# Patient Record
Sex: FEMALE | Race: WHITE | NOT HISPANIC OR LATINO | Employment: OTHER | URBAN - METROPOLITAN AREA
[De-identification: names, ages, dates, MRNs, and addresses within clinical notes are randomized per-mention and may not be internally consistent; named-entity substitution may affect disease eponyms.]

---

## 2017-03-24 DIAGNOSIS — E78.5 HYPERLIPIDEMIA LDL GOAL <100: ICD-10-CM

## 2017-03-24 RX ORDER — SIMVASTATIN 40 MG
40 TABLET ORAL AT BEDTIME
Qty: 90 TABLET | Refills: 3 | Status: CANCELLED | OUTPATIENT
Start: 2017-03-24

## 2017-03-24 NOTE — TELEPHONE ENCOUNTER
Simvastatin     Last Written Prescription Date: 04/07/2016  Last Fill Quantity: 90, # refills: 3  Last Office Visit with G, [facility] or [facility] prescribing provider: 04/07/2016  Next 5 appointments (look out 90 days)     Apr 10, 2017  8:00 AM CDT   PHYSICAL with Carla Carlos MD   [facility]    [address] [address] [facility] [address]   [phone]                   Lab Results   Component Value Date    CHOL 120 04/07/2016     Lab Results   Component Value Date    HDL 44 04/07/2016     Lab Results   Component Value Date    LDL 37 04/07/2016     Lab Results   Component Value Date    TRIG 194 04/07/2016     Lab Results   Component Value Date    CHOLHDLRATIO 3.3 04/06/2015       Niels GRECO (R)

## 2017-03-28 NOTE — TELEPHONE ENCOUNTER
Has appointment scheduled for 4/10/2017. Pharmacy notified should have enough to last until then.    Anabel Dietz RN

## 2017-04-10 ENCOUNTER — OFFICE VISIT (OUTPATIENT)
Dept: FAMILY MEDICINE | Facility: CLINIC | Age: 82
End: 2017-04-10
Payer: COMMERCIAL

## 2017-04-10 VITALS
HEIGHT: 64 IN | HEART RATE: 61 BPM | BODY MASS INDEX: 36.26 KG/M2 | DIASTOLIC BLOOD PRESSURE: 80 MMHG | WEIGHT: 212.4 LBS | SYSTOLIC BLOOD PRESSURE: 147 MMHG

## 2017-04-10 DIAGNOSIS — E78.5 HYPERLIPIDEMIA LDL GOAL <100: ICD-10-CM

## 2017-04-10 DIAGNOSIS — Z00.00 MEDICARE ANNUAL WELLNESS VISIT, SUBSEQUENT: Primary | ICD-10-CM

## 2017-04-10 DIAGNOSIS — E78.5 HYPERLIPIDEMIA, UNSPECIFIED HYPERLIPIDEMIA TYPE: ICD-10-CM

## 2017-04-10 DIAGNOSIS — R73.01 IMPAIRED FASTING GLUCOSE: ICD-10-CM

## 2017-04-10 DIAGNOSIS — I10 BENIGN ESSENTIAL HYPERTENSION: ICD-10-CM

## 2017-04-10 DIAGNOSIS — I10 ESSENTIAL HYPERTENSION: ICD-10-CM

## 2017-04-10 LAB
ANION GAP SERPL CALCULATED.3IONS-SCNC: 10 MMOL/L (ref 3–14)
BUN SERPL-MCNC: 19 MG/DL (ref 7–30)
CALCIUM SERPL-MCNC: 9 MG/DL (ref 8.5–10.1)
CHLORIDE SERPL-SCNC: 105 MMOL/L (ref 94–109)
CHOLEST SERPL-MCNC: 143 MG/DL
CO2 SERPL-SCNC: 25 MMOL/L (ref 20–32)
CREAT SERPL-MCNC: 0.74 MG/DL (ref 0.52–1.04)
GFR SERPL CREATININE-BSD FRML MDRD: 75 ML/MIN/1.7M2
GLUCOSE SERPL-MCNC: 106 MG/DL (ref 70–99)
HBA1C MFR BLD: 5.7 % (ref 4.3–6)
HDLC SERPL-MCNC: 43 MG/DL
LDLC SERPL CALC-MCNC: 66 MG/DL
NONHDLC SERPL-MCNC: 100 MG/DL
POTASSIUM SERPL-SCNC: 3.9 MMOL/L (ref 3.4–5.3)
SODIUM SERPL-SCNC: 140 MMOL/L (ref 133–144)
TRIGL SERPL-MCNC: 172 MG/DL

## 2017-04-10 PROCEDURE — 80048 BASIC METABOLIC PNL TOTAL CA: CPT | Performed by: FAMILY MEDICINE

## 2017-04-10 PROCEDURE — 80061 LIPID PANEL: CPT | Performed by: FAMILY MEDICINE

## 2017-04-10 PROCEDURE — 36415 COLL VENOUS BLD VENIPUNCTURE: CPT | Performed by: FAMILY MEDICINE

## 2017-04-10 PROCEDURE — 83036 HEMOGLOBIN GLYCOSYLATED A1C: CPT | Performed by: FAMILY MEDICINE

## 2017-04-10 PROCEDURE — 99397 PER PM REEVAL EST PAT 65+ YR: CPT | Performed by: FAMILY MEDICINE

## 2017-04-10 RX ORDER — AMLODIPINE BESYLATE 2.5 MG/1
2.5 TABLET ORAL DAILY
Qty: 90 TABLET | Refills: 3 | Status: SHIPPED | OUTPATIENT
Start: 2017-04-10 | End: 2018-01-25

## 2017-04-10 RX ORDER — SIMVASTATIN 40 MG
40 TABLET ORAL AT BEDTIME
Qty: 90 TABLET | Refills: 3 | Status: SHIPPED | OUTPATIENT
Start: 2017-04-10 | End: 2018-01-25

## 2017-04-10 RX ORDER — LISINOPRIL 20 MG/1
20 TABLET ORAL DAILY
Qty: 90 TABLET | Refills: 3 | Status: SHIPPED | OUTPATIENT
Start: 2017-04-10 | End: 2018-01-25

## 2017-04-10 NOTE — LETTER
[facility]  [address]  [facility] [address]  Phone: [phone]    April 13, 2017    Jen Layne  [address] [address]          Dear Jen,    The results of your recent lab tests were your cholesterol levels are fine on her simvastatin.   Your kidney functions and electrolytes are excellent. The fasting blood sugar is still a bit elevated, in the prediabetic range, but the A1C or 3 month average has improved since last year, so she may just recheck this in a year and keep eating a good diet with plenty of fruits and vegetables and whole grains, limited sweets and starches. Enclosed is a copy of these results.  If you have any further questions or problems, please contact our office.  Results for orders placed or performed in visit on 04/10/17   Lipid Profile with reflex to direct LDL   Result Value Ref Range    Cholesterol 143 <200 mg/dL    Triglycerides 172 (H) <150 mg/dL    HDL Cholesterol 43 (L) >49 mg/dL    LDL Cholesterol Calculated 66 <100 mg/dL    Non HDL Cholesterol 100 <130 mg/dL   Basic metabolic panel   Result Value Ref Range    Sodium 140 133 - 144 mmol/L    Potassium 3.9 3.4 - 5.3 mmol/L    Chloride 105 94 - 109 mmol/L    Carbon Dioxide 25 20 - 32 mmol/L    Anion Gap 10 3 - 14 mmol/L    Glucose 106 (H) 70 - 99 mg/dL    Urea Nitrogen 19 7 - 30 mg/dL    Creatinine 0.74 0.52 - 1.04 mg/dL    GFR Estimate 75 >60 mL/min/1.7m2    GFR Estimate If Black >90   GFR Calc   >60 mL/min/1.7m2    Calcium 9.0 8.5 - 10.1 mg/dL   Hemoglobin A1c   Result Value Ref Range    Hemoglobin A1C 5.7 4.3 - 6.0 %     Sincerely,      Carla Carlos MD/ llc

## 2017-04-10 NOTE — NURSING NOTE
"Chief Complaint   Patient presents with     Physical     refill medications and she would like her right foot looked at. Pain on the top of foot after walking.        Initial /80 (BP Location: Right arm, Patient Position: Chair, Cuff Size: Adult Large)  Pulse 61  Ht 5' 4.25\" (1.632 m)  Wt 212 lb 6.4 oz (96.3 kg)  BMI 36.18 kg/m2 Estimated body mass index is 36.18 kg/(m^2) as calculated from the following:    Height as of this encounter: 5' 4.25\" (1.632 m).    Weight as of this encounter: 212 lb 6.4 oz (96.3 kg).  Medication Reconciliation: complete   Danae Roque CMA    "

## 2017-04-10 NOTE — MR AVS SNAPSHOT
After Visit Summary   4/10/2017    Jen Layne    MRN: [id number]           Patient Information     Date Of Birth          1935        Visit Information        Provider Department      4/10/2017 8:00 AM Carla Carlos MD [facility]        Today's Diagnoses     Medicare annual wellness visit, subsequent    -  1    Benign essential hypertension        Impaired fasting glucose        Hyperlipidemia, unspecified hyperlipidemia type        Hyperlipidemia LDL goal <100        Essential hypertension          Care Instructions      Preventive Health Recommendations    Female Ages 65 +  Yearly exam:     See your health care provider every year in order to  o Review health changes.   o Discuss preventive care.    o Review your medicines if your doctor has prescribed any.    You no longer need a yearly Pap test unless you've had an abnormal Pap test in the past 10 years. If you have vaginal symptoms, such as bleeding or discharge, be sure to talk with your provider about a Pap test.    Every 1 to 2 years, have a mammogram.  If you are over 69, talk with your health care provider about whether or not you want to continue having screening mammograms.    Every 10 years, have a colonoscopy. Or, have a yearly FIT test (stool test). These exams will check for colon cancer.     Have a cholesterol test every 5 years, or more often if your doctor advises it.     Have a diabetes test (fasting glucose) every three years. If you are at risk for diabetes, you should have this test more often.     At age 65, have a bone density scan (DEXA) to check for osteoporosis (brittle bone disease).    Shots:    Get a flu shot each year.    Get a tetanus shot every 10 years.    Talk to your doctor about your pneumonia vaccines. There are now two you should receive - Pneumovax (PPSV 23) and Prevnar (PCV 13).    Talk to your doctor about the shingles vaccine.    Talk to your doctor about the hepatitis B  vaccine.  Nutrition:     Eat at least 5 servings of fruits and vegetables each day.    Eat whole-grain bread, whole-wheat pasta and brown rice instead of white grains and rice.    Talk to your provider about Calcium and Vitamin D.     Lifestyle    Exercise at least 150 minutes a week (30 minutes a day, 5 days a week). This will help you control your weight and prevent disease.    Limit alcohol to one drink per day.    No smoking.     Wear sunscreen to prevent skin cancer.     See your dentist twice a year for an exam and cleaning.    See your eye doctor every 1 to 2 years to screen for conditions such as glaucoma, macular degeneration and cataracts.  Thank you for choosing [facility].  You may be receiving a survey in the mail from [employer] regarding your visit today.  Please take a few minutes to complete and return the survey to let us know how we are doing.  Our Clinic hours are:  Mondays    7:20 am - 7 pm  Tues -  Fri  7:20 am - 5 pm  Clinic Phone: [phone]  The clinic lab opens at 7:30 am Mon - Fri and appointments are required.  [facility] [place]  Ph. [phone]  Monday-Thursday 8 am - 7pm  Tues/Wed/Fri 8 am - 5:30 pm               Follow-ups after your visit        Who to contact     If you have questions or need follow up information about today's clinic visit or your schedule please contact [facility]note 3] directly at [phone].  Normal or non-critical lab and imaging results will be communicated to you by MyChart, letter or phone within 4 business days after the clinic has received the results. If you do not hear from us within 7 days, please contact the clinic through [employer] or phone. If you have a critical or abnormal lab result, we will notify you by phone as soon as possible.  Submit refill requests through [employer] or call your pharmacy and they will forward the refill request to us. Please allow 3 business days for your refill to be completed.           "Additional Information About Your Visit        MyChart Information     [employer] lets you send messages to your doctor, view your test results, renew your prescriptions, schedule appointments and more. To sign up, go to [email or web address] . Click on \"Log in\" on the left side of the screen, which will take you to the Welcome page. Then click on \"Sign up Now\" on the right side of the page.     You will be asked to enter the access code listed below, as well as some personal information. Please follow the directions to create your username and password.     Your access code is: [email or web address]  Expires: 2017  8:36 AM     Your access code will  in 90 days. If you need help or a new code, please call your [place] clinic or [phone].        Care EveryWhere ID     This is your [facility] EveryWhere ID. This could be used by other organizations to access your [place] medical records  [id number]        Your Vitals Were     Pulse Height BMI (Body Mass Index)             61 5' 4.25\" (1.632 m) 36.18 kg/m2          Blood Pressure from Last 3 Encounters:   04/10/17 147/80   16 138/76   05/11/15 140/78    Weight from Last 3 Encounters:   04/10/17 212 lb 6.4 oz (96.3 kg)   16 214 lb (97.1 kg)   05/11/15 217 lb 9.6 oz (98.7 kg)              We Performed the Following     Basic metabolic panel     Hemoglobin A1c     Lipid Profile with reflex to direct LDL          Where to get your medicines      These medications were sent to [address] [address]     Phone:  [phone]     amLODIPine 2.5 MG tablet    lisinopril 20 MG tablet    simvastatin 40 MG tablet          Primary Care Provider Office Phone # Fax #    Carla Carlos [phone]       [facility] [address] [address] [address]        Thank you!     Thank you for choosing [facility]  for your care. Our goal is always to provide you " with excellent care. Hearing back from our patients is one way we can continue to improve our services. Please take a few minutes to complete the written survey that you may receive in the mail after your visit with us. Thank you!             Your Updated Medication List - Protect others around you: Learn how to safely use, store and throw away your medicines at www.disposemymeds.org.          This list is accurate as of: 4/10/17  8:36 AM.  Always use your most recent med list.                   Brand Name Dispense Instructions for use    amLODIPine 2.5 MG tablet    NORVASC    90 tablet    Take 1 tablet (2.5 mg) by mouth daily       aspirin 81 MG tablet      1 TABLET DAILY       lisinopril 20 MG tablet    PRINIVIL/ZESTRIL    90 tablet    Take 1 tablet (20 mg) by mouth daily       simvastatin 40 MG tablet    ZOCOR    90 tablet    Take 1 tablet (40 mg) by mouth At Bedtime       vitamin D 1000 UNITS capsule      1 CAPSULE DAILY

## 2017-04-10 NOTE — PROGRESS NOTES
SUBJECTIVE:                                                            Jen Layne is a 81 year old female who presents for Preventive Visit  Are you in the first 12 months of your Medicare Part B coverage?  No    Healthy Habits:    Do you get at least three servings of calcium containing foods daily (dairy, green leafy vegetables, etc.)? yes    Amount of exercise or daily activities, outside of work: 4 day(s) per week    Problems taking medications regularly No    Medication side effects: No    Have you had an eye exam in the past two years? yes    Do you see a dentist twice per year? yes    Do you have sleep apnea, excessive snoring or daytime drowsiness?yes, waking up to go to the bathroom     COGNITIVE SCREEN  1) Repeat 3 items (Banana, Sunrise, Chair)    2) Clock draw: NORMAL  3) 3 item recall: Recalls 3 objects  Results: 3 items recalled: COGNITIVE IMPAIRMENT LESS LIKELY  Mini-CogTM Copyright S Adrianna. Licensed by the author for use in [facility]; reprinted with permission (brittani@[facility]). All rights reserved.          Reviewed and updated as needed this visit by clinical staff  Tobacco  Allergies  Meds  Med Hx  Surg Hx  Fam Hx  Soc Hx        Reviewed and updated as needed this visit by Provider       Social History   Substance Use Topics     Smoking status: Never Smoker     Smokeless tobacco: Never Used     Alcohol use No       The patient does not drink >3 drinks per day nor >7 drinks per week.    Today's PHQ-2 Score:   PHQ-2 ( 1999 Pfizer) 4/10/2017 5/11/2015   Q1: Little interest or pleasure in doing things 0 0   Q2: Feeling down, depressed or hopeless 0 0   PHQ-2 Score 0 0     Do you feel safe in your environment - Yes    Do you have a Health Care Directive?: Yes: Advance Directive has been received and scanned. Up to date within the last year, per patient.    Current providers sharing in care for this patient include:   Patient Care Team:  Crala Carlos MD as PCP -  "General    Hearing impairment: No    Ability to successfully perform activities of daily living: Yes, no assistance needed     Fall risk:  Fallen 2 or more times in the past year?: No  Any fall with injury in the past year?: No    Home safety:  none identified      The following health maintenance items are reviewed in Epic and correct as of today:  Health Maintenance   Topic Date Due     FALL RISK ASSESSMENT  04/07/2017     INFLUENZA VACCINE (SYSTEM ASSIGNED)  09/01/2017     LIPID SCREEN Q5 YR FEMALE (SYSTEM ASSIGNED)  04/07/2021     ADVANCE DIRECTIVE PLANNING Q5 YRS (NO INBASKET)  12/06/2021     TETANUS IMMUNIZATION (SYSTEM ASSIGNED)  03/21/2023     DEXA SCAN SCREENING (SYSTEM ASSIGNED)  Completed     PNEUMOCOCCAL  Completed         Jen GRANDE Roverot is a 81 year old female here for a wellness exam. She is on lisinopril and amlodipine for hypertension, simvastatin for lipids, denies any problems with her medications.  She also takes a daily aspirin and vitamin D supplement.    She has been feeling well, denies any problems, occasionally takes an Aleve for aches and pains in her right foot.  She eats well, sleeps well, has good energy.    ROS:  Constitutional, HEENT, cardiovascular, pulmonary, gi and gu systems are negative, except as otherwise noted.      OBJECTIVE:                                                            /80 (BP Location: Right arm, Patient Position: Chair, Cuff Size: Adult Large)  Pulse 61  Ht 5' 4.25\" (1.632 m)  Wt 212 lb 6.4 oz (96.3 kg)  BMI 36.18 kg/m2 Estimated body mass index is 36.18 kg/(m^2) as calculated from the following:    Height as of this encounter: 5' 4.25\" (1.632 m).    Weight as of this encounter: 212 lb 6.4 oz (96.3 kg).  EXAM:   She is alert, oriented, overweight but weight has been stable for years.  She is in good spirits.  She has normal gait, has no trouble getting on the exam table.  The lungs are clear without wheezes, rales, or rhonchi.  Heart sounds are " "regular without murmur.  The abdomen is benign.  There is no edema.    Component      Latest Ref Rng & Units 4/7/2016 4/10/2017   Sodium      133 - 144 mmol/L 139 140   Potassium      3.4 - 5.3 mmol/L 4.2 3.9   Chloride      94 - 109 mmol/L 106 105   Carbon Dioxide      20 - 32 mmol/L 26 25   Anion Gap      3 - 14 mmol/L 7 10   Glucose      70 - 99 mg/dL 105 (H) 106 (H)   Urea Nitrogen      7 - 30 mg/dL 17 19   Creatinine      0.52 - 1.04 mg/dL 0.80 0.74   GFR Estimate      >60 mL/min/1.7m2 70 75   GFR Estimate If Black      >60 mL/min/1.7m2 84 >90 . . .   Calcium      8.5 - 10.1 mg/dL 9.2 9.0   Cholesterol      <200 mg/dL 120 143   Triglycerides      <150 mg/dL 194 (H) 172 (H)   HDL Cholesterol      >49 mg/dL 44 (L) 43 (L)   LDL Cholesterol Calculated      <100 mg/dL 37 66   Non HDL Cholesterol      <130 mg/dL 76 100   Hemoglobin A1C      4.3 - 6.0 % 6.0 5.7       ASSESSMENT / PLAN:                                                            1) wellness exam  2) hypertension, controlled on current medications -- renew meds  3) hyperlipidemia, controlled on current meds -- renew statin  4) impaired fasting glucose with improvement on A1C monitoring    End of Life Planning:  Patient currently has an advanced directive: Yes.  Practitioner is supportive of decision.    COUNSELING:  Reviewed preventive health counseling, as reflected in patient instructions       Regular exercise       Healthy diet/nutrition        Estimated body mass index is 36.18 kg/(m^2) as calculated from the following:    Height as of this encounter: 5' 4.25\" (1.632 m).    Weight as of this encounter: 212 lb 6.4 oz (96.3 kg).     reports that she has never smoked. She has never used smokeless tobacco.      Appropriate preventive services were discussed with this patient, including applicable screening as appropriate for cardiovascular disease, diabetes, osteopenia/osteoporosis, and glaucoma.  As appropriate for age/gender, discussed screening for " colorectal cancer, prostate cancer, breast cancer, and cervical cancer. Checklist reviewing preventive services available has been given to the patient.    Reviewed patients plan of care and provided an AVS. The Basic Care Plan (routine screening as documented in Health Maintenance) for Jen meets the Care Plan requirement. This Care Plan has been established and reviewed with the Patient.    Counseling Resources:  ATP IV Guidelines  Pooled Cohorts Equation Calculator  Breast Cancer Risk Calculator  FRAX Risk Assessment  ICSI Preventive Guidelines  Dietary Guidelines for Americans, 2010  USDA's MyPlate  ASA Prophylaxis  Lung CA Screening    Carla Carlos MD  [facility]

## 2017-04-10 NOTE — PATIENT INSTRUCTIONS
Preventive Health Recommendations    Female Ages 65 +  Yearly exam:     See your health care provider every year in order to  o Review health changes.   o Discuss preventive care.    o Review your medicines if your doctor has prescribed any.    You no longer need a yearly Pap test unless you've had an abnormal Pap test in the past 10 years. If you have vaginal symptoms, such as bleeding or discharge, be sure to talk with your provider about a Pap test.    Every 1 to 2 years, have a mammogram.  If you are over 69, talk with your health care provider about whether or not you want to continue having screening mammograms.    Every 10 years, have a colonoscopy. Or, have a yearly FIT test (stool test). These exams will check for colon cancer.     Have a cholesterol test every 5 years, or more often if your doctor advises it.     Have a diabetes test (fasting glucose) every three years. If you are at risk for diabetes, you should have this test more often.     At age 65, have a bone density scan (DEXA) to check for osteoporosis (brittle bone disease).    Shots:    Get a flu shot each year.    Get a tetanus shot every 10 years.    Talk to your doctor about your pneumonia vaccines. There are now two you should receive - Pneumovax (PPSV 23) and Prevnar (PCV 13).    Talk to your doctor about the shingles vaccine.    Talk to your doctor about the hepatitis B vaccine.  Nutrition:     Eat at least 5 servings of fruits and vegetables each day.    Eat whole-grain bread, whole-wheat pasta and brown rice instead of white grains and rice.    Talk to your provider about Calcium and Vitamin D.     Lifestyle    Exercise at least 150 minutes a week (30 minutes a day, 5 days a week). This will help you control your weight and prevent disease.    Limit alcohol to one drink per day.    No smoking.     Wear sunscreen to prevent skin cancer.     See your dentist twice a year for an exam and cleaning.    See your eye doctor every 1 to 2 years  to screen for conditions such as glaucoma, macular degeneration and cataracts.  Thank you for choosing [facility].  You may be receiving a survey in the mail from [employer] regarding your visit today.  Please take a few minutes to complete and return the survey to let us know how we are doing.  Our Clinic hours are:  Mondays    7:20 am - 7 pm  Tues -  Fri  7:20 am - 5 pm  Clinic Phone: [phone]  The clinic lab opens at 7:30 am Mon - Fri and appointments are required.  [facility] [place]  Ph. [phone]  Monday-Thursday 8 am - 7pm  Tues/Wed/Fri 8 am - 5:30 pm

## 2018-01-25 ENCOUNTER — OFFICE VISIT (OUTPATIENT)
Dept: FAMILY MEDICINE | Facility: CLINIC | Age: 83
End: 2018-01-25
Payer: COMMERCIAL

## 2018-01-25 VITALS
RESPIRATION RATE: 20 BRPM | HEART RATE: 86 BPM | TEMPERATURE: 97.6 F | WEIGHT: 206.8 LBS | BODY MASS INDEX: 35.3 KG/M2 | HEIGHT: 64 IN | DIASTOLIC BLOOD PRESSURE: 81 MMHG | SYSTOLIC BLOOD PRESSURE: 139 MMHG

## 2018-01-25 DIAGNOSIS — I10 ESSENTIAL HYPERTENSION: ICD-10-CM

## 2018-01-25 DIAGNOSIS — E78.5 HYPERLIPIDEMIA LDL GOAL <100: ICD-10-CM

## 2018-01-25 PROCEDURE — 99214 OFFICE O/P EST MOD 30 MIN: CPT | Performed by: FAMILY MEDICINE

## 2018-01-25 RX ORDER — AMLODIPINE BESYLATE 2.5 MG/1
2.5 TABLET ORAL DAILY
Qty: 90 TABLET | Refills: 3 | Status: SHIPPED | OUTPATIENT
Start: 2018-01-25 | End: 2019-02-27

## 2018-01-25 RX ORDER — LISINOPRIL 20 MG/1
20 TABLET ORAL DAILY
Qty: 90 TABLET | Refills: 3 | Status: SHIPPED | OUTPATIENT
Start: 2018-01-25 | End: 2018-04-19

## 2018-01-25 RX ORDER — SIMVASTATIN 40 MG
40 TABLET ORAL AT BEDTIME
Qty: 90 TABLET | Refills: 3 | Status: SHIPPED | OUTPATIENT
Start: 2018-01-25 | End: 2018-04-19

## 2018-01-25 ASSESSMENT — PAIN SCALES - GENERAL: PAINLEVEL: NO PAIN (0)

## 2018-01-25 NOTE — MR AVS SNAPSHOT
"              After Visit Summary   2018    Jen Layne    MRN: [id number]           Patient Information     Date Of Birth          1935        Visit Information        Provider Department      2018 10:20 AM Yamile Cotter MD [facility]        Today's Diagnoses     Hyperlipidemia LDL goal <100        Essential hypertension           Follow-ups after your visit        Who to contact     If you have questions or need follow up information about today's clinic visit or your schedule please contact [facility] directly at [phone].  Normal or non-critical lab and imaging results will be communicated to you by [employer], letter or phone within 4 business days after the clinic has received the results. If you do not hear from us within 7 days, please contact the clinic through [employer] or phone. If you have a critical or abnormal lab result, we will notify you by phone as soon as possible.  Submit refill requests through [employer] or call your pharmacy and they will forward the refill request to us. Please allow 3 business days for your refill to be completed.          Additional Information About Your Visit        MyChart Information     [employer] lets you send messages to your doctor, view your test results, renew your prescriptions, schedule appointments and more. To sign up, go to [email or web address] . Click on \"Log in\" on the left side of the screen, which will take you to the Welcome page. Then click on \"Sign up Now\" on the right side of the page.     You will be asked to enter the access code listed below, as well as some personal information. Please follow the directions to create your username and password.     Your access code is: [email or web address]  Expires: 2018 11:31 AM     Your access code will  in 90 days. If you need help or a new code, please call your [facility] or [phone].        Care EveryWhere ID     This is your Care " "EveryWhere ID. This could be used by other organizations to access your [place] medical records  [id number]        Your Vitals Were     Pulse Temperature Respirations Height Breastfeeding? BMI (Body Mass Index)    86 97.6  F (36.4  C) (Tympanic) 20 5' 4\" (1.626 m) No 35.5 kg/m2       Blood Pressure from Last 3 Encounters:   01/25/18 139/81   04/10/17 147/80   04/07/16 138/76    Weight from Last 3 Encounters:   01/25/18 206 lb 12.8 oz (93.8 kg)   04/10/17 212 lb 6.4 oz (96.3 kg)   04/07/16 214 lb (97.1 kg)              Today, you had the following     No orders found for display         Where to get your medicines      These medications were sent to [place] [address] [address]     Phone:  [phone]     amLODIPine 2.5 MG tablet    lisinopril 20 MG tablet    simvastatin 40 MG tablet          Primary Care Provider    Carla Carlos MD       No address on file        Equal Access to Services     [facility]: Hadii maryam lackey hadasho Soricardo, waaxda luqadaha, qaybta kaalmada adebo, beka landaverde . So [facility] [phone].    ATENCIÓN: Si habla español, tiene a pringle disposición servicios gratuitos de asistencia lingüística. [facility] [phone].    We comply with applicable federal civil rights laws and [place] laws. We do not discriminate on the basis of race, color, national origin, age, disability, sex, sexual orientation, or gender identity.            Thank you!     Thank you for choosing [facility]  for your care. Our goal is always to provide you with excellent care. Hearing back from our patients is one way we can continue to improve our services. Please take a few minutes to complete the written survey that you may receive in the mail after your visit with us. Thank you!             Your Updated Medication List - Protect others around you: Learn how to safely use, store and throw away your medicines at " www.disposemymeds.org.          This list is accurate as of 1/25/18 11:31 AM.  Always use your most recent med list.                   Brand Name Dispense Instructions for use Diagnosis    amLODIPine 2.5 MG tablet    NORVASC    90 tablet    Take 1 tablet (2.5 mg) by mouth daily    Essential hypertension       aspirin 81 MG tablet      1 TABLET DAILY        lisinopril 20 MG tablet    PRINIVIL/ZESTRIL    90 tablet    Take 1 tablet (20 mg) by mouth daily    Essential hypertension       simvastatin 40 MG tablet    ZOCOR    90 tablet    Take 1 tablet (40 mg) by mouth At Bedtime    Hyperlipidemia LDL goal <100       vitamin D 1000 UNITS capsule      1 CAPSULE DAILY

## 2018-04-19 ENCOUNTER — OFFICE VISIT (OUTPATIENT)
Dept: FAMILY MEDICINE | Facility: CLINIC | Age: 83
End: 2018-04-19
Payer: COMMERCIAL

## 2018-04-19 VITALS
TEMPERATURE: 97.6 F | HEART RATE: 63 BPM | DIASTOLIC BLOOD PRESSURE: 80 MMHG | SYSTOLIC BLOOD PRESSURE: 135 MMHG | BODY MASS INDEX: 35.89 KG/M2 | HEIGHT: 64 IN | OXYGEN SATURATION: 96 % | WEIGHT: 210.2 LBS | RESPIRATION RATE: 18 BRPM

## 2018-04-19 DIAGNOSIS — E66.9 OBESITY (BMI 30-39.9): ICD-10-CM

## 2018-04-19 DIAGNOSIS — Z78.0 POST-MENOPAUSAL: ICD-10-CM

## 2018-04-19 DIAGNOSIS — E78.5 HYPERLIPIDEMIA LDL GOAL <100: ICD-10-CM

## 2018-04-19 DIAGNOSIS — Z71.89 ADVANCED DIRECTIVES, COUNSELING/DISCUSSION: Chronic | ICD-10-CM

## 2018-04-19 DIAGNOSIS — I10 ESSENTIAL HYPERTENSION: ICD-10-CM

## 2018-04-19 DIAGNOSIS — R73.01 IMPAIRED FASTING GLUCOSE: ICD-10-CM

## 2018-04-19 DIAGNOSIS — Z00.00 WELL ADULT EXAM: Primary | ICD-10-CM

## 2018-04-19 LAB
ANION GAP SERPL CALCULATED.3IONS-SCNC: 7 MMOL/L (ref 3–14)
BUN SERPL-MCNC: 18 MG/DL (ref 7–30)
CALCIUM SERPL-MCNC: 8.9 MG/DL (ref 8.5–10.1)
CHLORIDE SERPL-SCNC: 107 MMOL/L (ref 94–109)
CHOLEST SERPL-MCNC: 107 MG/DL
CO2 SERPL-SCNC: 25 MMOL/L (ref 20–32)
CREAT SERPL-MCNC: 0.73 MG/DL (ref 0.52–1.04)
GFR SERPL CREATININE-BSD FRML MDRD: 77 ML/MIN/1.7M2
GLUCOSE SERPL-MCNC: 92 MG/DL (ref 70–99)
HBA1C MFR BLD: 5.7 % (ref 0–5.6)
HDLC SERPL-MCNC: 38 MG/DL
LDLC SERPL CALC-MCNC: 36 MG/DL
NONHDLC SERPL-MCNC: 69 MG/DL
POTASSIUM SERPL-SCNC: 4 MMOL/L (ref 3.4–5.3)
SODIUM SERPL-SCNC: 139 MMOL/L (ref 133–144)
TRIGL SERPL-MCNC: 163 MG/DL

## 2018-04-19 PROCEDURE — 83036 HEMOGLOBIN GLYCOSYLATED A1C: CPT | Performed by: FAMILY MEDICINE

## 2018-04-19 PROCEDURE — 80048 BASIC METABOLIC PNL TOTAL CA: CPT | Performed by: FAMILY MEDICINE

## 2018-04-19 PROCEDURE — 80061 LIPID PANEL: CPT | Performed by: FAMILY MEDICINE

## 2018-04-19 PROCEDURE — 99397 PER PM REEVAL EST PAT 65+ YR: CPT | Performed by: FAMILY MEDICINE

## 2018-04-19 PROCEDURE — 36415 COLL VENOUS BLD VENIPUNCTURE: CPT | Performed by: FAMILY MEDICINE

## 2018-04-19 RX ORDER — LISINOPRIL 20 MG/1
20 TABLET ORAL DAILY
Qty: 90 TABLET | Refills: 3 | Status: SHIPPED | OUTPATIENT
Start: 2018-04-19 | End: 2019-04-04

## 2018-04-19 RX ORDER — SIMVASTATIN 40 MG
40 TABLET ORAL AT BEDTIME
Qty: 90 TABLET | Refills: 3 | Status: SHIPPED | OUTPATIENT
Start: 2018-04-19 | End: 2019-04-04

## 2018-04-19 ASSESSMENT — PAIN SCALES - GENERAL: PAINLEVEL: NO PAIN (0)

## 2018-04-19 NOTE — LETTER
[facility]  [address]  [facility] [address]  Phone: [phone]      5/11/2018     Jen Layne  [address] [facility] [address] [facility] [address]      Dear Jen:    Thank you for allowing me to participate in your care. Your recent test results were reviewed and listed below.  This looks good.     Your results are provided below for your review  Results for orders placed or performed in visit on 04/19/18   Basic metabolic panel   Result Value Ref Range    Sodium 139 133 - 144 mmol/L    Potassium 4.0 3.4 - 5.3 mmol/L    Chloride 107 94 - 109 mmol/L    Carbon Dioxide 25 20 - 32 mmol/L    Anion Gap 7 3 - 14 mmol/L    Glucose 92 70 - 99 mg/dL    Urea Nitrogen 18 7 - 30 mg/dL    Creatinine 0.73 0.52 - 1.04 mg/dL    GFR Estimate 77 >60 mL/min/1.7m2    GFR Estimate If Black >90 >60 mL/min/1.7m2    Calcium 8.9 8.5 - 10.1 mg/dL   Lipid Profile (Chol, Trig, HDL, LDL calc)   Result Value Ref Range    Cholesterol 107 <200 mg/dL    Triglycerides 163 (H) <150 mg/dL    HDL Cholesterol 38 (L) >49 mg/dL    LDL Cholesterol Calculated 36 <100 mg/dL    Non HDL Cholesterol 69 <130 mg/dL   Hemoglobin A1c   Result Value Ref Range    Hemoglobin A1C 5.7 (H) 0 - 5.6 %                 Thank you for choosing [facility] [place]. As a result, please continue with the treatment plan discussed in the office. Return as discussed or sooner if symptoms worsen or fail to improve. If you have any further questions or concerns, please do not hesitate to contact us.      Sincerely,        Dr. Yamile Cotter

## 2018-04-19 NOTE — MR AVS SNAPSHOT
After Visit Summary   4/19/2018    Jen Layne    MRN: [id number]           Patient Information     Date Of Birth          1935        Visit Information        Provider Department      4/19/2018 8:40 AM Yamile Cotter MD [facility]        Today's Diagnoses     Impaired fasting glucose    -  1    Advance Care Planning        Obesity (BMI 30-39.9)        Essential hypertension        Hyperlipidemia LDL goal <100        Post-menopausal          Care Instructions      Preventive Health Recommendations    Female Ages 65 +    Yearly exam:     See your health care provider every year in order to  o Review health changes.   o Discuss preventive care.    o Review your medicines if your doctor has prescribed any.      You no longer need a yearly Pap test unless you've had an abnormal Pap test in the past 10 years. If you have vaginal symptoms, such as bleeding or discharge, be sure to talk with your provider about a Pap test.      Every 1 to 2 years, have a mammogram.  If you are over 69, talk with your health care provider about whether or not you want to continue having screening mammograms.      Every 10 years, have a colonoscopy. Or, have a yearly FIT test (stool test). These exams will check for colon cancer.       Have a cholesterol test every 5 years, or more often if your doctor advises it.       Have a diabetes test (fasting glucose) every three years. If you are at risk for diabetes, you should have this test more often.       At age 65, have a bone density scan (DEXA) to check for osteoporosis (brittle bone disease).    Shots:    Get a flu shot each year.    Get a tetanus shot every 10 years.    Talk to your doctor about your pneumonia vaccines. There are now two you should receive - Pneumovax (PPSV 23) and Prevnar (PCV 13).    Talk to your doctor about the shingles vaccine.    Talk to your doctor about the hepatitis B vaccine.    Nutrition:     Eat at least 5  "servings of fruits and vegetables each day.      Eat whole-grain bread, whole-wheat pasta and brown rice instead of white grains and rice.      Talk to your provider about Calcium and Vitamin D.     Lifestyle    Exercise at least 150 minutes a week (30 minutes a day, 5 days a week). This will help you control your weight and prevent disease.      Limit alcohol to one drink per day.      No smoking.       Wear sunscreen to prevent skin cancer.       See your dentist twice a year for an exam and cleaning.      See your eye doctor every 1 to 2 years to screen for conditions such as glaucoma, macular degeneration and cataracts.          Follow-ups after your visit        Future tests that were ordered for you today     Open Future Orders        Priority Expected Expires Ordered    DX Hip/Pelvis/Spine Routine  4/19/2019 4/19/2018            Who to contact     If you have questions or need follow up information about today's clinic visit or your schedule please contact [facility] directly at [phone].  Normal or non-critical lab and imaging results will be communicated to you by [employer], letter or phone within 4 business days after the clinic has received the results. If you do not hear from us within 7 days, please contact the clinic through G[employer] Messagingt or phone. If you have a critical or abnormal lab result, we will notify you by phone as soon as possible.  Submit refill requests through foodjunky or call your pharmacy and they will forward the refill request to us. Please allow 3 business days for your refill to be completed.          Additional Information About Your Visit        foodjunky Information     foodjunky lets you send messages to your doctor, view your test results, renew your prescriptions, schedule appointments and more. To sign up, go to [email or web address] . Click on \"Log in\" on the left side of the screen, which will take you to the Welcome page. Then click on \"Sign up Now\" on the " "right side of the page.     You will be asked to enter the access code listed below, as well as some personal information. Please follow the directions to create your username and password.     Your access code is: [email or web address]  Expires: 2018 12:31 PM     Your access code will  in 90 days. If you need help or a new code, please call your [facility] clinic or [phone].        Care EveryWhere ID     This is your Care EveryWhere ID. This could be used by other organizations to access your [facility] medical records  [id number]        Your Vitals Were     Pulse Temperature Respirations Height Pulse Oximetry Breastfeeding?    63 97.6  F (36.4  C) (Tympanic) 18 5' 4\" (1.626 m) 96% No    BMI (Body Mass Index)                   36.08 kg/m2            Blood Pressure from Last 3 Encounters:   18 135/80   18 139/81   04/10/17 147/80    Weight from Last 3 Encounters:   18 210 lb 3.2 oz (95.3 kg)   18 206 lb 12.8 oz (93.8 kg)   04/10/17 212 lb 6.4 oz (96.3 kg)              We Performed the Following     Basic metabolic panel     Hemoglobin A1c     Lipid Profile (Chol, Trig, HDL, LDL calc)          Where to get your medicines      These medications were sent to [place] [address] [facility] [address]     Phone:  [phone]     lisinopril 20 MG tablet    simvastatin 40 MG tablet          Primary Care Provider Office Phone # Fax #    Yamile Cotter [phone] [address] [facility] [address]        Equal Access to Services     [facility]: Hadii maryam Orozco, waaxda luqadaha, qaybta kaalmada adeedyada, beka bocanegra. So [facility] [phone].    ATENCIÓN: Si habla español, tiene a pringle disposición servicios gratuitos de asistencia lingüística. Llame al [phone].    We comply with applicable federal civil rights laws and [place] laws. We do not discriminate on the basis " of race, color, national origin, age, disability, sex, sexual orientation, or gender identity.            Thank you!     Thank you for choosing [facility]  for your care. Our goal is always to provide you with excellent care. Hearing back from our patients is one way we can continue to improve our services. Please take a few minutes to complete the written survey that you may receive in the mail after your visit with us. Thank you!             Your Updated Medication List - Protect others around you: Learn how to safely use, store and throw away your medicines at www.disposemymeds.org.          This list is accurate as of 4/19/18  9:30 AM.  Always use your most recent med list.                   Brand Name Dispense Instructions for use Diagnosis    amLODIPine 2.5 MG tablet    NORVASC    90 tablet    Take 1 tablet (2.5 mg) by mouth daily    Essential hypertension       aspirin 81 MG tablet      1 TABLET DAILY        lisinopril 20 MG tablet    PRINIVIL/ZESTRIL    90 tablet    Take 1 tablet (20 mg) by mouth daily    Essential hypertension       simvastatin 40 MG tablet    ZOCOR    90 tablet    Take 1 tablet (40 mg) by mouth At Bedtime    Hyperlipidemia LDL goal <100       vitamin D 1000 units capsule      1 CAPSULE DAILY

## 2018-04-19 NOTE — PROGRESS NOTES
SUBJECTIVE:   Jen Layne is a 82 year old female who presents for Preventive Visit.     Discussed that additional charges may be applied for addressing additional concerns at today's visit.  Patient verbalized understanding and would like additional concerns addressed today.     Are you in the first 12 months of your Medicare Part B coverage?  No    Healthy Habits:    Do you get at least three servings of calcium containing foods daily (dairy, green leafy vegetables, etc.)? yes    Amount of exercise or daily activities, outside of work: 1-2 day(s) per week    Problems taking medications regularly No    Medication side effects: No    Have you had an eye exam in the past two years? yes    Do you see a dentist twice per year?  yes    Do you have sleep apnea, excessive snoring or daytime drowsiness?no      Ability to successfully perform activities of daily living: Yes, no assistance needed    Home safety:  none identified     Hearing impairment: No    Fall risk:         COGNITIVE SCREEN  1) Repeat 3 items (Banana, Sunrise, Chair)    2) Clock draw: NORMAL  3) 3 item recall: Recalls 3 objects  Results: 3 items recalled: COGNITIVE IMPAIRMENT LESS LIKELY    Mini-CogTM Copyright S Adrianna. Licensed by the author for use in [facility]; reprinted with permission ([email or web address]). All rights reserved.      Chief Complaint   Patient presents with     Wellness Visit     dizziness when lying down at night x 1 week. No dizziness during the day. No CVA symptoms - weakness, speech etc. No URI symptoms.  Has gotten significantly better - almost gone now.        Reviewed and updated as needed this visit by clinical staff         Reviewed and updated as needed this visit by Provider        Social History   Substance Use Topics     Smoking status: Never Smoker     Smokeless tobacco: Never Used     Alcohol use No       If you drink alcohol do you typically have >3 drinks per day or >7 drinks per week? No                         Today's PHQ-2 Score:   PHQ-2 (  [employer]) 2018 4/10/2017   Q1: Little interest or pleasure in doing things 0 0   Q2: Feeling down, depressed or hopeless 0 0   PHQ-2 Score 0 0       Do you feel safe in your environment - Yes    Do you have a Health Care Directive?: Yes: Advance Directive has been received and scanned.    Current providers sharing in care for this patient include:   Patient Care Team:  Carla Carlos MD as PCP - General    The following health maintenance items are reviewed in Epic and correct as of today:  Health Maintenance   Topic Date Due     INFLUENZA VACCINE (1) 2017     FALL RISK ASSESSMENT  2019     ADVANCE DIRECTIVE PLANNING Q5 YRS  2021     TETANUS IMMUNIZATION (SYSTEM ASSIGNED)  2023     DEXA SCAN SCREENING (SYSTEM ASSIGNED)  Completed     PNEUMOCOCCAL  Completed     Labs reviewed in EPIC  Patient Active Problem List   Diagnosis     Hypertension     Hyperlipidemia LDL goal <100     Obesity (BMI 30-39.9)     CKD (chronic kidney disease) stage 2, GFR 60-89 ml/min     Advance Care Planning     Impaired fasting glucose     Metabolic syndrome     Past Surgical History:   Procedure Laterality Date     COLONOSCOPY  05    normal, recheck 10 years -- done at [facility] EYE  2018    Capsulotomy Left eye.   Dr. Skip Villela     HC REMOVAL GALLBLADDER       HYSTEROSCOPY,DIAGNOSTIC  around        Social History   Substance Use Topics     Smoking status: Never Smoker     Smokeless tobacco: Never Used     Alcohol use No     Family History   Problem Relation Age of Onset     CANCER Sister      uterine and ovarian-in remission      HEART DISEASE Brother      valve replacement     Lipids Brother      Eye Disorder Mother      glaucoma     HEART DISEASE Mother       of CHF     Eye Disorder Brother      detached retina     CANCER Son      SKIN     CANCER Paternal Grandfather      carcinoma of the lip, pipe-smoker     Thyroid Disease  "Daughter      goiter or nodule?     DIABETES No family hx of      C.A.D. No family hx of      Breast Cancer No family hx of      Cancer - colorectal No family hx of          Current Outpatient Prescriptions   Medication Sig Dispense Refill     amLODIPine (NORVASC) 2.5 MG tablet Take 1 tablet (2.5 mg) by mouth daily 90 tablet 3     ASPIRIN 81 MG OR TABS 1 TABLET DAILY       lisinopril (PRINIVIL/ZESTRIL) 20 MG tablet Take 1 tablet (20 mg) by mouth daily 90 tablet 3     simvastatin (ZOCOR) 40 MG tablet Take 1 tablet (40 mg) by mouth At Bedtime 90 tablet 3     VITAMIN D 1000 UNIT OR CAPS 1 CAPSULE DAILY       Allergies   Allergen Reactions     Hydrochlorothiazide      Triggered two gout attacks, no further problmes since discontinuing drug       Mammogram Screening: Patient over age 75, has elected to stop mammography screening.    ROS:  Constitutional, neuro, ENT, endocrine, pulmonary, cardiac, gastrointestinal, genitourinary, musculoskeletal, integument and psychiatric systems are negative, except as otherwise noted.     OBJECTIVE:   There were no vitals taken for this visit. Estimated body mass index is 35.5 kg/(m^2) as calculated from the following:    Height as of 1/25/18: 5' 4\" (1.626 m).    Weight as of 1/25/18: 206 lb 12.8 oz (93.8 kg).  EXAM:   GENERAL APPEARANCE: healthy, alert and no distress  EYES: Eyes grossly normal to inspection, PERRL and conjunctivae and sclerae normal  HENT: ear canals and TM's normal, nose and mouth without ulcers or lesions, oropharynx clear and oral mucous membranes moist  NECK: no adenopathy, no asymmetry, masses, or scars and thyroid normal to palpation  RESP: lungs clear to auscultation - no rales, rhonchi or wheezes  BREAST: normal without masses, tenderness or nipple discharge and no palpable axillary masses or adenopathy  CV: regular rate and rhythm, normal S1 S2, no S3 or S4, no murmur, click or rub, no peripheral edema and peripheral pulses strong  ABDOMEN: soft, nontender, " "no hepatosplenomegaly, no masses and bowel sounds normal  MS: no musculoskeletal defects are noted and gait is age appropriate without ataxia  SKIN: no suspicious lesions or rashes  NEURO: Normal strength and tone, sensory exam grossly normal, mentation intact and speech normal  PSYCH: mentation appears normal and affect normal/bright    ASSESSMENT / PLAN:   1. Well adult exam    2. Impaired fasting glucose  Check labs.  - Hemoglobin A1c    3. Obesity (BMI 30-39.9)  See below.    4. Essential hypertension  Well controlled. Refilled medication. Check labs.    - Basic metabolic panel  - lisinopril (PRINIVIL/ZESTRIL) 20 MG tablet; Take 1 tablet (20 mg) by mouth daily  Dispense: 90 tablet; Refill: 3    5. Hyperlipidemia LDL goal <100  Well controlled. Refilled medication. Check labs.    - Lipid Profile (Chol, Trig, HDL, LDL calc)  - simvastatin (ZOCOR) 40 MG tablet; Take 1 tablet (40 mg) by mouth At Bedtime  Dispense: 90 tablet; Refill: 3    6. Post-menopausal  - DX Hip/Pelvis/Spine; Future    7. Advance Care Planning      End of Life Planning:  Patient currently has an advanced directive: No.  I have verified the patient's ablity to prepare an advanced directive/make health care decisions.  Literature was provided to assist patient in preparing an advanced directive.    COUNSELING:  Reviewed preventive health counseling, as reflected in patient instructions        Estimated body mass index is 35.5 kg/(m^2) as calculated from the following:    Height as of 1/25/18: 5' 4\" (1.626 m).    Weight as of 1/25/18: 206 lb 12.8 oz (93.8 kg).  Weight management plan: Discussed healthy diet and exercise guidelines and patient will follow up in 12 months in clinic to re-evaluate.     reports that she has never smoked. She has never used smokeless tobacco.      Appropriate preventive services were discussed with this patient, including applicable screening as appropriate for cardiovascular disease, diabetes, osteopenia/osteoporosis, " and glaucoma.  As appropriate for age/gender, discussed screening for colorectal cancer, prostate cancer, breast cancer, and cervical cancer. Checklist reviewing preventive services available has been given to the patient.    Reviewed patients plan of care and provided an AVS. The Intermediate Care Plan ( asthma action plan, low back pain action plan, and migraine action plan) for Jen meets the Care Plan requirement. This Care Plan has been established and reviewed with the Patient.    Counseling Resources:  ATP IV Guidelines  Pooled Cohorts Equation Calculator  Breast Cancer Risk Calculator  FRAX Risk Assessment  ICSI Preventive Guidelines  Dietary Guidelines for Americans, 2010  USDA's MyPlate  ASA Prophylaxis  Lung CA Screening    Yamile Cotter MD  [facility]

## 2018-06-15 ENCOUNTER — OFFICE VISIT (OUTPATIENT)
Dept: FAMILY MEDICINE | Facility: CLINIC | Age: 83
End: 2018-06-15
Payer: COMMERCIAL

## 2018-06-15 VITALS
BODY MASS INDEX: 36.19 KG/M2 | TEMPERATURE: 97.9 F | SYSTOLIC BLOOD PRESSURE: 134 MMHG | RESPIRATION RATE: 12 BRPM | HEIGHT: 64 IN | HEART RATE: 72 BPM | WEIGHT: 212 LBS | OXYGEN SATURATION: 98 % | DIASTOLIC BLOOD PRESSURE: 81 MMHG

## 2018-06-15 DIAGNOSIS — R42 DIZZINESS: ICD-10-CM

## 2018-06-15 DIAGNOSIS — R53.83 FATIGUE, UNSPECIFIED TYPE: Primary | ICD-10-CM

## 2018-06-15 LAB
ALBUMIN SERPL-MCNC: 3.6 G/DL (ref 3.4–5)
ALP SERPL-CCNC: 58 U/L (ref 40–150)
ALT SERPL W P-5'-P-CCNC: 28 U/L (ref 0–50)
ANION GAP SERPL CALCULATED.3IONS-SCNC: 8 MMOL/L (ref 3–14)
AST SERPL W P-5'-P-CCNC: 21 U/L (ref 0–45)
BILIRUB SERPL-MCNC: 0.5 MG/DL (ref 0.2–1.3)
BUN SERPL-MCNC: 19 MG/DL (ref 7–30)
CALCIUM SERPL-MCNC: 8.8 MG/DL (ref 8.5–10.1)
CHLORIDE SERPL-SCNC: 109 MMOL/L (ref 94–109)
CO2 SERPL-SCNC: 27 MMOL/L (ref 20–32)
CREAT SERPL-MCNC: 1.03 MG/DL (ref 0.52–1.04)
GFR SERPL CREATININE-BSD FRML MDRD: 51 ML/MIN/1.7M2
GLUCOSE SERPL-MCNC: 99 MG/DL (ref 70–99)
POTASSIUM SERPL-SCNC: 4.2 MMOL/L (ref 3.4–5.3)
PROT SERPL-MCNC: 7.1 G/DL (ref 6.8–8.8)
SODIUM SERPL-SCNC: 144 MMOL/L (ref 133–144)
TSH SERPL DL<=0.005 MIU/L-ACNC: 0.48 MU/L (ref 0.4–4)

## 2018-06-15 PROCEDURE — 36415 COLL VENOUS BLD VENIPUNCTURE: CPT | Performed by: NURSE PRACTITIONER

## 2018-06-15 PROCEDURE — 99213 OFFICE O/P EST LOW 20 MIN: CPT | Performed by: NURSE PRACTITIONER

## 2018-06-15 PROCEDURE — 80053 COMPREHEN METABOLIC PANEL: CPT | Performed by: NURSE PRACTITIONER

## 2018-06-15 PROCEDURE — 84443 ASSAY THYROID STIM HORMONE: CPT | Performed by: NURSE PRACTITIONER

## 2018-06-15 ASSESSMENT — PAIN SCALES - GENERAL: PAINLEVEL: NO PAIN (0)

## 2018-06-15 NOTE — LETTER
[facility]  [address]  [facility] [address]  Phone: [phone]      6/19/2018     Jen Layne  [address] [address]      Dear Jen:    Thank you for allowing me to participate in your care. Your recent test results were reviewed and listed below.  Your thyroid level is normal, however your renal function looks a little off.  I would like you to come in so I can recheck this in 6 weeks.      Results for orders placed or performed in visit on 06/15/18   Comprehensive metabolic panel (BMP + Alb, Alk Phos, ALT, AST, Total. Bili, TP)   Result Value Ref Range    Sodium 144 133 - 144 mmol/L    Potassium 4.2 3.4 - 5.3 mmol/L    Chloride 109 94 - 109 mmol/L    Carbon Dioxide 27 20 - 32 mmol/L    Anion Gap 8 3 - 14 mmol/L    Glucose 99 70 - 99 mg/dL    Urea Nitrogen 19 7 - 30 mg/dL    Creatinine 1.03 0.52 - 1.04 mg/dL    GFR Estimate 51 (L) >60 mL/min/1.7m2    GFR Estimate If Black 62 >60 mL/min/1.7m2    Calcium 8.8 8.5 - 10.1 mg/dL    Bilirubin Total 0.5 0.2 - 1.3 mg/dL    Albumin 3.6 3.4 - 5.0 g/dL    Protein Total 7.1 6.8 - 8.8 g/dL    Alkaline Phosphatase 58 40 - 150 U/L    ALT 28 0 - 50 U/L    AST 21 0 - 45 U/L   TSH with free T4 reflex   Result Value Ref Range    TSH 0.48 0.40 - 4.00 mU/L         Thank you for choosing [facility]. As a result, please continue with the treatment plan discussed in the office. Return as discussed or sooner if symptoms worsen or fail to improve. If you have any further questions or concerns, please do not hesitate to contact us.      Sincerely,        Diane CANTRELL, NARCISO Ceja MA

## 2018-06-15 NOTE — PATIENT INSTRUCTIONS
We will call you with the results of your labs     Increase your fluids    Start taking the Lisinopril at bedtime    If dizziness does not resolve in a week then return to clinic.

## 2018-06-15 NOTE — MR AVS SNAPSHOT
After Visit Summary   6/15/2018    Jen Layne    MRN: [id number]           Patient Information     Date Of Birth          1935        Visit Information        Provider Department      6/15/2018 1:20 PM Diane Chua APRN CNP [facility]        Today's Diagnoses     Fatigue, unspecified type    -  1    Dizziness          Care Instructions    We will call you with the results of your labs     Increase your fluids    Start taking the Lisinopril at bedtime    If dizziness does not resolve in a week then return to clinic.          Follow-ups after your visit        Your next 10 appointments already scheduled     Jun 19, 2018 10:30 AM CDT   DX HIP/PELVIS/SPINE with [id number]   [facility] ([facility])    [address] [facility] [address] [phone]           Please do not take any of the following 24 hours prior to the day of your exam: vitamins, calcium tablets, antacids.  If possible, please wear clothes without metal (snaps, zippers). A sweatsuit works well.              Who to contact     If you have questions or need follow up information about today's clinic visit or your schedule please contact [facility] directly at [phone].  Normal or non-critical lab and imaging results will be communicated to you by MyChart, letter or phone within 4 business days after the clinic has received the results. If you do not hear from us within 7 days, please contact the clinic through MyChart or phone. If you have a critical or abnormal lab result, we will notify you by phone as soon as possible.  Submit refill requests through Ky[employer] or call your pharmacy and they will forward the refill request to us. Please allow 3 business days for your refill to be completed.          Additional Information About Your Visit        Care EveryWhere ID     This is your Care EveryWhere ID. This could be used by other organizations to  "access your [facility] medical records  [id number]        Your Vitals Were     Pulse Temperature Respirations Height Pulse Oximetry Breastfeeding?    72 97.9  F (36.6  C) (Tympanic) 12 5' 4\" (1.626 m) 98% No    BMI (Body Mass Index)                   36.39 kg/m2            Blood Pressure from Last 3 Encounters:   06/15/18 134/81   04/19/18 135/80   01/25/18 139/81    Weight from Last 3 Encounters:   06/15/18 212 lb (96.2 kg)   04/19/18 210 lb 3.2 oz (95.3 kg)   01/25/18 206 lb 12.8 oz (93.8 kg)              We Performed the Following     Comprehensive metabolic panel (BMP + Alb, Alk Phos, ALT, AST, Total. Bili, TP)     TSH with free T4 reflex        Primary Care Provider Office Phone # Fax #    Yamile Sandra Cotter [phone] [address] [facility] [address]        Equal Access to Services     [facility]: Hadii aad ku hadasho Soomaali, waaxda luqadaha, qaybta kaalmada adeegyada, waxay idiin hayracheln kye landaverde . So [facility] [phone].    ATENCIÓN: Si habla español, tiene a pringle disposición servicios gratuitos de asistencia lingüística. Llame al [phone].    We comply with applicable federal civil rights laws and [place] laws. We do not discriminate on the basis of race, color, national origin, age, disability, sex, sexual orientation, or gender identity.            Thank you!     Thank you for choosing [facility]  for your care. Our goal is always to provide you with excellent care. Hearing back from our patients is one way we can continue to improve our services. Please take a few minutes to complete the written survey that you may receive in the mail after your visit with us. Thank you!             Your Updated Medication List - Protect others around you: Learn how to safely use, store and throw away your medicines at www.disposemymeds.org.          This list is accurate as of 6/15/18  2:17 PM.  Always use your most recent med list.             "       Brand Name Dispense Instructions for use Diagnosis    amLODIPine 2.5 MG tablet    NORVASC    90 tablet    Take 1 tablet (2.5 mg) by mouth daily    Essential hypertension       aspirin 81 MG tablet      1 TABLET DAILY        lisinopril 20 MG tablet    PRINIVIL/ZESTRIL    90 tablet    Take 1 tablet (20 mg) by mouth daily    Essential hypertension       simvastatin 40 MG tablet    ZOCOR    90 tablet    Take 1 tablet (40 mg) by mouth At Bedtime    Hyperlipidemia LDL goal <100       vitamin D 1000 units capsule      1 CAPSULE DAILY

## 2018-06-15 NOTE — Clinical Note
When lab results return, patient would like to be notified by letter.  If abnormals, please call her as well.

## 2018-06-15 NOTE — PROGRESS NOTES
SUBJECTIVE:   Jen Layne is a 82 year old female who presents to clinic today for the following health issues:      Dizziness  Onset: Started 4/19/18 lasted about 1 week and quit,  this bout has been going on for 1 week     Description:   Do you feel faint:  no   Does it feel like the surroundings (bed, room) are moving: Sometimes  Unsteady/off balance: YES  Have you passed out or fallen: no     Intensity: moderate, severe    Progression of Symptoms:  worsening and intermittent    Accompanying Signs & Symptoms:  Heart palpitations: no   Nausea, vomiting: no   Weakness in arms or legs: no   Fatigue: YES  Vision or speech changes: no   Ringing in ears (Tinnitus): No  Hearing Loss: no     History:   Head trauma/concussion hx: no   Previous similar symptoms: YES 4/19/18  Recent bleeding history: no     Precipitating factors:   Worse with activity or head movement: YES- when dizziness happens  Any new medications (BP?): no   Alcohol/drug abuse/withdrawal: no     Alleviating factors:   Does staying in a fixed position give relief:  no     Therapies Tried and outcome: none    Dizziness at times when getting up, resolves quickly.   No vertigo symptoms  No vision changes  Legs can feel different, no speech changes  No CVA symptoms or neuro compromise    Not drinking enough fluids.  Some dizziness when lying down when she turns from side to side.      Problem list and histories reviewed & adjusted, as indicated.  Additional history: as documented    Patient Active Problem List   Diagnosis     Hypertension     Hyperlipidemia LDL goal <100     Obesity (BMI 30-39.9)     CKD (chronic kidney disease) stage 2, GFR 60-89 ml/min     Advance Care Planning     Impaired fasting glucose     Metabolic syndrome     Past Surgical History:   Procedure Laterality Date     COLONOSCOPY  12/05/05    normal, recheck 10 years -- done at [facility] EYE  03/30/2018    Capsulotomy Left eye.   Dr. Skip Villela     HC REMOVAL GALLBLADDER  "      HYSTEROSCOPY,DIAGNOSTIC  around        Social History   Substance Use Topics     Smoking status: Never Smoker     Smokeless tobacco: Never Used     Alcohol use No     Family History   Problem Relation Age of Onset     CANCER Sister      uterine and ovarian-in remission      HEART DISEASE Brother      valve replacement     Lipids Brother      Eye Disorder Mother      glaucoma     HEART DISEASE Mother       of CHF     Eye Disorder Brother      detached retina     CANCER Son      SKIN     CANCER Paternal Grandfather      carcinoma of the lip, pipe-smoker     Thyroid Disease Daughter      goiter or nodule?     DIABETES No family hx of      C.A.D. No family hx of      Breast Cancer No family hx of      Cancer - colorectal No family hx of            Reviewed and updated as needed this visit by clinical staff  Tobacco  Allergies  Meds  Problems  Med Hx  Surg Hx  Fam Hx  Soc Hx        Reviewed and updated as needed this visit by Provider  Allergies  Meds  Problems         ROS:  Constitutional, HEENT, cardiovascular, pulmonary, gi and gu systems are negative, except as otherwise noted.    OBJECTIVE:     /81 (BP Location: Right arm, Patient Position: Chair, Cuff Size: Adult Large)  Pulse 72  Temp 97.9  F (36.6  C) (Tympanic)  Resp 12  Ht 5' 4\" (1.626 m)  Wt 212 lb (96.2 kg)  SpO2 98%  Breastfeeding? No  BMI 36.39 kg/m2  Body mass index is 36.39 kg/(m^2).  GENERAL: healthy, alert and no distress  EYES: Eyes grossly normal to inspection, PERRL, EOMI and visual fields normal  HENT: ear canals and TM's normal, nose and mouth without ulcers or lesions  NECK: no adenopathy, no asymmetry, masses, or scars and thyroid normal to palpation  RESP: lungs clear to auscultation - no rales, rhonchi or wheezes  CV: regular rate and rhythm, normal S1 S2, no S3 or S4, no murmur, click or rub, no peripheral edema and peripheral pulses strong  MS: no gross musculoskeletal defects noted, no edema  NEURO: " Normal strength and tone, sensory exam grossly normal, mentation intact, speech normal, cranial nerves 2-12 intact and gait normal   PSYCH: mentation appears normal, affect normal/bright    Diagnostic Test Results:  No results found for this or any previous visit (from the past 24 hour(s)).    ASSESSMENT/PLAN:     ASSESSMENT:  1. Dizziness    2. Fatigue, unspecified type        PLAN:  Orders Placed This Encounter     Comprehensive metabolic panel (BMP + Alb, Alk Phos, ALT, AST, Total. Bili, TP)     TSH with free T4 reflex       Patient Instructions   We will call you with the results of your labs     Increase your fluids    Start taking the Lisinopril at bedtime    If dizziness does not resolve in a week then return to clinic.  Patient agrees with plan of care as outlined. Call or return to the clinic with any worsening of symptoms or no resolution. Medication side effects reviewed.  Chart documentation with Dragon Voice recognition Software. Although reviewed after completion, some words and grammatical errors may remain.        Diane Chua, NP, APRN [facility]

## 2018-06-18 ENCOUNTER — TELEPHONE (OUTPATIENT)
Dept: FAMILY MEDICINE | Facility: CLINIC | Age: 83
End: 2018-06-18

## 2018-06-18 DIAGNOSIS — R42 DIZZINESS: Primary | ICD-10-CM

## 2018-06-18 NOTE — TELEPHONE ENCOUNTER
Message  Received: 3 days ago       Diane Chua, APRN CNP  P Cl Provider [facility]                   When lab results return, patient would like to be notified by letter.  If abnormals, please call her as well.

## 2018-06-19 ENCOUNTER — HOSPITAL ENCOUNTER (OUTPATIENT)
Dept: BONE DENSITY | Facility: CLINIC | Age: 83
Discharge: HOME | End: 2018-06-19
Attending: FAMILY MEDICINE | Admitting: FAMILY MEDICINE
Payer: MEDICARE

## 2018-06-19 DIAGNOSIS — Z78.0 POST-MENOPAUSAL: ICD-10-CM

## 2018-06-19 PROCEDURE — 77080 DXA BONE DENSITY AXIAL: CPT

## 2018-06-19 NOTE — LETTER
[facility]  [address]  [facility] [address]  Phone: [phone]      6/25/2018     Jen Layne  [address] [address]      Dear Jen:    Thank you for allowing me to participate in your care. Your recent test results were reviewed and listed below.  Bone density scan shows osteopenia (thin bones) but not osteoporosis. I would recommend optimizing calcium and vitamin D. I would recommend 1500 mg of calcium daily along with 1000 international units of vitamin D daily. No indication for additional treatment this time.     Your results are provided below for your review  Results for orders placed or performed during the hospital encounter of 06/19/18   DX Hip/Pelvis/Spine    Narrative    DX HIP/PELVIS/SPINE 6/19/2018 10:19 AM    HISTORY: Postmenopausal.    TECHNIQUE: The lumbar spine and bilateral hips  are scanned with DXA  technique performed using a iSoftStone scanner.  DXA results are  reported according to T-score.  The T-score is the standard deviation  from the peak bone mass in a normal young adult patient.  A T-score of  -1.0 to -2.5 correlates with osteopenia.  A T-score of less than -2.5  correlates with osteoporosis.    In accordance with the ISCD (International Society of Clinical  Densitometry) the lowest BMD between the total hip and femoral neck is  reported.       Impression    IMPRESSION:  1. The T-score of the lumbar spine in the region of L1-L4 is 0.2. This  correlates with normal bone mineral density. If one looks at the L4  vertebral body alone the T-score is -1.0 which correlates with  borderline mild osteopenia.    2. The T-score of the right femoral neck is -1.0. This correlates with  borderline mild osteopenia.    3. The T-score of the left femoral neck is -0.9. This correlates with  normal bone mineral density.    4.  The bone mineral density of the worst hip is 0.902 gm/cm2.    LAWANDA ROJAS MD                 Thank you  for choosing [facility]. As a result, please continue with the treatment plan discussed in the office. Return as discussed or sooner if symptoms worsen or fail to improve. If you have any further questions or concerns, please do not hesitate to contact us.      Sincerely,        Dr. Yamile Cotter

## 2018-06-22 NOTE — PROGRESS NOTES
Please mail letter: Bone density scan shows osteopenia (thin bones) but not osteoporosis. I would recommend optimizing calcium and vitamin D. I would recommend 1500 mg of calcium daily along with 1000 international units of vitamin D daily. No indication for additional treatment this time.

## 2018-08-14 ENCOUNTER — OFFICE VISIT (OUTPATIENT)
Dept: FAMILY MEDICINE | Facility: CLINIC | Age: 83
End: 2018-08-14
Payer: COMMERCIAL

## 2018-08-14 VITALS
RESPIRATION RATE: 12 BRPM | TEMPERATURE: 96.9 F | SYSTOLIC BLOOD PRESSURE: 135 MMHG | OXYGEN SATURATION: 96 % | DIASTOLIC BLOOD PRESSURE: 75 MMHG | HEIGHT: 64 IN | BODY MASS INDEX: 35.68 KG/M2 | WEIGHT: 209 LBS | HEART RATE: 80 BPM

## 2018-08-14 DIAGNOSIS — N18.2 CKD (CHRONIC KIDNEY DISEASE) STAGE 2, GFR 60-89 ML/MIN: Primary | ICD-10-CM

## 2018-08-14 DIAGNOSIS — R42 DIZZINESS: ICD-10-CM

## 2018-08-14 PROBLEM — E66.01 MORBID OBESITY (H): Status: ACTIVE | Noted: 2018-08-14

## 2018-08-14 LAB
ALBUMIN SERPL-MCNC: 3.6 G/DL (ref 3.4–5)
ANION GAP SERPL CALCULATED.3IONS-SCNC: 6 MMOL/L (ref 3–14)
BASOPHILS # BLD AUTO: 0 10E9/L (ref 0–0.2)
BASOPHILS NFR BLD AUTO: 0.4 %
BUN SERPL-MCNC: 19 MG/DL (ref 7–30)
CALCIUM SERPL-MCNC: 8.9 MG/DL (ref 8.5–10.1)
CHLORIDE SERPL-SCNC: 108 MMOL/L (ref 94–109)
CO2 SERPL-SCNC: 27 MMOL/L (ref 20–32)
CREAT SERPL-MCNC: 0.81 MG/DL (ref 0.52–1.04)
DIFFERENTIAL METHOD BLD: NORMAL
EOSINOPHIL # BLD AUTO: 0.3 10E9/L (ref 0–0.7)
EOSINOPHIL NFR BLD AUTO: 3 %
ERYTHROCYTE [DISTWIDTH] IN BLOOD BY AUTOMATED COUNT: 13.6 % (ref 10–15)
GFR SERPL CREATININE-BSD FRML MDRD: 68 ML/MIN/1.7M2
GLUCOSE SERPL-MCNC: 98 MG/DL (ref 70–99)
HBA1C MFR BLD: 5.9 % (ref 0–5.6)
HCT VFR BLD AUTO: 44.4 % (ref 35–47)
HGB BLD-MCNC: 14.9 G/DL (ref 11.7–15.7)
LYMPHOCYTES # BLD AUTO: 2.1 10E9/L (ref 0.8–5.3)
LYMPHOCYTES NFR BLD AUTO: 23.6 %
MCH RBC QN AUTO: 30.5 PG (ref 26.5–33)
MCHC RBC AUTO-ENTMCNC: 33.6 G/DL (ref 31.5–36.5)
MCV RBC AUTO: 91 FL (ref 78–100)
MONOCYTES # BLD AUTO: 0.9 10E9/L (ref 0–1.3)
MONOCYTES NFR BLD AUTO: 9.6 %
NEUTROPHILS # BLD AUTO: 5.7 10E9/L (ref 1.6–8.3)
NEUTROPHILS NFR BLD AUTO: 63.4 %
PHOSPHATE SERPL-MCNC: 3.2 MG/DL (ref 2.5–4.5)
PLATELET # BLD AUTO: 277 10E9/L (ref 150–450)
POTASSIUM SERPL-SCNC: 4 MMOL/L (ref 3.4–5.3)
RBC # BLD AUTO: 4.88 10E12/L (ref 3.8–5.2)
SODIUM SERPL-SCNC: 141 MMOL/L (ref 133–144)
WBC # BLD AUTO: 8.9 10E9/L (ref 4–11)

## 2018-08-14 PROCEDURE — 80069 RENAL FUNCTION PANEL: CPT | Performed by: NURSE PRACTITIONER

## 2018-08-14 PROCEDURE — 99213 OFFICE O/P EST LOW 20 MIN: CPT | Performed by: NURSE PRACTITIONER

## 2018-08-14 PROCEDURE — 83036 HEMOGLOBIN GLYCOSYLATED A1C: CPT | Performed by: NURSE PRACTITIONER

## 2018-08-14 PROCEDURE — 36415 COLL VENOUS BLD VENIPUNCTURE: CPT | Performed by: NURSE PRACTITIONER

## 2018-08-14 PROCEDURE — 85025 COMPLETE CBC W/AUTO DIFF WBC: CPT | Performed by: NURSE PRACTITIONER

## 2018-08-14 ASSESSMENT — PAIN SCALES - GENERAL: PAINLEVEL: NO PAIN (0)

## 2018-08-14 NOTE — PROGRESS NOTES
SUBJECTIVE:   Cristi Layne is a 82 year old female who presents to clinic today for the following health issues:    RECHECK-  Pt is here to go over labs from last visit on 6/15/2018.  She was here to be evaluated for dizziness and fatigue.  Reports that there is been no recurrence of dizziness since her last visit.  At that time, she was working to sell her home and was under increased stress.  She wonders if the stress may have been related to her symptoms.  She is also increase her fluids as directed.  I asked her to also change the dosing time of her lisinopril to bedtime which she has done.  Metabolic panel and TSH were checked.  GFR noted to be 51, BUN 19, creatinine 1.03.      GFR in 50s 2008, 2009 when she was diagnosed with CKD stage 3.  It was felt at that time that the reduction was likely due to obesity, hypertension and increasing age. Labs improved, eGFR >60 March 2011 and December 2011, 75 in March 2013.    Metabolic panel checked today and GFR is now 68.       Results for CRISTI LAYNE (MRN [id number]) as of 8/14/2018 10:29   Ref. Range 4/6/2015 08:50 4/7/2016 08:46 4/10/2017 08:38 4/19/2018 09:36 6/15/2018 14:18   GFR Estimate Latest Ref Range: >60 mL/min/1.7m2 75 70 75 77 51 (L)     Results for CRISTI LAYNE (MRN [id number]) as of 8/18/2018 13:21   Ref. Range 8/14/2018 10:45   Sodium Latest Ref Range: 133 - 144 mmol/L 141   Potassium Latest Ref Range: 3.4 - 5.3 mmol/L 4.0   Chloride Latest Ref Range: 94 - 109 mmol/L 108   Carbon Dioxide Latest Ref Range: 20 - 32 mmol/L 27   Urea Nitrogen Latest Ref Range: 7 - 30 mg/dL 19   Creatinine Latest Ref Range: 0.52 - 1.04 mg/dL 0.81   GFR Estimate Latest Ref Range: >60 mL/min/1.7m2 68   GFR Estimate If Black Latest Ref Range: >60 mL/min/1.7m2 83   Calcium Latest Ref Range: 8.5 - 10.1 mg/dL 8.9   Anion Gap Latest Ref Range: 3 - 14 mmol/L 6   Phosphorus Latest Ref Range: 2.5 - 4.5 mg/dL 3.2   Albumin Latest Ref Range: 3.4 - 5.0 g/dL 3.6     Very  use of Aleve, not every day. Only twice a month when foot is bothering      Problem list and histories reviewed & adjusted, as indicated.  Additional history: as documented    Moved into apartment in [place] in early August.    Patient Active Problem List   Diagnosis     Hypertension     Hyperlipidemia LDL goal <100     Obesity (BMI 30-39.9)     CKD (chronic kidney disease) stage 2, GFR 60-89 ml/min     Advance Care Planning     Impaired fasting glucose     Metabolic syndrome     Morbid obesity (H)     Past Surgical History:   Procedure Laterality Date     COLONOSCOPY  05    normal, recheck 10 years -- done at [facility] EYE  2018    Capsulotomy Left eye.   Dr. Skip Villela     HC REMOVAL GALLBLADDER       HYSTEROSCOPY,DIAGNOSTIC  around        Social History   Substance Use Topics     Smoking status: Never Smoker     Smokeless tobacco: Never Used     Alcohol use No     Family History   Problem Relation Age of Onset     Cancer Sister      uterine and ovarian-in remission      HEART DISEASE Brother      valve replacement     Lipids Brother      Eye Disorder Mother      glaucoma     HEART DISEASE Mother       of CHF     Eye Disorder Brother      detached retina     Cancer Son      SKIN     Cancer Paternal Grandfather      carcinoma of the lip, pipe-smoker     Thyroid Disease Daughter      goiter or nodule?     Diabetes No family hx of      C.A.D. No family hx of      Breast Cancer No family hx of      Cancer - colorectal No family hx of            Reviewed and updated as needed this visit by clinical staff  Tobacco  Allergies  Meds  Problems  Med Hx  Surg Hx  Fam Hx  Soc Hx        Reviewed and updated as needed this visit by Provider  Allergies  Meds  Problems         ROS:  Constitutional, HEENT, cardiovascular, pulmonary, gi and gu systems are negative, except as otherwise noted.    OBJECTIVE:     /75 (BP Location: Right arm, Patient Position: Chair, Cuff Size: Adult Large)   "Pulse 80  Temp 96.9  F (36.1  C) (Tympanic)  Resp 12  Ht 5' 4\" (1.626 m)  Wt 209 lb (94.8 kg)  SpO2 96%  Breastfeeding? No  BMI 35.87 kg/m2  Body mass index is 35.87 kg/(m^2).  GENERAL: healthy, alert and no distress  NECK: no adenopathy, no asymmetry, masses, or scars and thyroid normal to palpation  RESP: lungs clear to auscultation - no rales, rhonchi or wheezes  CV: regular rate and rhythm, normal S1 S2, no S3 or S4, no murmur, click or rub, no peripheral edema and peripheral pulses strong  MS: no gross musculoskeletal defects noted, no edema  PSYCH: mentation appears normal, affect normal/bright    Diagnostic Test Results:  Results for orders placed or performed in visit on 08/14/18   Renal panel (Alb, BUN, Ca, Cl, CO2, Creat, Gluc, Phos, K, Na)   Result Value Ref Range    Sodium 141 133 - 144 mmol/L    Potassium 4.0 3.4 - 5.3 mmol/L    Chloride 108 94 - 109 mmol/L    Carbon Dioxide 27 20 - 32 mmol/L    Anion Gap 6 3 - 14 mmol/L    Glucose 98 70 - 99 mg/dL    Urea Nitrogen 19 7 - 30 mg/dL    Creatinine 0.81 0.52 - 1.04 mg/dL    GFR Estimate 68 >60 mL/min/1.7m2    GFR Estimate If Black 83 >60 mL/min/1.7m2    Calcium 8.9 8.5 - 10.1 mg/dL    Phosphorus 3.2 2.5 - 4.5 mg/dL    Albumin 3.6 3.4 - 5.0 g/dL   Hemoglobin A1c   Result Value Ref Range    Hemoglobin A1C 5.9 (H) 0 - 5.6 %   CBC with platelets and differential   Result Value Ref Range    WBC 8.9 4.0 - 11.0 10e9/L    RBC Count 4.88 3.8 - 5.2 10e12/L    Hemoglobin 14.9 11.7 - 15.7 g/dL    Hematocrit 44.4 35.0 - 47.0 %    MCV 91 78 - 100 fl    MCH 30.5 26.5 - 33.0 pg    MCHC 33.6 31.5 - 36.5 g/dL    RDW 13.6 10.0 - 15.0 %    Platelet Count 277 150 - 450 10e9/L    Diff Method Automated Method     % Neutrophils 63.4 %    % Lymphocytes 23.6 %    % Monocytes 9.6 %    % Eosinophils 3.0 %    % Basophils 0.4 %    Absolute Neutrophil 5.7 1.6 - 8.3 10e9/L    Absolute Lymphocytes 2.1 0.8 - 5.3 10e9/L    Absolute Monocytes 0.9 0.0 - 1.3 10e9/L    Absolute " Eosinophils 0.3 0.0 - 0.7 10e9/L    Absolute Basophils 0.0 0.0 - 0.2 10e9/L       ASSESSMENT/PLAN:     ASSESSMENT:  1. CKD (chronic kidney disease) stage 2, GFR 60-89 ml/min.  Stable no change in plan of care   2. Dizziness.  Resolved       PLAN:  Orders Placed This Encounter     Hemoglobin A1c     CBC with platelets and differential       Patient Instructions   We will call you with the results of your labs or need for further testing   Patient agrees with plan of care as outlined. Call or return to the clinic with any worsening of symptoms or no resolution. Medication side effects reviewed.  Chart documentation with Dragon Voice recognition Software. Although reviewed after completion, some words and grammatical errors may remain.        Diane Chua, JAYLAN, APRN [facility]

## 2018-08-14 NOTE — LETTER
[facility]  [address]  [facility] [address]  Phone: [phone]      8/15/2018     Jen Layne  [address]    [place] [place] [address]      Dear Jen:    Thank you for allowing me to participate in your care. Your recent test results were reviewed and listed below.  Kidney function now in normal range.  No concerns with this.  Complete blood count is normal.  Hemoglobin is is good.  The lab test for the diabetes is increased just a bit so we would say that you are pre-diabetic.  There are a few things that you can do-watch your calorie intake, reduce sweets, increase fruits and vegetables, increased exercise as examples..  Your results are provided below for your review  Results for orders placed or performed in visit on 08/14/18   Renal panel (Alb, BUN, Ca, Cl, CO2, Creat, Gluc, Phos, K, Na)   Result Value Ref Range    Sodium 141 133 - 144 mmol/L    Potassium 4.0 3.4 - 5.3 mmol/L    Chloride 108 94 - 109 mmol/L    Carbon Dioxide 27 20 - 32 mmol/L    Anion Gap 6 3 - 14 mmol/L    Glucose 98 70 - 99 mg/dL    Urea Nitrogen 19 7 - 30 mg/dL    Creatinine 0.81 0.52 - 1.04 mg/dL    GFR Estimate 68 >60 mL/min/1.7m2    GFR Estimate If Black 83 >60 mL/min/1.7m2    Calcium 8.9 8.5 - 10.1 mg/dL    Phosphorus 3.2 2.5 - 4.5 mg/dL    Albumin 3.6 3.4 - 5.0 g/dL   Hemoglobin A1c   Result Value Ref Range    Hemoglobin A1C 5.9 (H) 0 - 5.6 %   CBC with platelets and differential   Result Value Ref Range    WBC 8.9 4.0 - 11.0 10e9/L    RBC Count 4.88 3.8 - 5.2 10e12/L    Hemoglobin 14.9 11.7 - 15.7 g/dL    Hematocrit 44.4 35.0 - 47.0 %    MCV 91 78 - 100 fl    MCH 30.5 26.5 - 33.0 pg    MCHC 33.6 31.5 - 36.5 g/dL    RDW 13.6 10.0 - 15.0 %    Platelet Count 277 150 - 450 10e9/L    Diff Method Automated Method     % Neutrophils 63.4 %    % Lymphocytes 23.6 %    % Monocytes 9.6 %    % Eosinophils 3.0 %    % Basophils 0.4 %    Absolute Neutrophil 5.7 1.6 - 8.3 10e9/L    Absolute  Lymphocytes 2.1 0.8 - 5.3 10e9/L    Absolute Monocytes 0.9 0.0 - 1.3 10e9/L    Absolute Eosinophils 0.3 0.0 - 0.7 10e9/L    Absolute Basophils 0.0 0.0 - 0.2 10e9/L                 Thank you for choosing [facility]. As a result, please continue with the treatment plan discussed in the office. Return as discussed or sooner if symptoms worsen or fail to improve.     If you have any further questions or concerns, please do not hesitate to contact us.      Sincerely,        ÓSCAR Stanley CNP

## 2018-08-14 NOTE — MR AVS SNAPSHOT
"              After Visit Summary   8/14/2018    Jen Layne    MRN: [id number]           Patient Information     Date Of Birth          1935        Visit Information        Provider Department      8/14/2018 10:00 AM Diane Chua APRN CNP [facility]        Today's Diagnoses     CKD (chronic kidney disease) stage 2, GFR 60-89 ml/min    -  1      Care Instructions    We will call you with the results of your labs or need for further testing           Follow-ups after your visit        Who to contact     If you have questions or need follow up information about today's clinic visit or your schedule please contact [facility] directly at [phone].  Normal or non-critical lab and imaging results will be communicated to you by MyChart, letter or phone within 4 business days after the clinic has received the results. If you do not hear from us within 7 days, please contact the clinic through MyChart or phone. If you have a critical or abnormal lab result, we will notify you by phone as soon as possible.  Submit refill requests through [employer] or call your pharmacy and they will forward the refill request to us. Please allow 3 business days for your refill to be completed.          Additional Information About Your Visit        Care EveryWhere ID     This is your Care EveryWhere ID. This could be used by other organizations to access your [facility] medical records  [id number]        Your Vitals Were     Pulse Temperature Respirations Height Pulse Oximetry Breastfeeding?    80 96.9  F (36.1  C) (Tympanic) 12 5' 4\" (1.626 m) 96% No    BMI (Body Mass Index)                   35.87 kg/m2            Blood Pressure from Last 3 Encounters:   08/14/18 135/75   06/15/18 134/81   04/19/18 135/80    Weight from Last 3 Encounters:   08/14/18 209 lb (94.8 kg)   06/15/18 212 lb (96.2 kg)   04/19/18 210 lb 3.2 oz (95.3 kg)              We Performed the Following     Renal panel " (Alb, BUN, Ca, Cl, CO2, Creat, Gluc, Phos, K, Na)        Primary Care Provider Office Phone # Fax #    Eileenerik Sandra Cotter [phone] [address] [address]        Equal Access to Services     MANSOOR GALVEZ : Hadii aad ku hadasho Soomaali, waaxda luqadaha, qaybta kaalmada adeegyada, waxay idiin hayaan adeeg khradhikash la'aan ah. So [facility] [phone].    ATENCIÓN: Si habla español, tiene a pringle disposición servicios gratuitos de asistencia lingüística. Llame al [phone].    We comply with applicable federal civil rights laws and [place] laws. We do not discriminate on the basis of race, color, national origin, age, disability, sex, sexual orientation, or gender identity.            Thank you!     Thank you for choosing [facility]  for your care. Our goal is always to provide you with excellent care. Hearing back from our patients is one way we can continue to improve our services. Please take a few minutes to complete the written survey that you may receive in the mail after your visit with us. Thank you!             Your Updated Medication List - Protect others around you: Learn how to safely use, store and throw away your medicines at www.disposemymeds.org.          This list is accurate as of 8/14/18 10:37 AM.  Always use your most recent med list.                   Brand Name Dispense Instructions for use Diagnosis    amLODIPine 2.5 MG tablet    NORVASC    90 tablet    Take 1 tablet (2.5 mg) by mouth daily    Essential hypertension       aspirin 81 MG tablet      1 TABLET DAILY        lisinopril 20 MG tablet    PRINIVIL/ZESTRIL    90 tablet    Take 1 tablet (20 mg) by mouth daily    Essential hypertension       simvastatin 40 MG tablet    ZOCOR    90 tablet    Take 1 tablet (40 mg) by mouth At Bedtime    Hyperlipidemia LDL goal <100       vitamin D 1000 units capsule      1 CAPSULE DAILY

## 2019-01-19 ENCOUNTER — HOSPITAL ENCOUNTER (EMERGENCY)
Facility: CLINIC | Age: 84
Discharge: HOME | End: 2019-01-19
Attending: NURSE PRACTITIONER | Admitting: NURSE PRACTITIONER
Payer: MEDICARE

## 2019-01-19 VITALS
OXYGEN SATURATION: 100 % | SYSTOLIC BLOOD PRESSURE: 174 MMHG | DIASTOLIC BLOOD PRESSURE: 75 MMHG | TEMPERATURE: 97.4 F | RESPIRATION RATE: 18 BRPM

## 2019-01-19 DIAGNOSIS — R30.0 DYSURIA: ICD-10-CM

## 2019-01-19 DIAGNOSIS — N39.0 UTI (URINARY TRACT INFECTION): ICD-10-CM

## 2019-01-19 LAB
ALBUMIN UR-MCNC: NEGATIVE MG/DL
APPEARANCE UR: CLEAR
BACTERIA #/AREA URNS HPF: ABNORMAL /HPF
BILIRUB UR QL STRIP: NEGATIVE
COLOR UR AUTO: YELLOW
GLUCOSE UR STRIP-MCNC: NEGATIVE MG/DL
HGB UR QL STRIP: ABNORMAL
KETONES UR STRIP-MCNC: NEGATIVE MG/DL
LEUKOCYTE ESTERASE UR QL STRIP: ABNORMAL
MUCOUS THREADS #/AREA URNS LPF: PRESENT /LPF
NITRATE UR QL: NEGATIVE
PH UR STRIP: 6 PH (ref 5–7)
RBC #/AREA URNS AUTO: 2 /HPF (ref 0–2)
SOURCE: ABNORMAL
SP GR UR STRIP: 1.03 (ref 1–1.03)
SQUAMOUS #/AREA URNS AUTO: <1 /HPF (ref 0–1)
URNS CMNT MICRO: ABNORMAL
UROBILINOGEN UR STRIP-MCNC: NORMAL MG/DL (ref 0–2)
WBC #/AREA URNS AUTO: 25 /HPF (ref 0–5)
WBC CASTS #/AREA URNS LPF: 25 /LPF

## 2019-01-19 PROCEDURE — G0463 HOSPITAL OUTPT CLINIC VISIT: HCPCS | Performed by: NURSE PRACTITIONER

## 2019-01-19 PROCEDURE — 87086 URINE CULTURE/COLONY COUNT: CPT | Performed by: NURSE PRACTITIONER

## 2019-01-19 PROCEDURE — 87088 URINE BACTERIA CULTURE: CPT | Performed by: NURSE PRACTITIONER

## 2019-01-19 PROCEDURE — 87186 SC STD MICRODIL/AGAR DIL: CPT | Performed by: NURSE PRACTITIONER

## 2019-01-19 PROCEDURE — 99214 OFFICE O/P EST MOD 30 MIN: CPT | Mod: Z6 | Performed by: NURSE PRACTITIONER

## 2019-01-19 PROCEDURE — 81003 URINALYSIS AUTO W/O SCOPE: CPT | Performed by: NURSE PRACTITIONER

## 2019-01-19 RX ORDER — CEPHALEXIN 500 MG/1
500 CAPSULE ORAL 2 TIMES DAILY
Qty: 14 CAPSULE | Refills: 0 | Status: SHIPPED | OUTPATIENT
Start: 2019-01-19 | End: 2019-04-04

## 2019-01-19 NOTE — ED AVS SNAPSHOT
[facility] Emergency Department  [address] [address]  Phone:  [phone]  Fax:  [phone]                                    Jen Layne   MRN: [id number]    Department:  [facility] Emergency Department   Date of Visit:  1/19/2019           After Visit Summary Signature Page    I have received my discharge instructions, and my questions have been answered. I have discussed any challenges I see with this plan with the nurse or doctor.    ..........................................................................................................................................  Patient/Patient Representative Signature      ..........................................................................................................................................  Patient Representative Print Name and Relationship to Patient    ..................................................               ................................................  Date                                   Time    ..........................................................................................................................................  Reviewed by Signature/Title    ...................................................              ..............................................  Date                                               Time          22EPIC Rev 08/18

## 2019-01-20 NOTE — ED PROVIDER NOTES
History     Chief Complaint   Patient presents with     Dysuria     frequency, urgency     HPI    HPI:   Jen Layne is a 83 year old female who presents to the ED/UC with dysuria, frequency, hematuria, hesitancy and urgency.  Symptoms began 1 day(s) ago.  She denies long duration, rigors, flank pain, temperature > 101 degrees F., Vomiting, significant nausea or diarrhea and vaginal symptoms, chills, sweats.  Patient has had one UTI previously.  She has tried Cranberry juice prn (contraindicated in Coumadin patients) and Increase fluid intake.  Recent illnesses:  Diarrhea after eating out and only on Thursday afternoon.       Allergies:  Allergies   Allergen Reactions     Hydrochlorothiazide      Triggered two gout attacks, no further problmes since discontinuing drug       Problem List:    Patient Active Problem List    Diagnosis Date Noted     Morbid obesity (H) 08/14/2018     Priority: Medium     Impaired fasting glucose 12/16/2011     Priority: Medium     Metabolic syndrome 12/16/2011     Priority: Medium     Advance Care Planning 12/15/2011     Priority: Medium     Advance Care Planning 12/6/2016: Receipt of ACP document:  Received: Health Care Directive which was witnessed or notarized on 8-16-16.  Document previously scanned on 10-4-16.  Validation form completed and sent to be scanned.  Code Status needs to be updated to reflect choices in most recent ACP document. Confirmed/documented designated decision maker(s).  Added by Hansa Mcguire RN Advance Care Planning Liaison with [employer]  Patient does not have an Advance/Health Care Directive (HCD), given packet. Tamara Alcazar December 15, 2011    Patient has completed an Advance/Health Care Directive (HCD), scanned into Epic Media tab, entry date of 12/6/16.    Denisha Purvis  April 19, 2018         CKD (chronic kidney disease) stage 2, GFR 60-89 ml/min 11/16/2009     Priority: Medium     GFR in 50s 2008, 2009 -- diagnosed with CKD stage  3  Reduction likely due to obesity, hypertension, increasing age  Labs improved, eGFR >, 2011 -- diagnosis of stage 3 changed  Component      Latest Ref Rng 2010 3/13/2011 12/15/2011 3/21/2013   GFR Estimate      >60 mL/min/1.7m2 53 (L) 61 63 75     Component      Latest Ref Rng 3/31/2014   GFR Estimate      >60 mL/min/1.7m2 69     Problem list name updated by automated process. Provider to review       Obesity (BMI 30-39.9) 10/30/2008     Priority: Medium     (Problem list name updated by automated process. Provider to review and confirm.)       Hypertension 07/10/2008     Priority: Medium     Hyperlipidemia LDL goal <100 07/10/2008     Priority: Medium        Past Medical History:    Past Medical History:   Diagnosis Date     Gout 3/14/2011       Past Surgical History:    Past Surgical History:   Procedure Laterality Date     COLONOSCOPY  05    normal, recheck 10 years -- done at [facility] EYE  2018    Capsulotomy Left eye.   Dr. Skip Villela     HC REMOVAL GALLBLADDER       HYSTEROSCOPY,DIAGNOSTIC  around        Family History:    Family History   Problem Relation Age of Onset     Cancer Sister         uterine and ovarian-in remission      Heart Disease Brother         valve replacement     Lipids Brother      Eye Disorder Mother         glaucoma     Heart Disease Mother          of CHF     Eye Disorder Brother         detached retina     Cancer Son         SKIN     Cancer Paternal Grandfather         carcinoma of the lip, pipe-smoker     Thyroid Disease Daughter         goiter or nodule?     Diabetes No family hx of      C.A.D. No family hx of      Breast Cancer No family hx of      Cancer - colorectal No family hx of        Social History:  Marital Status:   [2]  Social History     Tobacco Use     Smoking status: Never Smoker     Smokeless tobacco: Never Used   Substance Use Topics     Alcohol use: No     Drug use: No        Medications:       cephALEXin (KEFLEX) 500 MG capsule   amLODIPine (NORVASC) 2.5 MG tablet   ASPIRIN 81 MG OR TABS   lisinopril (PRINIVIL/ZESTRIL) 20 MG tablet   simvastatin (ZOCOR) 40 MG tablet   VITAMIN D 1000 UNIT OR CAPS     Review of Systems  As mentioned above in the history present illness. All other systems were reviewed and are negative.    Physical Exam   BP: 174/75  Heart Rate: 76  Temp: 97.4  F (36.3  C)  Resp: 18  SpO2: 100 %      Physical Exam  General: healthy, alert, no distress, cooperative and over weight  Head: Normocephalic. No masses, lesions, tenderness or abnormalities  Respiratory: Normal - Clear to auscultation without rales, rhonchi, or wheezing.  Cardiovascular: Regular rate and rhythm, Normal (S1/S2), No murmurs, rubs or gallops  Abdomen: Abdomen soft, non-tender. BS normal. No masses or organomegaly  : not performed  Back/Spine: Back symmetric, no curvature. ROM normal. No CVA tenderness.      ED Course        Procedures    Results for orders placed or performed during the hospital encounter of 01/19/19 (from the past 24 hour(s))   UA reflex to Microscopic and Culture   Result Value Ref Range    Color Urine Yellow     Appearance Urine Clear     Glucose Urine Negative NEG^Negative mg/dL    Bilirubin Urine Negative NEG^Negative    Ketones Urine Negative NEG^Negative mg/dL    Specific Gravity Urine 1.030 1.003 - 1.035    Blood Urine Trace (A) NEG^Negative    pH Urine 6.0 5.0 - 7.0 pH    Protein Albumin Urine Negative NEG^Negative mg/dL    Urobilinogen mg/dL Normal 0.0 - 2.0 mg/dL    Nitrite Urine Negative NEG^Negative    Leukocyte Esterase Urine Trace (A) NEG^Negative    Source Midstream Urine     WBC Urine 25 0 - 5 /HPF    RBC Urine 2 0 - 2 /HPF    Bacteria Urine Moderate (A) NEG^Negative /HPF    Squamous Epithelial /HPF Urine <1 0 - 1 /HPF    Mucous Urine Present (A) NEG^Negative /LPF    Wbc Cast 25 NEG^Negative /LPF    Comment Urine       Urine was tested unconcentrated because <10 ml was received.        Medications - No data to display    Assessments & Plan (with Medical Decision Making)     I have reviewed the nursing notes.    I have reviewed the findings, diagnosis, plan and need for follow up with the patient.  Jen is an 83-year-old female presenting to urgent care with a 1 day history of dysuria, urinary frequency, urgency, hematuria without fever, aches, chills, sweats, flank pain with preceding 1 day history of diarrhea this past Thursday following ingestion of out to eat lunch with a friend.  Patient feeling well otherwise.  Patient has a history of stage II renal disease per problem list but most recent renal function is normal..  Patient has attempted increase fluids for treatment of her symptoms.  Patient is without vaginal symptoms presently.  Exam as noted above.  Urinalysis obtained to evaluate for possible urinary tract infection.  Urine reveals trace blood, moderate bacteria, moderate mucus and 25 white blood cells.  Will culture urine and treat for suspected urinary tract infection.  will prescribe Keflex  Recommend follow-up in 3-5 days if no improvement of symptoms.  Will encouraged increase fluids.       Medication List      Started    cephALEXin 500 MG capsule  Commonly known as:  KEFLEX  500 mg, Oral, 2 TIMES DAILY            Final diagnoses:   Dysuria   UTI (urinary tract infection)       1/19/2019   [facility] EMERGENCY DEPARTMENT     Demetra Alarcon, ÓSCAR CNP  01/19/19 1932

## 2019-01-21 LAB
BACTERIA SPEC CULT: ABNORMAL
SPECIMEN SOURCE: ABNORMAL

## 2019-02-27 ENCOUNTER — TELEPHONE (OUTPATIENT)
Dept: FAMILY MEDICINE | Facility: CLINIC | Age: 84
End: 2019-02-27

## 2019-02-27 DIAGNOSIS — I10 ESSENTIAL HYPERTENSION: ICD-10-CM

## 2019-02-27 RX ORDER — AMLODIPINE BESYLATE 2.5 MG/1
2.5 TABLET ORAL DAILY
Qty: 90 TABLET | Refills: 0 | Status: SHIPPED | OUTPATIENT
Start: 2019-02-27 | End: 2019-03-05

## 2019-02-27 NOTE — TELEPHONE ENCOUNTER
Prescription faxed to pharmacy, please notify patient. Follow-up for RN blood pressure re-check given last elevated reading.

## 2019-02-27 NOTE — TELEPHONE ENCOUNTER
Routing refill request to provider for review/approval because:  Last BP was over goal. Patient was in ED at the time.    BP Readings from Last 3 Encounters:   01/19/19 174/75   08/14/18 135/75   06/15/18 134/81     LOV 08/14/18 Mary Chua. LOV with PCP 04/19/18. Pended 90 day supply      CHANEL LeaN, RN

## 2019-02-27 NOTE — TELEPHONE ENCOUNTER
"Requested Prescriptions   Pending Prescriptions Disp Refills     amLODIPine (NORVASC) 2.5 MG tablet  Last Written Prescription Date:  01/25/2018  Last Fill Quantity: 90,  # refills: 3   Last office visit: 8/14/2018 with prescribing provider:  frank   Future Office Visit:     90 tablet 3     Sig: Take 1 tablet (2.5 mg) by mouth daily    Calcium Channel Blockers Protocol  Failed - 2/27/2019  1:26 PM       Failed - Blood pressure under 140/90 in past 12 months    BP Readings from Last 3 Encounters:   01/19/19 174/75   08/14/18 135/75   06/15/18 134/81                Passed - Recent (12 mo) or future (30 days) visit within the authorizing provider's specialty    Patient had office visit in the last 12 months or has a visit in the next 30 days with authorizing provider or within the authorizing provider's specialty.  See \"Patient Info\" tab in inbasket, or \"Choose Columns\" in Meds & Orders section of the refill encounter.             Passed - Medication is active on med list       Passed - Patient is age 18 or older       Passed - No active pregnancy on record       Passed - Normal serum creatinine on file in past 12 months    Recent Labs   Lab Test 08/14/18  1045   CR 0.81            Passed - No positive pregnancy test in past 12 months        Niels Flor RT (R)    "

## 2019-03-05 ENCOUNTER — ALLIED HEALTH/NURSE VISIT (OUTPATIENT)
Dept: FAMILY MEDICINE | Facility: CLINIC | Age: 84
End: 2019-03-05
Payer: MEDICARE

## 2019-03-05 ENCOUNTER — TELEPHONE (OUTPATIENT)
Dept: FAMILY MEDICINE | Facility: CLINIC | Age: 84
End: 2019-03-05

## 2019-03-05 VITALS — SYSTOLIC BLOOD PRESSURE: 122 MMHG | HEART RATE: 70 BPM | DIASTOLIC BLOOD PRESSURE: 72 MMHG

## 2019-03-05 DIAGNOSIS — I10 ESSENTIAL HYPERTENSION: ICD-10-CM

## 2019-03-05 PROCEDURE — 99207 ZZC NO CHARGE NURSE ONLY: CPT

## 2019-03-05 RX ORDER — AMLODIPINE BESYLATE 2.5 MG/1
2.5 TABLET ORAL DAILY
Qty: 30 TABLET | Refills: 0 | Status: SHIPPED | OUTPATIENT
Start: 2019-03-05 | End: 2019-04-04

## 2019-03-05 NOTE — TELEPHONE ENCOUNTER
S-(situation): RN BP Check    B-(background): Taking amlodipine 2.5 mg and lisinopril 20 mg daily for blood pressure    A-(assessment):   Patient is taking medication as prescribed.  Patient is monitoring Blood Pressure at home.  Average readings are 120/75. She brings in the following readings: 129/78,117/70,127/71,117/69,120/80,118/71,121/82,118/74,126/85,131/77,128/77.  Current complaints: dizziness. Patient complains of dizziness upon standing and also notices it when she is rolling over in bed. She states she does not know if it is from the BP or lack of water. Patient states she has increased fluid intake the passed few weeks, but still notices it.    Vital Signs 3/5/2019   Systolic 122   Diastolic 72   Pulse 70   MANUAL    BP Readings from Last 6 Encounters:   03/05/19 122/72   01/19/19 174/75   08/14/18 135/75   06/15/18 134/81   04/19/18 135/80   01/25/18 139/81     R-(recommendations): Routing to Provider. Patient does have upcoming OV on 3/27/2019.  Rebecca GARCÍA RN

## 2019-03-05 NOTE — NURSING NOTE
S-(situation): RN BP Check    B-(background): Taking amlodipine 2.5 mg and lisinopril 20 mg daily for blood pressure    A-(assessment):   Patient is taking medication as prescribed.  Patient is monitoring Blood Pressure at home.  Average readings are 120/75. She brings in the following readings: 129/78,117/70,127/71,117/69,120/80,118/71,121/82,118/74,126/85,131/77,128/77.  Current complaints: dizziness. Patient complains of dizziness upon standing and also notices it when she is rolling over in bed. She states she does not know if it is from the BP or lack of water.     Vital Signs 3/5/2019   Systolic 122   Diastolic 72   Pulse 70   MANUAL    BP Readings from Last 6 Encounters:   03/05/19 122/72   01/19/19 174/75   08/14/18 135/75   06/15/18 134/81   04/19/18 135/80   01/25/18 139/81     R-(recommendations): Routing to Provider. Patient does have upcoming OV on 3/27/2019.  Rebecca GARCÍA RN

## 2019-04-04 ENCOUNTER — OFFICE VISIT (OUTPATIENT)
Dept: FAMILY MEDICINE | Facility: CLINIC | Age: 84
End: 2019-04-04
Payer: MEDICARE

## 2019-04-04 VITALS
RESPIRATION RATE: 16 BRPM | TEMPERATURE: 96.9 F | OXYGEN SATURATION: 98 % | BODY MASS INDEX: 36.29 KG/M2 | SYSTOLIC BLOOD PRESSURE: 120 MMHG | WEIGHT: 212.6 LBS | HEART RATE: 80 BPM | HEIGHT: 64 IN | DIASTOLIC BLOOD PRESSURE: 74 MMHG

## 2019-04-04 DIAGNOSIS — E78.5 HYPERLIPIDEMIA LDL GOAL <100: ICD-10-CM

## 2019-04-04 DIAGNOSIS — I10 ESSENTIAL HYPERTENSION: ICD-10-CM

## 2019-04-04 DIAGNOSIS — E66.01 MORBID OBESITY (H): ICD-10-CM

## 2019-04-04 PROCEDURE — 99214 OFFICE O/P EST MOD 30 MIN: CPT | Performed by: FAMILY MEDICINE

## 2019-04-04 RX ORDER — SIMVASTATIN 40 MG
40 TABLET ORAL AT BEDTIME
Qty: 90 TABLET | Refills: 3 | Status: SHIPPED | OUTPATIENT
Start: 2019-04-04 | End: 2020-05-27

## 2019-04-04 RX ORDER — AMLODIPINE BESYLATE 2.5 MG/1
2.5 TABLET ORAL DAILY
Qty: 90 TABLET | Refills: 3 | Status: CANCELLED | OUTPATIENT
Start: 2019-04-04

## 2019-04-04 RX ORDER — LISINOPRIL 20 MG/1
20 TABLET ORAL DAILY
Qty: 90 TABLET | Refills: 3 | Status: SHIPPED | OUTPATIENT
Start: 2019-04-04 | End: 2020-05-29

## 2019-04-04 ASSESSMENT — MIFFLIN-ST. JEOR: SCORE: 1418.32

## 2019-04-04 ASSESSMENT — PAIN SCALES - GENERAL: PAINLEVEL: NO PAIN (0)

## 2019-04-04 NOTE — PATIENT INSTRUCTIONS
Our Clinic hours are:  Mondays    7:20 am - 7 pm  Tues -  Fri  7:20 am - 5 pm    Clinic Phone: [phone]    The clinic lab opens at 7:30 am Mon - Fri and appointments are required.    [facility]. [phone]  Monday  8 am - 7pm  Tues - Fri 8 am - 5:30 pm

## 2019-04-04 NOTE — PROGRESS NOTES
SUBJECTIVE:   Jen Layne is a 83 year old female who presents to clinic today for the following health issues:    Chief Complaint   Patient presents with     Hypertension     rx refill     Dizziness     when moving head from side to side, worse when lying down. off and on throughout the last year. She has a history of BPPV.        Hyperlipidemia Follow-Up      Rate your low fat/cholesterol diet?: good    Taking statin?  Yes, possible muscle aches from statin    Other lipid medications/supplements?:  none    Hypertension Follow-up      Outpatient blood pressures are being checked at home.  Results are 128/76,119/73,120/74,128/83.    Low Salt Diet: no added salt      Amount of exercise or physical activity: 2-3 days/week for an average of 45-60 minutes    Problems taking medications regularly: No    Medication side effects: No    Diet: low salt    ADDITIONAL HPI: 83 year old female here for above issue.      ROS: 10 point review of systems negative except as per HPI.    PAST MEDICAL HISTORY:  Past Medical History:   Diagnosis Date     Gout 3/14/2011    Triggered by hydrochlorothiazide          ACTIVE MEDICAL PROBLEMS:  Patient Active Problem List   Diagnosis     Hypertension     Hyperlipidemia LDL goal <100     Obesity (BMI 30-39.9)     CKD (chronic kidney disease) stage 2, GFR 60-89 ml/min     Advance Care Planning     Impaired fasting glucose     Metabolic syndrome     Morbid obesity (H)        FAMILY HISTORY:  Family History   Problem Relation Age of Onset     Cancer Sister         uterine and ovarian-in remission      Heart Disease Brother         valve replacement     Lipids Brother      Eye Disorder Mother         glaucoma     Heart Disease Mother          of CHF     Eye Disorder Brother         detached retina     Cancer Son         SKIN     Cancer Paternal Grandfather         carcinoma of the lip, pipe-smoker     Thyroid Disease Daughter         goiter or nodule?     Diabetes No family hx of       C.A.D. No family hx of      Breast Cancer No family hx of      Cancer - colorectal No family hx of        SOCIAL HISTORY:  Social History     Socioeconomic History     Marital status:      Spouse name: Not on file     Number of children: Not on file     Years of education: Not on file     Highest education level: Not on file   Occupational History     Not on file   Social Needs     Financial resource strain: Not on file     Food insecurity:     Worry: Not on file     Inability: Not on file     Transportation needs:     Medical: Not on file     Non-medical: Not on file   Tobacco Use     Smoking status: Never Smoker     Smokeless tobacco: Never Used   Substance and Sexual Activity     Alcohol use: No     Drug use: No     Sexual activity: Never   Lifestyle     Physical activity:     Days per week: Not on file     Minutes per session: Not on file     Stress: Not on file   Relationships     Social connections:     Talks on phone: Not on file     Gets together: Not on file     Attends [employer] service: Not on file     Active member of club or organization: Not on file     Attends meetings of clubs or organizations: Not on file     Relationship status: Not on file     Intimate partner violence:     Fear of current or ex partner: Not on file     Emotionally abused: Not on file     Physically abused: Not on file     Forced sexual activity: Not on file   Other Topics Concern     Parent/sibling w/ CABG, MI or angioplasty before 65F 55M? No   Social History Narrative    2008 --- 4 children, 11 grandchildren, 3 great-grandchildren       MEDICATIONS:  Current Outpatient Medications   Medication Sig Dispense Refill     ASPIRIN 81 MG OR TABS 1 TABLET DAILY       lisinopril (PRINIVIL/ZESTRIL) 20 MG tablet Take 1 tablet (20 mg) by mouth daily 90 tablet 3     simvastatin (ZOCOR) 40 MG tablet Take 1 tablet (40 mg) by mouth At Bedtime 90 tablet 3     VITAMIN D 1000 UNIT OR CAPS 1 CAPSULE DAILY         ALLERGIES:    "  Allergies   Allergen Reactions     Hydrochlorothiazide      Triggered two gout attacks, no further problmes since discontinuing drug       Problem list, Medication list, Allergies, and Medical/Social/Surgical histories reviewed in [facility] and updated as appropriate.    OBJECTIVE:                                                    VITALS: /74 (BP Location: Right arm, Cuff Size: Adult Large)   Pulse 80   Temp 96.9  F (36.1  C) (Tympanic)   Resp 16   Ht 1.632 m (5' 4.25\")   Wt 96.4 kg (212 lb 9.6 oz)   SpO2 98%   Breastfeeding? No   BMI 36.21 kg/m   Body mass index is 36.21 kg/m .  GENERAL: Pleasant, well appearing female.  HEENT: PERRL, EOMI, oropharynx clear.  NECK: supple, no thyromegaly or thyroid masses, no lymphadenopathy.  CV: RRR, no murmurs, rubs or gallops. No carotid bruits.   LUNGS: Clear to auscultation bilaterally, normal effort.  ABDOMEN: Soft, non-distended, non-tender.  No hepatosplenomegaly or palpable masses.    SKIN: warm and dry without obvious rashes.   EXTREMITIES: No edema, normal pulses.   NEURO: Cranial nerves II through XII grossly intact. No focal deficits.     ASSESSMENT/PLAN:                                                    1. Essential hypertension  - lisinopril (PRINIVIL/ZESTRIL) 20 MG tablet; Take 1 tablet (20 mg) by mouth daily  Dispense: 90 tablet; Refill: 3    2. Hyperlipidemia LDL goal <100  - simvastatin (ZOCOR) 40 MG tablet; Take 1 tablet (40 mg) by mouth At Bedtime  Dispense: 90 tablet; Refill: 3    3. Morbid obesity (H)  Working on lifestyle changes.    4. Vertigo  Discussed vestibular rehab. She will think about this as symptoms not too bothersome. Ok to order physical therapy if she decides to pursue this.       "

## 2019-08-30 ENCOUNTER — OFFICE VISIT (OUTPATIENT)
Dept: FAMILY MEDICINE | Facility: CLINIC | Age: 84
End: 2019-08-30
Payer: MEDICARE

## 2019-08-30 VITALS
HEART RATE: 80 BPM | DIASTOLIC BLOOD PRESSURE: 80 MMHG | SYSTOLIC BLOOD PRESSURE: 118 MMHG | HEIGHT: 64 IN | WEIGHT: 210 LBS | OXYGEN SATURATION: 96 % | BODY MASS INDEX: 35.85 KG/M2 | RESPIRATION RATE: 18 BRPM | TEMPERATURE: 97.8 F

## 2019-08-30 DIAGNOSIS — Z71.1 CONCERN ABOUT URINARY TRACT DISEASE WITHOUT DIAGNOSIS: Primary | ICD-10-CM

## 2019-08-30 PROCEDURE — 99213 OFFICE O/P EST LOW 20 MIN: CPT | Performed by: FAMILY MEDICINE

## 2019-08-30 ASSESSMENT — PAIN SCALES - GENERAL: PAINLEVEL: NO PAIN (0)

## 2019-08-30 ASSESSMENT — MIFFLIN-ST. JEOR: SCORE: 1406.52

## 2019-08-30 NOTE — PATIENT INSTRUCTIONS
Our Clinic hours are:  Mondays    7:20 am - 7 pm  Tues -  Fri  7:20 am - 5 pm    Clinic Phone: [phone]    The clinic lab opens at 7:30 am Mon - Fri and appointments are required.    [facility]. [phone]  Monday  8 am - 7pm  Tues - Fri 8 am - 5:30 pm

## 2019-08-30 NOTE — PROGRESS NOTES
"Subjective     Jen Layne is a 83 year old female who presents to clinic today for the following health issues:    HPI   URINARY TRACT SYMPTOMS    Chief Complaint   Patient presents with     Urinary Problem     Patient is here to dicuss why her urine was clear a couple nights ago. Patient now has her urine color to a pale yellow. Patient has no pain or discharge or burning. Patient is concerned about the amount of clear urine she had.         Onset: Monday    Description:   Painful urination (Dysuria): no            Frequency: YES   Blood in urine (Hematuria): no   Delay in urine (Hesitency): no     Intensity: 0/10    Progression of Symptoms:  same    Accompanying Signs & Symptoms:  Fever/chills: no   Flank pain no   Nausea and vomiting: no   Any vaginal symptoms: none  Abdominal/Pelvic Pain: no     History:   History of frequent UTI's: YES- last jan  History of kidney stones: no   Sexually Active: no   Possibility of pregnancy: No    Precipitating factors:       Therapies Tried and outcome: increased water intake    Bothered by one night of nocturia with larger volumes of clear urine.  No dysuria.   No incontinence.  The pattern of frequent urination has NOT continued this week (now five days later).    She just wanted to make sure that nothing was wrong.      Denies having been more edematous or having orthopnea before this began.   Reviewed and updated as needed this visit by Provider         Review of Systems   ROS COMP: Constitutional, HEENT, cardiovascular, pulmonary, gi and gu systems are negative, except as otherwise noted.      Objective    /80   Pulse 80   Temp 97.8  F (36.6  C) (Tympanic)   Resp 18   Ht 1.632 m (5' 4.25\")   Wt 95.3 kg (210 lb)   SpO2 96%   Breastfeeding? No   BMI 35.77 kg/m    Body mass index is 35.77 kg/m .  Physical Exam   GENERAL: healthy, alert and no distress  MS: no gross musculoskeletal defects noted, no edema           Assessment & Plan     1. Concern about " "urinary tract disease without diagnosis    Unlikely to be anything significant.  Sounds like an \"auto diuresis\" for unknown reasons.  No signs/symptoms of UTI.   Will not get labs or urine as all symptoms have resolved.  Patient and her daughter are comfortable with this after discussion.           No follow-ups on file.    Ele Purvis MD  [facility]      "

## 2020-02-08 ENCOUNTER — HOSPITAL ENCOUNTER (EMERGENCY)
Facility: CLINIC | Age: 85
Discharge: HOME | End: 2020-02-08
Attending: FAMILY MEDICINE | Admitting: FAMILY MEDICINE
Payer: MEDICARE

## 2020-02-08 VITALS
BODY MASS INDEX: 34.15 KG/M2 | HEART RATE: 104 BPM | OXYGEN SATURATION: 97 % | TEMPERATURE: 97.5 F | RESPIRATION RATE: 20 BRPM | WEIGHT: 200 LBS | DIASTOLIC BLOOD PRESSURE: 84 MMHG | SYSTOLIC BLOOD PRESSURE: 149 MMHG | HEIGHT: 64 IN

## 2020-02-08 DIAGNOSIS — J06.9 UPPER RESPIRATORY TRACT INFECTION, UNSPECIFIED TYPE: ICD-10-CM

## 2020-02-08 PROCEDURE — 99284 EMERGENCY DEPT VISIT MOD MDM: CPT | Mod: Z6 | Performed by: FAMILY MEDICINE

## 2020-02-08 PROCEDURE — 99283 EMERGENCY DEPT VISIT LOW MDM: CPT

## 2020-02-08 RX ORDER — AMOXICILLIN 400 MG/5ML
800 POWDER, FOR SUSPENSION ORAL 2 TIMES DAILY
Qty: 200 ML | Refills: 0 | Status: SHIPPED | OUTPATIENT
Start: 2020-02-08 | End: 2020-02-18

## 2020-02-08 RX ORDER — CODEINE PHOSPHATE AND GUAIFENESIN 10; 100 MG/5ML; MG/5ML
2 SOLUTION ORAL EVERY 4 HOURS PRN
Qty: 240 ML | Refills: 1 | Status: SHIPPED | OUTPATIENT
Start: 2020-02-08 | End: 2021-03-05

## 2020-02-08 ASSESSMENT — ENCOUNTER SYMPTOMS
CHILLS: 0
NAUSEA: 0
MUSCULOSKELETAL NEGATIVE: 1
SHORTNESS OF BREATH: 0
HEMATOLOGIC/LYMPHATIC NEGATIVE: 1
WHEEZING: 0
SORE THROAT: 1
EYE REDNESS: 0
FEVER: 0
PSYCHIATRIC NEGATIVE: 1
NEUROLOGICAL NEGATIVE: 1

## 2020-02-08 ASSESSMENT — MIFFLIN-ST. JEOR: SCORE: 1352.19

## 2020-02-08 NOTE — ED PROVIDER NOTES
History     Chief Complaint   Patient presents with     Pharyngitis     HPI  Jen Layne is a 84 year old female who presents with upper respiratory congestion and sore throat.      Jen is an 84-year-old woman who has a 5-day history of symptoms.  She has had congestion and postnasal drainage.  She has mild sinus pressure.  She has sore throat.  She is not having significant cough or shortness of breath.  She has not noticed elevated temperature.    She does not have a history of asthma or COPD.    She is a non-smoker.    Allergies:  Allergies   Allergen Reactions     Hydrochlorothiazide      Triggered two gout attacks, no further problmes since discontinuing drug       Problem List:    Patient Active Problem List    Diagnosis Date Noted     Morbid obesity (H) 08/14/2018     Priority: Medium     Impaired fasting glucose 12/16/2011     Priority: Medium     Metabolic syndrome 12/16/2011     Priority: Medium     Advance Care Planning 12/15/2011     Priority: Medium     Advance Care Planning 12/6/2016: Receipt of ACP document:  Received: Health Care Directive which was witnessed or notarized on 8-16-16.  Document previously scanned on 10-4-16.  Validation form completed and sent to be scanned.  Code Status needs to be updated to reflect choices in most recent ACP document. Confirmed/documented designated decision maker(s).  Added by Hansa Mcguire RN Advance Care Planning Liaison with [employer]  Patient does not have an Advance/Health Care Directive (HCD), given packet. Tamara Alcazar December 15, 2011    Patient has completed an Advance/Health Care Directive (HCD), scanned into Epic Media tab, entry date of 12/6/16.    Denisha Purvis  April 19, 2018         CKD (chronic kidney disease) stage 2, GFR 60-89 ml/min 11/16/2009     Priority: Medium     GFR in 50s 2008, 2009 -- diagnosed with CKD stage 3  Reduction likely due to obesity, hypertension, increasing age  Labs improved, eGFR >60 March 2011,  2011 -- diagnosis of stage 3 changed  Component      Latest Ref Rng 2010 3/13/2011 12/15/2011 3/21/2013   GFR Estimate      >60 mL/min/1.7m2 53 (L) 61 63 75     Component      Latest Ref Rng 3/31/2014   GFR Estimate      >60 mL/min/1.7m2 69     Problem list name updated by automated process. Provider to review       Obesity (BMI 30-39.9) 10/30/2008     Priority: Medium     (Problem list name updated by automated process. Provider to review and confirm.)       Hypertension 07/10/2008     Priority: Medium     Hyperlipidemia LDL goal <100 07/10/2008     Priority: Medium        Past Medical History:    Past Medical History:   Diagnosis Date     Gout 3/14/2011       Past Surgical History:    Past Surgical History:   Procedure Laterality Date     COLONOSCOPY  05    normal, recheck 10 years -- done at [facility] EYE  2018    Capsulotomy Left eye.   Dr. Skip Villela     [facility] REMOVAL GALLBLADDER       HYSTEROSCOPY,DIAGNOSTIC  around        Family History:    Family History   Problem Relation Age of Onset     Cancer Sister         uterine and ovarian-in remission      Heart Disease Brother         valve replacement     Lipids Brother      Eye Disorder Mother         glaucoma     Heart Disease Mother          of CHF     Eye Disorder Brother         detached retina     Cancer Son         SKIN     Cancer Paternal Grandfather         carcinoma of the lip, pipe-smoker     Thyroid Disease Daughter         goiter or nodule?     Diabetes No family hx of      C.A.D. No family hx of      Breast Cancer No family hx of      Cancer - colorectal No family hx of        Social History:  Marital Status:   [5]  Social History     Tobacco Use     Smoking status: Never Smoker     Smokeless tobacco: Never Used   Substance Use Topics     Alcohol use: No     Drug use: No        Medications:    amoxicillin (AMOXIL) 400 MG/5ML suspension  amoxicillin (AMOXIL) 400 MG/5ML suspension  guaiFENesin-codeine  "(ROBITUSSIN AC) 100-10 MG/5ML solution  ASPIRIN 81 MG OR TABS  lisinopril (PRINIVIL/ZESTRIL) 20 MG tablet  simvastatin (ZOCOR) 40 MG tablet  VITAMIN D 1000 UNIT OR CAPS          Review of Systems   Constitutional: Negative for chills and fever.   HENT: Positive for congestion and sore throat. Negative for ear pain.    Eyes: Negative for redness.   Respiratory: Negative for shortness of breath and wheezing.    Cardiovascular: Negative for chest pain.   Gastrointestinal: Negative for nausea.   Genitourinary: Negative.    Musculoskeletal: Negative.    Neurological: Negative.    Hematological: Negative.    Psychiatric/Behavioral: Negative.        Physical Exam   BP: (!) 149/84  Pulse: 104  Temp: 97.5  F (36.4  C)  Resp: 20  Height: 162.6 cm (5' 4\")  Weight: 90.7 kg (200 lb)  SpO2: 97 %      Physical Exam  Constitutional:       General: She is not in acute distress.  HENT:      Head: Normocephalic.      Right Ear: Tympanic membrane normal.      Left Ear: Tympanic membrane normal.      Nose: Congestion present.      Mouth/Throat:      Mouth: Mucous membranes are moist.      Pharynx: No posterior oropharyngeal erythema.      Tonsils: 0 on the right. 0 on the left.   Eyes:      Conjunctiva/sclera: Conjunctivae normal.   Cardiovascular:      Rate and Rhythm: Normal rate and regular rhythm.   Pulmonary:      Breath sounds: Normal breath sounds.   Abdominal:      Tenderness: There is no abdominal tenderness.   Skin:     Findings: No rash.   Neurological:      General: No focal deficit present.   Psychiatric:         Mood and Affect: Mood normal.         ED Course        Procedures               Critical Care time:  none               No results found for this or any previous visit (from the past 24 hour(s)).    Medications - No data to display    Assessments & Plan (with Medical Decision Making)     This patient has upper respiratory congestion consistent with URI or sinusitis.  I suspect her pharyngitis is related more to " this than a simple acute pharyngitis.  Her breathing is okay.  She was treated with antibiotics and cough medication and advised.  She voices understanding of recommendations.        I have reviewed the nursing notes.    I have reviewed the findings, diagnosis, plan and need for follow up with the patient.       Discharge Medication List as of 2/8/2020 10:29 AM      START taking these medications    Details   !! amoxicillin (AMOXIL) 400 MG/5ML suspension Take 10 mLs (800 mg) by mouth 2 times daily for 10 days, Disp-200 mL, R-0, E-PrescribeOnce daily dosing per AAP Red Book guidelines      !! amoxicillin (AMOXIL) 400 MG/5ML suspension Take 10 mLs (800 mg) by mouth 2 times daily for 10 days, Disp-200 mL, R-0, E-PrescribeOnce daily dosing per AAP Red Book guidelines      guaiFENesin-codeine (ROBITUSSIN AC) 100-10 MG/5ML solution Take 10 mLs by mouth every 4 hours as needed for cough, Disp-240 mL, R-1, Local Print       !! - Potential duplicate medications found. Please discuss with provider.          Final diagnoses:   Upper respiratory tract infection, unspecified type       2/8/2020   [facility] EMERGENCY DEPARTMENT     Best Alfaro MD  02/08/20 5920

## 2020-02-08 NOTE — ED NOTES
Pt here with sinus congestion/drainage, cough and sore throat for the past 5 days. No fevers. Pt states that it is difficult to lay down because of the drainage.

## 2020-02-08 NOTE — ED AVS SNAPSHOT
[facility] Emergency Department  [address] [address]  Phone:  [phone]  Fax:  [phone]                                    Jen Layne   MRN: [id number]    Department:  [facility] Emergency Department   Date of Visit:  2/8/2020           After Visit Summary Signature Page    I have received my discharge instructions, and my questions have been answered. I have discussed any challenges I see with this plan with the nurse or doctor.    ..........................................................................................................................................  Patient/Patient Representative Signature      ..........................................................................................................................................  Patient Representative Print Name and Relationship to Patient    ..................................................               ................................................  Date                                   Time    ..........................................................................................................................................  Reviewed by Signature/Title    ...................................................              ..............................................  Date                                               Time          22EPIC Rev 08/18

## 2020-05-24 DIAGNOSIS — E78.5 HYPERLIPIDEMIA LDL GOAL <100: ICD-10-CM

## 2020-05-27 RX ORDER — SIMVASTATIN 40 MG
40 TABLET ORAL AT BEDTIME
Qty: 90 TABLET | Refills: 0 | Status: SHIPPED | OUTPATIENT
Start: 2020-05-27 | End: 2020-06-11

## 2020-05-27 NOTE — TELEPHONE ENCOUNTER
"Requested Prescriptions   Signed Prescriptions Disp Refills    simvastatin (ZOCOR) 40 MG tablet 90 tablet 0     Sig: Take 1 tablet (40 mg) by mouth At Bedtime Labs DUE June 2020       Statins Protocol Failed - 5/24/2020  8:00 AM        Failed - LDL on file in past 12 months     Recent Labs   Lab Test 04/19/18  0936   LDL 36             Passed - No abnormal creatine kinase in past 12 months     No lab results found.             Passed - Recent (12 mo) or future (30 days) visit within the authorizing provider's specialty     Patient has had an office visit with the authorizing provider or a provider within the authorizing providers department within the previous 12 mos or has a future within next 30 days. See \"Patient Info\" tab in inbasket, or \"Choose Columns\" in Meds & Orders section of the refill encounter.              Passed - Medication is active on med list        Passed - Patient is age 18 or older        Passed - No active pregnancy on record        Passed - No positive pregnancy test in past 12 months             "

## 2020-05-28 DIAGNOSIS — I10 ESSENTIAL HYPERTENSION: ICD-10-CM

## 2020-05-29 RX ORDER — LISINOPRIL 20 MG/1
20 TABLET ORAL DAILY
Qty: 90 TABLET | Refills: 0 | Status: SHIPPED | OUTPATIENT
Start: 2020-05-29 | End: 2020-06-11

## 2020-05-29 NOTE — TELEPHONE ENCOUNTER
"Requested Prescriptions   Pending Prescriptions Disp Refills     lisinopril (ZESTRIL) 20 MG tablet [Pharmacy Med Name: lisinopril 20 mg tablet] 90 tablet 3     Sig: Take 1 tablet (20 mg) by mouth daily  Last Written Prescription Date:  4/4/19  Last Fill Quantity: 90,  # refills: 3   Last Office Visit with G, P or [facility] prescribing provider:  8/30/19   Future Office Visit:    Next 5 appointments (look out 90 days)    Jun 11, 2020  9:00 AM CDT  Telephone Visit with Yamile Cotter MD  [facility] ([facility]) [address] [facility] [address] [phone]                ACE Inhibitors (Including Combos) Protocol Failed - 5/28/2020  8:00 AM        Failed - Blood pressure under 140/90 in past 12 months     BP Readings from Last 3 Encounters:   02/08/20 (!) 149/84   08/30/19 118/80   04/04/19 120/74                 Failed - Normal serum creatinine on file in past 12 months     Recent Labs   Lab Test 08/14/18  1045   CR 0.81       Ok to refill medication if creatinine is low          Failed - Normal serum potassium on file in past 12 months     Recent Labs   Lab Test 08/14/18  1045   POTASSIUM 4.0             Passed - Recent (12 mo) or future (30 days) visit within the authorizing provider's specialty     Patient has had an office visit with the authorizing provider or a provider within the authorizing providers department within the previous 12 mos or has a future within next 30 days. See \"Patient Info\" tab in inbasket, or \"Choose Columns\" in Meds & Orders section of the refill encounter.              Passed - Medication is active on med list        Passed - Patient is age 18 or older        Passed - No active pregnancy on record        Passed - No positive pregnancy test within past 12 months             "

## 2020-06-11 ENCOUNTER — VIRTUAL VISIT (OUTPATIENT)
Dept: FAMILY MEDICINE | Facility: CLINIC | Age: 85
End: 2020-06-11
Payer: MEDICARE

## 2020-06-11 VITALS — DIASTOLIC BLOOD PRESSURE: 75 MMHG | SYSTOLIC BLOOD PRESSURE: 125 MMHG | HEART RATE: 70 BPM

## 2020-06-11 DIAGNOSIS — E78.5 HYPERLIPIDEMIA LDL GOAL <100: ICD-10-CM

## 2020-06-11 DIAGNOSIS — R25.2 LEG CRAMPS: Primary | ICD-10-CM

## 2020-06-11 DIAGNOSIS — I10 ESSENTIAL HYPERTENSION: ICD-10-CM

## 2020-06-11 PROCEDURE — 99442 ZZC PHYSICIAN TELEPHONE EVALUATION 11-20 MIN: CPT | Performed by: FAMILY MEDICINE

## 2020-06-11 RX ORDER — SIMVASTATIN 40 MG
40 TABLET ORAL AT BEDTIME
Qty: 90 TABLET | Refills: 3 | Status: SHIPPED | OUTPATIENT
Start: 2020-06-11 | End: 2021-03-05

## 2020-06-11 RX ORDER — LISINOPRIL 20 MG/1
20 TABLET ORAL DAILY
Qty: 90 TABLET | Refills: 3 | Status: SHIPPED | OUTPATIENT
Start: 2020-06-11 | End: 2021-03-05

## 2020-06-11 NOTE — PATIENT INSTRUCTIONS
Our Clinic hours are:  Mondays    7:20 am - 7 pm  Tues -  Fri  7:20 am - 5 pm    Clinic Phone: [phone]    The clinic lab opens at 7:30 am Mon - Fri and appointments are required.    [facility]. [phone]  Monday  8 am - 7pm  Tues - Fri 8 am - 5:30 pm

## 2020-06-11 NOTE — PROGRESS NOTES
"Jen Layne is a 84 year old female who is being evaluated via a billable telephone visit.      The patient has been notified of following:     \"This telephone visit will be conducted via a call between you and your physician/provider. We have found that certain health care needs can be provided without the need for a physical exam.  This service lets us provide the care you need with a short phone conversation.  If a prescription is necessary we can send it directly to your pharmacy.  If lab work is needed we can place an order for that and you can then stop by our lab to have the test done at a later time.    Telephone visits are billed at different rates depending on your insurance coverage. During this emergency period, for some insurers they may be billed the same as an in-person visit.  Please reach out to your insurance provider with any questions.    If during the course of the call the physician/provider feels a telephone visit is not appropriate, you will not be charged for this service.\"    Patient has given verbal consent for Telephone visit?  Yes    What phone number would you like to be contacted at? [phone]    How would you like to obtain your AVS? Mail a copy    Subjective     Jen Layne is a 84 year old female who presents via phone visit today for the following health issues:    HPI  Hyperlipidemia Follow-Up      Are you regularly taking any medication or supplement to lower your cholesterol?   Yes- .    Are you having muscle aches or other side effects that you think could be caused by your cholesterol lowering medication?  No    Hypertension Follow-up      Do you check your blood pressure regularly outside of the clinic? Yes 125/75    Are you following a low salt diet? Yes    Are your blood pressures ever more than 140 on the top number (systolic) OR more   than 90 on the bottom number (diastolic), for example 140/90? No      How many servings of fruits and vegetables do you eat " daily?  0-1    On average, how many sweetened beverages do you drink each day (Examples: soda, juice, sweet tea, etc.  Do NOT count diet or artificially sweetened beverages)?   0    How many days per week do you exercise enough to make your heart beat faster? 3 or less    How many minutes a day do you exercise enough to make your heart beat faster? 10 - 19    How many days per week do you miss taking your medication? 0             Patient Active Problem List   Diagnosis     Hypertension     Hyperlipidemia LDL goal <100     Obesity (BMI 30-39.9)     CKD (chronic kidney disease) stage 2, GFR 60-89 ml/min     Advance Care Planning     Impaired fasting glucose     Metabolic syndrome     Morbid obesity (H)     Past Surgical History:   Procedure Laterality Date     COLONOSCOPY  05    normal, recheck 10 years -- done at [facility] EYE  2018    Capsulotomy Left eye.   Dr. Skip Villela     [facility] REMOVAL GALLBLADDER       HYSTEROSCOPY,DIAGNOSTIC  around        Social History     Tobacco Use     Smoking status: Never Smoker     Smokeless tobacco: Never Used   Substance Use Topics     Alcohol use: No     Family History   Problem Relation Age of Onset     Cancer Sister         uterine and ovarian-in remission      Heart Disease Brother         valve replacement     Lipids Brother      Eye Disorder Mother         glaucoma     Heart Disease Mother          of CHF     Eye Disorder Brother         detached retina     Cancer Son         SKIN     Cancer Paternal Grandfather         carcinoma of the lip, pipe-smoker     Thyroid Disease Daughter         goiter or nodule?     Diabetes No family hx of      C.A.D. No family hx of      Breast Cancer No family hx of      Cancer - colorectal No family hx of          Current Outpatient Medications   Medication Sig Dispense Refill     ASPIRIN 81 MG OR TABS 1 TABLET DAILY       lisinopril (ZESTRIL) 20 MG tablet Take 1 tablet (20 mg) by mouth daily 90 tablet 3      simvastatin (ZOCOR) 40 MG tablet Take 1 tablet (40 mg) by mouth At Bedtime 90 tablet 3     VITAMIN D 1000 UNIT OR CAPS 1 CAPSULE DAILY       guaiFENesin-codeine (ROBITUSSIN AC) 100-10 MG/5ML solution Take 10 mLs by mouth every 4 hours as needed for cough (Patient not taking: Reported on 6/11/2020) 240 mL 1     Allergies   Allergen Reactions     Hydrochlorothiazide      Triggered two gout attacks, no further problmes since discontinuing drug       Reviewed and updated as needed this visit by Provider  Tobacco  Allergies  Meds  Problems  Med Hx  Surg Hx  Fam Hx         Review of Systems   Constitutional, neuro, ENT, endocrine, pulmonary, cardiac, gastrointestinal, genitourinary, musculoskeletal, integument and psychiatric systems are negative, except as otherwise noted.        Objective   Reported vitals:  /75   Pulse 70    healthy, alert and no distress  PSYCH: Alert and oriented times 3; coherent speech, normal   rate and volume, able to articulate logical thoughts, able   to abstract reason, no tangential thoughts, no hallucinations   or delusions  Her affect is normal and pleasant  RESP: No cough, no audible wheezing, able to talk in full sentences  Remainder of exam unable to be completed due to telephone visits    Diagnostic Test Results:  Labs reviewed in Epic        Assessment/Plan:  1. Essential hypertension  Well controlled. Refilled medication. Check labs.    - lisinopril (ZESTRIL) 20 MG tablet; Take 1 tablet (20 mg) by mouth daily  Dispense: 90 tablet; Refill: 3  - Basic metabolic panel; Future    2. Hyperlipidemia LDL goal <100  Well controlled. Refilled medication.  Check labs.   - simvastatin (ZOCOR) 40 MG tablet; Take 1 tablet (40 mg) by mouth At Bedtime  Dispense: 90 tablet; Refill: 3  - Lipid panel reflex to direct LDL Fasting; Future    3. Leg cramps  Check labs. Advised increasing physical activity - she reports she's been much more sedentary - and also making sure she's adequately  hydrated. Denies any swelling.  Follow-up if not improving or if worsening.   - Magnesium; Future  - TSH with free T4 reflex; Future    Return in about 1 day (around 6/12/2020) for lab visit.      Phone call duration:  11 minutes    Yamile Cotter MD

## 2020-06-12 DIAGNOSIS — I10 ESSENTIAL HYPERTENSION: ICD-10-CM

## 2020-06-12 DIAGNOSIS — E78.5 HYPERLIPIDEMIA LDL GOAL <100: ICD-10-CM

## 2020-06-12 DIAGNOSIS — R25.2 LEG CRAMPS: ICD-10-CM

## 2020-06-12 LAB
ANION GAP SERPL CALCULATED.3IONS-SCNC: 4 MMOL/L (ref 3–14)
BUN SERPL-MCNC: 18 MG/DL (ref 7–30)
CALCIUM SERPL-MCNC: 8.9 MG/DL (ref 8.5–10.1)
CHLORIDE SERPL-SCNC: 109 MMOL/L (ref 94–109)
CO2 SERPL-SCNC: 29 MMOL/L (ref 20–32)
CREAT SERPL-MCNC: 0.71 MG/DL (ref 0.52–1.04)
GFR SERPL CREATININE-BSD FRML MDRD: 79 ML/MIN/{1.73_M2}
GLUCOSE SERPL-MCNC: 96 MG/DL (ref 70–99)
MAGNESIUM SERPL-MCNC: 2.1 MG/DL (ref 1.6–2.3)
POTASSIUM SERPL-SCNC: 4.2 MMOL/L (ref 3.4–5.3)
SODIUM SERPL-SCNC: 142 MMOL/L (ref 133–144)
TSH SERPL DL<=0.005 MIU/L-ACNC: 0.76 MU/L (ref 0.4–4)

## 2020-06-12 PROCEDURE — 84443 ASSAY THYROID STIM HORMONE: CPT | Performed by: FAMILY MEDICINE

## 2020-06-12 PROCEDURE — 83735 ASSAY OF MAGNESIUM: CPT | Performed by: FAMILY MEDICINE

## 2020-06-12 PROCEDURE — 80048 BASIC METABOLIC PNL TOTAL CA: CPT | Performed by: FAMILY MEDICINE

## 2020-06-12 PROCEDURE — 36415 COLL VENOUS BLD VENIPUNCTURE: CPT | Performed by: FAMILY MEDICINE

## 2021-02-03 ENCOUNTER — IMMUNIZATION (OUTPATIENT)
Dept: FAMILY MEDICINE | Facility: OTHER | Age: 86
End: 2021-02-03
Attending: FAMILY MEDICINE
Payer: MEDICARE

## 2021-02-03 PROCEDURE — 91300 PR COVID VAC PFIZER DIL RECON 30 MCG/0.3 ML IM: CPT

## 2021-02-07 ENCOUNTER — HEALTH MAINTENANCE LETTER (OUTPATIENT)
Age: 86
End: 2021-02-07

## 2021-02-16 NOTE — PROGRESS NOTES
SUBJECTIVE:   Jen Layne is a 82 year old female who presents to clinic today for the following health issues:    Chief Complaint   Patient presents with     Establish Care     med recheck and refill     Hypertension     med recheck       Hypertension Follow-up      Outpatient blood pressures are being checked at home.  Results are 118/70.    Low Salt Diet: low salt      Amount of exercise or physical activity: 1 day/week for an average of less than 15 minutes    Problems taking medications regularly: No    Medication side effects: lightheadedness wondering if due to amlodipine    Diet: regular (no restrictions)      ADDITIONAL HPI: 82 year old female here for above issue.     ROS: 10 point review of systems negative except as per HPI.    PAST MEDICAL HISTORY:  Past Medical History:   Diagnosis Date     Gout 3/14/2011    Triggered by hydrochlorothiazide          ACTIVE MEDICAL PROBLEMS:  Patient Active Problem List   Diagnosis     Hypertension     Hyperlipidemia LDL goal <100     Obesity (BMI 30-39.9)     CKD (chronic kidney disease) stage 2, GFR 60-89 ml/min     Advance Care Planning     Impaired fasting glucose     Metabolic syndrome        FAMILY HISTORY:  Family History   Problem Relation Age of Onset     CANCER Sister      uterine and ovarian-in remission      HEART DISEASE Brother      valve replacement     Lipids Brother      Eye Disorder Mother      glaucoma     HEART DISEASE Mother       of CHF     Eye Disorder Brother      detached retina     CANCER Son      SKIN     CANCER Paternal Grandfather      carcinoma of the lip, pipe-smoker     Thyroid Disease Daughter      goiter or nodule?     DIABETES No family hx of      C.A.D. No family hx of      Breast Cancer No family hx of      Cancer - colorectal No family hx of        SOCIAL HISTORY:  Social History     Social History     Marital status:      Spouse name: N/A     Number of children: N/A     Years of education: N/A     Occupational  "History     Not on file.     Social History Main Topics     Smoking status: Never Smoker     Smokeless tobacco: Never Used     Alcohol use No     Drug use: No     Sexual activity: No     Other Topics Concern     Parent/Sibling W/ Cabg, Mi Or Angioplasty Before 65f 55m? No     Social History Narrative    2008 --- 4 children, 11 grandchildren, 3 great-grandchildren       MEDICATIONS:  Current Outpatient Prescriptions   Medication Sig Dispense Refill     simvastatin (ZOCOR) 40 MG tablet Take 1 tablet (40 mg) by mouth At Bedtime 90 tablet 3     lisinopril (PRINIVIL/ZESTRIL) 20 MG tablet Take 1 tablet (20 mg) by mouth daily 90 tablet 3     amLODIPine (NORVASC) 2.5 MG tablet Take 1 tablet (2.5 mg) by mouth daily 90 tablet 3     VITAMIN D 1000 UNIT OR CAPS 1 CAPSULE DAILY       ASPIRIN 81 MG OR TABS 1 TABLET DAILY       [DISCONTINUED] simvastatin (ZOCOR) 40 MG tablet Take 1 tablet (40 mg) by mouth At Bedtime 90 tablet 3     [DISCONTINUED] lisinopril (PRINIVIL/ZESTRIL) 20 MG tablet Take 1 tablet (20 mg) by mouth daily 90 tablet 3     [DISCONTINUED] amLODIPine (NORVASC) 2.5 MG tablet Take 1 tablet (2.5 mg) by mouth daily 90 tablet 3       ALLERGIES:     Allergies   Allergen Reactions     Hydrochlorothiazide      Triggered two gout attacks, no further problmes since discontinuing drug       Problem list, Medication list, Allergies, and Medical/Social/Surgical histories reviewed in [facility] and updated as appropriate.    OBJECTIVE:                                                    VITALS: /81 (BP Location: Right arm, Cuff Size: Adult Large)  Pulse 86  Temp 97.6  F (36.4  C) (Tympanic)  Resp 20  Ht 5' 4\" (1.626 m)  Wt 206 lb 12.8 oz (93.8 kg)  Breastfeeding? No  BMI 35.5 kg/m2 Body mass index is 35.5 kg/(m^2).  GENERAL: Pleasant, well appearing female.  HEENT: PERRL, EOMI, oropharynx clear.  NECK: supple, no thyromegaly or thyroid masses, no lymphadenopathy.  CV: RRR, no murmurs, rubs or gallops.  LUNGS: Clear to " auscultation bilaterally, normal effort.  SKIN: warm and dry without obvious rashes.   EXTREMITIES: No edema, normal pulses.     ASSESSMENT/PLAN:                                                    1. Hyperlipidemia LDL goal <100  Stable. Refilled medication.    - simvastatin (ZOCOR) 40 MG tablet; Take 1 tablet (40 mg) by mouth At Bedtime  Dispense: 90 tablet; Refill: 3    2. Essential hypertension  Stable. Refilled medication.    - lisinopril (PRINIVIL/ZESTRIL) 20 MG tablet; Take 1 tablet (20 mg) by mouth daily  Dispense: 90 tablet; Refill: 3  - amLODIPine (NORVASC) 2.5 MG tablet; Take 1 tablet (2.5 mg) by mouth daily  Dispense: 90 tablet; Refill: 3        29M. from home, self ambulating, chronic ETOH  abuse,  with no significant PMHx presents to the ED c/o abdominal pain and vomiting. Admitted for alcohol induced gastritis with hematemesis, followed by GI Dr. Long, for EGD 2/17, found to have - xxxx    CT A/P showed no acute finding of GI bleeding,  advised to take PPI as prescribed and as pt is stable in terms of  CIWA. pt is d/c home on vitamin bundle.  Please refer to medical record for entire hospital course as this is only brief summary.    29 year old male from home, self ambulating, chronic ETOH  abuse,  with no significant PMHx presents to the ED c/o abdominal pain and vomiting. Admitted for alcohol induced gastritis with hematemesis, on CIWA protocol. He was followed by GI Dr. Long, CT A/P showed no acute finding of GI bleeding. He was scheduled for EGD with Dr. Meehan on 2/17. However, pt decided to sign out AMA. Provider explained that leaving AMA may result in the patient's harm, injury, or death. Members of the healthcare team involved in the patient's care has offered to answer any questions. Patient is still adamantly requesting to leave the hospital. Attending was made aware.     Please refer to medical record for entire hospital course as this is only brief summary.    Patient is 29 year old male from home, self ambulating, chronic ETOH  abuse,  with no significant PMHx presents to the ED c/o abdominal pain and vomiting. Admitted for alcohol induced gastritis with hematemesis, on CIWA protocol. He was followed by GI Dr. Long, CT A/P showed no acute finding of GI bleeding. He was scheduled for EGD with Dr. Meehan on 2/17. Provider explained that leaving AMA may result in the patient's harm, injury, or death. Members of the healthcare team involved in the patient's care has offered to answer any questions. Patient is still adamantly requesting to leave the hospital. Attending was made aware. SW is following for placement in rehab facility as per patient request.    Please refer to medical record for entire hospital course as this is only brief summary.    Patient is 29 year old male from home, self ambulating, chronic ETOH  abuse,  with no significant PMHx presents to the ED c/o abdominal pain and vomiting. Admitted for alcohol induced gastritis with hematemesis, on CIWA protocol. He was followed by GI Dr. Long, CT A/P showed no acute finding of GI bleeding. He was scheduled for EGD with Dr. Meehan on 2/17.  S/P EGD :Impression:  Mild esophagitis - Plan: 1-  Resume diet 2- ETOH cessation 3- Follow up pathology   Refusing inpatient alchohol treatment. Verbalized understanding.    Please refer to medical record for entire hospital course as this is only brief summary.

## 2021-02-24 ENCOUNTER — IMMUNIZATION (OUTPATIENT)
Dept: FAMILY MEDICINE | Facility: OTHER | Age: 86
End: 2021-02-24
Attending: FAMILY MEDICINE
Payer: MEDICARE

## 2021-02-24 PROCEDURE — 91300 PR COVID VAC PFIZER DIL RECON 30 MCG/0.3 ML IM: CPT

## 2021-03-05 ENCOUNTER — OFFICE VISIT (OUTPATIENT)
Dept: FAMILY MEDICINE | Facility: CLINIC | Age: 86
End: 2021-03-05
Payer: MEDICARE

## 2021-03-05 VITALS
SYSTOLIC BLOOD PRESSURE: 138 MMHG | RESPIRATION RATE: 16 BRPM | DIASTOLIC BLOOD PRESSURE: 76 MMHG | BODY MASS INDEX: 36.54 KG/M2 | WEIGHT: 214 LBS | TEMPERATURE: 97.1 F | OXYGEN SATURATION: 96 % | HEIGHT: 64 IN | HEART RATE: 77 BPM

## 2021-03-05 DIAGNOSIS — Z00.00 ENCOUNTER FOR MEDICARE ANNUAL WELLNESS EXAM: Primary | ICD-10-CM

## 2021-03-05 DIAGNOSIS — E78.5 HYPERLIPIDEMIA LDL GOAL <100: ICD-10-CM

## 2021-03-05 DIAGNOSIS — Z13.220 LIPID SCREENING: ICD-10-CM

## 2021-03-05 DIAGNOSIS — I10 ESSENTIAL HYPERTENSION: ICD-10-CM

## 2021-03-05 LAB
CHOLEST SERPL-MCNC: 126 MG/DL
HDLC SERPL-MCNC: 46 MG/DL
LDLC SERPL CALC-MCNC: 45 MG/DL
NONHDLC SERPL-MCNC: 80 MG/DL
TRIGL SERPL-MCNC: 175 MG/DL

## 2021-03-05 PROCEDURE — G0439 PPPS, SUBSEQ VISIT: HCPCS | Performed by: FAMILY MEDICINE

## 2021-03-05 PROCEDURE — 36415 COLL VENOUS BLD VENIPUNCTURE: CPT | Performed by: FAMILY MEDICINE

## 2021-03-05 PROCEDURE — 80061 LIPID PANEL: CPT | Performed by: FAMILY MEDICINE

## 2021-03-05 RX ORDER — LISINOPRIL 20 MG/1
20 TABLET ORAL DAILY
Qty: 90 TABLET | Refills: 4 | Status: SHIPPED | OUTPATIENT
Start: 2021-03-05 | End: 2022-05-13

## 2021-03-05 RX ORDER — SIMVASTATIN 40 MG
40 TABLET ORAL AT BEDTIME
Qty: 90 TABLET | Refills: 4 | Status: SHIPPED | OUTPATIENT
Start: 2021-03-05 | End: 2022-05-13

## 2021-03-05 ASSESSMENT — MIFFLIN-ST. JEOR: SCORE: 1410.7

## 2021-03-05 NOTE — PATIENT INSTRUCTIONS
Patient Education   Personalized Prevention Plan  You are due for the preventive services outlined below.  Your care team is available to assist you in scheduling these services.  If you have already completed any of these items, please share that information with your care team to update in your medical record.  Health Maintenance Due   Topic Date Due     Zoster (Shingles) Vaccine (2 of 3) 12/17/2007     Annual Wellness Visit  04/19/2019     FALL RISK ASSESSMENT  04/04/2020     PHQ-2  01/01/2021

## 2021-03-05 NOTE — PROGRESS NOTES
"  SUBJECTIVE:   Jen Layne is a 85 year old female who presents for Preventive Visit.      Patient has been advised of split billing requirements and indicates understanding: Yes  Are you in the first 12 months of your Medicare Part B coverage?  No    Physical Health:    In general, how would you rate your overall physical health? good    Outside of work, how many days during the week do you exercise? none    Outside of work, approximately how many minutes a day do you exercise?less than 15 minutes    If you drink alcohol do you typically have >3 drinks per day or >7 drinks per week? No    Do you usually eat at least 4 servings of fruit and vegetables a day, include whole grains & fiber and avoid regularly eating high fat or \"junk\" foods? Yes    Do you have any problems taking medications regularly?  No    Do you have any side effects from medications? none    Needs assistance for the following daily activities: no assistance needed    Which of the following safety concerns are present in your home?  lack of grab bars in the bathroom and lack of handrails on stairs     Hearing impairment: No    In the past 6 months, have you been bothered by leaking of urine? no    Mental Health:    In general, how would you rate your overall mental or emotional health? excellent  PHQ-2 Score:      Do you feel safe in your environment? Yes    Have you ever done Advance Care Planning? (For example, a Health Directive, POLST, or a discussion with a medical provider or your loved ones about your wishes): Yes, advance care planning is on file.    Additional concerns to address?  YES    Fall risk:  Fallen 2 or more times in the past year?: No  Any fall with injury in the past year?: No    Cognitive Screenin) Repeat 3 items (Leader, Season, Table)    2) Clock draw: NORMAL  3) 3 item recall: Recalls 3 objects  Results: 3 items recalled: COGNITIVE IMPAIRMENT LESS LIKELY    Mini-CogTM Copyright S Adrianna. Licensed by the author " for use in [facility]; reprinted with permission (brittani@.[email or web address]). All rights reserved.              Right Foot - has a painful bump on the top of right foot    Reviewed and updated as needed this visit by clinical staff  Tobacco  Allergies  Meds   Med Hx  Surg Hx  Fam Hx  Soc Hx        Reviewed and updated as needed this visit by Provider                Social History     Tobacco Use     Smoking status: Never Smoker     Smokeless tobacco: Never Used   Substance Use Topics     Alcohol use: No                           Current providers sharing in care for this patient include:   Patient Care Team:  Yamile Cotter MD as PCP - General (Family Practice)  Yamile Cotter MD as Assigned PCP    The following health maintenance items are reviewed in Epic and correct as of today:  Health Maintenance   Topic Date Due     ZOSTER IMMUNIZATION (2 of 3) 12/17/2007     FALL RISK ASSESSMENT  04/04/2020     PHQ-2  01/01/2021     MEDICARE ANNUAL WELLNESS VISIT  03/05/2022     DTAP/TDAP/TD IMMUNIZATION (3 - Td) 03/21/2023     ADVANCE CARE PLANNING  03/05/2026     DEXA  06/19/2033     INFLUENZA VACCINE  Completed     Pneumococcal Vaccine: 65+ Years  Completed     COVID-19 Vaccine  Completed     Pneumococcal Vaccine: Pediatrics (0 to 5 Years) and At-Risk Patients (6 to 64 Years)  Aged Out     IPV IMMUNIZATION  Aged Out     MENINGITIS IMMUNIZATION  Aged Out     HEPATITIS B IMMUNIZATION  Aged Out     Labs reviewed in EPIC  Patient Active Problem List   Diagnosis     Hypertension     Hyperlipidemia LDL goal <100     Obesity (BMI 30-39.9)     CKD (chronic kidney disease) stage 2, GFR 60-89 ml/min     Advance Care Planning     Impaired fasting glucose     Metabolic syndrome     Morbid obesity (H)     Past Surgical History:   Procedure Laterality Date     COLONOSCOPY  12/05/05    normal, recheck 10 years -- done at [facility] EYE  03/30/2018    Capsulotomy Left eye.   Dr. Skip Villela     HC  "REMOVAL GALLBLADDER       HYSTEROSCOPY,DIAGNOSTIC  around        Social History     Tobacco Use     Smoking status: Never Smoker     Smokeless tobacco: Never Used   Substance Use Topics     Alcohol use: No     Family History   Problem Relation Age of Onset     Cancer Sister         uterine and ovarian-in remission      Heart Disease Brother         valve replacement     Lipids Brother      Eye Disorder Mother         glaucoma     Heart Disease Mother          of CHF     Eye Disorder Brother         detached retina     Cancer Son         SKIN     Cancer Paternal Grandfather         carcinoma of the lip, pipe-smoker     Thyroid Disease Daughter         goiter or nodule?     Diabetes No family hx of      C.A.D. No family hx of      Breast Cancer No family hx of      Cancer - colorectal No family hx of          Current Outpatient Medications   Medication Sig Dispense Refill     ASPIRIN 81 MG OR TABS 1 TABLET DAILY       lisinopril (ZESTRIL) 20 MG tablet Take 1 tablet (20 mg) by mouth daily 90 tablet 4     simvastatin (ZOCOR) 40 MG tablet Take 1 tablet (40 mg) by mouth At Bedtime 90 tablet 4     VITAMIN D 1000 UNIT OR CAPS 1 CAPSULE DAILY       Allergies   Allergen Reactions     Hydrochlorothiazide      Triggered two gout attacks, no further problmes since discontinuing drug     Mammogram Screening: Mammogram Screening - Patient over age 75, has elected to discontinue screenings.    ROS:  Constitutional, neuro, ENT, endocrine, pulmonary, cardiac, gastrointestinal, genitourinary, musculoskeletal, integument and psychiatric systems are negative, except as otherwise noted.     OBJECTIVE:   /76   Pulse 77   Temp 97.1  F (36.2  C) (Tympanic)   Resp 16   Ht 1.626 m (5' 4\")   Wt 97.1 kg (214 lb)   SpO2 96%   BMI 36.73 kg/m   Estimated body mass index is 36.73 kg/m  as calculated from the following:    Height as of this encounter: 1.626 m (5' 4\").    Weight as of this encounter: 97.1 kg (214 lb).  EXAM: " "  GENERAL APPEARANCE: healthy, alert and no distress  EYES: Eyes grossly normal to inspection, PERRL and conjunctivae and sclerae normal  HENT: ear canals and TM's normal, nose and mouth without ulcers or lesions, oropharynx clear and oral mucous membranes moist  NECK: no adenopathy, no asymmetry, masses, or scars and thyroid normal to palpation  RESP: lungs clear to auscultation - no rales, rhonchi or wheezes  CV: regular rate and rhythm, normal S1 S2, no S3 or S4, no murmur, click or rub, no peripheral edema and peripheral pulses strong  ABDOMEN: soft, nontender, no hepatosplenomegaly, no masses and bowel sounds normal  MS: no musculoskeletal defects are noted and gait is age appropriate without ataxia  SKIN: no suspicious lesions or rashes  NEURO: Normal strength and tone, sensory exam grossly normal, mentation intact and speech normal  PSYCH: mentation appears normal and affect normal/bright    Diagnostic Test Results:  Labs reviewed in Epic    ASSESSMENT / PLAN:   1. Encounter for Medicare annual wellness exam    2. Lipid screening  - Lipid panel reflex to direct LDL Fasting    3. Essential hypertension  Well controlled. Refilled medication.   Labs up to date.  - lisinopril (ZESTRIL) 20 MG tablet; Take 1 tablet (20 mg) by mouth daily  Dispense: 90 tablet; Refill: 4    4. Hyperlipidemia LDL goal <100  Well controlled. Refilled medication. Check labs.    - simvastatin (ZOCOR) 40 MG tablet; Take 1 tablet (40 mg) by mouth At Bedtime  Dispense: 90 tablet; Refill: 4    Patient has been advised of split billing requirements and indicates understanding: Yes    COUNSELING:  Reviewed preventive health counseling, as reflected in patient instructions    Estimated body mass index is 36.73 kg/m  as calculated from the following:    Height as of this encounter: 1.626 m (5' 4\").    Weight as of this encounter: 97.1 kg (214 lb).    Weight management plan: See AVS    She reports that she has never smoked. She has never used " smokeless tobacco.    Appropriate preventive services were discussed with this patient, including applicable screening as appropriate for cardiovascular disease, diabetes, osteopenia/osteoporosis, and glaucoma.  As appropriate for age/gender, discussed screening for colorectal cancer, prostate cancer, breast cancer, and cervical cancer. Checklist reviewing preventive services available has been given to the patient.    Reviewed patients plan of care and provided an AVS. The Basic Care Plan (routine screening as documented in Health Maintenance) for Jen meets the Care Plan requirement. This Care Plan has been established and reviewed with the Patient.    Counseling Resources:  ATP IV Guidelines  Pooled Cohorts Equation Calculator  Breast Cancer Risk Calculator  BRCA-Related Cancer Risk Assessment: FHS-7 Tool  FRAX Risk Assessment  ICSI Preventive Guidelines  Dietary Guidelines for Americans, 2010  USDA's MyPlate  ASA Prophylaxis  Lung CA Screening    Yamile Cotter MD  [facility]

## 2021-05-25 ENCOUNTER — RECORDS - HEALTHEAST (OUTPATIENT)
Dept: ADMINISTRATIVE | Facility: CLINIC | Age: 86
End: 2021-05-25

## 2021-05-26 ENCOUNTER — RECORDS - HEALTHEAST (OUTPATIENT)
Dept: ADMINISTRATIVE | Facility: CLINIC | Age: 86
End: 2021-05-26

## 2021-05-28 ENCOUNTER — RECORDS - HEALTHEAST (OUTPATIENT)
Dept: ADMINISTRATIVE | Facility: CLINIC | Age: 86
End: 2021-05-28

## 2021-05-30 ENCOUNTER — RECORDS - HEALTHEAST (OUTPATIENT)
Dept: ADMINISTRATIVE | Facility: CLINIC | Age: 86
End: 2021-05-30

## 2021-07-13 ENCOUNTER — RECORDS - HEALTHEAST (OUTPATIENT)
Dept: ADMINISTRATIVE | Facility: CLINIC | Age: 86
End: 2021-07-13

## 2021-07-21 ENCOUNTER — RECORDS - HEALTHEAST (OUTPATIENT)
Dept: ADMINISTRATIVE | Facility: CLINIC | Age: 86
End: 2021-07-21

## 2021-09-18 ENCOUNTER — HEALTH MAINTENANCE LETTER (OUTPATIENT)
Age: 86
End: 2021-09-18

## 2021-10-25 ENCOUNTER — VIRTUAL VISIT (OUTPATIENT)
Dept: FAMILY MEDICINE | Facility: CLINIC | Age: 86
End: 2021-10-25
Payer: MEDICARE

## 2021-10-25 DIAGNOSIS — J01.90 ACUTE NON-RECURRENT SINUSITIS, UNSPECIFIED LOCATION: Primary | ICD-10-CM

## 2021-10-25 PROCEDURE — 99441 PR PHYSICIAN TELEPHONE EVALUATION 5-10 MIN: CPT | Mod: 95 | Performed by: NURSE PRACTITIONER

## 2021-10-25 RX ORDER — AMOXICILLIN 875 MG
875 TABLET ORAL 2 TIMES DAILY
Qty: 14 TABLET | Refills: 0 | Status: SHIPPED | OUTPATIENT
Start: 2021-10-25 | End: 2022-04-14

## 2021-10-25 NOTE — PROGRESS NOTES
Jen is a 86 year old who is being evaluated via a billable telephone visit.      What phone number would you like to be contacted at? [phone]  How would you like to obtain your AVS? MyChart    Assessment & Plan     Acute non-recurrent sinusitis, unspecified location  Will treat patient based on duration of symptoms   - amoxicillin (AMOXIL) 875 MG tablet; Take 1 tablet (875 mg) by mouth 2 times daily        No follow-ups on file.    ÓSCAR Rossi [facility]    Racheal Li is a 86 year old who presents for the following health issues    Chief Complaint   Patient presents with     URI     Pt being seen for cold symptoms.       HPI     Acute Illness  Acute illness concerns: cold symptoms- sinus pressure/ sinus headaches, nasal congestion, slight sore throat. Has received her covid vaccines  No known exposures to covid. Patient pretty much stays home.   No history of allergies or asthma.   Onset/Duration: 1 wk  Symptoms:  Fever: no  Chills/Sweats: no  Headache (location?): YES  Sinus Pressure: YES- behind eyes, forehead  Conjunctivitis:  YES- little  Ear Pain: no  Rhinorrhea: YES- lots  Congestion: YES- nasal and throat  Sore Throat: YES  Cough: YES  Wheeze: no  Decreased Appetite: YES- somewhat  Nausea: no  Vomiting: no  Diarrhea: no  Dysuria/Freq.: no  Dysuria or Hematuria: no  Fatigue/Achiness: YES- fatigue  Sick/Strep Exposure: no  Therapies tried and outcome: delsym for cough, aleve      Review of Systems   Constitutional, HEENT, cardiovascular, pulmonary, GI, , musculoskeletal, neuro, skin, endocrine and psych systems are negative, except as otherwise noted.      Objective           Vitals:  No vitals were obtained today due to virtual visit.    Physical Exam   healthy, alert and no distress  PSYCH: Alert and oriented times 3; coherent speech, normal   rate and volume, able to articulate logical thoughts, able   to abstract reason, no tangential thoughts, no  hallucinations   or delusions  Her affect is normal  RESP: No cough, no audible wheezing, able to talk in full sentences  Remainder of exam unable to be completed due to telephone visits              Phone call duration: 10 minutes

## 2021-10-31 ENCOUNTER — ANCILLARY PROCEDURE (OUTPATIENT)
Dept: GENERAL RADIOLOGY | Facility: CLINIC | Age: 86
End: 2021-10-31
Attending: EMERGENCY MEDICINE
Payer: MEDICARE

## 2021-10-31 ENCOUNTER — OFFICE VISIT (OUTPATIENT)
Dept: URGENT CARE | Facility: URGENT CARE | Age: 86
End: 2021-10-31
Payer: MEDICARE

## 2021-10-31 VITALS
BODY MASS INDEX: 34.84 KG/M2 | RESPIRATION RATE: 20 BRPM | HEART RATE: 72 BPM | DIASTOLIC BLOOD PRESSURE: 82 MMHG | TEMPERATURE: 97.8 F | OXYGEN SATURATION: 94 % | WEIGHT: 203 LBS | SYSTOLIC BLOOD PRESSURE: 136 MMHG

## 2021-10-31 DIAGNOSIS — J18.9 PNEUMONIA OF RIGHT LOWER LOBE DUE TO INFECTIOUS ORGANISM: ICD-10-CM

## 2021-10-31 DIAGNOSIS — R05.9 COUGH: Primary | ICD-10-CM

## 2021-10-31 PROCEDURE — 71046 X-RAY EXAM CHEST 2 VIEWS: CPT | Performed by: RADIOLOGY

## 2021-10-31 PROCEDURE — 99214 OFFICE O/P EST MOD 30 MIN: CPT | Performed by: EMERGENCY MEDICINE

## 2021-10-31 RX ORDER — BENZONATATE 200 MG/1
200 CAPSULE ORAL 3 TIMES DAILY PRN
Qty: 20 CAPSULE | Refills: 0 | Status: SHIPPED | OUTPATIENT
Start: 2021-10-31 | End: 2022-04-14

## 2021-10-31 RX ORDER — AZITHROMYCIN 100 MG/5ML
POWDER, FOR SUSPENSION ORAL
Qty: 75 ML | Refills: 0 | Status: SHIPPED | OUTPATIENT
Start: 2021-10-31 | End: 2022-04-14

## 2021-10-31 NOTE — PROGRESS NOTES
CHIEF COMPLAINT: Persistent cough      HPI: Patient is an 86-year-old female who presents with several week history of persistent cough.  She was treated with amoxicillin for sinus infection by virtual appointment and she is on day 6 of the amoxicillin.  He does continue to have some nasal congestion.  She does feel occasionally dyspneic.  Recently Covid negative.  No chronic respiratory disease      ROS: See HPI otherwise normal.    Allergies   Allergen Reactions     Hydrochlorothiazide      Triggered two gout attacks, no further problmes since discontinuing drug      Current Outpatient Medications   Medication Sig Dispense Refill     amoxicillin (AMOXIL) 875 MG tablet Take 1 tablet (875 mg) by mouth 2 times daily 14 tablet 0     ASPIRIN 81 MG OR TABS 1 TABLET DAILY       azithromycin (ZITHROMAX) 100 MG/5ML suspension Tale 25 ml now, then 12.5 ml for 4 more days. 75 mL 0     benzonatate (TESSALON) 200 MG capsule Take 1 capsule (200 mg) by mouth 3 times daily as needed for cough 20 capsule 0     lisinopril (ZESTRIL) 20 MG tablet Take 1 tablet (20 mg) by mouth daily 90 tablet 4     simvastatin (ZOCOR) 40 MG tablet Take 1 tablet (40 mg) by mouth At Bedtime 90 tablet 4     VITAMIN D 1000 UNIT OR CAPS 1 CAPSULE DAILY           PE: Physical exam reveals an 86-year-old female who does not appear in acute distress vital signs show she is afebrile.  Pulse oximetry borderline at 94%.  Examination of lungs revealed no power wheezes rales or rhonchi.  Heart is regular.  Skin warm and dry.        TREATMENT: Chest x-ray reveals a question of patchy infiltrate in the right mid lung field.      ASSESSMENT: Pneumonia.  Pulse ox 94%.  No acute distress clinically.  Outpatient management reasonable.  Should be noted the patient cannot swallow pills and only can tolerate liquid medicine.      DIAGNOSIS: Pneumonia.      PLAN: Zithromax as instructed.  Tessalon for cough suppression (patient can tolerate small gel capsules).  Recheck if  feeling more ill.  Follow-up 1 week if no improvement.

## 2021-10-31 NOTE — PATIENT INSTRUCTIONS
Stop current antibiotics and start new antibiotics    Capsules for cough    Arrange follow-up in 1 week if still ill, sooner if worse

## 2021-11-02 ENCOUNTER — TELEPHONE (OUTPATIENT)
Dept: URGENT CARE | Facility: URGENT CARE | Age: 86
End: 2021-11-02

## 2021-11-02 NOTE — TELEPHONE ENCOUNTER
Pt had her ABX dose today.  Informed she would get a call tomorrow about if she needs ABXs or not.  KPavelRN    See initial note.  KPavelRN

## 2021-11-02 NOTE — TELEPHONE ENCOUNTER
See note below.  Spoke with Pharmacist and pt was given new instructions with new med dose changes. Pharmacist states pt chose to take the amts on her AVS instead of what she was directed to do.    Azithromycin given was 200 mg/5 ml since the pharmacy did not have the 100 mg/5 ml available.    Pt has had Azithromycin 1000 mg x 1 day and then 500 mg x 2 days and is now out of med.  Is pt ok to stop or does she need another prescription?  Advise.  Suyapa

## 2021-11-04 NOTE — TELEPHONE ENCOUNTER
Ok to stop antibiotic now. Zithromax can be given as a 3 day regimen too.  It's extended release so as long as she had the full quantity - the fact that it was over 3 and not 5 days it totally ok.

## 2022-04-14 ENCOUNTER — OFFICE VISIT (OUTPATIENT)
Dept: URGENT CARE | Facility: URGENT CARE | Age: 87
End: 2022-04-14
Payer: MEDICARE

## 2022-04-14 VITALS
SYSTOLIC BLOOD PRESSURE: 128 MMHG | WEIGHT: 199 LBS | OXYGEN SATURATION: 97 % | HEART RATE: 84 BPM | BODY MASS INDEX: 34.16 KG/M2 | DIASTOLIC BLOOD PRESSURE: 66 MMHG | RESPIRATION RATE: 18 BRPM | TEMPERATURE: 97.3 F

## 2022-04-14 DIAGNOSIS — J06.9 VIRAL URI WITH COUGH: Primary | ICD-10-CM

## 2022-04-14 PROCEDURE — 99213 OFFICE O/P EST LOW 20 MIN: CPT | Performed by: STUDENT IN AN ORGANIZED HEALTH CARE EDUCATION/TRAINING PROGRAM

## 2022-04-14 RX ORDER — BENZONATATE 200 MG/1
200 CAPSULE ORAL 3 TIMES DAILY PRN
Qty: 30 CAPSULE | Refills: 0 | Status: SHIPPED | OUTPATIENT
Start: 2022-04-14 | End: 2022-05-13

## 2022-04-14 RX ORDER — GUAIFENESIN 600 MG/1
1200 TABLET, EXTENDED RELEASE ORAL 2 TIMES DAILY
Qty: 40 TABLET | Refills: 0 | Status: SHIPPED | OUTPATIENT
Start: 2022-04-14 | End: 2022-05-13

## 2022-04-14 NOTE — PROGRESS NOTES
Assessment & Plan     Viral URI with cough  Symptoms consistent with viral URI. Afebrile. Discussed medications that can help to treat symptoms along with getting lots of rest and fluids. Follow up if fever or chills develop, or symptoms are worsening, advised to follow up.   - benzonatate (TESSALON) 200 MG capsule  Dispense: 30 capsule; Refill: 0  - guaiFENesin (MUCINEX) 600 MG 12 hr tablet  Dispense: 40 tablet; Refill: 0      No follow-ups on file.    Lori Kerr, ÓSCAR [facility]    Racheal Li is a 86 year old female who presents to clinic today for the following health issues:  Chief Complaint   Patient presents with     Cough     Started Tuesday with a runny nose, caused a cough and sore throat. Having trouble sleeping.  Has been using cough drops.     HPI        Review of Systems  Constitutional, HEENT, cardiovascular, pulmonary, gi and gu systems are negative, except as otherwise noted.      Objective    /66 (BP Location: Right arm, Patient Position: Sitting, Cuff Size: Adult Regular)   Pulse 84   Temp 97.3  F (36.3  C) (Tympanic)   Resp 18   Wt 90.3 kg (199 lb)   SpO2 97%   BMI 34.16 kg/m    Physical Exam   GENERAL: alert and no distress  EYES: Eyes grossly normal to inspection, PERRL and conjunctivae and sclerae normal  HENT: ear canals and TM's normal, nose and mouth without ulcers or lesions  NECK: no adenopathy, no asymmetry, masses, or scars   RESP: lungs clear to auscultation - no rales, rhonchi or wheezes  CV: regular rate and rhythm, normal S1 S2, no S3 or S4, no murmur, click or rub  MS: no gross musculoskeletal defects noted, no edema  SKIN: no suspicious lesions or rashes  NEURO: Normal strength and tone, mentation intact and speech normal    No results found for this or any previous visit (from the past 24 hour(s)).  No results found for this visit on 04/14/22.  No results found.

## 2022-04-24 ENCOUNTER — HEALTH MAINTENANCE LETTER (OUTPATIENT)
Age: 87
End: 2022-04-24

## 2022-05-12 NOTE — PROGRESS NOTES
"  Assessment & Plan     Essential hypertension  Well controlled. Refilled medication. Check labs.    - BASIC METABOLIC PANEL; Future  - lisinopril (ZESTRIL) 20 MG tablet; Take 1 tablet (20 mg) by mouth daily  - BASIC METABOLIC PANEL  - Albumin Random Urine Quantitative with Creat Ratio; Future  - Albumin Random Urine Quantitative with Creat Ratio    Hyperlipidemia LDL goal <100  Well controlled. Refilled medication. Check labs.    - simvastatin (ZOCOR) 40 MG tablet; Take 1 tablet (40 mg) by mouth At Bedtime  - Lipid panel reflex to direct LDL Fasting; Future  - Lipid panel reflex to direct LDL Fasting    Morbid obesity (H)  See AVS    Nocturnal leg cramps  Check labs. Discussed management strategies.  - Magnesium; Future  - CBC with Platelets & Differential; Future  - Magnesium  - CBC with Platelets & Differential    Primary osteoarthritis of right foot  Can try over the counter voltaren gel. Follow-up if not improving or if worsening.              BMI:   Estimated body mass index is 34.33 kg/m  as calculated from the following:    Height as of this encounter: 1.626 m (5' 4\").    Weight as of this encounter: 90.7 kg (200 lb).           Return for Physical.    Yamile Cotter MD  [facility]    Racheal Li is a 86 year old who presents for the following health issues     History of Present Illness       Reason for visit:  Medication refill    She eats 2-3 servings of fruits and vegetables daily.She consumes 2 sweetened beverage(s) daily.She exercises with enough effort to increase her heart rate 9 or less minutes per day.  She exercises with enough effort to increase her heart rate 3 or less days per week.   She is taking medications regularly.       Hyperlipidemia Follow-Up      Are you regularly taking any medication or supplement to lower your cholesterol?   Yes- simvatatin    Are you having muscle aches or other side effects that you think could be caused by your " "cholesterol lowering medication?  No    Hypertension Follow-up      Do you check your blood pressure regularly outside of the clinic? Yes     Are you following a low salt diet? No    Are your blood pressures ever more than 140 on the top number (systolic) OR more   than 90 on the bottom number (diastolic), for example 140/90? No        Review of Systems   Constitutional, neuro, ENT, endocrine, pulmonary, cardiac, gastrointestinal, genitourinary, musculoskeletal, integument and psychiatric systems are negative, except as otherwise noted.      Objective    /82   Pulse 85   Temp 97.3  F (36.3  C) (Tympanic)   Resp 16   Ht 1.626 m (5' 4\")   Wt 90.7 kg (200 lb)   SpO2 95%   BMI 34.33 kg/m    Body mass index is 34.33 kg/m .  Physical Exam   GENERAL: Pleasant, well appearing female.  HEENT: PERRL, EOMI.  NECK: supple, no thyromegaly or thyroid masses, no lymphadenopathy.  CV: RRR, no murmurs, rubs or gallops.  LUNGS: Clear to auscultation bilaterally, normal effort.  ABDOMEN: Soft, non-distended, non-tender.  No hepatosplenomegaly or palpable masses.    SKIN: warm and dry without obvious rashes.   EXTREMITIES: No edema, normal pulses.             "

## 2022-05-13 ENCOUNTER — OFFICE VISIT (OUTPATIENT)
Dept: FAMILY MEDICINE | Facility: CLINIC | Age: 87
End: 2022-05-13
Payer: MEDICARE

## 2022-05-13 VITALS
OXYGEN SATURATION: 95 % | TEMPERATURE: 97.3 F | WEIGHT: 200 LBS | SYSTOLIC BLOOD PRESSURE: 120 MMHG | RESPIRATION RATE: 16 BRPM | HEART RATE: 85 BPM | BODY MASS INDEX: 34.15 KG/M2 | HEIGHT: 64 IN | DIASTOLIC BLOOD PRESSURE: 82 MMHG

## 2022-05-13 DIAGNOSIS — Z13.220 SCREENING FOR HYPERLIPIDEMIA: ICD-10-CM

## 2022-05-13 DIAGNOSIS — E66.01 MORBID OBESITY (H): ICD-10-CM

## 2022-05-13 DIAGNOSIS — E78.5 HYPERLIPIDEMIA LDL GOAL <100: ICD-10-CM

## 2022-05-13 DIAGNOSIS — R73.01 IMPAIRED FASTING GLUCOSE: ICD-10-CM

## 2022-05-13 DIAGNOSIS — M19.071 PRIMARY OSTEOARTHRITIS OF RIGHT FOOT: ICD-10-CM

## 2022-05-13 DIAGNOSIS — G47.62 NOCTURNAL LEG CRAMPS: ICD-10-CM

## 2022-05-13 DIAGNOSIS — I10 ESSENTIAL HYPERTENSION: Primary | ICD-10-CM

## 2022-05-13 LAB
ANION GAP SERPL CALCULATED.3IONS-SCNC: 6 MMOL/L (ref 3–14)
BASOPHILS # BLD AUTO: 0.1 10E3/UL (ref 0–0.2)
BASOPHILS NFR BLD AUTO: 1 %
BUN SERPL-MCNC: 19 MG/DL (ref 7–30)
CALCIUM SERPL-MCNC: 9.3 MG/DL (ref 8.5–10.1)
CHLORIDE BLD-SCNC: 108 MMOL/L (ref 94–109)
CHOLEST SERPL-MCNC: 123 MG/DL
CO2 SERPL-SCNC: 25 MMOL/L (ref 20–32)
CREAT SERPL-MCNC: 0.79 MG/DL (ref 0.52–1.04)
CREAT UR-MCNC: 173 MG/DL
EOSINOPHIL # BLD AUTO: 0.3 10E3/UL (ref 0–0.7)
EOSINOPHIL NFR BLD AUTO: 4 %
ERYTHROCYTE [DISTWIDTH] IN BLOOD BY AUTOMATED COUNT: 13.1 % (ref 10–15)
FASTING STATUS PATIENT QL REPORTED: YES
GFR SERPL CREATININE-BSD FRML MDRD: 73 ML/MIN/1.73M2
GLUCOSE BLD-MCNC: 120 MG/DL (ref 70–99)
HCT VFR BLD AUTO: 48.3 % (ref 35–47)
HDLC SERPL-MCNC: 38 MG/DL
HGB BLD-MCNC: 15.6 G/DL (ref 11.7–15.7)
IMM GRANULOCYTES # BLD: 0 10E3/UL
IMM GRANULOCYTES NFR BLD: 0 %
LDLC SERPL CALC-MCNC: 49 MG/DL
LYMPHOCYTES # BLD AUTO: 2.3 10E3/UL (ref 0.8–5.3)
LYMPHOCYTES NFR BLD AUTO: 26 %
MAGNESIUM SERPL-MCNC: 1.8 MG/DL (ref 1.6–2.3)
MCH RBC QN AUTO: 29.8 PG (ref 26.5–33)
MCHC RBC AUTO-ENTMCNC: 32.3 G/DL (ref 31.5–36.5)
MCV RBC AUTO: 92 FL (ref 78–100)
MICROALBUMIN UR-MCNC: 20 MG/L
MICROALBUMIN/CREAT UR: 11.56 MG/G CR (ref 0–25)
MONOCYTES # BLD AUTO: 0.7 10E3/UL (ref 0–1.3)
MONOCYTES NFR BLD AUTO: 8 %
NEUTROPHILS # BLD AUTO: 5.4 10E3/UL (ref 1.6–8.3)
NEUTROPHILS NFR BLD AUTO: 62 %
NONHDLC SERPL-MCNC: 85 MG/DL
PLATELET # BLD AUTO: 321 10E3/UL (ref 150–450)
POTASSIUM BLD-SCNC: 4.2 MMOL/L (ref 3.4–5.3)
RBC # BLD AUTO: 5.24 10E6/UL (ref 3.8–5.2)
SODIUM SERPL-SCNC: 139 MMOL/L (ref 133–144)
TRIGL SERPL-MCNC: 178 MG/DL
WBC # BLD AUTO: 8.7 10E3/UL (ref 4–11)

## 2022-05-13 PROCEDURE — 85025 COMPLETE CBC W/AUTO DIFF WBC: CPT | Performed by: FAMILY MEDICINE

## 2022-05-13 PROCEDURE — 80048 BASIC METABOLIC PNL TOTAL CA: CPT | Performed by: FAMILY MEDICINE

## 2022-05-13 PROCEDURE — 83735 ASSAY OF MAGNESIUM: CPT | Performed by: FAMILY MEDICINE

## 2022-05-13 PROCEDURE — 99214 OFFICE O/P EST MOD 30 MIN: CPT | Performed by: FAMILY MEDICINE

## 2022-05-13 PROCEDURE — 82043 UR ALBUMIN QUANTITATIVE: CPT | Performed by: FAMILY MEDICINE

## 2022-05-13 PROCEDURE — 36415 COLL VENOUS BLD VENIPUNCTURE: CPT | Performed by: FAMILY MEDICINE

## 2022-05-13 PROCEDURE — 80061 LIPID PANEL: CPT | Performed by: FAMILY MEDICINE

## 2022-05-13 RX ORDER — SIMVASTATIN 40 MG
40 TABLET ORAL AT BEDTIME
Qty: 90 TABLET | Refills: 4 | Status: SHIPPED | OUTPATIENT
Start: 2022-05-13 | End: 2023-07-17

## 2022-05-13 RX ORDER — LISINOPRIL 20 MG/1
20 TABLET ORAL DAILY
Qty: 90 TABLET | Refills: 4 | Status: SHIPPED | OUTPATIENT
Start: 2022-05-13 | End: 2023-06-14

## 2022-05-13 ASSESSMENT — PAIN SCALES - GENERAL: PAINLEVEL: NO PAIN (0)

## 2022-05-13 NOTE — PATIENT INSTRUCTIONS
Shingrix is the new shingles vaccine. It is typically a pharmacy benefit under most insurances. The [facility] pharmacy can check your insurance coverage.    Voltraren gel topically for foot pain.    Your BMI is Body mass index is Body mass index is 34.33 kg/m .     A BMI of 18.5 to 24.9 is in the healthy range. A person with a BMI of 25 to 29.9 is considered overweight, and someone with a BMI of 30 or greater is considered obese. More than two-thirds of American adults are considered overweight or obese.  Weight management is a personal decision.  If you are interested in exploring weight loss strategies, the following discussion covers the approaches that may be successful. Body mass index (BMI) is one way to tell whether you are at a healthy weight, overweight, or obese. It measures your weight in relation to your height.  Being overweight or obese increases the risk for further weight gain. Excess weight may lead to heart disease and diabetes.  Creating and following plans for healthy eating and physical activity may help you improve your health.  Weight control is part of healthy lifestyle and includes exercise, emotional health, and healthy eating habits. Careful eating habits lifelong are the mainstay of weight control. Though there are significant health benefits from weight loss, long-term weight loss with diet alone may be very difficult to achieve- studies show long-term success with dietary management alone in less than 10% of people. Attaining a healthy weight may be especially difficult to achieve in those with severe obesity. In some cases, medications, devices and surgical management might be considered.  What can you do?  Keep a food journal to help with mindful eating and finding ways to modify your diet.    Reduce the amount of processed food in your diet. Focus on adding vegetables, and lean proteins.  Reduce dietary carbohydrates. Limiting to  gm of carbohydrates per day has been  shows to help boost weight loss.  If you have diabetes or are on diabetic medications do not do this without talking to your physician or healthcare provider.    Diet combined with exercise helps maintain muscle while optimizing fat loss. Strength training is particularly important for building and maintaining muscle mass. Exercise helps reduce stress, increase energy, and improves fitness. Increasing exercise without diet control, however, may not burn enough calories to loose weight.       Start walking three days a week 10-20 minutes at a time  Work towards walking thirty minutes five days a week    Eventually, increase the speed of your walking for 1-2 minutes at time    In addition, we recommend that you review healthy lifestyles and methods for weight loss available through the National Institutes of Health patient information sites:  http://win.niddk.nih.gov/publications/index.htm    Also look into health and wellness programs that may be available through your health insurance provider, employer, local community center, or [employer].

## 2022-05-13 NOTE — NURSING NOTE
"Initial /82   Pulse 85   Temp 97.3  F (36.3  C) (Tympanic)   Resp 16   Ht 1.626 m (5' 4\")   Wt 90.7 kg (200 lb)   SpO2 95%   BMI 34.33 kg/m   Estimated body mass index is 34.33 kg/m  as calculated from the following:    Height as of this encounter: 1.626 m (5' 4\").    Weight as of this encounter: 90.7 kg (200 lb). .      "

## 2022-06-06 ENCOUNTER — TELEPHONE (OUTPATIENT)
Dept: UROLOGY | Facility: CLINIC | Age: 87
End: 2022-06-06

## 2022-06-06 ENCOUNTER — OFFICE VISIT (OUTPATIENT)
Dept: FAMILY MEDICINE | Facility: CLINIC | Age: 87
End: 2022-06-06
Payer: MEDICARE

## 2022-06-06 VITALS
SYSTOLIC BLOOD PRESSURE: 134 MMHG | DIASTOLIC BLOOD PRESSURE: 74 MMHG | BODY MASS INDEX: 34.16 KG/M2 | OXYGEN SATURATION: 96 % | WEIGHT: 199 LBS | RESPIRATION RATE: 16 BRPM | TEMPERATURE: 97.9 F | HEART RATE: 77 BPM

## 2022-06-06 DIAGNOSIS — R31.0 GROSS HEMATURIA: Primary | ICD-10-CM

## 2022-06-06 DIAGNOSIS — N30.01 ACUTE CYSTITIS WITH HEMATURIA: ICD-10-CM

## 2022-06-06 LAB
ALBUMIN UR-MCNC: 100 MG/DL
APPEARANCE UR: ABNORMAL
BACTERIA #/AREA URNS HPF: ABNORMAL /HPF
BILIRUB UR QL STRIP: ABNORMAL
COLOR UR AUTO: ABNORMAL
GLUCOSE UR STRIP-MCNC: 100 MG/DL
HGB UR QL STRIP: ABNORMAL
KETONES UR STRIP-MCNC: NEGATIVE MG/DL
LEUKOCYTE ESTERASE UR QL STRIP: NEGATIVE
NITRATE UR QL: NEGATIVE
PH UR STRIP: 5.5 [PH] (ref 5–7)
RBC #/AREA URNS AUTO: ABNORMAL /HPF
SP GR UR STRIP: 1.02 (ref 1–1.03)
UROBILINOGEN UR STRIP-ACNC: 1 E.U./DL
WBC #/AREA URNS AUTO: ABNORMAL /HPF

## 2022-06-06 PROCEDURE — 87086 URINE CULTURE/COLONY COUNT: CPT | Performed by: NURSE PRACTITIONER

## 2022-06-06 PROCEDURE — 87186 SC STD MICRODIL/AGAR DIL: CPT | Performed by: NURSE PRACTITIONER

## 2022-06-06 PROCEDURE — 87088 URINE BACTERIA CULTURE: CPT | Performed by: NURSE PRACTITIONER

## 2022-06-06 PROCEDURE — 99214 OFFICE O/P EST MOD 30 MIN: CPT | Performed by: NURSE PRACTITIONER

## 2022-06-06 PROCEDURE — 81001 URINALYSIS AUTO W/SCOPE: CPT | Performed by: NURSE PRACTITIONER

## 2022-06-06 ASSESSMENT — PAIN SCALES - GENERAL: PAINLEVEL: NO PAIN (0)

## 2022-06-06 NOTE — TELEPHONE ENCOUNTER
[facility]    Phone Message    May a detailed message be left on voicemail: yes     Reason for Call: Other: Patient is being referred to Urology for Gross hematuria, per prtotocols send TE.Please triage and call patient to discuss further      Action Taken: Message routed to:  [facility] ([facility] Urology     Travel Screening: Not Applicable

## 2022-06-06 NOTE — PROGRESS NOTES
"  Assessment & Plan     Gross hematuria  Source of blood unclear however suspect likely urethral.  Further evaluation warranted. Uro/Gyn referral placed today for further evaluation. Imaging needs discussed. Will wait for evaluation   - UA macro with reflex to Microscopic and Culture - Clinc Collect  - Urine Microscopic  - Urine Culture Aerobic Bacterial - lab collect; Future  - Adult Urology Referral; Future             BMI:   Estimated body mass index is 34.16 kg/m  as calculated from the following:    Height as of 5/13/22: 1.626 m (5' 4\").    Weight as of this encounter: 90.3 kg (199 lb).           Return in about 2 weeks (around 6/20/2022) for ongoing symptoms if not improving.    Denisha Stern, ÓSCAR CNP  [facility]   Jen is a 86 year old who presents for the following health issues:    History of Present Illness       Reason for visit:  Urinary tract infection  Symptom onset:  1-3 days ago    She eats 2-3 servings of fruits and vegetables daily.She consumes 1 sweetened beverage(s) daily.She exercises with enough effort to increase her heart rate 9 or less minutes per day.  She exercises with enough effort to increase her heart rate 4 days per week.   She is taking medications regularly.     Unclear of source of bleeding patient is not sure if it is urine versus vaginal.  She denies any pain, cramping, previous similar symptoms.  Symptoms started 3 days ago and have been persistent.  No recent changes in medications.  No lifestyle changes.  She is a non-smoker.        Review of Systems   Constitutional, HEENT, cardiovascular, pulmonary, gi and gu systems are negative, except as otherwise noted.      Objective    /74   Pulse 77   Temp 97.9  F (36.6  C) (Tympanic)   Resp 16   Wt 90.3 kg (199 lb)   LMP  (LMP Unknown)   SpO2 96%   BMI 34.16 kg/m    Body mass index is 34.16 kg/m .  Physical Exam   GENERAL: healthy, alert and no distress  RESP: lungs clear " to auscultation - no rales, rhonchi or wheezes  CV: regular rate and rhythm, normal S1 S2, no S3 or S4, no murmur, click or rub, no peripheral edema and peripheral pulses strong   (female): normal female external genitalia, normal urethral meatus  and vaginal mucosa pink, moist, well rugated.  Bloody discharge present external genitalia.  Some blood present in vaginal cavity does not appear to be coming from cervix.  Urethral opening with blood present      Results for orders placed or performed in visit on 06/06/22 (from the past 24 hour(s))   UA macro with reflex to Microscopic and Culture - Clinc Collect    Specimen: Urine, Clean Catch   Result Value Ref Range    Color Urine Red (A) Colorless, Straw, Light Yellow, Yellow    Appearance Urine Cloudy (A) Clear    Glucose Urine 100  (A) Negative mg/dL    Bilirubin Urine Small (A) Negative    Ketones Urine Negative Negative mg/dL    Specific Gravity Urine 1.025 1.003 - 1.035    Blood Urine Large (A) Negative    pH Urine 5.5 5.0 - 7.0    Protein Albumin Urine 100  (A) Negative mg/dL    Urobilinogen Urine 1.0 0.2, 1.0 E.U./dL    Nitrite Urine Negative Negative    Leukocyte Esterase Urine Negative Negative   Urine Microscopic   Result Value Ref Range    Bacteria Urine Moderate (A) None Seen /HPF    RBC Urine  (A) 0-2 /HPF /HPF    WBC Urine 5-10 (A) 0-5 /HPF /HPF    Narrative    Microscopic exam performed on unspun urine.    Urine Culture not indicated

## 2022-06-08 ENCOUNTER — HOSPITAL ENCOUNTER (OUTPATIENT)
Dept: CT IMAGING | Facility: CLINIC | Age: 87
Discharge: HOME | End: 2022-06-08
Attending: NURSE PRACTITIONER | Admitting: NURSE PRACTITIONER
Payer: MEDICARE

## 2022-06-08 DIAGNOSIS — R31.0 GROSS HEMATURIA: ICD-10-CM

## 2022-06-08 LAB — BACTERIA UR CULT: ABNORMAL

## 2022-06-08 PROCEDURE — G1004 CDSM NDSC: HCPCS

## 2022-06-08 PROCEDURE — 250N000011 HC RX IP 250 OP 636: Performed by: RADIOLOGY

## 2022-06-08 PROCEDURE — 250N000009 HC RX 250: Performed by: RADIOLOGY

## 2022-06-08 RX ORDER — IOPAMIDOL 755 MG/ML
97 INJECTION, SOLUTION INTRAVASCULAR ONCE
Status: COMPLETED | OUTPATIENT
Start: 2022-06-08 | End: 2022-06-08

## 2022-06-08 RX ORDER — SULFAMETHOXAZOLE/TRIMETHOPRIM 800-160 MG
1 TABLET ORAL 2 TIMES DAILY
Qty: 14 TABLET | Refills: 0 | Status: SHIPPED | OUTPATIENT
Start: 2022-06-08 | End: 2022-06-15

## 2022-06-08 RX ADMIN — IOPAMIDOL 97 ML: 755 INJECTION, SOLUTION INTRAVENOUS at 08:37

## 2022-06-08 RX ADMIN — SODIUM CHLORIDE 90 ML: 9 INJECTION, SOLUTION INTRAVENOUS at 08:37

## 2022-06-10 ENCOUNTER — TELEPHONE (OUTPATIENT)
Dept: FAMILY MEDICINE | Facility: CLINIC | Age: 87
End: 2022-06-10
Payer: MEDICARE

## 2022-06-10 NOTE — TELEPHONE ENCOUNTER
Discussed with results with patient.  She has an appointment coming up with urogynecology further work-up will occur with this appointment.  See CT results as below.    ÓSCAR Valdez CNP      CT Urogram wo & w Contrast    Result Date: 6/8/2022  CT UROGRAM WITHOUT AND WITH CONTRAST  6/8/2022 8:54 AM CLINICAL HISTORY: Hematuria, unknown cause. Gross hematuria, unclear if bladder vs. uterine source. TECHNIQUE: CT scan of the abdomen and pelvis was performed with and without the use of IV contrast utilizing CT urogram protocol. Multiplanar reformats were obtained. Dose reduction techniques were used. CONTRAST: 97 mL Isovue 370 COMPARISON: None. FINDINGS: LOWER CHEST: Normal. HEPATOBILIARY: Gallbladder is absent. Liver parenchyma is normal appearing. No bile duct dilation. PANCREAS: Normal. SPLEEN: Spleen is normal in size. Several scattered benign calcified splenic granulomas are present. A couple of splenules along the anterior inferior aspect of the spleen are noted. ADRENAL GLANDS: Normal. RIGHT KIDNEY/URETER: Normal in size, shape and position. No nephrolithiasis or hydronephrosis. 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 Numerous subcentimeter low-density renal cortical foci are present, which are too small to characterize, but statistically favor cysts and do not require follow-up. No solid enhancing renal mass. No hydronephrosis. Central renal collecting system and opacified portions of the ureter are normal appearing. LEFT KIDNEY/URETER: Normal in size, shape and position. A couple 2 to 3 mm nonobstructing left intrarenal calculi are present. No hydronephrosis. A partially exophytic simple-appearing unilocular cyst in the anterior aspect of the left kidney measures 2.8 cm and does not require follow-up. An additional smaller 1.2 cm cyst is also seen on the left and does not require follow-up. Numerous  subcentimeter low-density renal cortical foci are present, which are too small to characterize, but statistically favor cysts and do not require follow-up. No solid enhancing renal mass. Central renal collecting system and opacified portions of the ureter are normal appearing on delayed imaging. URINARY BLADDER: The urinary bladder is normal-appearing. No urinary bladder wall thickening. No urinary bladder enhancement or intraluminal filling defect. Appendix is normal-appearing. Duodenal diverticulum. BOWEL: Diverticulosis in the colon. No acute inflammatory change. No obstruction. PELVIC ORGANS: Low-density thickening is seen along the endometrium, which measures up to 14 mm. There is also lobulated low-density masslike appearance at the uterine fundus measuring 2.7 x 3.2 x 3.2 cm. ADDITIONAL FINDINGS: None. MUSCULOSKELETAL: Normal.     IMPRESSION: 1.  No abnormality to account for reported gross hematuria. 2.  Punctate 2 to 3 mm nonobstructing left intrarenal calculi. No hydronephrosis. 3.  Multiple bilateral renal cortical cysts. No follow-up is necessary. 4.  Low-density thickening of the endometrium with a lobulated low-density masslike appearance near the fundus. Recommend correlation with history of postmenopausal bleeding, follow-up pelvic ultrasound, and possible tissue sampling to exclude endometrial carcinoma, if clinically indicated. 5.  Colonic diverticulosis without signs of diverticulitis. 6.  Prior cholecystectomy. JUAN JOSE WHATLEY MD   SYSTEM ID:  [id number]

## 2022-06-23 ENCOUNTER — OFFICE VISIT (OUTPATIENT)
Dept: UROLOGY | Facility: CLINIC | Age: 87
End: 2022-06-23
Payer: MEDICARE

## 2022-06-23 ENCOUNTER — MYC MEDICAL ADVICE (OUTPATIENT)
Dept: UROLOGY | Facility: CLINIC | Age: 87
End: 2022-06-23

## 2022-06-23 VITALS
SYSTOLIC BLOOD PRESSURE: 125 MMHG | TEMPERATURE: 97.6 F | OXYGEN SATURATION: 97 % | DIASTOLIC BLOOD PRESSURE: 80 MMHG | HEART RATE: 92 BPM

## 2022-06-23 DIAGNOSIS — N95.0 POSTMENOPAUSAL BLEEDING: Primary | ICD-10-CM

## 2022-06-23 PROCEDURE — 99203 OFFICE O/P NEW LOW 30 MIN: CPT | Performed by: STUDENT IN AN ORGANIZED HEALTH CARE EDUCATION/TRAINING PROGRAM

## 2022-06-23 NOTE — NURSING NOTE
"Initial /80 (BP Location: Right arm, Patient Position: Chair, Cuff Size: Adult Large)   Pulse 92   Temp 97.6  F (36.4  C) (Tympanic)   LMP  (LMP Unknown)   SpO2 97%  Estimated body mass index is 34.16 kg/m  as calculated from the following:    Height as of 5/13/22: 1.626 m (5' 4\").    Weight as of 6/6/22: 90.3 kg (199 lb). .    Active order to obtain bladder scan? Yes   Name of ordering provider:  June Boyle  Bladder scan preformed post void Yes.  Bladder scan reveled 16ML  Provider notified?  Yes    Tiffany Martin CMA          "

## 2022-06-23 NOTE — PROGRESS NOTES
Chief Complaint:   Gross hematuria         History of Present Illness:   Jen Layne is a 86 year old female with a history of HLD, HTN, and CKD stage 2 who presents for evaluation of gross hematuria.     Earlier this month, patient reported 3 days of gross hematuria.  She saw blood in the toilet and a small amount of blood when wiping and in her pad.  She does report some stress incontinence and wears a pad at baseline.    Patient had a UA on 2022 which was notable for  RBCs and 5-10 WBCs.  Urine culture grew ,000 CFUs of Klebsiella pneumoniae.  She was treated with a 7-day course of Bactrim.    She states the gross hematuria resolved shortly after she was started on Bactrim.  She did not have any symptoms of a UTI when the gross hematuria started.    A urine culture on 2019 also grew ,000 CFUs of Klebsiella pneumoniae.  At that time, UA was notable for only 2 RBCs.  She states she had dysuria with this UTI.    She has no smoking history, though her  did smoke.  He  5 years ago.  She denies occupational exposure to chemicals or dyes.  She denies history of kidney stones and pelvic radiation.    She denies surgical history of hysterectomy and oophorectomy.    She did have a CT urogram on 2022 which was notable for a 2 to 3 mm nonobstructing left renal stone without hydronephrosis.  Low-density thickening of the endometrium with a lobulated low-density masslike appearance near the fundus was also noted.         Past Medical History:     Past Medical History:   Diagnosis Date     Arthritis      Gout 3/14/2011    Triggered by hydrochlorothiazide       Hypertension             Past Surgical History:     Past Surgical History:   Procedure Laterality Date     COLONOSCOPY  05    normal, recheck 10 years -- done at [facility]  2018    Capsulotomy Left eye.   Dr. Skip Villela     EYE SURGERY       [facility] REMOVAL GALLBLADDER        HYSTEROSCOPY,DIAGNOSTIC  around 1998            Medications     Current Outpatient Medications   Medication     ASPIRIN 81 MG OR TABS     lisinopril (ZESTRIL) 20 MG tablet     simvastatin (ZOCOR) 40 MG tablet     VITAMIN D 1000 UNIT OR CAPS     No current facility-administered medications for this visit.            Allergies:   Hydrochlorothiazide         Review of Systems:  From intake questionnaire   Negative 14 system review except as noted on HPI, nurse's note.         Physical Exam:   Patient is a 86 year old  female   Vitals: Blood pressure 125/80, pulse 92, temperature 97.6  F (36.4  C), temperature source Tympanic, SpO2 97 %, not currently breastfeeding.  General Appearance Adult: Alert, no acute distress, oriented.  Lungs: Non-labored breathing.  Heart: No obvious jugular venous distension present.  Neuro: Alert, oriented, speech and mentation normal    PVR: 16 mL      Labs and Pathology:    I personally reviewed all applicable laboratory data and went over findings with patient  Significant for:    UA RESULTS:   Recent Labs   Lab Test 06/06/22  1534 01/19/19  1854   SG 1.025 1.030   URINEPH 5.5 6.0   NITRITE Negative Negative   RBCU * 2   WBCU 5-10* 25         Imaging:    I personally reviewed all applicable imaging and went over findings with patient.  Significant for:    Results for orders placed or performed during the hospital encounter of 06/08/22   CT Urogram wo & w Contrast    Narrative    CT UROGRAM WITHOUT AND WITH CONTRAST  6/8/2022 8:54 AM    CLINICAL HISTORY: Hematuria, unknown cause. Gross hematuria, unclear  if bladder vs. uterine source.    TECHNIQUE: CT scan of the abdomen and pelvis was performed with and  without the use of IV contrast utilizing CT urogram protocol.  Multiplanar reformats were obtained. Dose reduction techniques were  used.  CONTRAST: 97 mL Isovue 370    COMPARISON: None.    FINDINGS:   LOWER CHEST: Normal.    HEPATOBILIARY: Gallbladder is absent. Liver parenchyma  is normal  appearing. No bile duct dilation.    PANCREAS: Normal.    SPLEEN: Spleen is normal in size. Several scattered benign calcified  splenic granulomas are present. A couple of splenules along the  anterior inferior aspect of the spleen are noted.    ADRENAL GLANDS: Normal.    RIGHT KIDNEY/URETER: Normal in size, shape and position. No  nephrolithiasis or hydronephrosis. A small round 6 mm fat density  right renal cortical lesion is noted, which likely represents an  angiomyolipoma and does not require follow-up. Numerous subcentimeter  low-density renal cortical foci are present, which are too small to  characterize, but statistically favor cysts and do not require  follow-up. No solid enhancing renal mass. No hydronephrosis. Central  renal collecting system and opacified portions of the ureter are  normal appearing.    LEFT KIDNEY/URETER: Normal in size, shape and position. A couple 2 to  3 mm nonobstructing left intrarenal calculi are present. No  hydronephrosis. A partially exophytic simple-appearing unilocular cyst  in the anterior aspect of the left kidney measures 2.8 cm and does not  require follow-up. An additional smaller 1.2 cm cyst is also seen on  the left and does not require follow-up. Numerous subcentimeter  low-density renal cortical foci are present, which are too small to  characterize, but statistically favor cysts and do not require  follow-up. No solid enhancing renal mass. Central renal collecting  system and opacified portions of the ureter are normal appearing on  delayed imaging.    URINARY BLADDER: The urinary bladder is normal-appearing. No urinary  bladder wall thickening. No urinary bladder enhancement or  intraluminal filling defect. Appendix is normal-appearing. Duodenal  diverticulum.    BOWEL: Diverticulosis in the colon. No acute inflammatory change. No  obstruction.     PELVIC ORGANS: Low-density thickening is seen along the endometrium,  which measures up to 14 mm. There is  also lobulated low-density  masslike appearance at the uterine fundus measuring 2.7 x 3.2 x 3.2  cm.    ADDITIONAL FINDINGS: None.    MUSCULOSKELETAL: Normal.      Impression    IMPRESSION:   1.  No abnormality to account for reported gross hematuria.  2.  Punctate 2 to 3 mm nonobstructing left intrarenal calculi. No  hydronephrosis.  3.  Multiple bilateral renal cortical cysts. No follow-up is  necessary.  4.  Low-density thickening of the endometrium with a lobulated  low-density masslike appearance near the fundus. Recommend correlation  with history of postmenopausal bleeding, follow-up pelvic ultrasound,  and possible tissue sampling to exclude endometrial carcinoma, if  clinically indicated.  5.  Colonic diverticulosis without signs of diverticulitis.  6.  Prior cholecystectomy.    JUAN JOSE WHATLEY MD         SYSTEM ID:  [id number]            Assessment and Plan:     Assessment: Ms. Jen Layne is a pleasant 86 year old female with gross hematuria that lasted for 3 days and has since resolved.  Urine culture did grow Klebsiella pneumonia and she was treated with a course of Bactrim.  Of note, she denies any other UTI-like symptoms when she was treated.  She reports noticing blood in the toilet, when she wiped, and in her pad.  She does report some stress incontinence and wears a pad at baseline.    At this point it is unclear whether or not blood is coming from her reproductive tract or her urinary tract.  She does report some blood in the toilet and has some stress incontinence.    CT urogram was notable for a small nonobstructing left kidney stone.  It was also notable for endometrial thickening and a masslike appearance near the fundus of the uterus.    I would like her to see gynecology for work-up of postmenopausal bleeding given her symptoms and findings of the CT urogram.  I will order a pelvic ultrasound for correlation of CT findings.    Since a source of her bleeding is unclear at this time, we  will also plan to complete the work-up for gross hematuria.    Patient was unable to leave a urine sample today.  I would like to repeat UA, urine culture, and urine cytology for the work-up of gross hematuria.  We will plan to have the patient leave a urine sample at her cystoscopy appointment.  Patient was instructed to come with a comfortably full bladder to that appointment.    Plan:  1. Pelvic US with referral to gynecology for follow up.  Patient was provided with the number to schedule both of these appointments.  2. At this time, recommend proceeding with comprehensive hematuria evaluation to include:  - Urinalysis and urine culture to rule out an acute urinary tract infection.   - Urine cytology to look for cells concerning for malignancy.  - Cystoscopy with Dr. Davis to evaluate the interior of the bladder. Follow up for hematuria as recommended by urologist performing cystoscopic evaluation.    ALBINA ROD PA-C  Department of Urology

## 2022-06-24 ENCOUNTER — ALLIED HEALTH/NURSE VISIT (OUTPATIENT)
Dept: UROLOGY | Facility: CLINIC | Age: 87
End: 2022-06-24
Payer: MEDICARE

## 2022-06-24 DIAGNOSIS — R31.0 GROSS HEMATURIA: Primary | ICD-10-CM

## 2022-06-24 DIAGNOSIS — N95.0 POSTMENOPAUSAL BLEEDING: Primary | ICD-10-CM

## 2022-06-24 PROCEDURE — 88112 CYTOPATH CELL ENHANCE TECH: CPT | Mod: 26 | Performed by: PATHOLOGY

## 2022-06-24 PROCEDURE — 99207 PR NO CHARGE NURSE ONLY: CPT

## 2022-06-24 NOTE — PROGRESS NOTES
Pt left urine, but there was not enough to run all tests. Cytology ran and UA/UC cancelled per provider.  Alicia ALCALA RN BSN PHN  [facility]

## 2022-06-24 NOTE — PROGRESS NOTES
UA/UC cancelled as pt did not give enough urine to run with cytology. Cytology ordered only per provider.  Alicia ALCALA RN BSN PHN  [facility]

## 2022-06-27 ENCOUNTER — MYC MEDICAL ADVICE (OUTPATIENT)
Dept: UROLOGY | Facility: CLINIC | Age: 87
End: 2022-06-27

## 2022-06-27 NOTE — TELEPHONE ENCOUNTER
Left voicemail for patient to call clinic about upcoming appointment that was scheduled.  Tiffany Martin MA on 6/27/2022 at 10:38 AM

## 2022-06-28 LAB
PATH REPORT.COMMENTS IMP SPEC: NORMAL
PATH REPORT.FINAL DX SPEC: NORMAL
PATH REPORT.GROSS SPEC: NORMAL
PATH REPORT.MICROSCOPIC SPEC OTHER STN: NORMAL
PATH REPORT.RELEVANT HX SPEC: NORMAL

## 2022-07-08 ENCOUNTER — HOSPITAL ENCOUNTER (OUTPATIENT)
Dept: ULTRASOUND IMAGING | Facility: CLINIC | Age: 87
Discharge: HOME | End: 2022-07-08
Attending: STUDENT IN AN ORGANIZED HEALTH CARE EDUCATION/TRAINING PROGRAM | Admitting: STUDENT IN AN ORGANIZED HEALTH CARE EDUCATION/TRAINING PROGRAM
Payer: MEDICARE

## 2022-07-08 DIAGNOSIS — N95.0 POSTMENOPAUSAL BLEEDING: ICD-10-CM

## 2022-07-08 PROCEDURE — 76830 TRANSVAGINAL US NON-OB: CPT

## 2022-11-04 ENCOUNTER — E-VISIT (OUTPATIENT)
Dept: FAMILY MEDICINE | Facility: CLINIC | Age: 87
End: 2022-11-04

## 2022-11-04 DIAGNOSIS — M10.9 ACUTE GOUT INVOLVING TOE, UNSPECIFIED CAUSE, UNSPECIFIED LATERALITY: Primary | ICD-10-CM

## 2022-11-04 PROCEDURE — 99421 OL DIG E/M SVC 5-10 MIN: CPT | Performed by: FAMILY MEDICINE

## 2022-11-04 RX ORDER — PREDNISONE 20 MG/1
40 TABLET ORAL DAILY
Qty: 14 TABLET | Refills: 0 | Status: SHIPPED | OUTPATIENT
Start: 2022-11-04 | End: 2023-06-16

## 2022-11-04 NOTE — PATIENT INSTRUCTIONS
Thank you for choosing us for your care. I have placed an order for a prescription so that you can start treatment. View your full visit summary for details by clicking on the link below. Your pharmacist will able to address any questions you may have about the medication.     If you're not feeling better within 5-7 days, please schedule an appointment.  You can schedule an appointment right here in [facility], or call [phone]  If the visit is for the same symptoms as your eVisit, we'll refund the cost of your eVisit if seen within seven days.

## 2023-06-01 ENCOUNTER — HEALTH MAINTENANCE LETTER (OUTPATIENT)
Age: 88
End: 2023-06-01

## 2023-06-05 ENCOUNTER — APPOINTMENT (OUTPATIENT)
Dept: MRI IMAGING | Facility: CLINIC | Age: 88
End: 2023-06-05
Attending: NURSE PRACTITIONER
Payer: MEDICARE

## 2023-06-05 ENCOUNTER — APPOINTMENT (OUTPATIENT)
Dept: ULTRASOUND IMAGING | Facility: CLINIC | Age: 88
End: 2023-06-05
Attending: NURSE PRACTITIONER
Payer: MEDICARE

## 2023-06-05 ENCOUNTER — NURSE TRIAGE (OUTPATIENT)
Dept: FAMILY MEDICINE | Facility: CLINIC | Age: 88
End: 2023-06-05
Payer: MEDICARE

## 2023-06-05 ENCOUNTER — HOSPITAL ENCOUNTER (EMERGENCY)
Facility: CLINIC | Age: 88
Discharge: HOME | End: 2023-06-06
Attending: NURSE PRACTITIONER | Admitting: NURSE PRACTITIONER
Payer: MEDICARE

## 2023-06-05 VITALS
HEIGHT: 64 IN | BODY MASS INDEX: 35 KG/M2 | DIASTOLIC BLOOD PRESSURE: 90 MMHG | RESPIRATION RATE: 20 BRPM | OXYGEN SATURATION: 93 % | WEIGHT: 205 LBS | TEMPERATURE: 98.2 F | HEART RATE: 82 BPM | SYSTOLIC BLOOD PRESSURE: 149 MMHG

## 2023-06-05 DIAGNOSIS — N85.8 UTERINE MASS: ICD-10-CM

## 2023-06-05 DIAGNOSIS — N95.0 POST-MENOPAUSAL BLEEDING: ICD-10-CM

## 2023-06-05 DIAGNOSIS — N39.0 ACUTE LOWER UTI: ICD-10-CM

## 2023-06-05 LAB
ALBUMIN UR-MCNC: 30 MG/DL
ANION GAP SERPL CALCULATED.3IONS-SCNC: 12 MMOL/L (ref 7–15)
APPEARANCE UR: ABNORMAL
BACTERIA #/AREA URNS HPF: ABNORMAL /HPF
BASOPHILS # BLD AUTO: 0.1 10E3/UL (ref 0–0.2)
BASOPHILS NFR BLD AUTO: 1 %
BILIRUB UR QL STRIP: NEGATIVE
BUN SERPL-MCNC: 19.5 MG/DL (ref 8–23)
CALCIUM SERPL-MCNC: 9.1 MG/DL (ref 8.8–10.2)
CHLORIDE SERPL-SCNC: 103 MMOL/L (ref 98–107)
COLOR UR AUTO: YELLOW
CREAT SERPL-MCNC: 0.75 MG/DL (ref 0.51–0.95)
DEPRECATED HCO3 PLAS-SCNC: 24 MMOL/L (ref 22–29)
EOSINOPHIL # BLD AUTO: 0.3 10E3/UL (ref 0–0.7)
EOSINOPHIL NFR BLD AUTO: 3 %
ERYTHROCYTE [DISTWIDTH] IN BLOOD BY AUTOMATED COUNT: 12.8 % (ref 10–15)
GFR SERPL CREATININE-BSD FRML MDRD: 78 ML/MIN/1.73M2
GLUCOSE SERPL-MCNC: 131 MG/DL (ref 70–99)
GLUCOSE UR STRIP-MCNC: NEGATIVE MG/DL
HCT VFR BLD AUTO: 43.7 % (ref 35–47)
HGB BLD-MCNC: 14.9 G/DL (ref 11.7–15.7)
HGB UR QL STRIP: ABNORMAL
HOLD SPECIMEN: NORMAL
IMM GRANULOCYTES # BLD: 0 10E3/UL
IMM GRANULOCYTES NFR BLD: 0 %
KETONES UR STRIP-MCNC: NEGATIVE MG/DL
LEUKOCYTE ESTERASE UR QL STRIP: ABNORMAL
LYMPHOCYTES # BLD AUTO: 2.3 10E3/UL (ref 0.8–5.3)
LYMPHOCYTES NFR BLD AUTO: 22 %
MCH RBC QN AUTO: 30.6 PG (ref 26.5–33)
MCHC RBC AUTO-ENTMCNC: 34.1 G/DL (ref 31.5–36.5)
MCV RBC AUTO: 90 FL (ref 78–100)
MONOCYTES # BLD AUTO: 0.8 10E3/UL (ref 0–1.3)
MONOCYTES NFR BLD AUTO: 8 %
MUCOUS THREADS #/AREA URNS LPF: PRESENT /LPF
NEUTROPHILS # BLD AUTO: 6.9 10E3/UL (ref 1.6–8.3)
NEUTROPHILS NFR BLD AUTO: 66 %
NITRATE UR QL: POSITIVE
NRBC # BLD AUTO: 0 10E3/UL
NRBC BLD AUTO-RTO: 0 /100
PH UR STRIP: 5 [PH] (ref 5–7)
PLATELET # BLD AUTO: 314 10E3/UL (ref 150–450)
POTASSIUM SERPL-SCNC: 4 MMOL/L (ref 3.4–5.3)
RBC # BLD AUTO: 4.87 10E6/UL (ref 3.8–5.2)
RBC URINE: >182 /HPF
SODIUM SERPL-SCNC: 139 MMOL/L (ref 136–145)
SP GR UR STRIP: 1.02 (ref 1–1.03)
SQUAMOUS EPITHELIAL: 3 /HPF
UROBILINOGEN UR STRIP-MCNC: NORMAL MG/DL
WBC # BLD AUTO: 10.4 10E3/UL (ref 4–11)
WBC URINE: >182 /HPF

## 2023-06-05 PROCEDURE — A9585 GADOBUTROL INJECTION: HCPCS | Performed by: NURSE PRACTITIONER

## 2023-06-05 PROCEDURE — 85025 COMPLETE CBC W/AUTO DIFF WBC: CPT | Performed by: NURSE PRACTITIONER

## 2023-06-05 PROCEDURE — 82310 ASSAY OF CALCIUM: CPT | Performed by: NURSE PRACTITIONER

## 2023-06-05 PROCEDURE — 87086 URINE CULTURE/COLONY COUNT: CPT | Performed by: NURSE PRACTITIONER

## 2023-06-05 PROCEDURE — 258N000003 HC RX IP 258 OP 636: Performed by: NURSE PRACTITIONER

## 2023-06-05 PROCEDURE — 81001 URINALYSIS AUTO W/SCOPE: CPT | Performed by: NURSE PRACTITIONER

## 2023-06-05 PROCEDURE — 72197 MRI PELVIS W/O & W/DYE: CPT | Mod: MA

## 2023-06-05 PROCEDURE — 99284 EMERGENCY DEPT VISIT MOD MDM: CPT | Performed by: NURSE PRACTITIONER

## 2023-06-05 PROCEDURE — 99285 EMERGENCY DEPT VISIT HI MDM: CPT | Mod: 25 | Performed by: NURSE PRACTITIONER

## 2023-06-05 PROCEDURE — 255N000002 HC RX 255 OP 636: Performed by: NURSE PRACTITIONER

## 2023-06-05 PROCEDURE — 250N000013 HC RX MED GY IP 250 OP 250 PS 637: Performed by: NURSE PRACTITIONER

## 2023-06-05 PROCEDURE — 76830 TRANSVAGINAL US NON-OB: CPT

## 2023-06-05 PROCEDURE — 36415 COLL VENOUS BLD VENIPUNCTURE: CPT | Performed by: NURSE PRACTITIONER

## 2023-06-05 RX ORDER — CEFDINIR 250 MG/5ML
600 POWDER, FOR SUSPENSION ORAL ONCE
Status: COMPLETED | OUTPATIENT
Start: 2023-06-05 | End: 2023-06-05

## 2023-06-05 RX ORDER — CEFDINIR 250 MG/5ML
300 POWDER, FOR SUSPENSION ORAL 2 TIMES DAILY
Qty: 120 ML | Refills: 0 | Status: SHIPPED | OUTPATIENT
Start: 2023-06-05 | End: 2023-06-16

## 2023-06-05 RX ORDER — CEFDINIR 125 MG/5ML
600 POWDER, FOR SUSPENSION ORAL ONCE
Status: DISCONTINUED | OUTPATIENT
Start: 2023-06-05 | End: 2023-06-05

## 2023-06-05 RX ORDER — CEFDINIR 250 MG/5ML
300 POWDER, FOR SUSPENSION ORAL 2 TIMES DAILY
Qty: 120 ML | Refills: 0 | Status: SHIPPED | OUTPATIENT
Start: 2023-06-05 | End: 2023-06-05

## 2023-06-05 RX ORDER — GADOBUTROL 604.72 MG/ML
9 INJECTION INTRAVENOUS ONCE
Status: COMPLETED | OUTPATIENT
Start: 2023-06-05 | End: 2023-06-05

## 2023-06-05 RX ADMIN — CEFDINIR 600 MG: 250 POWDER, FOR SUSPENSION ORAL at 23:19

## 2023-06-05 RX ADMIN — SODIUM CHLORIDE 50 ML: 9 INJECTION, SOLUTION INTRAVENOUS at 22:54

## 2023-06-05 RX ADMIN — GADOBUTROL 9 ML: 604.72 INJECTION INTRAVENOUS at 22:53

## 2023-06-05 ASSESSMENT — ACTIVITIES OF DAILY LIVING (ADL)
ADLS_ACUITY_SCORE: 35
ADLS_ACUITY_SCORE: 33

## 2023-06-05 NOTE — TELEPHONE ENCOUNTER
Contacted daughter Marsha.  Advised UC as she has burning and blood in urine.   Denies confusion.   Patient having ongoing urinary symptoms.   Denies fever, flank pain  Daughter states she will take her to UC now  Loraine Mcguire RN on 6/5/2023 at 4:57 PM

## 2023-06-06 ENCOUNTER — PATIENT OUTREACH (OUTPATIENT)
Dept: ONCOLOGY | Facility: CLINIC | Age: 88
End: 2023-06-06
Payer: MEDICARE

## 2023-06-06 LAB
ABO/RH(D): NORMAL
ANTIBODY SCREEN: NEGATIVE
SPECIMEN EXPIRATION DATE: NORMAL

## 2023-06-06 NOTE — PROGRESS NOTES
New Patient Hematology / Oncology Nurse Navigator Note     Referral Date: 6/5/23    Referring provider:   Emergent referral,   Demetra Alarcon APRN CNP   [address] [facility] [address]   Phone: [phone]   Fax: [phone]       Referring Clinic/Organization: [facility]     Referred to: GynOnc    Requested provider (if applicable): First available - did not specify     Evaluation for : uterine mass     Clinical History (per Nurse review of records provided):      ED visit yesterday at [place] [facility] -- BOOKMARKED     Pelvic US, MRI done yesterday in ED:   IMPRESSION:  1.  Note that this study is overall moderately limited due to motion artifact.  2.  Suboptimal visualization of a 2.5 x 1.6 cm structure that has probable heterogeneous enhancement is present in the anterior right aspect of the uterus, corresponding to where there was an abnormal structure on the comparison CT scan dated 6/8/2022.   This is nonspecific, but possibilities include endometrial carcinoma and a submucosal uterine fibroid. An endometrial biopsy could be considered for further evaluation  3.  The ovaries are not visualized.  4.  A trace amount of nonspecific free fluid in the pelvis.   This result has not been signed. Information might be incomplete.   -- BOOKMARKED      Clinical Assessment / Barriers to Care (Per Nurse):  Pt lives in [facility]    Records Location: [facility]     Records Needed:  N/A    Additional testing needed prior to consult:   N/A    Referral updates and Plan:   Hold Dr. Au 6/7 noon @ [facility]  OUTGOING CALL to pt and reached her daughter Marsha who will help with transportation and scheduling:  Introduced my role as nurse navigator with "[employer] Hematology/Oncology dept and that we have recd the referral for dx of uterine mass from [place] [facility]  Pt confirms they are aware of the referral and ready to schedule  Provided contact information if future questions arise.     -Transferred pt to [facility] line  [phone] to schedule appt per scheduling instructions.    Leah Echeverria, BSN, RN, PHN, OCN  Hematology/Oncology Nurse Navigator  [facility]  [phone]

## 2023-06-06 NOTE — ED PROVIDER NOTES
History     Chief Complaint   Patient presents with     Vaginal Bleeding     Pt reports intermittent bleeding for the past 6 weeks, sometimes spotting, sometimes heavier, enough for pt to have to wear a pad. Pt denies pain at this time.      HPI  Jen Layne is a 87 year old female who presents to the emergency department with intermittent vaginal bleeding and urinary urgency and frequency.  Patient reports intermittent vaginal bleeding has been going on for the past 6 weeks.  Patient reports urinary frequency and urgency has been going on for the past week or 2.  Patient denies vaginal sores or lesions.  Patient denies abdominal pain.  Patient reports normal bowel movements.  Patient denies any chest pain or shortness of breath or difficulty breathing.  Patient denies fever, chills, sweats.  Patient denies any medication changes.  Patient states that approximately a year ago she was having vaginal bleeding and saw a provider and had an ultrasound of her kidneys and her pelvis.  Patient states that her kidneys were normal and there was nothing more to do.  Patient denies any other concerns.    Allergies:  Allergies   Allergen Reactions     Hydrochlorothiazide      Triggered two gout attacks, no further problmes since discontinuing drug       Problem List:    Patient Active Problem List    Diagnosis Date Noted     Morbid obesity (H) 08/14/2018     Priority: Medium     Impaired fasting glucose 12/16/2011     Priority: Medium     Metabolic syndrome 12/16/2011     Priority: Medium     Advance Care Planning 12/15/2011     Priority: Medium     Advance Care Planning 12/6/2016: Receipt of ACP document:  Received: Health Care Directive which was witnessed or notarized on 8-16-16.  Document previously scanned on 10-4-16.  Validation form completed and sent to be scanned.  Code Status needs to be updated to reflect choices in most recent ACP document. Confirmed/documented designated decision maker(s).  Added by Hansa  Maynor WOOD Advance Care Planning Liaison with [employer]  Patient does not have an Advance/Health Care Directive (HCD), given packet. Tamara Solorzanoon December 15, 2011    Patient has completed an Advance/Health Care Directive (HCD), scanned into Epic Media tab, entry date of 12/6/16.    Denisharadha Purvis  April 19, 2018         CKD (chronic kidney disease) stage 2, GFR 60-89 ml/min 11/16/2009     Priority: Medium     GFR in 50s 2008, 2009 -- diagnosed with CKD stage 3  Reduction likely due to obesity, hypertension, increasing age  Labs improved, eGFR >60 March 2011March 2011, December 2011 -- diagnosis of stage 3 changed  Component      Latest Ref Rng 12/9/2010 3/13/2011 12/15/2011 3/21/2013   GFR Estimate      >60 mL/min/1.7m2 53 (L) 61 63 75     Component      Latest Ref Rng 3/31/2014   GFR Estimate      >60 mL/min/1.7m2 69     Problem list name updated by automated process. Provider to review       Obesity (BMI 30-39.9) 10/30/2008     Priority: Medium     (Problem list name updated by automated process. Provider to review and confirm.)       Essential hypertension 07/10/2008     Priority: Medium     Hyperlipidemia LDL goal <100 07/10/2008     Priority: Medium        Past Medical History:    Past Medical History:   Diagnosis Date     Arthritis      Gout 3/14/2011     Hypertension        Past Surgical History:    Past Surgical History:   Procedure Laterality Date     COLONOSCOPY  12/05/05    normal, recheck 10 years -- done at [facility]  03/30/2018    Capsulotomy Left eye.   Dr. Skip Villela     EYE SURGERY  2015     HC REMOVAL GALLBLADDER       HYSTEROSCOPY,DIAGNOSTIC  around 1998       Family History:    Family History   Problem Relation Age of Onset     Cancer Sister         uterine and ovarian-in remission 2008     Other Cancer Sister      Heart Disease Brother         valve replacement     Lipids Brother      Coronary Artery Disease Brother      Eye Disorder Mother         glaucoma     Heart Disease  "Mother          of CHF     Eye Disorder Brother         detached retina     Cancer Son         SKIN     Cancer Paternal Grandfather         carcinoma of the lip, pipe-smoker     Thyroid Disease Daughter         goiter or nodule?     Other Cancer Son      Other Cancer Other      Thyroid Disease Other      Diabetes No family hx of      C.A.D. No family hx of      Breast Cancer No family hx of      Cancer - colorectal No family hx of        Social History:  Marital Status:   [5]  Social History     Tobacco Use     Smoking status: Never     Smokeless tobacco: Never   Vaping Use     Vaping status: Never Used   Substance Use Topics     Alcohol use: No     Drug use: No        Medications:    cefdinir (OMNICEF) 250 MG/5ML suspension  ASPIRIN 81 MG OR TABS  lisinopril (ZESTRIL) 20 MG tablet  predniSONE (DELTASONE) 20 MG tablet  simvastatin (ZOCOR) 40 MG tablet  VITAMIN D 1000 UNIT OR CAPS          Review of Systems  As mentioned above in the history present illness. All other systems were reviewed and are negative.    Physical Exam   BP: (!) 189/100  Pulse: 79  Temp: 98.2  F (36.8  C)  Resp: 20  Height: 162.6 cm (5' 4\")  Weight: 93 kg (205 lb)  SpO2: 96 %      Physical Exam  Vitals and nursing note reviewed.   Constitutional:       General: She is not in acute distress.     Appearance: Normal appearance. She is well-developed. She is not ill-appearing, toxic-appearing or diaphoretic.   HENT:      Head: Normocephalic and atraumatic.   Eyes:      General: No scleral icterus.        Right eye: No discharge.         Left eye: No discharge.      Conjunctiva/sclera: Conjunctivae normal.   Cardiovascular:      Rate and Rhythm: Regular rhythm.      Heart sounds: Normal heart sounds. No murmur heard.     No friction rub.   Pulmonary:      Effort: Pulmonary effort is normal. No respiratory distress.      Breath sounds: Normal breath sounds. No stridor. No wheezing or rales.   Chest:      Chest wall: No tenderness. "   Abdominal:      General: Bowel sounds are normal. There is no distension.      Palpations: Abdomen is soft.      Tenderness: There is no abdominal tenderness. There is no right CVA tenderness, left CVA tenderness or guarding.   Musculoskeletal:      Cervical back: Normal range of motion and neck supple.   Skin:     General: Skin is warm and dry.      Coloration: Skin is not pale.      Findings: No erythema or rash.   Neurological:      General: No focal deficit present.      Mental Status: She is alert and oriented to person, place, and time.   Psychiatric:         Mood and Affect: Mood normal.         ED Course              ED Course as of 06/05/23 2359 Mon Jun 05, 2023   2355 MR Pelvis (GYN) wo & w Contrast  MRI result reveals concern for mass and possible carcinoma.  Will relay findings to the patient and family and place oncology referral.   2355 MR Pelvis (GYN) wo & w Contrast     Procedures      Results for orders placed or performed during the hospital encounter of 06/05/23 (from the past 24 hour(s))   CBC with platelets differential    Narrative    The following orders were created for panel order CBC with platelets differential.  Procedure                               Abnormality         Status                     ---------                               -----------         ------                     CBC with platelets and d...[[id number]]                      Final result                 Please view results for these tests on the individual orders.   UA with Microscopic reflex to Culture    Specimen: Urine, Midstream   Result Value Ref Range    Color Urine Yellow Colorless, Straw, Light Yellow, Yellow    Appearance Urine Cloudy (A) Clear    Glucose Urine Negative Negative mg/dL    Bilirubin Urine Negative Negative    Ketones Urine Negative Negative mg/dL    Specific Gravity Urine 1.021 1.003 - 1.035    Blood Urine Large (A) Negative    pH Urine 5.0 5.0 - 7.0    Protein Albumin Urine 30 (A) Negative  mg/dL    Urobilinogen Urine Normal Normal, 2.0 mg/dL    Nitrite Urine Positive (A) Negative    Leukocyte Esterase Urine Large (A) Negative    Bacteria Urine Moderate (A) None Seen /HPF    Mucus Urine Present (A) None Seen /LPF    RBC Urine >182 (H) <=2 /HPF    WBC Urine >182 (H) <=5 /HPF    Squamous Epithelials Urine 3 (H) <=1 /HPF    Narrative    Urine Culture ordered based on laboratory criteria   CBC with platelets and differential   Result Value Ref Range    WBC Count 10.4 4.0 - 11.0 10e3/uL    RBC Count 4.87 3.80 - 5.20 10e6/uL    Hemoglobin 14.9 11.7 - 15.7 g/dL    Hematocrit 43.7 35.0 - 47.0 %    MCV 90 78 - 100 fL    MCH 30.6 26.5 - 33.0 pg    MCHC 34.1 31.5 - 36.5 g/dL    RDW 12.8 10.0 - 15.0 %    Platelet Count 314 150 - 450 10e3/uL    % Neutrophils 66 %    % Lymphocytes 22 %    % Monocytes 8 %    % Eosinophils 3 %    % Basophils 1 %    % Immature Granulocytes 0 %    NRBCs per 100 WBC 0 <1 /100    Absolute Neutrophils 6.9 1.6 - 8.3 10e3/uL    Absolute Lymphocytes 2.3 0.8 - 5.3 10e3/uL    Absolute Monocytes 0.8 0.0 - 1.3 10e3/uL    Absolute Eosinophils 0.3 0.0 - 0.7 10e3/uL    Absolute Basophils 0.1 0.0 - 0.2 10e3/uL    Absolute Immature Granulocytes 0.0 <=0.4 10e3/uL    Absolute NRBCs 0.0 10e3/uL   [place] Draw *Canceled*    Narrative    The following orders were created for panel order [place] Draw.  Procedure                               Abnormality         Status                     ---------                               -----------         ------                       Please view results for these tests on the individual orders.   [place] Draw *Canceled*    Narrative    The following orders were created for panel order [place] Draw.  Procedure                               Abnormality         Status                     ---------                               -----------         ------                     Extra Red Top Tube[[id number]]                                                            Please  view results for these tests on the individual orders.   Extra Tube    Narrative    The following orders were created for panel order Extra Tube.  Procedure                               Abnormality         Status                     ---------                               -----------         ------                     Extra Green Top (Lithium...[[id number]]                      Final result                 Please view results for these tests on the individual orders.   Extra Green Top (Lithium Heparin) Tube   Result Value Ref Range    Hold Specimen [facility]    Basic metabolic panel   Result Value Ref Range    Sodium 139 136 - 145 mmol/L    Potassium 4.0 3.4 - 5.3 mmol/L    Chloride 103 98 - 107 mmol/L    Carbon Dioxide (CO2) 24 22 - 29 mmol/L    Anion Gap 12 7 - 15 mmol/L    Urea Nitrogen 19.5 8.0 - 23.0 mg/dL    Creatinine 0.75 0.51 - 0.95 mg/dL    Calcium 9.1 8.8 - 10.2 mg/dL    Glucose 131 (H) 70 - 99 mg/dL    GFR Estimate 78 >60 mL/min/1.73m2   US Pelvic Complete with Transvaginal    Narrative    EXAM: US PELVIC TRANSABDOMINAL AND TRANSVAGINAL  LOCATION: [facility]  DATE/TIME: 6/5/2023 9:59 PM CDT    INDICATION: post menopausal bleeding  COMPARISON: 07/08/2022  TECHNIQUE: Transabdominal scans were performed. Endovaginal ultrasound was performed to better visualize the adnexa.    FINDINGS:    UTERUS: 6.9 x 4.3 x 3.5 cm. Normal in size and position. 1.3 cm submucosal fibroid in the mid uterine body.    ENDOMETRIUM: 10 mm. Diffusely thickened and heterogeneous with increased vascularity.    RIGHT OVARY: 1.8 x 1.0 x 1.1 cm. Normal.    LEFT OVARY: 1.6 x 1.3 x 1.0 cm. Normal.    No significant free fluid.      Impression    IMPRESSION:  1.  Thickened, heterogeneous, and hypervascular endometrium, which is abnormal in the setting of postmenopausal bleeding and concerning for endometrial carcinoma. Tissue sampling recommended.    2.  Small submucosal fibroid.    3.  Normal ovaries.         MR  Pelvis (GYN) wo & w Contrast    Narrative    EXAM: MR PELVIS WITHOUT AND WITH CONTRAST  LOCATION: [facility]  DATE/TIME: 6/5/2023 10:52 PM CDT    INDICATION: Postmenopausal bleeding.  COMPARISON:   6/5/2023 - pelvic ultrasound. The report from this study describes thickened, heterogeneous and hypervascular endometrium.  6/8/2022 - CT abdomen and pelvis.  TECHNIQUE: Routine MRI female pelvis protocol including T1 in/out phase, diffusion, thin section high resolution multiplane T2, and post contrast T1.  CONTRAST: 9 mL Gadavist intravenous.    FINDINGS: Note that evaluation of the uterus is overall moderately limited on this study due to motion artifact.    UTERUS AND ENDOMETRIUM: 8.1 x 4.4 x 5.4 cm in long axis, short axis and transverse dimensions, respectively. An apparent heterogeneous masslike structure is present in the anterior and right aspect of the uterus in the subendometrial or endometrial   region, measuring approximately 2.5 x 1.6 cm (series 18 image 33). This structure is not well-visualized on T1 weighted images due to artifact. It appears isointense to the uterine myometrium on T2-weighted images. Probable mildly heterogeneous   enhancement of this structure. The dual-leg endometrial stripe is not well defined due to motion artifact, measuring approximately 0.9 cm in anteroposterior thickness. A few subcentimeter low signal regions in the anterior uterus could represent   fibroids.    ADNEXA: The ovaries are not definitely visualized.    ADDITIONAL FINDINGS: Several diverticula are present in the sigmoid colon, without evidence of diverticulitis. A trace amount of free fluid in the right hemipelvis.      Impression    IMPRESSION:  1.  Note that this study is overall moderately limited due to motion artifact.  2.  Suboptimal visualization of a 2.5 x 1.6 cm structure that has probable heterogeneous enhancement is present in the anterior right aspect of the uterus,  corresponding to where there was an abnormal structure on the comparison CT scan dated 6/8/2022.   This is nonspecific, but possibilities include endometrial carcinoma and a submucosal uterine fibroid. An endometrial biopsy could be considered for further evaluation  3.  The ovaries are not visualized.  4.  A trace amount of nonspecific free fluid in the pelvis.       Medications   gadobutrol (GADAVIST) injection 9 mL (9 mLs Intravenous $Given 6/5/23 9059)   0.9% sodium chloride BOLUS (50 mLs Intravenous $New Bag 6/5/23 8723)   cefdinir (OMNICEF) suspension 600 mg (600 mg Oral $Given 6/5/23 9867)       Assessments & Plan (with Medical Decision Making)     I have reviewed the nursing notes.    I have reviewed the findings, diagnosis, plan and need for follow up with the patient.  87-year-old female presents emergency department with a 6-week intermittent history of vaginal bleeding and a 1 to 2-week history of urinary frequency and urgency without fever, chills, mental status changes.  Patient denies abdominal pain, bloating and reports normal bowel movements and denies bright red rectal bleeding and vaginal sores or lesions.  Reviewed previous records including visits from last year and ultrasound reports and cystogram reports from patient's previous vaginal bleeding episode.  Reviewed ultrasound that recommends MRI.  Consulted with radiologist and discussed that MRI had been recommended previously and given today's vaginal bleeding should we do an MRI or a CT today and radiologist recommended MRI.  Ultrasound pelvis today reports thickened heterogenous and hypervascular endometrium concerning for possible endometrial carcinoma.  Urinalysis reveals large white blood cells, red blood cells, nitrates positive consistent with a lower urinary tract infection and at this point in time no suspicion for pyelonephritis or ureteral process.  Will initiate patient on cefdinir with culture and close interval follow-up.  MRI  reveals :IMPRESSION:  1.  Note that this study is overall moderately limited due to motion artifact.  2.  Suboptimal visualization of a 2.5 x 1.6 cm structure that has probable heterogeneous enhancement is present in the anterior right aspect of the uterus, corresponding to where there was an abnormal structure on the comparison CT scan dated 6/8/2022.   This is nonspecific, but possibilities include endometrial carcinoma and a submucosal uterine fibroid. An endometrial biopsy could be considered for further evaluation  3.  The ovaries are not visualized.  4.  A trace amount of nonspecific free fluid in the pelvis.  This result has not been signed. Information might be incomplete.  We will place oncology referral and close interval follow-up.  Patient discharged with the following instructions:  I recommend  the cefdinir at the results of the drug tomorrow.  You were given your first dose this evening.  Please drink at least 4 to 6 glasses of water daily.  I recommend follow-up with your primary care for reevaluation of your urine in 1 week to 10 days.  I have placed a referral for oncology.  They should be contacting you before the end of this week for further evaluation and possible testing or where to go from here.  Please return to the emergency room if you develop confusion or unable to urinate or feel like your symptoms are worsening including uncontrolled vaginal bleeding.    New Prescriptions    CEFDINIR (OMNICEF) 250 MG/5ML SUSPENSION    Take 6 mLs (300 mg) by mouth 2 times daily for 10 days       Final diagnoses:   Acute lower UTI   Uterine mass   Post-menopausal bleeding       6/5/2023   [facility] EMERGENCY DEPT     Demetra Alarcon, ÓSCAR CNP  06/05/23 3583

## 2023-06-06 NOTE — DISCHARGE INSTRUCTIONS
I recommend  the cefdinir at the results of the drug tomorrow.  You were given your first dose this evening.  Please drink at least 4 to 6 glasses of water daily.  I recommend follow-up with your primary care for reevaluation of your urine in 1 week to 10 days.  I have placed a referral for oncology.  They should be contacting you before the end of this week for further evaluation and possible testing or where to go from here.  Please return to the emergency room if you develop confusion or unable to urinate or feel like your symptoms are worsening including uncontrolled vaginal bleeding.

## 2023-06-06 NOTE — ED TRIAGE NOTES
Pt reports intermittent bleeding for the past 6 weeks, sometimes spotting, sometimes heavier, enough for pt to have to wear a pad. Pt denies pain at this time.

## 2023-06-06 NOTE — TELEPHONE ENCOUNTER
RECORDS STATUS - ALL OTHER DIAGNOSIS      RECORDS RECEIVED FROM: Epic   DATE RECEIVED:    NOTES STATUS DETAILS   OFFICE NOTE from referring provider Epic 06/05/23: ÓSCAR Walsh CNP   OFFICE NOTE from other specialist Epic 06/23/22: June Boyle PA-C   DISCHARGE REPORT from the ER [facility] 06/05/23, 01/19/19: [facility] [place] [facility]   MEDICATION LIST [facility]    LABS     PATHOLOGY REPORTS Report in EPIC Non Gyn Cyto:  06/24/22: [id number]   ANYTHING RELATED TO DIAGNOSIS Epic Most recent 06/05/23   IMAGING (NEED IMAGES & REPORT)     CT SCANS PACS 06/08/22: CT Urogram   MRI PACS 06/05/23: MR Pelvis   ULTRASOUND PACS 06/05/23, 07/08/22: US Pelvic

## 2023-06-07 ENCOUNTER — PRE VISIT (OUTPATIENT)
Dept: ONCOLOGY | Facility: CLINIC | Age: 88
End: 2023-06-07

## 2023-06-07 ENCOUNTER — LAB (OUTPATIENT)
Dept: INFUSION THERAPY | Facility: CLINIC | Age: 88
End: 2023-06-07
Attending: OBSTETRICS & GYNECOLOGY
Payer: MEDICARE

## 2023-06-07 ENCOUNTER — ONCOLOGY VISIT (OUTPATIENT)
Dept: ONCOLOGY | Facility: CLINIC | Age: 88
End: 2023-06-07
Attending: NURSE PRACTITIONER
Payer: MEDICARE

## 2023-06-07 ENCOUNTER — PATIENT OUTREACH (OUTPATIENT)
Dept: ONCOLOGY | Facility: CLINIC | Age: 88
End: 2023-06-07

## 2023-06-07 VITALS
DIASTOLIC BLOOD PRESSURE: 77 MMHG | RESPIRATION RATE: 16 BRPM | WEIGHT: 196 LBS | HEIGHT: 64 IN | BODY MASS INDEX: 33.46 KG/M2 | SYSTOLIC BLOOD PRESSURE: 165 MMHG

## 2023-06-07 DIAGNOSIS — N85.8 UTERINE MASS: ICD-10-CM

## 2023-06-07 DIAGNOSIS — N95.0 POST-MENOPAUSAL BLEEDING: ICD-10-CM

## 2023-06-07 LAB
ALBUMIN SERPL BCG-MCNC: 4 G/DL (ref 3.5–5.2)
ALP SERPL-CCNC: 54 U/L (ref 35–104)
ALT SERPL W P-5'-P-CCNC: 21 U/L (ref 10–35)
ANION GAP SERPL CALCULATED.3IONS-SCNC: 15 MMOL/L (ref 7–15)
AST SERPL W P-5'-P-CCNC: 31 U/L (ref 10–35)
BASOPHILS # BLD AUTO: 0.1 10E3/UL (ref 0–0.2)
BASOPHILS NFR BLD AUTO: 1 %
BILIRUB SERPL-MCNC: 0.5 MG/DL
BUN SERPL-MCNC: 21 MG/DL (ref 8–23)
CALCIUM SERPL-MCNC: 9.3 MG/DL (ref 8.8–10.2)
CHLORIDE SERPL-SCNC: 105 MMOL/L (ref 98–107)
CREAT SERPL-MCNC: 0.77 MG/DL (ref 0.51–0.95)
DEPRECATED HCO3 PLAS-SCNC: 21 MMOL/L (ref 22–29)
EOSINOPHIL # BLD AUTO: 0.4 10E3/UL (ref 0–0.7)
EOSINOPHIL NFR BLD AUTO: 4 %
ERYTHROCYTE [DISTWIDTH] IN BLOOD BY AUTOMATED COUNT: 12.9 % (ref 10–15)
GFR SERPL CREATININE-BSD FRML MDRD: 75 ML/MIN/1.73M2
GLUCOSE SERPL-MCNC: 125 MG/DL (ref 70–99)
HCT VFR BLD AUTO: 44.1 % (ref 35–47)
HGB BLD-MCNC: 14.6 G/DL (ref 11.7–15.7)
IMM GRANULOCYTES # BLD: 0 10E3/UL
IMM GRANULOCYTES NFR BLD: 0 %
LYMPHOCYTES # BLD AUTO: 2.3 10E3/UL (ref 0.8–5.3)
LYMPHOCYTES NFR BLD AUTO: 25 %
MCH RBC QN AUTO: 30.8 PG (ref 26.5–33)
MCHC RBC AUTO-ENTMCNC: 33.1 G/DL (ref 31.5–36.5)
MCV RBC AUTO: 93 FL (ref 78–100)
MONOCYTES # BLD AUTO: 0.7 10E3/UL (ref 0–1.3)
MONOCYTES NFR BLD AUTO: 7 %
NEUTROPHILS # BLD AUTO: 6 10E3/UL (ref 1.6–8.3)
NEUTROPHILS NFR BLD AUTO: 63 %
NRBC # BLD AUTO: 0 10E3/UL
NRBC BLD AUTO-RTO: 0 /100
PLATELET # BLD AUTO: 317 10E3/UL (ref 150–450)
POTASSIUM SERPL-SCNC: 4.4 MMOL/L (ref 3.4–5.3)
PROT SERPL-MCNC: 6.6 G/DL (ref 6.4–8.3)
RBC # BLD AUTO: 4.74 10E6/UL (ref 3.8–5.2)
SODIUM SERPL-SCNC: 141 MMOL/L (ref 136–145)
WBC # BLD AUTO: 9.4 10E3/UL (ref 4–11)

## 2023-06-07 PROCEDURE — 85041 AUTOMATED RBC COUNT: CPT

## 2023-06-07 PROCEDURE — 85018 HEMOGLOBIN: CPT

## 2023-06-07 PROCEDURE — 99205 OFFICE O/P NEW HI 60 MIN: CPT | Performed by: OBSTETRICS & GYNECOLOGY

## 2023-06-07 PROCEDURE — 36415 COLL VENOUS BLD VENIPUNCTURE: CPT

## 2023-06-07 PROCEDURE — 86850 RBC ANTIBODY SCREEN: CPT

## 2023-06-07 PROCEDURE — G0463 HOSPITAL OUTPT CLINIC VISIT: HCPCS | Performed by: OBSTETRICS & GYNECOLOGY

## 2023-06-07 PROCEDURE — 86901 BLOOD TYPING SEROLOGIC RH(D): CPT

## 2023-06-07 ASSESSMENT — PAIN SCALES - GENERAL: PAINLEVEL: NO PAIN (0)

## 2023-06-07 NOTE — RESULT ENCOUNTER NOTE
[facility] Emergency Dept discharge antibiotic (if prescribed): Cefdinir (Omnicef) 250 MG/5ML PO suspension, Take 6 mLs (300 mg) by mouth 2 times daily for 10 days   Date of Rx (if applicable):  6/5/23  No changes in treatment per [facility] ED Lab Result Urine culture protocol.

## 2023-06-07 NOTE — PATIENT INSTRUCTIONS
Unable to perform biopsy in clinic today    Recommend proceeding to D&C.    You will be contacted with a surgical date    Labs and teaching  today    Amanda Au MD  Gynecologic Oncology  [facility]

## 2023-06-07 NOTE — PROGRESS NOTES
Met with Jen today at clinic after he surgical consult with Dr. Au, diagnosis of uterine mass.  She will proceed with D&C, Surgery date will be determined in the near future and she understands that a surgery scheduler will reach out to her to inform her of her surgery date/time and will also schedule a pre-op appointment at the PAC clinic.    Surgery teaching was completed face to face today with Jen and her daughters.    Writer provided surgical teaching packet to patient and reviewed with her and her daughters.    Reviewed:   Preparing for surgery   Showering before Surgery  Restrictions post surgery  Diet post surgery  Symptoms of concern  Activity and Driving post surgery  Bowel management   Pain management and expectations     Questions and concerns addressed. Contact information was provided for future correspondence. They stated understanding of the above and  Expressed appreciation/Nayanamary ellen Gutierrez RN

## 2023-06-07 NOTE — PROGRESS NOTES
Consult Notes on Referred Patient    Date: 2023       Dr. Demetra Alarcon, APRN CNP  [address] [place] [address]       RE: Jen Layne  : 1935  PATRICA: 2023    Dear Dr. Demetra Alarcon:    I had the pleasure of seeing your patient Jen Layne here at the Gynecologic Cancer Clinic at the [facility] on 2023.  As you know she is a very pleasant 87 year old woman with a recent diagnosis of  Postmenopausal bleeding.   Given these findings she was subsequently sent to the Gynecologic Cancer Clinic for new patient consultation.     History obtained from patient and review of EMR.  Briefly, she is a 86yo who was seen in ED for intermittent vaginal bleeding x 6 wks. Was actually seen one year ago for hematuria.  At that time, underwent CT urogram 22 which showed no gross abnormalities to explain hematuria, was noted to have low-density thickening of endometrium.  Underwent a pelvic US on 22 which showed uterus at 6.8 x 4.6cm with a 3.2cm mass along endometrial canal possibly representing polyp or fibroid.  Never had any further follow-up related to this that I can see.    Was then seen most recently in ED for bleeding. Had repeat pelvic US on 23 which showed diffusely thickened endometrium.  Had pelvic MRI  8.1 x 4.4 x 5.4 cm in long axis, short axis and transverse dimensions, respectively. An apparent heterogeneous mass-like structure is present in the anterior and right aspect of the uterus in the subendometrial or endometrial  region, measuring approximately 2.5 x 1.6 cm (series 18 image 33). This structure is not well-visualized on T1 weighted images due to artifact. It appears isointense to the uterine myometrium on T2-weighted images. Probable mildly heterogeneous  enhancement of this structure. A few subcentimeter low T2 signal regions in the anterior uterus likely represent very small fibroids.   The endometrial stripe is not  well defined due to motion artifact. It measures approximately 0.9 cm in dual-layer anteroposterior thickness. The endometrial region appears mildly irregular on the axial images (for example, series 6 image 15). It cannot be determined on the post contrast images if there is abnormal enhancement associated with the endometrium. Possible restricted diffusion within the region of endometrium.    She has been referred for further evaluation.  She has not been seen by Gyn during this time.  No pelvic exam or biopsies.    Her PMH is notable for arthritis, gout, HTN.  Her past surgical history is notable for hysteroscopy, D&C in late 1990s, pierce.     FH of sister with endometrial/ovarian cancer, free of disease for past 25 years    SH: , lives alone in apartment.    In ROS, no chest pain, no SOB, relatively active.  Continues to have some light  Spotting. No abdominal or pelvic pain.     Past Medical History:    Past Medical History:   Diagnosis Date     Arthritis      Gout 3/14/2011    Triggered by hydrochlorothiazide       Hypertension          Past Surgical History:    Past Surgical History:   Procedure Laterality Date     COLONOSCOPY  12/05/05    normal, recheck 10 years -- done at [facility] EYE  03/30/2018    Capsulotomy Left eye.   Dr. Skip Villela     EYE SURGERY  2015     HC REMOVAL GALLBLADDER       HYSTEROSCOPY,DIAGNOSTIC  around 1998         Health Maintenance:  Health Maintenance Due   Topic Date Due     MEDICARE ANNUAL WELLNESS VISIT  03/05/2022     PHQ-2 (once per calendar year)  01/01/2023     DTAP/TDAP/TD IMMUNIZATION (5 - Td or Tdap) 03/21/2023     LIPID  05/13/2023     MICROALBUMIN  05/13/2023         Current Medications:     has a current medication list which includes the following prescription(s): aspirin, cefdinir, lisinopril, prednisone, simvastatin, and vitamin d, and the following Facility-Administered Medications: silver nitrate.       Allergies:     Hydrochlorothiazide         Social History:     Social History     Tobacco Use     Smoking status: Never     Smokeless tobacco: Never   Vaping Use     Vaping status: Never Used   Substance Use Topics     Alcohol use: No       History   Drug Use No           Family History:     The patient's family history is notable for :    Family History   Problem Relation Age of Onset     Cancer Sister         uterine and ovarian-in remission      Other Cancer Sister      Heart Disease Brother         valve replacement     Lipids Brother      Coronary Artery Disease Brother      Eye Disorder Mother         glaucoma     Heart Disease Mother          of CHF     Eye Disorder Brother         detached retina     Cancer Son         SKIN     Cancer Paternal Grandfather         carcinoma of the lip, pipe-smoker     Thyroid Disease Daughter         goiter or nodule?     Other Cancer Son      Other Cancer Other      Thyroid Disease Other      Diabetes No family hx of      C.A.D. No family hx of      Breast Cancer No family hx of      Cancer - colorectal No family hx of          Physical Exam:     LMP  (LMP Unknown)   There is no height or weight on file to calculate BMI.    General Appearance: healthy and alert, no distress     Musculoskeletal: extremities non tender and without edema    Skin: no lesions or rashes     Neurological: normal gait, no gross defects     Psychiatric: appropriate mood and affect                               Hematological: normal cervical, supraclavicular and inguinal lymph nodes     Gastrointestinal:       abdomen soft, non-tender, non-distended, no organomegaly or masses    Genitourinary: External genitalia and urethral meatus appears normal.  Vagina is smooth without nodularity or masses.  Old blood in vault.  Cervix small, scarred centrally.  Attempt at endometrial biopsy. Unsuccessful due to significant stenosis.  Unable to dilate with os finder. Bimanual exam reveal no masses, nodularity or fullness.  Recto-vaginal exam  confirms these findings.      Assessment:    Jen Layne is a 87 year old woman with a new diagnosis of postmenopausal bleeding and thickened endometrial stripe     60 minutes spent on the date of the encounter doing chart review, history and exam, documentation and further activities as noted above      Plan:     1.)        Symptoms, imaging and exam findings reviewed in detail with patient.  Given age, postmenopausal bleeding and thickened endometrial stripe, discussed differential diagnosis of endometrial hyperplasia, cancer versus benign process such as endometrial polyp.  Unable to perform endometrial biopsy in office today due to stenosis.    Therefore, recommend examination under anesthesia, dilation and curettage uterus under ultrasound guidance for diagnostic purposes.  Discussed potential need for further procedures including hysterectomy pending results of D&C.    Discussed in detail. Risks, benefits and alternatives to proceed discussed in detail with the patient. Risks include but are not limited to bleeding, infection, possible injury to surrounding organs including bowel, bladder, ureter, need for second procedure/surgery related to complications from first procedure, postoperative medical complications such as cardiopulmonary events, lymphedema, lymphocyst, thromboembolic events. Also discussed specific risks related to the procedure including uterine perforation, bleeding, inclusive sampling, need for additional treatment based on final pathology. Will arrange appropriate preoperative blood work, PAC. Patient also advised on need for postoperative surveillance and/or adjuvant therapy. Questions answered, patient will be contacted with surgical date.     Amanda Au MD  Gynecologic Oncology  [facility]      CC  Patient Care Team:  Yamile Cotter MD as PCP - General (Family Practice)  Yamile Cotter MD as Assigned PCP  June Boyle PA-C as  Assigned Surgical Provider  Amanda Au MD as MD (Gynecologic Oncology)  ESTHER PINO

## 2023-06-07 NOTE — PROGRESS NOTES
"Oncology Rooming Note    June 7, 2023 12:24 PM   Jen Layne is a 87 year old female who presents for:    Chief Complaint   Patient presents with     Oncology Clinic Visit     New consult uterine mass and postmenopausal bleeding     Initial Vitals: BP (!) 165/77   Resp 16   Ht 1.626 m (5' 4\")   Wt 88.9 kg (196 lb)   LMP  (LMP Unknown)   BMI 33.64 kg/m   Estimated body mass index is 33.64 kg/m  as calculated from the following:    Height as of this encounter: 1.626 m (5' 4\").    Weight as of this encounter: 88.9 kg (196 lb). Body surface area is 2 meters squared.  No Pain (0) Comment: Data Unavailable   No LMP recorded (lmp unknown). Patient is postmenopausal.  Allergies reviewed: Yes  Medications reviewed: Yes    Medications: Medication refills not needed today.  Pharmacy name entered into EPIC:    [facility] - [place], [place] - [address] [place] [address] AND [facility]    Clinical concerns: new consult       Naya Salazar            "

## 2023-06-07 NOTE — LETTER
"    2023         RE: Jen Layne  [address]  [address]  [place] [address]        Dear Colleague,    Thank you for referring your patient, Jen Layne, to the [facility]. Please see a copy of my visit note below.    Oncology Rooming Note    2023 12:24 PM   Jen Layne is a 87 year old female who presents for:    Chief Complaint   Patient presents with     Oncology Clinic Visit     New consult uterine mass and postmenopausal bleeding     Initial Vitals: BP (!) 165/77   Resp 16   Ht 1.626 m (5' 4\")   Wt 88.9 kg (196 lb)   LMP  (LMP Unknown)   BMI 33.64 kg/m   Estimated body mass index is 33.64 kg/m  as calculated from the following:    Height as of this encounter: 1.626 m (5' 4\").    Weight as of this encounter: 88.9 kg (196 lb). Body surface area is 2 meters squared.  No Pain (0) Comment: Data Unavailable   No LMP recorded (lmp unknown). Patient is postmenopausal.  Allergies reviewed: Yes  Medications reviewed: Yes    Medications: Medication refills not needed today.  Pharmacy name entered into EPIC:    [facility] - [place], [place] - [address] [place] [address] AND [facility]    Clinical concerns: new consult       Naya Salazar                                      Consult Notes on Referred Patient    Date: 2023       Dr. Demetra Alarcon, APRN CNP  [address] [place] [address]       RE: Jen Layne  : 1935  PATRICA: 2023    Dear Dr. Demetra Alarcon:    I had the pleasure of seeing your patient Jen Layne here at the Gynecologic Cancer Clinic at the [facility] on 2023.  As you know she is a very pleasant 87 year old woman with a recent diagnosis of  Postmenopausal bleeding.   Given these findings she was subsequently sent to the Gynecologic Cancer Clinic for new patient consultation.     History obtained from patient and review of EMR.  Briefly, she is a 86yo who " was seen in ED for intermittent vaginal bleeding x 6 wks. Was actually seen one year ago for hematuria.  At that time, underwent CT urogram 6/8/22 which showed no gross abnormalities to explain hematuria, was noted to have low-density thickening of endometrium.  Underwent a pelvic US on 7/8/22 which showed uterus at 6.8 x 4.6cm with a 3.2cm mass along endometrial canal possibly representing polyp or fibroid.  Never had any further follow-up related to this that I can see.    Was then seen most recently in ED for bleeding. Had repeat pelvic US on 6/5/23 which showed diffusely thickened endometrium.  Had pelvic MRI  8.1 x 4.4 x 5.4 cm in long axis, short axis and transverse dimensions, respectively. An apparent heterogeneous mass-like structure is present in the anterior and right aspect of the uterus in the subendometrial or endometrial  region, measuring approximately 2.5 x 1.6 cm (series 18 image 33). This structure is not well-visualized on T1 weighted images due to artifact. It appears isointense to the uterine myometrium on T2-weighted images. Probable mildly heterogeneous  enhancement of this structure. A few subcentimeter low T2 signal regions in the anterior uterus likely represent very small fibroids.   The endometrial stripe is not well defined due to motion artifact. It measures approximately 0.9 cm in dual-layer anteroposterior thickness. The endometrial region appears mildly irregular on the axial images (for example, series 6 image 15). It cannot be determined on the post contrast images if there is abnormal enhancement associated with the endometrium. Possible restricted diffusion within the region of endometrium.    She has been referred for further evaluation.  She has not been seen by Gyn during this time.  No pelvic exam or biopsies.    Her PMH is notable for arthritis, gout, HTN.  Her past surgical history is notable for hysteroscopy, D&C in late 1990s, pierce.     FH of sister with  endometrial/ovarian cancer, free of disease for past 25 years    SH: , lives alone in apartment.    In ROS, no chest pain, no SOB, relatively active.  Continues to have some light  Spotting. No abdominal or pelvic pain.     Past Medical History:    Past Medical History:   Diagnosis Date     Arthritis      Gout 3/14/2011    Triggered by hydrochlorothiazide       Hypertension          Past Surgical History:    Past Surgical History:   Procedure Laterality Date     COLONOSCOPY  05    normal, recheck 10 years -- done at [facility] EYE  2018    Capsulotomy Left eye.   Dr. Skip Villela     EYE SURGERY       HC REMOVAL GALLBLADDER       HYSTEROSCOPY,DIAGNOSTIC  around          Health Maintenance:  Health Maintenance Due   Topic Date Due     MEDICARE ANNUAL WELLNESS VISIT  2022     PHQ-2 (once per calendar year)  2023     DTAP/TDAP/TD IMMUNIZATION (5 - Td or Tdap) 2023     LIPID  2023     MICROALBUMIN  2023         Current Medications:     has a current medication list which includes the following prescription(s): aspirin, cefdinir, lisinopril, prednisone, simvastatin, and vitamin d, and the following Facility-Administered Medications: silver nitrate.       Allergies:     Hydrochlorothiazide        Social History:     Social History     Tobacco Use     Smoking status: Never     Smokeless tobacco: Never   Vaping Use     Vaping status: Never Used   Substance Use Topics     Alcohol use: No       History   Drug Use No           Family History:     The patient's family history is notable for :    Family History   Problem Relation Age of Onset     Cancer Sister         uterine and ovarian-in remission      Other Cancer Sister      Heart Disease Brother         valve replacement     Lipids Brother      Coronary Artery Disease Brother      Eye Disorder Mother         glaucoma     Heart Disease Mother          of CHF     Eye Disorder Brother         detached  retina     Cancer Son         SKIN     Cancer Paternal Grandfather         carcinoma of the lip, pipe-smoker     Thyroid Disease Daughter         goiter or nodule?     Other Cancer Son      Other Cancer Other      Thyroid Disease Other      Diabetes No family hx of      C.A.D. No family hx of      Breast Cancer No family hx of      Cancer - colorectal No family hx of          Physical Exam:     LMP  (LMP Unknown)   There is no height or weight on file to calculate BMI.    General Appearance: healthy and alert, no distress     Musculoskeletal: extremities non tender and without edema    Skin: no lesions or rashes     Neurological: normal gait, no gross defects     Psychiatric: appropriate mood and affect                               Hematological: normal cervical, supraclavicular and inguinal lymph nodes     Gastrointestinal:       abdomen soft, non-tender, non-distended, no organomegaly or masses    Genitourinary: External genitalia and urethral meatus appears normal.  Vagina is smooth without nodularity or masses.  Old blood in vault.  Cervix small, scarred centrally.  Attempt at endometrial biopsy. Unsuccessful due to significant stenosis.  Unable to dilate with os finder. Bimanual exam reveal no masses, nodularity or fullness.  Recto-vaginal exam confirms these findings.      Assessment:    Jen Layne is a 87 year old woman with a new diagnosis of postmenopausal bleeding and thickened endometrial stripe     60 minutes spent on the date of the encounter doing chart review, history and exam, documentation and further activities as noted above      Plan:     1.)        Symptoms, imaging and exam findings reviewed in detail with patient.  Given age, postmenopausal bleeding and thickened endometrial stripe, discussed differential diagnosis of endometrial hyperplasia, cancer versus benign process such as endometrial polyp.  Unable to perform endometrial biopsy in office today due to stenosis.    Therefore,  recommend examination under anesthesia, dilation and curettage uterus under ultrasound guidance for diagnostic purposes.  Discussed potential need for further procedures including hysterectomy pending results of D&C.    Discussed in detail. Risks, benefits and alternatives to proceed discussed in detail with the patient. Risks include but are not limited to bleeding, infection, possible injury to surrounding organs including bowel, bladder, ureter, need for second procedure/surgery related to complications from first procedure, postoperative medical complications such as cardiopulmonary events, lymphedema, lymphocyst, thromboembolic events. Also discussed specific risks related to the procedure including uterine perforation, bleeding, inclusive sampling, need for additional treatment based on final pathology. Will arrange appropriate preoperative blood work, PAC. Patient also advised on need for postoperative surveillance and/or adjuvant therapy. Questions answered, patient will be contacted with surgical date.     Amanda Au MD  Gynecologic Oncology  [facility]      CC  Patient Care Team:  Yamile Cotter MD as PCP - General (Family Practice)  Yamile Cotter MD as Assigned PCP  June Boyle PA-C as Assigned Surgical Provider  Amanda Au MD as MD (Gynecologic Oncology)  ESTHER PINO        Again, thank you for allowing me to participate in the care of your patient.        Sincerely,        Amanda Au MD

## 2023-06-08 LAB
BACTERIA UR CULT: ABNORMAL
BACTERIA UR CULT: ABNORMAL

## 2023-06-09 NOTE — RESULT ENCOUNTER NOTE
Final Urine Culture Report on 6/8/23  Emergency Dep/Urgent Care discharge antibiotic prescribed: Cefdinir (Omnicef) 250 MG/5ML PO suspension, Take 6 mLs (300 mg) by mouth 2 times daily for 10 days  #1. Bacteria, >100,000 CFU/ML Klebsiella pneumoniae  is SUSCEPTIBLE to Antibiotic.    #2. Bacteria, >100,000 CFU/ML Klebsiella pneumoniae  is SUSCEPTIBLE to Antibiotic.    No change in treatment per [facility] ED lab result Urine Culture protocol.

## 2023-06-10 ENCOUNTER — TELEPHONE (OUTPATIENT)
Dept: EMERGENCY MEDICINE | Facility: CLINIC | Age: 88
End: 2023-06-10
Payer: MEDICARE

## 2023-06-10 NOTE — TELEPHONE ENCOUNTER
"[facility] Emergency Department/Urgent Care Lab result notification   [Positive for uti and bacteria is susceptible to antibiotic]    Reason for call:     Notify of lab results    Assess patient current symptoms    Review ED Providers recommendations/discharge instructions (if necessary)    Advise per [facility] ED lab result Urine Culture protocol    Lab Result & Date of Final Report [copied from Result Note]:    Final Urine Culture Report on 6/8/23  Emergency Dep/Urgent Care discharge antibiotic prescribed: Cefdinir (Omnicef) 250 MG/5ML PO suspension, Take 6 mLs (300 mg) by mouth 2 times daily for 10 days  #1. Bacteria, >100,000 CFU/ML Klebsiella pneumoniae  is SUSCEPTIBLE to Antibiotic.    #2. Bacteria, >100,000 CFU/ML Klebsiella pneumoniae  is SUSCEPTIBLE to Antibiotic.    No change in treatment per [facility] ED lab result Urine Culture protocol.    RN Assessment (Patient's current Symptoms):    Time of call: 5:06PM    Assessment: \"I'm doing pretty darn good.\" \"I feel fine.\" \"I have no pain.\"     The vaginal spotting has \"just about stopped.\"     Has less urgency and frequency    Is taking the antibiotic without difficulty.     Recommendations/Instructions:     [facility] ED lab result protocol used: Urine Culture    Patient notified of urine culture result    Take antibiotic as directed by the Emergency Dept/Urgent Care Provider.    RN reviewed information about UTI prevention [Yes/No]:  Yes  Patient Education on preventing future UTI's.  Practice good personal hygiene. Wipe yourself from front to back after using the toilet. This helps keep bacteria from getting into the urethra. Keep the genital area clean and dry.  Drink plenty of fluids  Follow up with your health care provider as directed. He or she may test to make sure the infection has cleared. If necessary, additional treatment may be started.    Advised to follow up with the PCP as directed by the ED provider.   The " patient is comfortable with the information given and has no further questions.     Please contact you PCP or return to the Emergency Department if your:    Symptomas do not improved after completing antibiotic.    Symptoms worsen or other concerning symptoms    Copy of full urine culture report below:  Urine Culture  Order: [id number]   Collected 6/5/2023  9:10 PM      Status: Final result      Visible to patient: Yes (not seen)     Specimen Information: Urine, Midstream    3 Result Notes  Culture >100,000 CFU/mL Klebsiella pneumoniae Abnormal       This isolate of Klebsiella pneumoniae demonstrates a Hypermucoviscous phenotype (hmKp), Hypermucoviscous Klebsiella pneumoniae (hmKp) may reflect a hypervirulent strain.   >100,000 CFU/mL Klebsiella pneumoniae Abnormal             Resulting Agency: [facility]     Susceptibility     Klebsiella pneumoniae (1) Klebsiella pneumoniae (2)     ALONSO ALONSO     Ampicillin  Resistant 1  Resistant 1     Ampicillin/ Sulbactam 4 ug/mL Susceptible <=2 ug/mL Susceptible     Cefazolin <=4 ug/mL Susceptible 2 <=4 ug/mL Susceptible 2     Cefepime <=1 ug/mL Susceptible <=1 ug/mL Susceptible     Cefoxitin <=4 ug/mL Susceptible <=4 ug/mL Susceptible     Ceftazidime <=1 ug/mL Susceptible <=1 ug/mL Susceptible     Ceftriaxone <=1 ug/mL Susceptible <=1 ug/mL Susceptible     Ciprofloxacin <=0.25 ug/mL Susceptible <=0.25 ug/mL Susceptible     Gentamicin <=1 ug/mL Susceptible <=1 ug/mL Susceptible     Levofloxacin <=0.12 ug/mL Susceptible <=0.12 ug/mL Susceptible     Nitrofurantoin 32 ug/mL Susceptible 32 ug/mL Susceptible     Piperacillin/Tazobactam <=4 ug/mL Susceptible <=4 ug/mL Susceptible     Tobramycin <=1 ug/mL Susceptible <=1 ug/mL Susceptible     Trimethoprim/Sulfamethoxazole <=1/19 ug/mL Susceptible <=1/19 ug/mL Susceptible                1 Intrinsically Resistant   2 Cefazolin ALONSO breakpoints are for the treatment of uncomplicated urinary tract infections. For the treatment of systemic  infections, please contact the laboratory for additional testing.            Specimen Collected: 06/05/23  9:10 PM Last Resulted: 06/08/23  9:06 PM                  Nava Byrne RN  [facility]  Emergency Dept Lab Result RN  Ph# [phone]

## 2023-06-15 ENCOUNTER — PREP FOR PROCEDURE (OUTPATIENT)
Dept: ONCOLOGY | Facility: CLINIC | Age: 88
End: 2023-06-15
Payer: MEDICARE

## 2023-06-15 DIAGNOSIS — R93.89 THICKENED ENDOMETRIUM: Primary | ICD-10-CM

## 2023-06-15 DIAGNOSIS — N95.0 POST-MENOPAUSAL BLEEDING: ICD-10-CM

## 2023-06-15 RX ORDER — PHENAZOPYRIDINE HYDROCHLORIDE 100 MG/1
200 TABLET, FILM COATED ORAL ONCE
Status: CANCELLED | OUTPATIENT
Start: 2023-06-15 | End: 2023-06-15

## 2023-06-15 RX ORDER — HEPARIN SODIUM 10000 [USP'U]/ML
5000 INJECTION, SOLUTION INTRAVENOUS; SUBCUTANEOUS
Status: CANCELLED | OUTPATIENT
Start: 2023-06-15

## 2023-06-15 NOTE — PROGRESS NOTES
Orders placed for OR on 7/19/23    Indication:  Thickened endometrium, postmenopausal bleeding    Procedure:  Pelvic examination under anesthesia, dilation and curettage uterus, ultrasound guidance    Preop teaching done.  Needs PAC appt.  Consent day of surgery.  Order placed for intraop US with tech.    RNCC Nayana Au MD  Gynecologic Oncology  [facility]

## 2023-06-16 ENCOUNTER — OFFICE VISIT (OUTPATIENT)
Dept: FAMILY MEDICINE | Facility: CLINIC | Age: 88
End: 2023-06-16
Payer: MEDICARE

## 2023-06-16 VITALS
OXYGEN SATURATION: 96 % | TEMPERATURE: 97 F | RESPIRATION RATE: 16 BRPM | WEIGHT: 194 LBS | HEIGHT: 65 IN | BODY MASS INDEX: 32.32 KG/M2 | SYSTOLIC BLOOD PRESSURE: 142 MMHG | DIASTOLIC BLOOD PRESSURE: 96 MMHG | HEART RATE: 85 BPM

## 2023-06-16 DIAGNOSIS — N30.01 ACUTE CYSTITIS WITH HEMATURIA: Primary | ICD-10-CM

## 2023-06-16 LAB
ALBUMIN UR-MCNC: ABNORMAL MG/DL
APPEARANCE UR: CLEAR
BACTERIA #/AREA URNS HPF: ABNORMAL /HPF
BILIRUB UR QL STRIP: NEGATIVE
COLOR UR AUTO: YELLOW
GLUCOSE UR STRIP-MCNC: NEGATIVE MG/DL
HGB UR QL STRIP: ABNORMAL
KETONES UR STRIP-MCNC: NEGATIVE MG/DL
LEUKOCYTE ESTERASE UR QL STRIP: ABNORMAL
MUCOUS THREADS #/AREA URNS LPF: PRESENT /LPF
NITRATE UR QL: NEGATIVE
PH UR STRIP: 6 [PH] (ref 5–7)
RBC #/AREA URNS AUTO: ABNORMAL /HPF
SP GR UR STRIP: >=1.03 (ref 1–1.03)
SQUAMOUS #/AREA URNS AUTO: ABNORMAL /LPF
UROBILINOGEN UR STRIP-ACNC: 0.2 E.U./DL
WBC #/AREA URNS AUTO: ABNORMAL /HPF

## 2023-06-16 PROCEDURE — 87086 URINE CULTURE/COLONY COUNT: CPT | Performed by: NURSE PRACTITIONER

## 2023-06-16 PROCEDURE — 99214 OFFICE O/P EST MOD 30 MIN: CPT | Performed by: NURSE PRACTITIONER

## 2023-06-16 PROCEDURE — 81001 URINALYSIS AUTO W/SCOPE: CPT | Performed by: NURSE PRACTITIONER

## 2023-06-16 RX ORDER — SULFAMETHOXAZOLE AND TRIMETHOPRIM 200; 40 MG/5ML; MG/5ML
160 SUSPENSION ORAL 2 TIMES DAILY
Qty: 280 ML | Refills: 0 | Status: SHIPPED | OUTPATIENT
Start: 2023-06-16 | End: 2023-06-23

## 2023-06-16 ASSESSMENT — PAIN SCALES - GENERAL: PAINLEVEL: NO PAIN (0)

## 2023-06-16 NOTE — PROGRESS NOTES
"  Assessment & Plan     Acute cystitis with hematuria  UA is positive for UTI still.  Treating with Bactrim suspension for 1 week.  Reviewed last kidney labs that are normal.  Plan to recheck urine in 1 week.  Follow-up pending that lab.  Advised to use probiotic to reduce gastric symptoms.  - UA Macroscopic with reflex to Microscopic and Culture  - UA Microscopic with Reflex to Culture  - Urine Culture  - sulfamethoxazole-trimethoprim (BACTRIM/SEPTRA) 8 mg/mL suspension; Take 20 mLs (160 mg) by mouth 2 times daily for 7 days  - UA with Microscopic reflex to Culture - lab collect; Future    See Patient Instructions    Alisa Stevenson NP  [facility] JOSH Li is a 87 year old, presenting for the following health issues:  UTI        6/16/2023    11:13 AM   Additional Questions   Roomed by rmb   Accompanied by daughter         6/16/2023    11:13 AM   Patient Reported Additional Medications   Patient reports taking the following new medications none     History of Present Illness       Reason for visit:  Bleeding & urinary issues    She eats 2-3 servings of fruits and vegetables daily.She consumes 2 sweetened beverage(s) daily.She exercises with enough effort to increase her heart rate 9 or less minutes per day.  She exercises with enough effort to increase her heart rate 3 or less days per week.   She is taking medications regularly.    Still has some confusion per daughter.  Needs to clear UTI prior to having D&C surgery.  Patient has finished cefdinir ordered by ER but had diarrhea and nausea during treatment which has resolved off of treatment.    Review of Systems   CONSTITUTIONAL: NEGATIVE for fever, chills, change in weight  : normal menstrual cycles and recent UTI      Objective    BP (!) 162/88   Pulse 85   Temp 97  F (36.1  C) (Tympanic)   Resp 16   Ht 1.638 m (5' 4.5\")   Wt 88 kg (194 lb)   LMP  (LMP Unknown)   SpO2 96%   BMI 32.79 kg/m    Body mass index is " 32.79 kg/m .  Physical Exam   GENERAL: healthy, alert and no distress   (female): recent UTI with treatment with cefdinir, here to check if this is resolved.  PSYCH: mentation appears normal, affect normal/bright    Results for orders placed or performed in visit on 06/16/23 (from the past 24 hour(s))   UA Macroscopic with reflex to Microscopic and Culture    Specimen: Urine, Midstream   Result Value Ref Range    Color Urine Yellow Colorless, Straw, Light Yellow, Yellow    Appearance Urine Clear Clear    Glucose Urine Negative Negative mg/dL    Bilirubin Urine Negative Negative    Ketones Urine Negative Negative mg/dL    Specific Gravity Urine >=1.030 1.003 - 1.035    Blood Urine Large (A) Negative    pH Urine 6.0 5.0 - 7.0    Protein Albumin Urine Trace (A) Negative mg/dL    Urobilinogen Urine 0.2 0.2, 1.0 E.U./dL    Nitrite Urine Negative Negative    Leukocyte Esterase Urine Small (A) Negative   UA Microscopic with Reflex to Culture   Result Value Ref Range    Bacteria Urine Few (A) None Seen /HPF    RBC Urine 10-25 (A) 0-2 /HPF /HPF    WBC Urine 10-25 (A) 0-5 /HPF /HPF    Squamous Epithelials Urine Few (A) None Seen /LPF    Mucus Urine Present (A) None Seen /LPF

## 2023-06-16 NOTE — PATIENT INSTRUCTIONS
Take antibiotic as directed.  Take Align or Culturelle once daily with treatment.  Follow-up in 1 week with lab appointment for recheck urine.

## 2023-06-17 LAB — BACTERIA UR CULT: NORMAL

## 2023-06-19 ENCOUNTER — TELEPHONE (OUTPATIENT)
Dept: ONCOLOGY | Facility: CLINIC | Age: 88
End: 2023-06-19
Payer: MEDICARE

## 2023-06-19 NOTE — TELEPHONE ENCOUNTER
Called patient, spoke with daughter Marsha to schedule surgery with: Dr. Au     Surgery Date: 7/19/23     Location: [facility]    H&P: to be completed by PAC clinic on 7/12/23, virtual     Post-op: tbd    Patient will receive surgery arrival and start time from PAC.    Patient aware times are subject to change up until day before surgery.     Patient questions/concerns: N/A     Surgery packet was provided.       Maria Fernanda Manning on 6/19/2023 at 2:49 PM

## 2023-06-20 NOTE — TELEPHONE ENCOUNTER
FUTURE VISIT INFORMATION      SURGERY INFORMATION:    Date: 7/19/23    Location: uu or    Surgeon:  Amanda Au MD    Anesthesia Type:  General    Procedure: Pelvic examination under anesthesia, dilation and curettage uterus, ultrasound guidance    Consult: ov 6/7/23    RECORDS REQUESTED FROM:       Primary Care Provider: [facility]    Pertinent Medical History: hypertension

## 2023-06-23 ENCOUNTER — LAB (OUTPATIENT)
Dept: LAB | Facility: CLINIC | Age: 88
End: 2023-06-23
Payer: MEDICARE

## 2023-06-23 DIAGNOSIS — N30.01 ACUTE CYSTITIS WITH HEMATURIA: ICD-10-CM

## 2023-06-23 LAB
ALBUMIN UR-MCNC: 30 MG/DL
APPEARANCE UR: ABNORMAL
BACTERIA #/AREA URNS HPF: ABNORMAL /HPF
BILIRUB UR QL STRIP: NEGATIVE
COLOR UR AUTO: YELLOW
GLUCOSE UR STRIP-MCNC: NEGATIVE MG/DL
HGB UR QL STRIP: ABNORMAL
KETONES UR STRIP-MCNC: NEGATIVE MG/DL
LEUKOCYTE ESTERASE UR QL STRIP: ABNORMAL
MUCOUS THREADS #/AREA URNS LPF: PRESENT /LPF
NITRATE UR QL: NEGATIVE
PH UR STRIP: 5.5 [PH] (ref 5–7)
RBC #/AREA URNS AUTO: ABNORMAL /HPF
SP GR UR STRIP: >=1.03 (ref 1–1.03)
SQUAMOUS #/AREA URNS AUTO: ABNORMAL /LPF
UROBILINOGEN UR STRIP-ACNC: 0.2 E.U./DL
WBC #/AREA URNS AUTO: ABNORMAL /HPF

## 2023-06-23 PROCEDURE — 87086 URINE CULTURE/COLONY COUNT: CPT

## 2023-06-23 PROCEDURE — 81001 URINALYSIS AUTO W/SCOPE: CPT

## 2023-06-26 LAB — BACTERIA UR CULT: NORMAL

## 2023-07-06 ENCOUNTER — HOSPITAL ENCOUNTER (EMERGENCY)
Facility: CLINIC | Age: 88
Discharge: HOME | End: 2023-07-06
Attending: EMERGENCY MEDICINE | Admitting: EMERGENCY MEDICINE
Payer: MEDICARE

## 2023-07-06 VITALS
WEIGHT: 190 LBS | TEMPERATURE: 97.8 F | HEIGHT: 65 IN | RESPIRATION RATE: 18 BRPM | SYSTOLIC BLOOD PRESSURE: 195 MMHG | HEART RATE: 76 BPM | DIASTOLIC BLOOD PRESSURE: 107 MMHG | OXYGEN SATURATION: 96 % | BODY MASS INDEX: 31.65 KG/M2

## 2023-07-06 DIAGNOSIS — N93.9 VAGINAL BLEEDING: ICD-10-CM

## 2023-07-06 LAB
ANION GAP SERPL CALCULATED.3IONS-SCNC: 12 MMOL/L (ref 7–15)
BASOPHILS # BLD AUTO: 0.1 10E3/UL (ref 0–0.2)
BASOPHILS NFR BLD AUTO: 1 %
BUN SERPL-MCNC: 18.2 MG/DL (ref 8–23)
CALCIUM SERPL-MCNC: 9.3 MG/DL (ref 8.8–10.2)
CHLORIDE SERPL-SCNC: 106 MMOL/L (ref 98–107)
CREAT SERPL-MCNC: 0.81 MG/DL (ref 0.51–0.95)
DEPRECATED HCO3 PLAS-SCNC: 24 MMOL/L (ref 22–29)
EOSINOPHIL # BLD AUTO: 0.3 10E3/UL (ref 0–0.7)
EOSINOPHIL NFR BLD AUTO: 3 %
ERYTHROCYTE [DISTWIDTH] IN BLOOD BY AUTOMATED COUNT: 12.6 % (ref 10–15)
GFR SERPL CREATININE-BSD FRML MDRD: 71 ML/MIN/1.73M2
GLUCOSE SERPL-MCNC: 112 MG/DL (ref 70–99)
HCT VFR BLD AUTO: 42.4 % (ref 35–47)
HGB BLD-MCNC: 14.3 G/DL (ref 11.7–15.7)
IMM GRANULOCYTES # BLD: 0 10E3/UL
IMM GRANULOCYTES NFR BLD: 0 %
LYMPHOCYTES # BLD AUTO: 2.1 10E3/UL (ref 0.8–5.3)
LYMPHOCYTES NFR BLD AUTO: 21 %
MCH RBC QN AUTO: 30.8 PG (ref 26.5–33)
MCHC RBC AUTO-ENTMCNC: 33.7 G/DL (ref 31.5–36.5)
MCV RBC AUTO: 91 FL (ref 78–100)
MONOCYTES # BLD AUTO: 0.8 10E3/UL (ref 0–1.3)
MONOCYTES NFR BLD AUTO: 8 %
NEUTROPHILS # BLD AUTO: 6.6 10E3/UL (ref 1.6–8.3)
NEUTROPHILS NFR BLD AUTO: 67 %
NRBC # BLD AUTO: 0 10E3/UL
NRBC BLD AUTO-RTO: 0 /100
PLATELET # BLD AUTO: 292 10E3/UL (ref 150–450)
POTASSIUM SERPL-SCNC: 4.5 MMOL/L (ref 3.4–5.3)
RBC # BLD AUTO: 4.65 10E6/UL (ref 3.8–5.2)
SODIUM SERPL-SCNC: 142 MMOL/L (ref 136–145)
WBC # BLD AUTO: 9.8 10E3/UL (ref 4–11)

## 2023-07-06 PROCEDURE — 99283 EMERGENCY DEPT VISIT LOW MDM: CPT | Performed by: EMERGENCY MEDICINE

## 2023-07-06 PROCEDURE — 36415 COLL VENOUS BLD VENIPUNCTURE: CPT | Performed by: EMERGENCY MEDICINE

## 2023-07-06 PROCEDURE — 85025 COMPLETE CBC W/AUTO DIFF WBC: CPT | Performed by: EMERGENCY MEDICINE

## 2023-07-06 PROCEDURE — 99284 EMERGENCY DEPT VISIT MOD MDM: CPT | Performed by: EMERGENCY MEDICINE

## 2023-07-06 PROCEDURE — 80048 BASIC METABOLIC PNL TOTAL CA: CPT | Performed by: EMERGENCY MEDICINE

## 2023-07-06 ASSESSMENT — ENCOUNTER SYMPTOMS
SHORTNESS OF BREATH: 0
NAUSEA: 0
VOMITING: 0
UNEXPECTED WEIGHT CHANGE: 1
COUGH: 0
APPETITE CHANGE: 0
DYSURIA: 0
CHEST TIGHTNESS: 0
LIGHT-HEADEDNESS: 0
ABDOMINAL PAIN: 0
FEVER: 0
FATIGUE: 0
CHILLS: 0
BLOOD IN STOOL: 0
HEMATURIA: 0

## 2023-07-07 ENCOUNTER — PATIENT OUTREACH (OUTPATIENT)
Dept: ONCOLOGY | Facility: CLINIC | Age: 88
End: 2023-07-07
Payer: MEDICARE

## 2023-07-07 NOTE — ED PROVIDER NOTES
History     Chief Complaint   Patient presents with     Vaginal Bleeding     HPI  Jen Layne is a 87 year old female with history of hypertension and hyperlipidemia presenting for evaluation of vaginal bleeding.  Has been having spotting for a few months but had more heavy bleeding today prompting evaluation.  Was seen June 5 and had an MRI and ultrasound showing evidence of a uterine mass.  She has scheduled follow-up  with GYN surgery on the 12th for preop visit and on the 19th for a planned surgical evaluation.  Patient reports she has been otherwise feeling well other than increased bleeding today.  Denies any chest pain, difficulty breathing, lightheadedness, or dizziness.  Normal appetite.  Normal bowel movements.  No dysuria, urgency, or frequency.  Patient reports her current bleeding today has been similar to her menses in her past.  Patient feels that while being in the ED, her bleeding seems to have subsided.  She is on aspirin 81 mg daily preventatively.  No diagnosed history of heart disease.      ==================================================================    CHART REVIEW:    MRI pelvis 6/5/23  IMPRESSION:  1.  Note that this study is overall moderately limited due to motion artifact.  2.  Apparent mildly irregular appearance of the endometrium, which is not well evaluated on this study. This could relate to endometrial carcinoma, blood products, or be artifactual. An endometrial biopsy could be considered for further evaluation.  3.  Suboptimal visualization of a 2.5 x 1.6 cm structure that has probable heterogeneous enhancement in the anterior right aspect of the uterus, corresponding to where there was an abnormal structure on the comparison CT scan dated 6/8/2022. This is   nonspecific, but possibilities include a submucosal fibroid and endometrial carcinoma. There are also likely a few subcentimeter fibroids scattered within the uterus.  4.  The ovaries are not visualized.  5.  A  trace amount of nonspecific free fluid in the pelvis.    US Pelvis 6/5/23:  IMPRESSION:  1.  Thickened, heterogeneous, and hypervascular endometrium, which is abnormal in the setting of postmenopausal bleeding and concerning for endometrial carcinoma. Tissue sampling recommended.     2.  Small submucosal fibroid.     3.  Normal ovaries.    END CHART REVIEW  ==================================================================      Allergies:  Allergies   Allergen Reactions     Hydrochlorothiazide      Triggered two gout attacks, no further problmes since discontinuing drug       Problem List:    Patient Active Problem List    Diagnosis Date Noted     Morbid obesity (H) 08/14/2018     Priority: Medium     Impaired fasting glucose 12/16/2011     Priority: Medium     Metabolic syndrome 12/16/2011     Priority: Medium     Advance Care Planning 12/15/2011     Priority: Medium     Advance Care Planning 12/6/2016: Receipt of ACP document:  Received: Health Care Directive which was witnessed or notarized on 8-16-16.  Document previously scanned on 10-4-16.  Validation form completed and sent to be scanned.  Code Status needs to be updated to reflect choices in most recent ACP document. Confirmed/documented designated decision maker(s).  Added by Hansa Mcguire RN Advance Care Planning Liaison with [employer]  Patient does not have an Advance/Health Care Directive (HCD), given packet. Tamara Alcazar December 15, 2011    Patient has completed an Advance/Health Care Directive (HCD), scanned into [employer] tab, entry date of 12/6/16.    Denisha Purvis  April 19, 2018         CKD (chronic kidney disease) stage 2, GFR 60-89 ml/min 11/16/2009     Priority: Medium     GFR in 50s 2008, 2009 -- diagnosed with CKD stage 3  Reduction likely due to obesity, hypertension, increasing age  Labs improved, eGFR >60 March 2011March 2011, December 2011 -- diagnosis of stage 3 changed  Component      Latest Ref Rng 12/9/2010 3/13/2011 12/15/2011  3/21/2013   GFR Estimate      >60 mL/min/1.7m2 53 (L) 61 63 75     Component      Latest Ref Rng 3/31/2014   GFR Estimate      >60 mL/min/1.7m2 69     Problem list name updated by automated process. Provider to review       Obesity (BMI 30-39.9) 10/30/2008     Priority: Medium     (Problem list name updated by automated process. Provider to review and confirm.)       Essential hypertension 07/10/2008     Priority: Medium     Hyperlipidemia LDL goal <100 07/10/2008     Priority: Medium        Past Medical History:    Past Medical History:   Diagnosis Date     Arthritis      Gout 3/14/2011     Hypertension        Past Surgical History:    Past Surgical History:   Procedure Laterality Date     COLONOSCOPY  05    normal, recheck 10 years -- done at [facility] EYE  2018    Capsulotomy Left eye.   Dr. Skip Villela     EYE SURGERY       [facility] REMOVAL GALLBLADDER       HYSTEROSCOPY,DIAGNOSTIC  around        Family History:    Family History   Problem Relation Age of Onset     Cancer Sister         uterine and ovarian-in remission      Other Cancer Sister      Heart Disease Brother         valve replacement     Lipids Brother      Coronary Artery Disease Brother      Eye Disorder Mother         glaucoma     Heart Disease Mother          of CHF     Eye Disorder Brother         detached retina     Cancer Son         SKIN     Cancer Paternal Grandfather         carcinoma of the lip, pipe-smoker     Thyroid Disease Daughter         goiter or nodule?     Other Cancer Son      Other Cancer Other      Thyroid Disease Other      Diabetes No family hx of      C.A.D. No family hx of      Breast Cancer No family hx of      Cancer - colorectal No family hx of        Social History:  Marital Status:   [5]  Social History     Tobacco Use     Smoking status: Never     Smokeless tobacco: Never   Vaping Use     Vaping Use: Never used   Substance Use Topics     Alcohol use: No     Drug use: No     "    Medications:    lisinopril (ZESTRIL) 20 MG tablet  simvastatin (ZOCOR) 40 MG tablet  VITAMIN D 1000 UNIT OR CAPS          Review of Systems   Constitutional: Positive for unexpected weight change. Negative for appetite change, chills, fatigue and fever.   HENT: Negative for congestion.    Respiratory: Negative for cough, chest tightness and shortness of breath.    Cardiovascular: Negative for chest pain.   Gastrointestinal: Negative for abdominal pain, blood in stool, nausea and vomiting.   Genitourinary: Positive for vaginal bleeding. Negative for decreased urine volume, dysuria, hematuria, pelvic pain and vaginal discharge.   Neurological: Negative for light-headedness.   All other systems reviewed and are negative.      Physical Exam   BP: (!) 175/114  Pulse: 80  Temp: 97.8  F (36.6  C)  Resp: 18  Height: 165.1 cm (5' 5\")  Weight: 86.2 kg (190 lb)  SpO2: 97 %      Physical Exam  Vitals and nursing note reviewed.   Constitutional:       Appearance: Normal appearance. She is obese. She is not ill-appearing or diaphoretic.   HENT:      Head: Atraumatic.      Mouth/Throat:      Mouth: Mucous membranes are moist.   Eyes:      Conjunctiva/sclera: Conjunctivae normal.   Cardiovascular:      Rate and Rhythm: Normal rate.      Pulses: Normal pulses.   Pulmonary:      Effort: Pulmonary effort is normal.   Abdominal:      Palpations: Abdomen is soft.      Tenderness: There is no abdominal tenderness.   Skin:     General: Skin is warm and dry.      Capillary Refill: Capillary refill takes less than 2 seconds.   Neurological:      Mental Status: She is alert and oriented to person, place, and time.   Psychiatric:         Mood and Affect: Mood normal.         ED Course                 Procedures                Results for orders placed or performed during the hospital encounter of 07/06/23 (from the past 24 hour(s))   CBC with platelets, differential    Narrative    The following orders were created for panel order CBC " with platelets, differential.  Procedure                               Abnormality         Status                     ---------                               -----------         ------                     CBC with platelets and d...[[id number]]                      Final result                 Please view results for these tests on the individual orders.   Basic metabolic panel   Result Value Ref Range    Sodium 142 136 - 145 mmol/L    Potassium 4.5 3.4 - 5.3 mmol/L    Chloride 106 98 - 107 mmol/L    Carbon Dioxide (CO2) 24 22 - 29 mmol/L    Anion Gap 12 7 - 15 mmol/L    Urea Nitrogen 18.2 8.0 - 23.0 mg/dL    Creatinine 0.81 0.51 - 0.95 mg/dL    Calcium 9.3 8.8 - 10.2 mg/dL    Glucose 112 (H) 70 - 99 mg/dL    GFR Estimate 71 >60 mL/min/1.73m2   CBC with platelets and differential   Result Value Ref Range    WBC Count 9.8 4.0 - 11.0 10e3/uL    RBC Count 4.65 3.80 - 5.20 10e6/uL    Hemoglobin 14.3 11.7 - 15.7 g/dL    Hematocrit 42.4 35.0 - 47.0 %    MCV 91 78 - 100 fL    MCH 30.8 26.5 - 33.0 pg    MCHC 33.7 31.5 - 36.5 g/dL    RDW 12.6 10.0 - 15.0 %    Platelet Count 292 150 - 450 10e3/uL    % Neutrophils 67 %    % Lymphocytes 21 %    % Monocytes 8 %    % Eosinophils 3 %    % Basophils 1 %    % Immature Granulocytes 0 %    NRBCs per 100 WBC 0 <1 /100    Absolute Neutrophils 6.6 1.6 - 8.3 10e3/uL    Absolute Lymphocytes 2.1 0.8 - 5.3 10e3/uL    Absolute Monocytes 0.8 0.0 - 1.3 10e3/uL    Absolute Eosinophils 0.3 0.0 - 0.7 10e3/uL    Absolute Basophils 0.1 0.0 - 0.2 10e3/uL    Absolute Immature Granulocytes 0.0 <=0.4 10e3/uL    Absolute NRBCs 0.0 10e3/uL       Medications - No data to display    Assessments & Plan (with Medical Decision Making)  Well-appearing 87-year-old presenting for evaluation of painless vaginal bleeding.  Has been having spotting for several months and had a work-up in early June showing a uterine mass.  She has scheduled follow-up with surgery for further evaluation of this next week.  Today her  bleeding was somewhat heavier, transitioning from spotting to more heavy bleeding similar to her previous menses.  Not have any other systemic symptoms.  Hemoglobin not significantly changed from baseline.  Patient provided reassurance and encouraged that the neck step of evaluation will be surgical follow-up as scheduled.  Encouraged to maintain hydration and good dietary intake of iron.  Also suggested holding her aspirin as this will likely help reduce her bleeding.  Discussed that they can call her surgeon tomorrow to see if an earlier follow-up may be available.     I have reviewed the nursing notes.    I have reviewed the findings, diagnosis, plan and need for follow up with the patient.               Current Discharge Medication List          Final diagnoses:   Vaginal bleeding       7/6/2023   [facility] EMERGENCY DEPT     Mena, Christopher Tran MD  07/06/23 [id number]

## 2023-07-07 NOTE — PROGRESS NOTES
"Patients daughter reached out as patient was in the ED for vaginal bleeding yesterday     Patient needed to change a  \"Thick old fashioned pad\" Q hour from 1-6 pm. Patient stated this was a heavy heavy bleeding type period    Patient called her kids and stated that she needed to go in and they brought her to the ED. ED staff instructed family to reach out to Gyn/Onc team with update and see if surgery could be potentially be moved up     Bleeding has improved and is back to spotting. No heavy bleeding    Denies any signs and symptoms of concern     Labs reviewed    RN and daughter reviewed when to return to the ED versus calling in     Contact information for weekend coverage will be sent via [employer]     Message sent to MD     Daughter very appreciative of RN time     Pily Hines RN        "

## 2023-07-07 NOTE — ED TRIAGE NOTES
Pt presents with vaginal bleeding. Pt states she has had spotting for months and scheduled for a DnC on July 19th. Pt states bleeding has increased. Per pt she has gone through 6 pads in last 6 hrs.        Triage Assessment     Row Name 07/06/23 1940       Triage Assessment (Adult)    Airway WDL WDL       Respiratory WDL    Respiratory WDL WDL       Skin Circulation/Temperature WDL    Skin Circulation/Temperature WDL WDL       Cardiac WDL    Cardiac WDL WDL       Peripheral/Neurovascular WDL    Peripheral Neurovascular WDL WDL       Cognitive/Neuro/Behavioral WDL    Cognitive/Neuro/Behavioral WDL WDL

## 2023-07-12 ENCOUNTER — ANESTHESIA EVENT (OUTPATIENT)
Dept: SURGERY | Facility: CLINIC | Age: 88
End: 2023-07-12
Payer: MEDICARE

## 2023-07-12 ENCOUNTER — PRE VISIT (OUTPATIENT)
Dept: SURGERY | Facility: CLINIC | Age: 88
End: 2023-07-12

## 2023-07-12 ENCOUNTER — VIRTUAL VISIT (OUTPATIENT)
Dept: SURGERY | Facility: CLINIC | Age: 88
End: 2023-07-12
Payer: MEDICARE

## 2023-07-12 DIAGNOSIS — Z01.818 PREOP EXAMINATION: Primary | ICD-10-CM

## 2023-07-12 PROCEDURE — 99203 OFFICE O/P NEW LOW 30 MIN: CPT | Mod: 95 | Performed by: PHYSICIAN ASSISTANT

## 2023-07-12 ASSESSMENT — ENCOUNTER SYMPTOMS: SEIZURES: 0

## 2023-07-12 ASSESSMENT — LIFESTYLE VARIABLES: TOBACCO_USE: 0

## 2023-07-12 NOTE — PATIENT INSTRUCTIONS
Preparing for Your Surgery      Name:  Jen Layne   MRN:  [id number]   :  1935   Today's Date:  2023       Arriving for surgery:  Surgery date:  23  Arrival time:  10:45 am    Please come to:     Please come to:      [facility] [address]    [address] [place]  [address]      The [facility] Patient /Visitor Ramp is located at [address]. Patients and visitors who self-park will receive the reduced hospital parking rate. If the Patient /Visitor Ramp is full, please follow the signs to the  parking located at the main hospital entrance.     parking is available ( 24 hours/ 7 days a week)    Discounted parking pass options are available for patients and visitors. They can be purchased at the [employer] desk at the [facility] hospital entrance.    -    Stop at the security desk and they will direct surgery patients to the 3rd floor Surgery Waiting Room - . [phone]     -  If you are in need of directions, wheelchair or escort please stop at the Information/security desk in the lobby.        What can I eat or drink?  -  You may eat and drink normally up to 8 hours prior to arrival time. (Until 2:45 am)  -  You may have clear liquids until 2 hours prior to arrival time. (Until 8:45 am)    Examples of clear liquids:  Water  Clear broth  Juices (apple, white grape, white cranberry  and cider) without pulp  Noncarbonated, powder based beverages  (lemonade and Daniel-Aid)  Sodas (Sprite, 7-Up, ginger ale and seltzer)  Coffee or tea (without milk or cream)  Gatorade    -  No Alcohol or cannabis products for at least 24 hours before surgery.     Which medicines can I take?    Hold Aspirin for 7 days before surgery.   Hold Multivitamins for 7 days before surgery.  Hold Supplements for 7 days before surgery.  Hold Ibuprofen (Advil, Motrin) for 1 day(s) before surgery--unless otherwise directed by surgeon.  Hold  Naproxen (Aleve) for 4 days before surgery.    -  DO NOT take these medications the day of surgery:  Lisinopril (if this is normally taken in the evening, you may take it the evening before surgery - take at least 12 hours before surgery time)  Vitamin D    -  PLEASE TAKE these medications the night before or the day of surgery per your usual routine:  Simvastatin (Zocor)    How do I prepare myself?  - Please take 2 showers (one the night prior to surgery and one the morning of surgery) using Scrubcare or Hibiclens soap.    Use this soap only from the neck to your toes.     Leave the soap on your skin for one minute--then rinse thoroughly.      You may use your own shampoo and conditioner. No other hair products.   - Please remove all jewelry and body piercings.  - No lotions, deodorants or fragrance.  - No makeup or fingernail polish.   - Bring your ID and insurance card.      ALL PATIENTS GOING HOME THE SAME DAY OF SURGERY ARE REQUIRED TO HAVE A RESPONSIBLE ADULT TO DRIVE AND BE IN ATTENDANCE WITH THEM FOR 24 HOURS FOLLOWING SURGERY.    Covid testing policy as of 12/06/2022  Your surgeon will notify and schedule you for a COVID test if one is needed before surgery--please direct any questions or COVID symptoms to your surgeon      Questions or Concerns:    - For any questions regarding the day of surgery or your hospital stay, please contact the Pre Admission Nursing Office at [phone].       - If you have health changes between today and your surgery, please call your surgeon.       - For questions after surgery, please call your surgeons office.           Current Visitor Guidelines    You may have 2 visitors in the pre op area.    Visiting hours: 8 a.m. to 8:30 p.m.    You may have four visitors during your inpatient hospital stay.    Patients confirmed or suspected to have symptoms of COVID 19 or flu:     No visitors allowed for adult patients.   Children (under age 18) can have 1 named visitor.     People  who are sick or showing symptoms of COVID 19 or flu:    Are not allowed to visit patients--we can only make exceptions in special situations.       Please follow these guidelines for your visit:          Please maintain social distance          Masking is optional--however at times you may be asked to wear a mask for the safety of yourself and others     Clean your hands with alcohol hand . Do this when you arrive at and leave the building and patient room,    And again after you touch your mask or anything in the room.     Go directly to and from the room you are visiting.     Stay in the patient s room during your visit. Limit going to other places in the hospital as much as possible     Leave bags and jackets at home or in the car.     For everyone s health, please don t come and go during your visit. That includes for smoking   during your visit.

## 2023-07-12 NOTE — H&P
Pre-Operative H & P     CC:  Preoperative exam to assess for increased cardiopulmonary risk while undergoing surgery and anesthesia.    Date of Encounter: 7/12/2023  Primary Care Physician:  Yamile Cotter Roverto is a 87 year old female who presents for pre-operative H & P in preparation for  Procedure Information     Case: [id number] Date/Time: 07/19/23 1245    Procedure: Pelvic examination under anesthesia, dilation and curettage uterus, ultrasound guidance (Abdomen)    Anesthesia type: General    Diagnosis: Thickened endometrium [R93.89]    Pre-op diagnosis: Thickened endometrium [R93.89]    Location:  OR  /  OR    Providers: Amanda Au MD          Patient is being evaluated for comorbid conditions of HTN, obesity, arthritis,     Ms. Layne has a history of postmenopausal bleeding in the setting a a thickened endometrium. She now presents for the above procedure.    History is obtained from the patient and chart review. She is accompanied by her two daughters,    Hx of abnormal bleeding or anti-platelet use: denies    Menstrual history: No LMP recorded (lmp unknown). Patient is postmenopausal.:       Past Medical History  Past Medical History:   Diagnosis Date     Arthritis      Gout 03/14/2011    Triggered by hydrochlorothiazide       Hypertension      Obesity      Thickened endometrium        Past Surgical History  Past Surgical History:   Procedure Laterality Date     COLONOSCOPY  12/05/05    normal, recheck 10 years -- done at [facility] EYE  03/30/2018    Capsulotomy Left eye.   Dr. Skip Villela     EYE SURGERY  2015     [facility] REMOVAL GALLBLADDER       HYSTEROSCOPY,DIAGNOSTIC  around 1998       Prior to Admission Medications  Current Outpatient Medications   Medication Sig Dispense Refill     lisinopril (ZESTRIL) 20 MG tablet Take 1 tablet (20 mg) by mouth daily (Patient taking differently: Take 20 mg by mouth every evening) 90 tablet 0     simvastatin (ZOCOR) 40  MG tablet Take 1 tablet (40 mg) by mouth At Bedtime 90 tablet 4     VITAMIN D 1000 UNIT OR CAPS Take 1 capsule by mouth every morning         Allergies  Allergies   Allergen Reactions     Hydrochlorothiazide      Triggered two gout attacks, no further problmes since discontinuing drug       Social History  Social History     Socioeconomic History     Marital status:      Spouse name: Not on file     Number of children: Not on file     Years of education: Not on file     Highest education level: Not on file   Occupational History     Not on file   Tobacco Use     Smoking status: Never     Passive exposure: Never     Smokeless tobacco: Never   Vaping Use     Vaping Use: Never used   Substance and Sexual Activity     Alcohol use: No     Drug use: No     Sexual activity: Never   Other Topics Concern     Parent/sibling w/ CABG, MI or angioplasty before 65F 55M? No   Social History Narrative     --- 4 children, 11 grandchildren, 3 great-grandchildren     Social Determinants of Health     Financial Resource Strain: Not on file   Food Insecurity: Not on file   Transportation Needs: Not on file   Physical Activity: Not on file   Stress: Not on file   Social Connections: Not on file   Intimate Partner Violence: Not on file   Housing Stability: Not on file       Family History  Family History   Problem Relation Age of Onset     Eye Disorder Mother         glaucoma     Heart Disease Mother          of CHF     Cancer Sister         uterine and ovarian-in remission      Other Cancer Sister      Heart Disease Brother         valve replacement     Lipids Brother      Coronary Artery Disease Brother      Eye Disorder Brother         detached retina     Cancer Paternal Grandfather         carcinoma of the lip, pipe-smoker     Thyroid Disease Daughter         goiter or nodule?     Cancer Son         SKIN     Other Cancer Son      Other Cancer Other      Thyroid Disease Other      Diabetes No family hx of      C.A.D.  No family hx of      Breast Cancer No family hx of      Cancer - colorectal No family hx of      Anesthesia Reaction No family hx of      Deep Vein Thrombosis (DVT) No family hx of        Review of Systems  The complete review of systems is negative other than noted in the HPI or here.     Anesthesia Evaluation   Pt has had prior anesthetic.     No history of anesthetic complications       ROS/MED HX  ENT/Pulmonary:  - neg pulmonary ROS  (-) tobacco use, asthma and sleep apnea   Neurologic:  - neg neurologic ROS  (-) no seizures and no CVA   Cardiovascular:     (+) hypertension-----    METS/Exercise Tolerance: 3 - Able to walk 1-2 blocks without stopping Comment: Able to ascend stairs and walk > 2 blocks. Endorses fatigue, denies CP. Does own shopping.   Hematologic:  - neg hematologic  ROS  (-) history of blood clots and history of blood transfusion   Musculoskeletal:   (+) arthritis (feet),     GI/Hepatic:  - neg GI/hepatic ROS  (-) GERD and liver disease   Renal/Genitourinary:  - neg Renal ROS  (-) renal disease   Endo:     (+) Obesity,  (-) Type II DM   Psychiatric/Substance Use:  - neg psychiatric ROS     Infectious Disease:  - neg infectious disease ROS     Malignancy:  - neg malignancy ROS     Other:  - neg other ROS          Virtual visit -  No vitals were obtained    Physical Exam  Constitutional: Awake, alert, cooperative, no apparent distress, and appears stated age.  HENT: Normocephalic  Respiratory: non labored breathing   Neurologic: Awake, alert, oriented to name, place and time.   Neuropsychiatric: Calm, cooperative. Normal affect.      Prior Labs/Diagnostic Studies   All labs and imaging personally reviewed       The patient's records and results personally reviewed by this provider.       Assessment      Jen Layne is a 87 year old female seen as a PAC referral for risk assessment and optimization for anesthesia.    Plan/Recommendations  Pt will be optimized for the proposed procedure.  See  "below for details on the assessment, risk, and preoperative recommendations    NEUROLOGY  - No history of TIA, CVA or seizure    -Post Op delirium risk factors:  Age    ENT  - No current airway concerns.  Will need to be reassessed day of surgery.  Mallampati: Unable to assess  TM: Unable to assess    CARDIAC  - HTN, will hold lisinopril DOS  - METS (Metabolic Equivalents)  Able to ascend stairs and walk > 2 blocks. Endorses fatigue, denies CP. Does own shopping.    Patient CANNOT perform 4 METS exercise without symptoms            Total Score: 1    Functional Capacity: Unable to complete 4 METS      RCRI-Very low risk: Class 1 0.4% complication rate            Total Score: 0        PULMONARY  MONAE Low Risk            Total Score: 2    MONAE: Hypertension    MONAE: Over 50 ys old      - Denies asthma or inhaler use  - Tobacco History      History   Smoking Status     Never   Smokeless Tobacco     Never       GI  - Denies GERD  PONV Medium Risk  Total Score: 2           1 AN PONV: Pt is Female    1 AN PONV: Patient is not a current smoker          ENDOCRINE    - BMI: Estimated body mass index is 31.62 kg/m  as calculated from the following:    Height as of 7/6/23: 1.651 m (5' 5\").    Weight as of 7/6/23: 86.2 kg (190 lb).  Obesity (BMI >30)  - No history of Diabetes Mellitus    HEME  VTE Low Risk 0.26%            Total Score: 1    VTE: Greater than 59 yrs old      - No history of abnormal bleeding or antiplatelet use.      The patient is aware that the final anesthesia plan will be decided by the assigned anesthesia provider on the date of service.    The patient is optimized for their procedure. AVS with information on surgery time/arrival time, meds and NPO status given by nursing staff. No further diagnostic testing indicated.    Please refer to the physical examination documented by the anesthesiologist in the anesthesia record on the day of surgery.    Video-Visit Details    Type of service:  Video Visit    Provider " received verbal consent for a Video Visit from the patient? Yes   Video Start Time: 0923  Video End Time:0934    Originating Location (pt. Location): Home    Distant Location (provider location):  Off-site  Mode of Communication:  Video Conference via NoteWagon  On the day of service:     Prep time: 14 minutes  Visit time: 11 minutes  Documentation time: 12 minutes  ------------------------------------------  Total time: 37 minutes      Izabella Ribeiro PA-C  Preoperative Assessment Center  [facility]  [facility]  Phone: [phone]  Fax: [phone]

## 2023-07-12 NOTE — PROGRESS NOTES
Jen is a 87 year old who is being evaluated via a billable video visit.      How would you like to obtain your AVS? Diana Springer   Jen is a 87 year old, presenting for the following health issues:  Pre-Op Exam (/)    HPI           Review of Systems       Physical Exam     ALBIN Velazco LPN

## 2023-07-14 ENCOUNTER — PATIENT OUTREACH (OUTPATIENT)
Dept: ONCOLOGY | Facility: CLINIC | Age: 88
End: 2023-07-14
Payer: MEDICARE

## 2023-07-14 NOTE — PROGRESS NOTES
RN reached out to patient for a check in prior to surgery     Patient had specific questions on what the D&C was and its full purpose     RN was able to review this with patient     RN and patient reviewed what to expect after surgery and when to call clinic vs seeking care at ED     Patient very appreciative of the call    Pily Hines RN

## 2023-07-16 DIAGNOSIS — E78.5 HYPERLIPIDEMIA LDL GOAL <100: ICD-10-CM

## 2023-07-17 RX ORDER — SIMVASTATIN 40 MG
40 TABLET ORAL AT BEDTIME
Qty: 90 TABLET | Refills: 0 | Status: SHIPPED | OUTPATIENT
Start: 2023-07-17 | End: 2023-09-01

## 2023-07-17 ASSESSMENT — ENCOUNTER SYMPTOMS: SEIZURES: 0

## 2023-07-17 ASSESSMENT — LIFESTYLE VARIABLES: TOBACCO_USE: 0

## 2023-07-17 NOTE — ANESTHESIA PREPROCEDURE EVALUATION
Pre-Operative H & P     CC:  Preoperative exam to assess for increased cardiopulmonary risk while undergoing surgery and anesthesia.    Date of Encounter: 7/12/2023  Primary Care Physician:  Yamile Cotter Roverto is a 87 year old female who presents for pre-operative H & P in preparation for  Procedure Information     Case: [id number] Date/Time: 07/19/23 1245    Procedure: Pelvic examination under anesthesia, dilation and curettage uterus, ultrasound guidance Latex Free (Abdomen)    Anesthesia type: General    Diagnosis: Thickened endometrium [R93.89]    Pre-op diagnosis: Thickened endometrium [R93.89]    Location:  OR  /  OR    Providers: Amanda Au MD          Patient is being evaluated for comorbid conditions of HTN, obesity, arthritis,     Ms. Layne has a history of postmenopausal bleeding in the setting a a thickened endometrium. She now presents for the above procedure.    History is obtained from the patient and chart review. She is accompanied by her two daughters,    Hx of abnormal bleeding or anti-platelet use: denies    Menstrual history: No LMP recorded (lmp unknown). Patient is postmenopausal.:       Past Medical History  Past Medical History:   Diagnosis Date     Arthritis      Gout 03/14/2011    Triggered by hydrochlorothiazide       Hypertension      Obesity      Thickened endometrium        Past Surgical History  Past Surgical History:   Procedure Laterality Date     COLONOSCOPY  12/05/05    normal, recheck 10 years -- done at [facility] EYE  03/30/2018    Capsulotomy Left eye.   Dr. Skip Villela     EYE SURGERY  2015     [facility] REMOVAL GALLBLADDER       HYSTEROSCOPY,DIAGNOSTIC  around 1998       Prior to Admission Medications  Current Outpatient Medications   Medication Sig Dispense Refill     lisinopril (ZESTRIL) 20 MG tablet Take 1 tablet (20 mg) by mouth daily (Patient taking differently: Take 20 mg by mouth every evening) 90 tablet 0     simvastatin  (ZOCOR) 40 MG tablet Take 1 tablet (40 mg) by mouth At Bedtime 90 tablet 4     VITAMIN D 1000 UNIT OR CAPS Take 1 capsule by mouth every morning         Allergies  Allergies   Allergen Reactions     Hydrochlorothiazide      Triggered two gout attacks, no further problmes since discontinuing drug       Social History  Social History     Socioeconomic History     Marital status:      Spouse name: Not on file     Number of children: Not on file     Years of education: Not on file     Highest education level: Not on file   Occupational History     Not on file   Tobacco Use     Smoking status: Never     Passive exposure: Never     Smokeless tobacco: Never   Vaping Use     Vaping Use: Never used   Substance and Sexual Activity     Alcohol use: No     Drug use: No     Sexual activity: Never   Other Topics Concern     Parent/sibling w/ CABG, MI or angioplasty before 65F 55M? No   Social History Narrative     --- 4 children, 11 grandchildren, 3 great-grandchildren     Social Determinants of Health     Financial Resource Strain: Not on file   Food Insecurity: Not on file   Transportation Needs: Not on file   Physical Activity: Not on file   Stress: Not on file   Social Connections: Not on file   Intimate Partner Violence: Not on file   Housing Stability: Not on file       Family History  Family History   Problem Relation Age of Onset     Eye Disorder Mother         glaucoma     Heart Disease Mother          of CHF     Cancer Sister         uterine and ovarian-in remission      Other Cancer Sister      Heart Disease Brother         valve replacement     Lipids Brother      Coronary Artery Disease Brother      Eye Disorder Brother         detached retina     Cancer Paternal Grandfather         carcinoma of the lip, pipe-smoker     Thyroid Disease Daughter         goiter or nodule?     Cancer Son         SKIN     Other Cancer Son      Other Cancer Other      Thyroid Disease Other      Diabetes No family hx of       C.A.D. No family hx of      Breast Cancer No family hx of      Cancer - colorectal No family hx of      Anesthesia Reaction No family hx of      Deep Vein Thrombosis (DVT) No family hx of        Review of Systems  The complete review of systems is negative other than noted in the HPI or here.     Anesthesia Evaluation   Pt has had prior anesthetic.     No history of anesthetic complications       ROS/MED HX  ENT/Pulmonary:  - neg pulmonary ROS  (-) tobacco use, asthma and sleep apnea   Neurologic:  - neg neurologic ROS  (-) no seizures and no CVA   Cardiovascular:     (+) Dyslipidemia hypertension-----    METS/Exercise Tolerance: 3 - Able to walk 1-2 blocks without stopping Comment: Able to ascend stairs and walk > 2 blocks. Endorses fatigue, denies CP. Does own shopping.   Hematologic:  - neg hematologic  ROS  (-) history of blood clots and history of blood transfusion   Musculoskeletal:   (+) arthritis (feet),     GI/Hepatic:  - neg GI/hepatic ROS  (-) GERD and liver disease   Renal/Genitourinary: Comment: Thickened endometrium/postmenopausal bleeding    (+) renal disease (CKD2),     Endo:     (+) Obesity,  (-) Type II DM   Psychiatric/Substance Use:  - neg psychiatric ROS     Infectious Disease:  - neg infectious disease ROS     Malignancy:  - neg malignancy ROS     Other:  - neg other ROS          Virtual visit -  No vitals were obtained    Physical Exam  Constitutional: Awake, alert, cooperative, no apparent distress, and appears stated age.  HENT: Normocephalic  Respiratory: non labored breathing   Neurologic: Awake, alert, oriented to name, place and time.   Neuropsychiatric: Calm, cooperative. Normal affect.      Prior Labs/Diagnostic Studies   All labs and imaging personally reviewed       The patient's records and results personally reviewed by this provider.       Assessment      Jen Layne is a 87 year old female seen as a PAC referral for risk assessment and optimization for  "anesthesia.    Plan/Recommendations  Pt will be optimized for the proposed procedure.  See below for details on the assessment, risk, and preoperative recommendations    NEUROLOGY  - No history of TIA, CVA or seizure    -Post Op delirium risk factors:  Age    ENT  - No current airway concerns.  Will need to be reassessed day of surgery.  Mallampati: Unable to assess  TM: Unable to assess    CARDIAC  - HTN, will hold lisinopril DOS  - METS (Metabolic Equivalents)  Able to ascend stairs and walk > 2 blocks. Endorses fatigue, denies CP. Does own shopping.    Patient CANNOT perform 4 METS exercise without symptoms            Total Score: 1    Functional Capacity: Unable to complete 4 METS      RCRI-Very low risk: Class 1 0.4% complication rate            Total Score: 0        PULMONARY  MONAE Low Risk            Total Score: 2    MONAE: Hypertension    MONAE: Over 50 ys old      - Denies asthma or inhaler use  - Tobacco History      History   Smoking Status     Never   Smokeless Tobacco     Never       GI  - Denies GERD  PONV Medium Risk  Total Score: 2           1 AN PONV: Pt is Female    1 AN PONV: Patient is not a current smoker          ENDOCRINE    - BMI: Estimated body mass index is 31.62 kg/m  as calculated from the following:    Height as of 7/6/23: 1.651 m (5' 5\").    Weight as of 7/6/23: 86.2 kg (190 lb).  Obesity (BMI >30)  - No history of Diabetes Mellitus    HEME  VTE Low Risk 0.26%            Total Score: 1    VTE: Greater than 59 yrs old      - No history of abnormal bleeding or antiplatelet use.           Physical Exam    Airway        Mallampati: III   TM distance: > 3 FB   Neck ROM: full   Mouth opening: > 3 cm    Respiratory Devices and Support         Dental     Comment: Patient reports no loose or chipped teeth        Cardiovascular          Rhythm and rate: regular and normal     Pulmonary           breath sounds clear to auscultation             Anesthesia Plan    ASA Status:  2   NPO Status:  NPO " Appropriate    Anesthesia Type: General.     - Airway: LMA   Induction: Intravenous.   Maintenance: TIVA.        Consents    Anesthesia Plan(s) and associated risks, benefits, and realistic alternatives discussed. Questions answered and patient/representative(s) expressed understanding.    - Discussed:     - Discussed with:  Patient         Postoperative Care    Pain management: IV analgesics, Multi-modal analgesia.   PONV prophylaxis: Ondansetron (or other 5HT-3)     Comments:

## 2023-07-19 ENCOUNTER — HOSPITAL ENCOUNTER (OUTPATIENT)
Facility: CLINIC | Age: 88
Discharge: HOME | End: 2023-07-19
Attending: OBSTETRICS & GYNECOLOGY | Admitting: OBSTETRICS & GYNECOLOGY
Payer: MEDICARE

## 2023-07-19 ENCOUNTER — HOSPITAL ENCOUNTER (OUTPATIENT)
Dept: ULTRASOUND IMAGING | Facility: CLINIC | Age: 88
Discharge: HOME | End: 2023-07-19
Attending: OBSTETRICS & GYNECOLOGY | Admitting: OBSTETRICS & GYNECOLOGY
Payer: MEDICARE

## 2023-07-19 ENCOUNTER — ANESTHESIA (OUTPATIENT)
Dept: SURGERY | Facility: CLINIC | Age: 88
End: 2023-07-19
Payer: MEDICARE

## 2023-07-19 VITALS
OXYGEN SATURATION: 96 % | HEIGHT: 64 IN | TEMPERATURE: 98.4 F | BODY MASS INDEX: 32.26 KG/M2 | DIASTOLIC BLOOD PRESSURE: 77 MMHG | HEART RATE: 66 BPM | WEIGHT: 188.93 LBS | RESPIRATION RATE: 16 BRPM | SYSTOLIC BLOOD PRESSURE: 147 MMHG

## 2023-07-19 DIAGNOSIS — R93.89 THICKENED ENDOMETRIUM: ICD-10-CM

## 2023-07-19 LAB — HGB BLD-MCNC: 15 G/DL (ref 11.7–15.7)

## 2023-07-19 PROCEDURE — 710N000010 HC RECOVERY PHASE 1, LEVEL 2, PER MIN: Performed by: OBSTETRICS & GYNECOLOGY

## 2023-07-19 PROCEDURE — 250N000009 HC RX 250

## 2023-07-19 PROCEDURE — 76857 US EXAM PELVIC LIMITED: CPT | Mod: TC

## 2023-07-19 PROCEDURE — 88341 IMHCHEM/IMCYTCHM EA ADD ANTB: CPT | Mod: 26 | Performed by: PATHOLOGY

## 2023-07-19 PROCEDURE — 258N000003 HC RX IP 258 OP 636

## 2023-07-19 PROCEDURE — 999N000054 HC STATISTIC EKG NON-CHARGEABLE

## 2023-07-19 PROCEDURE — 360N000075 HC SURGERY LEVEL 2, PER MIN: Performed by: OBSTETRICS & GYNECOLOGY

## 2023-07-19 PROCEDURE — 999N000141 HC STATISTIC PRE-PROCEDURE NURSING ASSESSMENT: Performed by: OBSTETRICS & GYNECOLOGY

## 2023-07-19 PROCEDURE — 88305 TISSUE EXAM BY PATHOLOGIST: CPT | Mod: 26 | Performed by: PATHOLOGY

## 2023-07-19 PROCEDURE — 250N000013 HC RX MED GY IP 250 OP 250 PS 637: Performed by: OBSTETRICS & GYNECOLOGY

## 2023-07-19 PROCEDURE — 250N000013 HC RX MED GY IP 250 OP 250 PS 637

## 2023-07-19 PROCEDURE — 85018 HEMOGLOBIN: CPT | Performed by: ANESTHESIOLOGY

## 2023-07-19 PROCEDURE — 250N000011 HC RX IP 250 OP 636: Mod: JZ

## 2023-07-19 PROCEDURE — 272N000001 HC OR GENERAL SUPPLY STERILE: Performed by: OBSTETRICS & GYNECOLOGY

## 2023-07-19 PROCEDURE — 88305 TISSUE EXAM BY PATHOLOGIST: CPT | Mod: TC | Performed by: OBSTETRICS & GYNECOLOGY

## 2023-07-19 PROCEDURE — 250N000025 HC SEVOFLURANE, PER MIN: Performed by: OBSTETRICS & GYNECOLOGY

## 2023-07-19 PROCEDURE — 250N000011 HC RX IP 250 OP 636: Performed by: OBSTETRICS & GYNECOLOGY

## 2023-07-19 PROCEDURE — 93005 ELECTROCARDIOGRAM TRACING: CPT

## 2023-07-19 PROCEDURE — 36415 COLL VENOUS BLD VENIPUNCTURE: CPT | Performed by: ANESTHESIOLOGY

## 2023-07-19 PROCEDURE — 370N000017 HC ANESTHESIA TECHNICAL FEE, PER MIN: Performed by: OBSTETRICS & GYNECOLOGY

## 2023-07-19 PROCEDURE — 710N000012 HC RECOVERY PHASE 2, PER MINUTE: Performed by: OBSTETRICS & GYNECOLOGY

## 2023-07-19 PROCEDURE — 88342 IMHCHEM/IMCYTCHM 1ST ANTB: CPT | Mod: 26 | Performed by: PATHOLOGY

## 2023-07-19 RX ORDER — PROPOFOL 10 MG/ML
INJECTION, EMULSION INTRAVENOUS CONTINUOUS PRN
Status: DISCONTINUED | OUTPATIENT
Start: 2023-07-19 | End: 2023-07-19

## 2023-07-19 RX ORDER — ACETAMINOPHEN 325 MG/1
975 TABLET ORAL ONCE
Status: DISCONTINUED | OUTPATIENT
Start: 2023-07-19 | End: 2023-07-19 | Stop reason: HOSPADM

## 2023-07-19 RX ORDER — SODIUM CHLORIDE, SODIUM LACTATE, POTASSIUM CHLORIDE, CALCIUM CHLORIDE 600; 310; 30; 20 MG/100ML; MG/100ML; MG/100ML; MG/100ML
INJECTION, SOLUTION INTRAVENOUS CONTINUOUS
Status: DISCONTINUED | OUTPATIENT
Start: 2023-07-19 | End: 2023-07-19 | Stop reason: HOSPADM

## 2023-07-19 RX ORDER — ACETAMINOPHEN 500 MG
1000 TABLET ORAL ONCE
Status: COMPLETED | OUTPATIENT
Start: 2023-07-19 | End: 2023-07-19

## 2023-07-19 RX ORDER — EPHEDRINE SULFATE 50 MG/ML
INJECTION, SOLUTION INTRAMUSCULAR; INTRAVENOUS; SUBCUTANEOUS PRN
Status: DISCONTINUED | OUTPATIENT
Start: 2023-07-19 | End: 2023-07-19

## 2023-07-19 RX ORDER — ACETAMINOPHEN 500 MG
1000 TABLET ORAL ONCE
Status: DISCONTINUED | OUTPATIENT
Start: 2023-07-19 | End: 2023-07-19 | Stop reason: HOSPADM

## 2023-07-19 RX ORDER — LIDOCAINE 40 MG/G
CREAM TOPICAL
Status: DISCONTINUED | OUTPATIENT
Start: 2023-07-19 | End: 2023-07-19 | Stop reason: HOSPADM

## 2023-07-19 RX ORDER — LABETALOL HYDROCHLORIDE 5 MG/ML
10 INJECTION, SOLUTION INTRAVENOUS
Status: DISCONTINUED | OUTPATIENT
Start: 2023-07-19 | End: 2023-07-19 | Stop reason: HOSPADM

## 2023-07-19 RX ORDER — FENTANYL CITRATE 50 UG/ML
25 INJECTION, SOLUTION INTRAMUSCULAR; INTRAVENOUS EVERY 5 MIN PRN
Status: DISCONTINUED | OUTPATIENT
Start: 2023-07-19 | End: 2023-07-19 | Stop reason: HOSPADM

## 2023-07-19 RX ORDER — IBUPROFEN 200 MG
600 TABLET ORAL ONCE
Status: DISCONTINUED | OUTPATIENT
Start: 2023-07-19 | End: 2023-07-19 | Stop reason: HOSPADM

## 2023-07-19 RX ORDER — FENTANYL CITRATE 50 UG/ML
50 INJECTION, SOLUTION INTRAMUSCULAR; INTRAVENOUS EVERY 5 MIN PRN
Status: DISCONTINUED | OUTPATIENT
Start: 2023-07-19 | End: 2023-07-19 | Stop reason: HOSPADM

## 2023-07-19 RX ORDER — SODIUM CHLORIDE, SODIUM LACTATE, POTASSIUM CHLORIDE, CALCIUM CHLORIDE 600; 310; 30; 20 MG/100ML; MG/100ML; MG/100ML; MG/100ML
INJECTION, SOLUTION INTRAVENOUS CONTINUOUS PRN
Status: DISCONTINUED | OUTPATIENT
Start: 2023-07-19 | End: 2023-07-19

## 2023-07-19 RX ORDER — PROPOFOL 10 MG/ML
INJECTION, EMULSION INTRAVENOUS PRN
Status: DISCONTINUED | OUTPATIENT
Start: 2023-07-19 | End: 2023-07-19

## 2023-07-19 RX ORDER — HYDRALAZINE HYDROCHLORIDE 20 MG/ML
2.5-5 INJECTION INTRAMUSCULAR; INTRAVENOUS EVERY 10 MIN PRN
Status: DISCONTINUED | OUTPATIENT
Start: 2023-07-19 | End: 2023-07-19 | Stop reason: HOSPADM

## 2023-07-19 RX ORDER — FENTANYL CITRATE 50 UG/ML
INJECTION, SOLUTION INTRAMUSCULAR; INTRAVENOUS PRN
Status: DISCONTINUED | OUTPATIENT
Start: 2023-07-19 | End: 2023-07-19

## 2023-07-19 RX ORDER — PHENAZOPYRIDINE HYDROCHLORIDE 200 MG/1
200 TABLET, FILM COATED ORAL ONCE
Status: COMPLETED | OUTPATIENT
Start: 2023-07-19 | End: 2023-07-19

## 2023-07-19 RX ORDER — HYDROMORPHONE HCL IN WATER/PF 6 MG/30 ML
0.2 PATIENT CONTROLLED ANALGESIA SYRINGE INTRAVENOUS EVERY 5 MIN PRN
Status: DISCONTINUED | OUTPATIENT
Start: 2023-07-19 | End: 2023-07-19 | Stop reason: HOSPADM

## 2023-07-19 RX ORDER — HEPARIN SODIUM 5000 [USP'U]/.5ML
5000 INJECTION, SOLUTION INTRAVENOUS; SUBCUTANEOUS
Status: COMPLETED | OUTPATIENT
Start: 2023-07-19 | End: 2023-07-19

## 2023-07-19 RX ORDER — ONDANSETRON 2 MG/ML
INJECTION INTRAMUSCULAR; INTRAVENOUS PRN
Status: DISCONTINUED | OUTPATIENT
Start: 2023-07-19 | End: 2023-07-19

## 2023-07-19 RX ORDER — DIMENHYDRINATE 50 MG/ML
25 INJECTION, SOLUTION INTRAMUSCULAR; INTRAVENOUS
Status: DISCONTINUED | OUTPATIENT
Start: 2023-07-19 | End: 2023-07-19 | Stop reason: HOSPADM

## 2023-07-19 RX ORDER — OXYCODONE HYDROCHLORIDE 10 MG/1
10 TABLET ORAL
Status: DISCONTINUED | OUTPATIENT
Start: 2023-07-19 | End: 2023-07-19 | Stop reason: HOSPADM

## 2023-07-19 RX ORDER — ONDANSETRON 4 MG/1
4 TABLET, ORALLY DISINTEGRATING ORAL EVERY 30 MIN PRN
Status: DISCONTINUED | OUTPATIENT
Start: 2023-07-19 | End: 2023-07-19 | Stop reason: HOSPADM

## 2023-07-19 RX ORDER — HYDROMORPHONE HCL IN WATER/PF 6 MG/30 ML
0.4 PATIENT CONTROLLED ANALGESIA SYRINGE INTRAVENOUS EVERY 5 MIN PRN
Status: DISCONTINUED | OUTPATIENT
Start: 2023-07-19 | End: 2023-07-19 | Stop reason: HOSPADM

## 2023-07-19 RX ORDER — ONDANSETRON 2 MG/ML
4 INJECTION INTRAMUSCULAR; INTRAVENOUS EVERY 30 MIN PRN
Status: DISCONTINUED | OUTPATIENT
Start: 2023-07-19 | End: 2023-07-19 | Stop reason: HOSPADM

## 2023-07-19 RX ORDER — LIDOCAINE HYDROCHLORIDE 20 MG/ML
INJECTION, SOLUTION INFILTRATION; PERINEURAL PRN
Status: DISCONTINUED | OUTPATIENT
Start: 2023-07-19 | End: 2023-07-19

## 2023-07-19 RX ORDER — OXYCODONE HYDROCHLORIDE 5 MG/1
5 TABLET ORAL
Status: DISCONTINUED | OUTPATIENT
Start: 2023-07-19 | End: 2023-07-19 | Stop reason: HOSPADM

## 2023-07-19 RX ADMIN — PROPOFOL 100 MCG/KG/MIN: 10 INJECTION, EMULSION INTRAVENOUS at 14:09

## 2023-07-19 RX ADMIN — PHENYLEPHRINE HYDROCHLORIDE 100 MCG: 10 INJECTION INTRAVENOUS at 14:15

## 2023-07-19 RX ADMIN — ONDANSETRON 4 MG: 2 INJECTION INTRAMUSCULAR; INTRAVENOUS at 14:33

## 2023-07-19 RX ADMIN — HEPARIN SODIUM 5000 UNITS: 5000 INJECTION, SOLUTION INTRAVENOUS; SUBCUTANEOUS at 11:44

## 2023-07-19 RX ADMIN — ACETAMINOPHEN 1000 MG: 500 TABLET ORAL at 11:48

## 2023-07-19 RX ADMIN — EPHEDRINE SULFATE 5 MG: 5 INJECTION INTRAVENOUS at 14:16

## 2023-07-19 RX ADMIN — SODIUM CHLORIDE, POTASSIUM CHLORIDE, SODIUM LACTATE AND CALCIUM CHLORIDE: 600; 310; 30; 20 INJECTION, SOLUTION INTRAVENOUS at 13:57

## 2023-07-19 RX ADMIN — FENTANYL CITRATE 100 MCG: 50 INJECTION, SOLUTION INTRAMUSCULAR; INTRAVENOUS at 14:04

## 2023-07-19 RX ADMIN — LIDOCAINE HYDROCHLORIDE 100 MG: 20 INJECTION, SOLUTION INFILTRATION; PERINEURAL at 14:04

## 2023-07-19 RX ADMIN — PROPOFOL 140 MG: 10 INJECTION, EMULSION INTRAVENOUS at 14:06

## 2023-07-19 RX ADMIN — PROPOFOL 85 MCG/KG/MIN: 10 INJECTION, EMULSION INTRAVENOUS at 14:24

## 2023-07-19 RX ADMIN — PHENAZOPYRIDINE 200 MG: 200 TABLET ORAL at 11:48

## 2023-07-19 ASSESSMENT — ACTIVITIES OF DAILY LIVING (ADL)
ADLS_ACUITY_SCORE: 35

## 2023-07-19 NOTE — OR NURSING
Small amount of bright red vaginal drainage, paged gyn-onc. Ok to send to P2, will see pt there. Confirmed pt does not need to void before going home.

## 2023-07-19 NOTE — PROGRESS NOTES
Gynecologic Oncology Postoperative Check Note  7/19/2023    S: Patient reports she is doing well postoperatively. Pain is well controlled with PO pain medications. Ambulating without pain. Voiding spontaneously. Tolerating crackers and water without nausea or vomiting. Denies chest pain, shortness of breath, dizziness, or other concerns at this time. She feels ready to go home.    O:  Vitals:    07/19/23 1500 07/19/23 1515 07/19/23 1543 07/19/23 1625   BP: 123/59  138/70 (!) 147/77   BP Location:   Left arm    Pulse: 61 62 61 66   Resp: 14 14 15 16   Temp:  97.6  F (36.4  C) 98.4  F (36.9  C)    TempSrc:  Oral Oral    SpO2: 93% 94% 97% 96%   Weight:       Height:           Gen: NAD  Cardio: Well-perfused  Resp: Breathing comfortably on room air  Abdomen: Soft, non-tender, non-distended  Extremities: Non-tender, trace edema    Assessment: 87 year old POD#0 s/p EUA, D&C. Doing well in the early post-op period. She has voided, tolerated water and crackers without n/v, ambulated, and pain is well controlled.  Given instructions to call or come to ED for pain not controlled my PO pain meds, n/v especially if unable to tolerate PO intake all day or not passing gas or had a BM, inability to urinate, fever/chills, heavy vaginal bleeding, or lightheadedness.    Plan:  Disease: PMB. TVUS 7/8/22 uterus w/ 3.2cm mass along endometrial kiana. TVUS 6/5/23 diffusely thickened endometrium. Pelvic MRI w/ heterogenous mass-like structure ~2.5 x 1.6cm.  FEN: Regular diet  Pain: Tylenol  Heme: Hgb 14.3> EBL 2  CV: HTN - Hydrochlorothiazide  Resp: No issues  GI: No issues  : Voiding spontaneously  Endocrine: No issues  Psych/Neuro/MSK: Arthritis  ID: No issues  PPx: ambulation    Dispo: to home    Hortencia Bales MD  Gynecologic Oncology, PGY-4  07/19/2023, 4:51 PM     To reach the GYNECOLOGIC ONCOLOGY resident pager: [phone]

## 2023-07-19 NOTE — ANESTHESIA CARE TRANSFER NOTE
Patient: Jen Layne    Procedure: Procedure(s):  Pelvic examination under anesthesia, dilation and curettage uterus, ultrasound guidance Latex Free       Diagnosis: Thickened endometrium [R93.89]  Diagnosis Additional Information: No value filed.    Anesthesia Type:   General     Note:    Oropharynx: oropharynx clear of all foreign objects and spontaneously breathing  Level of Consciousness: awake  Oxygen Supplementation: face mask  Level of Supplemental Oxygen (L/min / FiO2): 6  Independent Airway: airway patency satisfactory and stable  Dentition: dentition unchanged  Vital Signs Stable: post-procedure vital signs reviewed and stable  Report to RN Given: handoff report given  Patient transferred to: PACU    Handoff Report: Identifed the Patient, Identified the Reponsible Provider, Reviewed the pertinent medical history, Discussed the surgical course, Reviewed Intra-OP anesthesia mangement and issues during anesthesia, Set expectations for post-procedure period and Allowed opportunity for questions and acknowledgement of understanding      Vitals:  Vitals Value Taken Time   /79 07/19/23 1446   Temp     Pulse 66 07/19/23 1448   Resp 8 07/19/23 1448   SpO2 99 % 07/19/23 1448   Vitals shown include unvalidated device data.    Electronically Signed By: Florencio Manzo MD  July 19, 2023  2:49 PM

## 2023-07-19 NOTE — OP NOTE
[facility]   Operative Note    Date of service: 2023    Name: Jen Layne   MRN: [id number]   : 1935     Pre-operative diagnosis:  1. Thickened endometrium    Post-operative diagnosis:  Same    Surgeon: Amanda Au MD    Assistant: Jaime Can, PGY-2    Procedure: Exam under anesthesia, Dilation & curettage under ultrasound guidance    Anesthesia: MAC  Fluids: 300 mL  EBL: 2 mL  Urine output: bladder not drained    Complications: None  Specimen: Endometrial curettings    Indication: 87 year old female with postmenopausal bleeding. TVUS demonstrated diffusely thickened endometrium, pelvic MRI demonstrated a heterogenous mass-like structure measuring ~2.5 x 1.6 cm. In-office EMB was attempted and unable to be completed secondary to cervical stenosis. Exam under anesthesia and D&C was recommended. Risk, benefits, and alternatives of procedure were discussed, informed consent was obtained.     Findings:   1. EUA reveals cervix flush with vaginal tissue, anteverted uterus, dark red vaginal bleeding  2. Ultrasound demonstrated thickened endometrium    Procedure:  Patient was taken to the OR where she underwent MAC anesthesia without difficulty. Once the patient was comfortable she was positioned in the dorsal lithotomy position using yellow-fin stirrups.  She was then prepped and draped in the normal sterile fashion. A sterile speculum was placed in the vagina and the cervix was visualized. The cervix was grasped with a single tooth tenaculum. The cervix was serially dilated with Hegar dilators to 6mm. A sharp curette was placed through the cervix and used to curette to obtain endometrial curettings. Once sample was deemed sufficient, the tenaculum was removed and sites noted to be hemostatic. The speculum was then removed. The patient tolerated the procedure well and was taken to the PACU for recovery in stable condition. Instrument, needle and sponge counts correct x2.  Dr. Au was scrubbed and  present for the entire procedure.    Jaime Can MD  OB/GYN PGY-2  07/19/2023 12:08 PM    Attending Attestation:  I was present and scrubbed for the entire surgical procedure.  I have reviewed and edited above note and agree with findings as documented.    Amanda Au MD  Gynecologic Oncology  [facility]

## 2023-07-19 NOTE — DISCHARGE INSTRUCTIONS
[facility]  Same-Day Surgery   Adult Discharge Orders & Instructions     For 24 hours after surgery    Get plenty of rest.  A responsible adult must stay with you for at least 24 hours after you leave the hospital.   Do not drive or use heavy equipment.  If you have weakness or tingling, don't drive or use heavy equipment until this feeling goes away.  Do not drink alcohol.  Avoid strenuous or risky activities.  Ask for help when climbing stairs.   You may feel lightheaded.  IF so, sit for a few minutes before standing.  Have someone help you get up.   If you have nausea (feel sick to your stomach): Drink only clear liquids such as apple juice, ginger ale, broth or 7-Up.  Rest may also help.  Be sure to drink enough fluids.  Move to a regular diet as you feel able.  You may have a slight fever. Call the doctor if your fever is over 100 F (37.7 C) (taken under the tongue) or lasts longer than 24 hours.  You may have a dry mouth, a sore throat, muscle aches or trouble sleeping.  These should go away after 24 hours.  Do not make important or legal decisions.   Call your doctor for any of the followin.  Signs of infection (fever, growing tenderness at the surgery site, a large amount of drainage or bleeding, severe pain, foul-smelling drainage, redness, swelling).    2. It has been over 8 to 10 hours since surgery and you are still not able to urinate (pass water).    3.  Headache for over 24 hours.    4.  Numbness, tingling or weakness the day after surgery (if you had spinal anesthesia    '   [phone] and ask for the resident on call for   Dr Au's clinic at [phone] (answered 24 hours a day)  '   Emergency Department:    [facility]: [phone]       (TTY for hearing impaired: [phone])    [facility]: [phone]       (TTY for hearing impaired: [phone])

## 2023-07-19 NOTE — ANESTHESIA POSTPROCEDURE EVALUATION
Patient: Jen Layne    Procedure: Procedure(s):  Pelvic examination under anesthesia, dilation and curettage uterus, ultrasound guidance Latex Free       Anesthesia Type:  General    Note:  Disposition: Outpatient   Postop Pain Control: Uneventful            Sign Out: Well controlled pain   PONV: No   Neuro/Psych: Uneventful            Sign Out: Acceptable/Baseline neuro status   Airway/Respiratory: Uneventful            Sign Out: Acceptable/Baseline resp. status   CV/Hemodynamics: Uneventful            Sign Out: Acceptable CV status   Other NRE: NONE   DID A NON-ROUTINE EVENT OCCUR? No           Last vitals:  Vitals Value Taken Time   /59 07/19/23 1500   Temp     Pulse 60 07/19/23 1507   Resp 12 07/19/23 1507   SpO2 94 % 07/19/23 1507   Vitals shown include unvalidated device data.    Electronically Signed By: Rafita Wild MD  July 19, 2023  3:07 PM

## 2023-07-19 NOTE — ANESTHESIA PROCEDURE NOTES
Airway    Staff -        Anesthesiologist:  Rafita Wild MD       Resident/Fellow: Florencio Manzo MD       Performed By: resident  Consent for Airway        Urgency: elective  Indications and Patient Condition       Indications for airway management: gamaliel-procedural       Induction type:intravenous       Mask difficulty assessment: 0 - not attempted    Final Airway Details       Final airway type: supraglottic airway    Supraglottic Airway Details        Type: LMA       Brand: I-Gel       LMA size: 3    Post intubation assessment        Placement verified by: capnometry, equal breath sounds and chest rise        Number of attempts at approach: 1       Number of other approaches attempted: 0       Ease of procedure: easy       Dentition: Intact and Unchanged

## 2023-07-20 LAB
ATRIAL RATE - MUSE: 76 BPM
DIASTOLIC BLOOD PRESSURE - MUSE: NORMAL MMHG
INTERPRETATION ECG - MUSE: NORMAL
P AXIS - MUSE: 22 DEGREES
PR INTERVAL - MUSE: 202 MS
QRS DURATION - MUSE: 74 MS
QT - MUSE: 380 MS
QTC - MUSE: 427 MS
R AXIS - MUSE: -12 DEGREES
SYSTOLIC BLOOD PRESSURE - MUSE: NORMAL MMHG
T AXIS - MUSE: 3 DEGREES
VENTRICULAR RATE- MUSE: 76 BPM

## 2023-07-21 ENCOUNTER — PATIENT OUTREACH (OUTPATIENT)
Dept: ONCOLOGY | Facility: CLINIC | Age: 88
End: 2023-07-21
Payer: MEDICARE

## 2023-07-21 LAB
PATH REPORT.COMMENTS IMP SPEC: ABNORMAL
PATH REPORT.COMMENTS IMP SPEC: YES
PATH REPORT.FINAL DX SPEC: ABNORMAL
PATH REPORT.GROSS SPEC: ABNORMAL
PATH REPORT.MICROSCOPIC SPEC OTHER STN: ABNORMAL
PATH REPORT.RELEVANT HX SPEC: ABNORMAL
PATHOLOGY SYNOPTIC REPORT: ABNORMAL
PHOTO IMAGE: ABNORMAL

## 2023-07-21 NOTE — PROGRESS NOTES
Post hospital call completed    Patient doing well. Eating and drinking without concern     Pain controlled with OTC medication.     Up moving around     Vaginal bleeding within normal limits     Reviewed when to go to ED versus calling clinic/oncall MD    No questions or concerns at this time     Pily Hines RN

## 2023-07-21 NOTE — PROGRESS NOTES
Post-Discharge Phone Call         Surgery Date:  7/19/2023         Description of Surgery: Pelvic examination under anesthesia, dilation and curettage uterus, ultrasound guidance       Pain:    1) Location: denies pain    2) Rate pain on scale 1-10:  0    3) Is your pain well controlled on your pain medication?:only took one tylenol post surgery    4) How often are you taking your pain medication?: has not needed medication since 7/19      GI:     Last bowel movement: 7/21/2023     Are you having regular bowel movements?: Yes     Nausea?: no         Urinary:    9) Are you having problems or difficulty with urination?: no    Oral intake.  Reports good appetite and eating baseline portions. She is drinking 5 glasses of fluids per day and will try to increase to 6-8    Vaginal Discharge  She reports small  Or trace amount of vaginal spotting. She has not passed any blood clots    No fever or chills and she is moving about the house alternating rest and activity. She reports good help from her family.         Post-op:    18) Verify date and time of appointment: 8/4/2023, check in at 1015 and second apt is scheduled on 8/29/23, check iin at 1125    19) Pt was informed that pathology will be discussed at this appointment: yes         Any other questions or concerns at this time?: no    She has our contact information for nurse triage as well as after hours contact for GYN service. She expressed appreciation for the phone call/    Nayana Gutierrez RN

## 2023-07-24 LAB
LAB DIRECTOR COMMENTS: ABNORMAL
LAB DIRECTOR DISCLAIMER: ABNORMAL
LAB DIRECTOR INTERPRETATION: ABNORMAL
LAB DIRECTOR METHODOLOGY: ABNORMAL
LAB DIRECTOR RESULTS: ABNORMAL
SPECIMEN DESCRIPTION: ABNORMAL

## 2023-07-24 PROCEDURE — G0452 MOLECULAR PATHOLOGY INTERPR: HCPCS | Mod: 26 | Performed by: PATHOLOGY

## 2023-07-24 PROCEDURE — 81288 MLH1 GENE: CPT | Performed by: OBSTETRICS & GYNECOLOGY

## 2023-07-25 ENCOUNTER — PREP FOR PROCEDURE (OUTPATIENT)
Dept: ONCOLOGY | Facility: CLINIC | Age: 88
End: 2023-07-25
Payer: MEDICARE

## 2023-07-25 DIAGNOSIS — C54.1 ENDOMETRIAL CANCER (H): Primary | ICD-10-CM

## 2023-07-25 RX ORDER — PHENAZOPYRIDINE HYDROCHLORIDE 100 MG/1
200 TABLET, FILM COATED ORAL ONCE
Status: CANCELLED | OUTPATIENT
Start: 2023-07-25 | End: 2023-07-25

## 2023-07-25 RX ORDER — METRONIDAZOLE 500 MG/100ML
500 INJECTION, SOLUTION INTRAVENOUS
Status: CANCELLED | OUTPATIENT
Start: 2023-07-25

## 2023-07-25 RX ORDER — HEPARIN SODIUM 10000 [USP'U]/ML
5000 INJECTION, SOLUTION INTRAVENOUS; SUBCUTANEOUS
Status: CANCELLED | OUTPATIENT
Start: 2023-07-25

## 2023-07-25 NOTE — PROGRESS NOTES
Surgery orders placed for OR on 9/22    Diagnosis:  Endometrial cancer    Procedure:  Robotic assisted hysterectomy, bilateral salpingo-oophorectomy, cancer staging, sentinel lymph node mapping and sampling, possible laparotomy     Needs preop teaching, PAC, consent day of surgery      Has appt with me on 8/29 to discuss further    RNCC Nayana Gutierrez; follow-up at     Amanda Au MD  Gynecologic Oncology  [facility]

## 2023-07-26 ENCOUNTER — TELEPHONE (OUTPATIENT)
Dept: ONCOLOGY | Facility: CLINIC | Age: 88
End: 2023-07-26
Payer: MEDICARE

## 2023-07-26 ENCOUNTER — PATIENT OUTREACH (OUTPATIENT)
Dept: ONCOLOGY | Facility: CLINIC | Age: 88
End: 2023-07-26
Payer: MEDICARE

## 2023-07-26 NOTE — TELEPHONE ENCOUNTER
Called patient to schedule surgery with: Dr. Au     Surgery Date: 9/22/23     Location: [facility]    H&P: to be completed by PAC clinic on 9/13/23, virtual     Post-op:  10/4/23, in person visit, Alyssa    Patient will receive surgery arrival and start time from PAC.    Patient aware times are subject to change up until day before surgery.     Patient questions/concerns: N/A     Surgery packet was provided by the RNCC during appointment      Maria Fernanda Manning on 7/26/2023 at 10:41 AM

## 2023-07-26 NOTE — PROGRESS NOTES
Phoned  Jen to complete Surgery teaching via telephone.    Diagnosis:Endometrial cancer     Procedure: Robotic assisted hysterectomy, bilateral salpingo-oophorectomy, cancer staging, sentinel lymph node mapping and sampling, possible laparotomy       Surgery is scheduled on 9/22/2023    Pre-op  Is scheduled at  PAC on 9/13/23, virtual visit    Reviewed:   Preparing for surgery    Restrictions post surgery  Diet post surgery  Tips to Increase protein  Symptoms of concern and when to call  Driving post surgery  Bowel management   Showering post surgery  Incision care   Pain management and expectations   Surgery teaching was done on 6/7 for D&C but reviewed today and added additional information for the above listed procedure.    Questions and concerns addressed. Contact information was provided for future correspondence.   She stated understanding of the above and  Expressed appreciation.  Additional Surgery packet items were mailed to her confirmed address for [place].  Of note, writer also reviewed the above teaching with her daughter, Marsha, by phone today., in a separate phone call /Nayana Gutierrez RN

## 2023-07-27 NOTE — TELEPHONE ENCOUNTER
FUTURE VISIT INFORMATION      SURGERY INFORMATION:  Date: 23  Location: u or  Surgeon:  Amanda Au MD   Anesthesia Type:  general  Procedure: Robotic assisted hysterectomy, bilateral salpingo-oophorectomy, cancer staging, sentinel lymph node mapping and sampling  possible open Hysterectomy, Total Abdominal, With Bilateral Salpingo-Oophorectomy, With Lymphadenectomy       RECORDS REQUESTED FROM:       Primary Care Provider: [facility]    Pertinent Medical History: hypertension    Most recent EKG+ Tracin23

## 2023-08-03 NOTE — PROGRESS NOTES
Follow Up Notes on Referred Patient    Date: 2023        RE: Jen Layne  : 1935  PATRICA: 2023      Jen Layne is a 87 year old woman with a diagnosis of grade 1 endometrial endometrioid adenocarcinoma. She is here today for follow up.      History:   Ms Layne was underwent CT urogram 22 which showed no gross abnormalities to explain hematuria, was noted to have low-density thickening of endometrium.  Underwent a pelvic US on 22 which showed uterus at 6.8 x 4.6cm with a 3.2cm mass along endometrial canal possibly representing polyp or fibroid.  Never had any further follow-up related to this.     Was then seen most recently in ED 23 for bleeding. Had repeat pelvic US on 23 which showed diffusely thickened endometrium.  Had pelvic MRI  8.1 x 4.4 x 5.4 cm in long axis, short axis and transverse dimensions, respectively. An apparent heterogeneous mass-like structure is present in the anterior and right aspect of the uterus in the subendometrial or endometrial  region, measuring approximately 2.5 x 1.6 cm (series 18 image 33). This structure is not well-visualized on T1 weighted images due to artifact. It appears isointense to the uterine myometrium on T2-weighted images. Probable mildly heterogeneous  enhancement of this structure. A few subcentimeter low T2 signal regions in the anterior uterus likely represent very small fibroids.   The endometrial stripe is not well defined due to motion artifact. It measures approximately 0.9 cm in dual-layer anteroposterior thickness. The endometrial region appears mildly irregular on the axial images (for example, series 6 image 15). It cannot be determined on the post contrast images if there is abnormal enhancement associated with the endometrium. Possible restricted diffusion within the region of endometrium.    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 She states she has bleeding prior to her procedure. After her procedure she has has some spotting to a little more bleeding which is dark red to red; she denies saturating a pad and may see a little worm like darker clot. She denies any issues with her bowel or bladder. She will take Tylenol for her HA or her spinal stenosis; she denies pain from her procedure. She is eating and drinking well.       Review of Systems:    Systemic           no weight changes; no fever; no chills; no night sweats; no appetite changes  Skin           no rashes, or lesions  Eye           no irritation; no changes in vision  Rosanna-Laryngeal           no dysphagia; no hoarseness   Pulmonary    no cough; no shortness of breath  Cardiovascular    no chest pain; no palpitations  Gastrointestinal    no diarrhea; no constipation; no abdominal pain; no changes in bowel habits; no blood in stool  Genitourinary   no urinary frequency; no urinary urgency; no dysuria; no pain; no abnormal vaginal discharge; no abnormal vaginal bleeding  Breast   no breast discharge; no breast changes; no breast pain  Musculoskeletal    no myalgias; no arthralgias; no back pain  Psychiatric           no depressed mood; no anxiety    Hematologic              no tender lymph nodes; no noticeable swellings or lumps   Endocrine    no hot flashes; no heat/cold intolerance         Neurological   no tremor; no numbness and tingling; no headaches; no difficulty sleeping      Past Medical History:    Past Medical History:   Diagnosis Date    Arthritis     Gout 03/14/2011    Triggered by hydrochlorothiazide      Hypertension     Obesity     Thickened endometrium          Past Surgical History:    Past Surgical History:   Procedure Laterality Date    COLONOSCOPY  12/05/05     normal, recheck 10 years -- done at [facility]    DILATION AND CURETTAGE, WITH ULTRASOUND GUIDANCE N/A 2023    Procedure: Pelvic examination under anesthesia, dilation and curettage uterus, ultrasound guidance Latex Free;  Surgeon: Amanda Au MD;  Location: [facility] OR    EXAM EYE  2018    Capsulotomy Left eye.   Dr. Skip Villela    EYE SURGERY      HC REMOVAL GALLBLADDER      HYSTEROSCOPY,DIAGNOSTIC  around          Health Maintenance Due   Topic Date Due    MEDICARE ANNUAL WELLNESS VISIT  2022    COVID-19 Vaccine (6 - Pfizer series) 2023    DTAP/TDAP/TD IMMUNIZATION (5 - Td or Tdap) 2023    LIPID  2023    MICROALBUMIN  2023       Current Medications:     Current Outpatient Medications   Medication Sig Dispense Refill    lisinopril (ZESTRIL) 20 MG tablet Take 1 tablet (20 mg) by mouth daily (Patient taking differently: Take 20 mg by mouth every evening) 90 tablet 0    simvastatin (ZOCOR) 40 MG tablet Take 1 tablet (40 mg) by mouth At Bedtime 90 tablet 0    VITAMIN D 1000 UNIT OR CAPS Take 1 capsule by mouth every morning           Allergies:        Allergies   Allergen Reactions    Hydrochlorothiazide      Triggered two gout attacks, no further problmes since discontinuing drug        Social History:     Social History     Tobacco Use    Smoking status: Never     Passive exposure: Never    Smokeless tobacco: Never   Substance Use Topics    Alcohol use: No       History   Drug Use No         Family History:     The patient's family history is notable for:.    Family History   Problem Relation Age of Onset    Eye Disorder Mother         glaucoma    Heart Disease Mother          of CHF    Cancer Sister         uterine and ovarian-in remission     Other Cancer Sister     Heart Disease Brother         valve replacement    Lipids Brother     Coronary Artery Disease Brother     Eye Disorder Brother         detached retina    Cancer Paternal Grandfather          "carcinoma of the lip, pipe-smoker    Thyroid Disease Daughter         goiter or nodule?    Cancer Son         SKIN    Other Cancer Son     Other Cancer Other     Thyroid Disease Other     Diabetes No family hx of     C.A.D. No family hx of     Breast Cancer No family hx of     Cancer - colorectal No family hx of     Anesthesia Reaction No family hx of     Deep Vein Thrombosis (DVT) No family hx of          Physical Exam:     /83 (BP Location: Right arm, Patient Position: Sitting, Cuff Size: Adult Regular)   Pulse 79   Temp 97.7  F (36.5  C) (Oral)   Ht 1.626 m (5' 4.02\")   Wt 86.5 kg (190 lb 11.2 oz)   LMP  (LMP Unknown)   SpO2 97%   BMI 32.72 kg/m    Body mass index is 32.72 kg/m .    General Appearance: healthy and alert, no distress     HEENT: no thyromegaly, no palpable nodules or masses        Cardiovascular: regular rate and rhythm, no gallops, rubs or murmurs     Respiratory: lungs clear, no rales, rhonchi or wheezes, normal diaphragmatic excursion    Musculoskeletal: Upper extremities non tender and without edema; lower extremities with slight edema at ankles    Skin: no lesions or rashes     Neurological: normal gait, no gross defects     Psychiatric: appropriate mood and affect              Gastrointestinal:       abdomen soft, non-tender, non-distended    Genitourinary: deferred      Assessment:    Jen Layne is a 87 year old woman with a diagnosis of grade 1 endometrial endometrioid adenocarcinoma. She is here today for follow up. .     20 minutes spent on the date of the encounter doing chart review, history and exam, documentation and further activities as noted above        Plan:     1.)        She is scheduled to see Dr. Au 8/29 ahead of her scheduled surgery on 9/22. Reviewed signs and symptoms for when she should contact the clinic or seek additional care. Patient to contact the clinic with any questions or concerns in the interim.  Answered all of her questions to the best " of my ability.     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 MLH1 promoter methylation is typically associated with sporadic microsatellite unstable tumors of the colon/rectum or endometrium.    3.) Labs and/or tests ordered include:  none.     4.) Health maintenance issues addressed today include annual health maintenance and non-gynecologic issues with PCP.    ÓSCAR Morgan, WHNP-BC, ANP-BC  Women's Health Nurse Practitioner  Adult Nurse Practitioner  Division of Gynecologic Oncology  .      CC  Patient Care Team:  Yamile Cotter MD as PCP - General (Family Practice)  Yamile Cotter MD as Assigned PCP  June Boyle PA-C as Assigned Surgical Provider  Amanda Au MD as MD (Gynecologic Oncology)  Nayana uGtierrez, [place] as Specialty Care Coordinator (Gynecologic Oncology)  Amanda Au MD as Assigned Cancer Care Provider

## 2023-08-04 ENCOUNTER — ONCOLOGY VISIT (OUTPATIENT)
Dept: ONCOLOGY | Facility: CLINIC | Age: 88
End: 2023-08-04
Attending: OBSTETRICS & GYNECOLOGY
Payer: MEDICARE

## 2023-08-04 VITALS
DIASTOLIC BLOOD PRESSURE: 83 MMHG | HEIGHT: 64 IN | SYSTOLIC BLOOD PRESSURE: 136 MMHG | OXYGEN SATURATION: 97 % | TEMPERATURE: 97.7 F | WEIGHT: 190.7 LBS | BODY MASS INDEX: 32.56 KG/M2 | HEART RATE: 79 BPM

## 2023-08-04 DIAGNOSIS — C54.1 GRADE 1 MALIGNANT NEOPLASM OF ENDOMETRIUM (H): Primary | ICD-10-CM

## 2023-08-04 PROCEDURE — G0463 HOSPITAL OUTPT CLINIC VISIT: HCPCS | Performed by: NURSE PRACTITIONER

## 2023-08-04 PROCEDURE — 99213 OFFICE O/P EST LOW 20 MIN: CPT | Performed by: NURSE PRACTITIONER

## 2023-08-04 ASSESSMENT — PAIN SCALES - GENERAL: PAINLEVEL: NO PAIN (0)

## 2023-08-04 NOTE — LETTER
2023         RE: Jen Layne  [address]  [address]  [facility] [address]        Dear Colleague,    Thank you for referring your patient, Jen Layne, to the [facility]. Please see a copy of my visit note below.                Follow Up Notes on Referred Patient    Date: 2023        RE: Jen Layne  : 1935  PATRICA: 2023      Jen Layne is a 87 year old woman with a diagnosis of grade 1 endometrial endometrioid adenocarcinoma. She is here today for follow up.      History:   Ms Layne was underwent CT urogram 22 which showed no gross abnormalities to explain hematuria, was noted to have low-density thickening of endometrium.  Underwent a pelvic US on 22 which showed uterus at 6.8 x 4.6cm with a 3.2cm mass along endometrial canal possibly representing polyp or fibroid.  Never had any further follow-up related to this.     Was then seen most recently in ED 23 for bleeding. Had repeat pelvic US on 23 which showed diffusely thickened endometrium.  Had pelvic MRI  8.1 x 4.4 x 5.4 cm in long axis, short axis and transverse dimensions, respectively. An apparent heterogeneous mass-like structure is present in the anterior and right aspect of the uterus in the subendometrial or endometrial  region, measuring approximately 2.5 x 1.6 cm (series 18 image 33). This structure is not well-visualized on T1 weighted images due to artifact. It appears isointense to the uterine myometrium on T2-weighted images. Probable mildly heterogeneous  enhancement of this structure. A few subcentimeter low T2 signal regions in the anterior uterus likely represent very small fibroids.   The endometrial stripe is not well defined due to motion artifact. It measures approximately 0.9 cm in dual-layer anteroposterior thickness. The endometrial region appears mildly irregular on the axial images (for example, series 6 image 15). It cannot be determined on  the post contrast images if there is abnormal enhancement associated with the endometrium. Possible restricted diffusion within the region of endometrium.    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 She states she has bleeding prior to her procedure. After her procedure she has has some spotting to a little more bleeding which is dark red to red; she denies saturating a pad and may see a little worm like darker clot. She denies any issues with her bowel or bladder. She will take Tylenol for her HA or her spinal stenosis; she denies pain from her procedure. She is eating and drinking well.       Review of Systems:    Systemic           no weight changes; no fever; no chills; no night sweats; no appetite changes  Skin           no rashes, or lesions  Eye           no irritation; no changes in vision  Rosanna-Laryngeal           no dysphagia; no hoarseness   Pulmonary    no cough; no shortness of breath  Cardiovascular    no chest pain; no palpitations  Gastrointestinal    no diarrhea; no constipation; no abdominal pain; no changes in bowel habits; no blood in stool  Genitourinary   no urinary frequency; no urinary urgency; no dysuria; no pain; no abnormal vaginal discharge; no abnormal vaginal bleeding  Breast   no breast discharge; no breast changes; no breast pain  Musculoskeletal    no myalgias; no arthralgias; no back pain  Psychiatric           no depressed mood; no anxiety    Hematologic              no tender lymph nodes; no noticeable swellings or lumps   Endocrine    no hot flashes; no heat/cold intolerance         Neurological   no tremor; no numbness and tingling; no headaches; no difficulty sleeping      Past Medical History:    Past  Medical History:   Diagnosis Date    Arthritis     Gout 2011    Triggered by hydrochlorothiazide      Hypertension     Obesity     Thickened endometrium          Past Surgical History:    Past Surgical History:   Procedure Laterality Date    COLONOSCOPY  05    normal, recheck 10 years -- done at [facility]    DILATION AND CURETTAGE, WITH ULTRASOUND GUIDANCE N/A 2023    Procedure: Pelvic examination under anesthesia, dilation and curettage uterus, ultrasound guidance Latex Free;  Surgeon: Amanda Au MD;  Location: [facility] OR    EXAM EYE  2018    Capsulotomy Left eye.   Dr. Skip Villela    EYE SURGERY      [facility] REMOVAL GALLBLADDER      HYSTEROSCOPY,DIAGNOSTIC  around          Health Maintenance Due   Topic Date Due    MEDICARE ANNUAL WELLNESS VISIT  2022    COVID-19 Vaccine (6 - Pfizer series) 2023    DTAP/TDAP/TD IMMUNIZATION (5 - Td or Tdap) 2023    LIPID  2023    MICROALBUMIN  2023       Current Medications:     Current Outpatient Medications   Medication Sig Dispense Refill    lisinopril (ZESTRIL) 20 MG tablet Take 1 tablet (20 mg) by mouth daily (Patient taking differently: Take 20 mg by mouth every evening) 90 tablet 0    simvastatin (ZOCOR) 40 MG tablet Take 1 tablet (40 mg) by mouth At Bedtime 90 tablet 0    VITAMIN D 1000 UNIT OR CAPS Take 1 capsule by mouth every morning           Allergies:        Allergies   Allergen Reactions    Hydrochlorothiazide      Triggered two gout attacks, no further problmes since discontinuing drug        Social History:     Social History     Tobacco Use    Smoking status: Never     Passive exposure: Never    Smokeless tobacco: Never   Substance Use Topics    Alcohol use: No       History   Drug Use No         Family History:     The patient's family history is notable for:.    Family History   Problem Relation Age of Onset    Eye Disorder Mother         glaucoma    Heart Disease Mother          of CHF    Cancer  "Sister         uterine and ovarian-in remission 2008    Other Cancer Sister     Heart Disease Brother         valve replacement    Lipids Brother     Coronary Artery Disease Brother     Eye Disorder Brother         detached retina    Cancer Paternal Grandfather         carcinoma of the lip, pipe-smoker    Thyroid Disease Daughter         goiter or nodule?    Cancer Son         SKIN    Other Cancer Son     Other Cancer Other     Thyroid Disease Other     Diabetes No family hx of     C.A.D. No family hx of     Breast Cancer No family hx of     Cancer - colorectal No family hx of     Anesthesia Reaction No family hx of     Deep Vein Thrombosis (DVT) No family hx of          Physical Exam:     /83 (BP Location: Right arm, Patient Position: Sitting, Cuff Size: Adult Regular)   Pulse 79   Temp 97.7  F (36.5  C) (Oral)   Ht 1.626 m (5' 4.02\")   Wt 86.5 kg (190 lb 11.2 oz)   LMP  (LMP Unknown)   SpO2 97%   BMI 32.72 kg/m    Body mass index is 32.72 kg/m .    General Appearance: healthy and alert, no distress     HEENT: no thyromegaly, no palpable nodules or masses        Cardiovascular: regular rate and rhythm, no gallops, rubs or murmurs     Respiratory: lungs clear, no rales, rhonchi or wheezes, normal diaphragmatic excursion    Musculoskeletal: Upper extremities non tender and without edema; lower extremities with slight edema at ankles    Skin: no lesions or rashes     Neurological: normal gait, no gross defects     Psychiatric: appropriate mood and affect              Gastrointestinal:       abdomen soft, non-tender, non-distended    Genitourinary: deferred      Assessment:    Jen Layne is a 87 year old woman with a diagnosis of grade 1 endometrial endometrioid adenocarcinoma. She is here today for follow up. .     20 minutes spent on the date of the encounter doing chart review, history and exam, documentation and further activities as noted above        Plan:     1.)        She is scheduled to see " Dr. Au 8/29 ahead of her scheduled surgery on 9/22. Reviewed signs and symptoms for when she should contact the clinic or seek additional care. Patient to contact the clinic with any questions or concerns in the interim.  Answered all of her questions to the best of my ability.     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 MLH1 promoter methylation is typically associated with sporadic microsatellite unstable tumors of the colon/rectum or endometrium.    3.) Labs and/or tests ordered include:  none.     4.) Health maintenance issues addressed today include annual health maintenance and non-gynecologic issues with PCP.    ÓSCAR Morgan, WHNP-BC, ANP-BC  Women's Health Nurse Practitioner  Adult Nurse Practitioner  Division of Gynecologic Oncology      CC  Patient Care Team:  Yamile Cotter MD as PCP - General (Family Practice)  Yamile Cotter MD as Assigned PCP

## 2023-08-04 NOTE — NURSING NOTE
"Oncology Rooming Note    August 4, 2023 10:19 AM   Jen Layne is a 87 year old female who presents for:    Chief Complaint   Patient presents with    Oncology Clinic Visit     Grade 1 malignant neoplasm of endometrium (H) (Primary Dx)     Initial Vitals: /83 (BP Location: Right arm, Patient Position: Sitting, Cuff Size: Adult Regular)   Pulse 79   Temp 97.7  F (36.5  C) (Oral)   Ht 1.626 m (5' 4.02\")   Wt 86.5 kg (190 lb 11.2 oz)   LMP  (LMP Unknown)   SpO2 97%   BMI 32.72 kg/m   Estimated body mass index is 32.72 kg/m  as calculated from the following:    Height as of this encounter: 1.626 m (5' 4.02\").    Weight as of this encounter: 86.5 kg (190 lb 11.2 oz). Body surface area is 1.98 meters squared.  No Pain (0) Comment: Data Unavailable   No LMP recorded (lmp unknown). Patient is postmenopausal.  Allergies reviewed: Yes  Medications reviewed: Yes    Medications: Medication refills not needed today.  Pharmacy name entered into EPIC:    [facility] - [place], [place] - [address] [place] [address] AND [facility]    Clinical concerns:  PT just concerned about the spotting       Kendrick Maria             "

## 2023-08-14 ENCOUNTER — PATIENT OUTREACH (OUTPATIENT)
Dept: ONCOLOGY | Facility: CLINIC | Age: 88
End: 2023-08-14
Payer: MEDICARE

## 2023-08-14 NOTE — PROGRESS NOTES
Called Jen Larson's daughter to follow up on  questions that hey have prior to surgery.  1. They wonder if there any guidance of timing to receive either the seasonal flu vac or the covid vac prior to surgery or after surgery? Relayed that Jen should not get vaccine within a week from her surgery as it can cause side effects as it can give the OR team a false elevated temp. Jen plans to get her vaccines the first week in Sept.  2. She also needs to have a cavity filled and had planned to do this some time in Sept. Relayed that there is no restriction on dental work prior to surgery.  Marsha will see her mom today and will relay the above. They will keep all scheduled future apts/Nayana Gutierrez RN

## 2023-08-25 ASSESSMENT — ENCOUNTER SYMPTOMS
ARTHRALGIAS: 1
ABDOMINAL PAIN: 0
HEMATURIA: 1
DYSURIA: 0
JOINT SWELLING: 0
BREAST MASS: 0
SORE THROAT: 0
CONSTIPATION: 0
PALPITATIONS: 0
HEADACHES: 1
PARESTHESIAS: 0
WEAKNESS: 0
COUGH: 1
DIARRHEA: 0
NERVOUS/ANXIOUS: 1
NAUSEA: 0
EYE PAIN: 0
FREQUENCY: 1
HEARTBURN: 0
CHILLS: 0
SHORTNESS OF BREATH: 0
HEMATOCHEZIA: 0
FEVER: 0
MYALGIAS: 1
DIZZINESS: 1

## 2023-08-25 ASSESSMENT — ACTIVITIES OF DAILY LIVING (ADL): CURRENT_FUNCTION: NO ASSISTANCE NEEDED

## 2023-08-29 ENCOUNTER — ONCOLOGY VISIT (OUTPATIENT)
Dept: ONCOLOGY | Facility: CLINIC | Age: 88
End: 2023-08-29
Attending: OBSTETRICS & GYNECOLOGY
Payer: MEDICARE

## 2023-08-29 VITALS
BODY MASS INDEX: 32.97 KG/M2 | OXYGEN SATURATION: 97 % | SYSTOLIC BLOOD PRESSURE: 143 MMHG | HEART RATE: 77 BPM | TEMPERATURE: 97.9 F | WEIGHT: 192.2 LBS | DIASTOLIC BLOOD PRESSURE: 83 MMHG

## 2023-08-29 DIAGNOSIS — C54.1 GRADE 1 MALIGNANT NEOPLASM OF ENDOMETRIUM (H): Primary | ICD-10-CM

## 2023-08-29 PROCEDURE — G0463 HOSPITAL OUTPT CLINIC VISIT: HCPCS | Performed by: OBSTETRICS & GYNECOLOGY

## 2023-08-29 PROCEDURE — 99214 OFFICE O/P EST MOD 30 MIN: CPT | Performed by: OBSTETRICS & GYNECOLOGY

## 2023-08-29 ASSESSMENT — PAIN SCALES - GENERAL: PAINLEVEL: NO PAIN (0)

## 2023-08-29 NOTE — LETTER
8/29/2023         RE: Jen Layne  [address]  [address]  [place] [place] [address]        Dear Colleague,    Thank you for referring your patient, Jen Layne, to the [facility]. Please see a copy of my visit note below.                            Consult Notes on Referred Patient    Date: 8/29/2023     The patient returns today for follow-up and treatment planning.    Her history is as follows:    Briefly, she is a 86yo who was seen in ED for intermittent vaginal bleeding x 6 wks. Was actually seen one year ago for hematuria.  At that time, underwent CT urogram 6/8/22 which showed no gross abnormalities to explain hematuria, was noted to have low-density thickening of endometrium.  Underwent a pelvic US on 7/8/22 which showed uterus at 6.8 x 4.6cm with a 3.2cm mass along endometrial canal possibly representing polyp or fibroid.  Never had any further follow-up related to this that I can see.     Was then seen most recently in ED for bleeding. Had repeat pelvic US on 6/5/23 which showed diffusely thickened endometrium.  Had pelvic MRI  8.1 x 4.4 x 5.4 cm in long axis, short axis and transverse dimensions, respectively. An apparent heterogeneous mass-like structure is present in the anterior and right aspect of the uterus in the subendometrial or endometrial  region, measuring approximately 2.5 x 1.6 cm (series 18 image 33). This structure is not well-visualized on T1 weighted images due to artifact. It appears isointense to the uterine myometrium on T2-weighted images. Probable mildly heterogeneous  enhancement of this structure. A few subcentimeter low T2 signal regions in the anterior uterus likely represent very small fibroids.   The endometrial stripe is not well defined due to motion artifact. It measures approximately 0.9 cm in dual-layer anteroposterior thickness. The endometrial region appears mildly irregular on the axial images (for example, series 6 image  15). It cannot be determined on the post contrast images if there is abnormal enhancement associated with the endometrium. Possible restricted diffusion within the region of endometrium.    6/7/23:  Seen in Gyn Onc. Unable to perform EMB due to stenosis    7/19/23:  EUA, D&C under US guidance.  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  MLH1 hypermethylation +    She returns today for evaluation.  She has been scheduled for definitive surgical management on 9/22/23.  No complaints, no changes in symptoms.           Past Medical History:    Past Medical History:   Diagnosis Date    Arthritis     Dyslipidemia     Endometrial cancer (H)     Gout 03/14/2011    Triggered by hydrochlorothiazide      Hypertension     Obesity          Past Surgical History:    Past Surgical History:   Procedure Laterality Date    COLONOSCOPY  12/05/05    normal, recheck 10 years -- done at [facility] HYSTERECTOMY TOTAL, BILATERAL SALPINGO-OOPHORECTOMY, NODE DISSECTION, COMBINED N/A 9/22/2023    Procedure: Robotic assisted hysterectomy, bilateral salpingo-oophorectomy, cancer staging, sentinel lymph node mapping and sampling, pelvic washings;  Surgeon: Amanda Au MD;  Location: [facility] OR    DILATION AND CURETTAGE, WITH ULTRASOUND GUIDANCE N/A 7/19/2023    Procedure: Pelvic examination under anesthesia, dilation and curettage uterus, ultrasound guidance Latex Free;  Surgeon: Amanda Au MD;  Location: [facility] OR    EXAM EYE  03/30/2018    Capsulotomy Left eye.   Dr. Skip Villela    EYE SURGERY  2015    [facility] REMOVAL GALLBLADDER      HYSTEROSCOPY,DIAGNOSTIC  around 1998         Health Maintenance:  Health Maintenance Due   Topic Date Due    COVID-19 Vaccine (6 - Pfizer series) 01/08/2023         Current Medications:     has a current medication list which includes the following prescription(s): acetaminophen, lisinopril, oxycodone, senna-docusate, simvastatin, and vitamin d.       Allergies:     Hydrochlorothiazide         Social History:     Social History     Tobacco Use    Smoking status: Never     Passive exposure: Never    Smokeless tobacco: Never   Substance Use Topics    Alcohol use: No       History   Drug Use No           Family History:     The patient's family history is notable for :.    Family History   Problem Relation Age of Onset    Eye Disorder Mother         glaucoma    Heart Disease Mother          of CHF    Cancer Sister         uterine and ovarian-in remission     Other Cancer Sister     Deep Vein Thrombosis (DVT) Sister     Heart Disease Brother         valve replacement    Lipids Brother     Coronary Artery Disease Brother     Eye Disorder Brother         detached retina    Cancer Paternal Grandfather         carcinoma of the lip, pipe-smoker    Thyroid Disease Daughter         goiter or nodule?    Cancer Son         SKIN    Other Cancer Son     Other Cancer Other     Thyroid Disease Other     Diabetes No family hx of     C.A.D. No family hx of     Breast Cancer No family hx of     Cancer - colorectal No family hx of     Anesthesia Reaction No family hx of          Physical Exam:     BP (!) 143/83 (BP Location: Right arm, Patient Position: Sitting, Cuff Size: Adult Large)   Pulse 77   Temp 97.9  F (36.6  C) (Oral)   Wt 87.2 kg (192 lb 3.2 oz)   LMP  (LMP Unknown)   SpO2 97%   BMI 32.97 kg/m    Body mass index is 32.97 kg/m .    General Appearance: healthy and alert, no distress       Assessment:    Jen Layne is a 88 year old woman with a new diagnosis of FIGO Grade 1 endometrioid TOMAS.       30 minutes spent on the date of the encounter doing chart review, history and exam, documentation and further activities as noted above        Plan:     1.)    FIGO Grade 1 endometrioid TOMAS:  pathology reviewed in detail with patient and her family. Reviewed recommendations for surgery.     Diagnosis, staging, treatment options for endometrial cancer reviewed in detail including surgery, radiation and  medical management. Reviewed general surgical approach to endometrial cancer including hysterectomy, bilateral salpingo-oophorectomy, lymph node dissection to both treat the disease, determine extent of spread, and identify pathologic risk factors which may dictate need for post-operative adjuvant therapy. Discussed surgical approach via laparotomy versus minimally invasive robotic-assisted approach. Patient is an appropriate candidate for robotic approach. Therefore, would recommend robotic-assisted hysterectomy, bilateral salpingo-oophorectomy, cancer staging, sentinel lymph node mapping and sampling, possible laparotomy. Risks, benefits and alternatives to proceed discussed in detail with the patient. Risks include but are not limited to bleeding, infection, possible injury to surrounding organs including bowel, bladder, ureter, need for second procedure/surgery related to complications from first procedure, postoperative medical complications such as cardiopulmonary events, lymphedema, lymphocyst, thromboembolic events. Also discussed specific risks related to robotic procedure including potential for conversion to laparotomy, vaginal cuff dehiscence, trochar site hernia.        Questions answered, patient expressed understanding of plan of care.  Proceed with surgery as scheduled.    Amanda Au MD  Gynecologic Oncology  [facility]      CC  Patient Care Team:  Yamile Cotter MD as PCP - General (Family Medicine)

## 2023-08-29 NOTE — NURSING NOTE
"Oncology Rooming Note    August 29, 2023 11:40 AM   Jen Layne is a 87 year old female who presents for:    Chief Complaint   Patient presents with    Oncology Clinic Visit     malignant neoplasm of endometrium     Initial Vitals: BP (!) 143/83 (BP Location: Right arm, Patient Position: Sitting, Cuff Size: Adult Large)   Pulse 77   Temp 97.9  F (36.6  C) (Oral)   Wt 87.2 kg (192 lb 3.2 oz)   LMP  (LMP Unknown)   SpO2 97%   BMI 32.97 kg/m   Estimated body mass index is 32.97 kg/m  as calculated from the following:    Height as of 8/4/23: 1.626 m (5' 4.02\").    Weight as of this encounter: 87.2 kg (192 lb 3.2 oz). Body surface area is 1.98 meters squared.  No Pain (0) Comment: Data Unavailable   No LMP recorded (lmp unknown). Patient is postmenopausal.  Allergies reviewed: Yes  Medications reviewed: Yes    Medications: Medication refills not needed today.  Pharmacy name entered into EPIC:    [facility] - [place], [place] - [address] [place] [address]    Clinical concerns: none      Brenda Lake, EMT  8/29/2023              "

## 2023-09-01 ENCOUNTER — LAB (OUTPATIENT)
Dept: LAB | Facility: CLINIC | Age: 88
End: 2023-09-01
Payer: MEDICARE

## 2023-09-01 ENCOUNTER — OFFICE VISIT (OUTPATIENT)
Dept: FAMILY MEDICINE | Facility: CLINIC | Age: 88
End: 2023-09-01
Payer: MEDICARE

## 2023-09-01 VITALS
TEMPERATURE: 98.1 F | HEIGHT: 65 IN | HEART RATE: 79 BPM | SYSTOLIC BLOOD PRESSURE: 136 MMHG | WEIGHT: 190 LBS | RESPIRATION RATE: 16 BRPM | DIASTOLIC BLOOD PRESSURE: 84 MMHG | BODY MASS INDEX: 31.65 KG/M2 | OXYGEN SATURATION: 96 %

## 2023-09-01 DIAGNOSIS — N18.2 CKD (CHRONIC KIDNEY DISEASE) STAGE 2, GFR 60-89 ML/MIN: ICD-10-CM

## 2023-09-01 DIAGNOSIS — Z00.00 ENCOUNTER FOR MEDICARE ANNUAL WELLNESS EXAM: Primary | ICD-10-CM

## 2023-09-01 DIAGNOSIS — E78.5 HYPERLIPIDEMIA LDL GOAL <100: ICD-10-CM

## 2023-09-01 DIAGNOSIS — I10 ESSENTIAL HYPERTENSION: ICD-10-CM

## 2023-09-01 DIAGNOSIS — C54.1 ENDOMETRIAL CARCINOMA (H): ICD-10-CM

## 2023-09-01 LAB
ANION GAP SERPL CALCULATED.3IONS-SCNC: 9 MMOL/L (ref 7–15)
BASOPHILS # BLD AUTO: 0 10E3/UL (ref 0–0.2)
BASOPHILS NFR BLD AUTO: 0 %
BUN SERPL-MCNC: 18.9 MG/DL (ref 8–23)
CALCIUM SERPL-MCNC: 9.2 MG/DL (ref 8.8–10.2)
CHLORIDE SERPL-SCNC: 106 MMOL/L (ref 98–107)
CHOLEST SERPL-MCNC: 119 MG/DL
CREAT SERPL-MCNC: 0.78 MG/DL (ref 0.51–0.95)
CREAT UR-MCNC: 229.2 MG/DL
DEPRECATED HCO3 PLAS-SCNC: 25 MMOL/L (ref 22–29)
EOSINOPHIL # BLD AUTO: 0.1 10E3/UL (ref 0–0.7)
EOSINOPHIL NFR BLD AUTO: 2 %
ERYTHROCYTE [DISTWIDTH] IN BLOOD BY AUTOMATED COUNT: 12.9 % (ref 10–15)
GFR SERPL CREATININE-BSD FRML MDRD: 74 ML/MIN/1.73M2
GLUCOSE SERPL-MCNC: 115 MG/DL (ref 70–99)
HCT VFR BLD AUTO: 45.4 % (ref 35–47)
HDLC SERPL-MCNC: 40 MG/DL
HGB BLD-MCNC: 14.9 G/DL (ref 11.7–15.7)
IMM GRANULOCYTES # BLD: 0 10E3/UL
IMM GRANULOCYTES NFR BLD: 0 %
LDLC SERPL CALC-MCNC: 52 MG/DL
LYMPHOCYTES # BLD AUTO: 1.6 10E3/UL (ref 0.8–5.3)
LYMPHOCYTES NFR BLD AUTO: 18 %
MCH RBC QN AUTO: 30.2 PG (ref 26.5–33)
MCHC RBC AUTO-ENTMCNC: 32.8 G/DL (ref 31.5–36.5)
MCV RBC AUTO: 92 FL (ref 78–100)
MICROALBUMIN UR-MCNC: 56.5 MG/L
MICROALBUMIN/CREAT UR: 24.65 MG/G CR (ref 0–25)
MONOCYTES # BLD AUTO: 0.8 10E3/UL (ref 0–1.3)
MONOCYTES NFR BLD AUTO: 9 %
NEUTROPHILS # BLD AUTO: 6.4 10E3/UL (ref 1.6–8.3)
NEUTROPHILS NFR BLD AUTO: 71 %
NONHDLC SERPL-MCNC: 79 MG/DL
PLATELET # BLD AUTO: 296 10E3/UL (ref 150–450)
POTASSIUM SERPL-SCNC: 4.3 MMOL/L (ref 3.4–5.3)
RBC # BLD AUTO: 4.94 10E6/UL (ref 3.8–5.2)
SODIUM SERPL-SCNC: 140 MMOL/L (ref 136–145)
TRIGL SERPL-MCNC: 133 MG/DL
WBC # BLD AUTO: 9 10E3/UL (ref 4–11)

## 2023-09-01 PROCEDURE — G0439 PPPS, SUBSEQ VISIT: HCPCS | Performed by: FAMILY MEDICINE

## 2023-09-01 PROCEDURE — 36415 COLL VENOUS BLD VENIPUNCTURE: CPT

## 2023-09-01 PROCEDURE — 99214 OFFICE O/P EST MOD 30 MIN: CPT | Mod: 25 | Performed by: FAMILY MEDICINE

## 2023-09-01 PROCEDURE — 82043 UR ALBUMIN QUANTITATIVE: CPT

## 2023-09-01 PROCEDURE — 82570 ASSAY OF URINE CREATININE: CPT

## 2023-09-01 PROCEDURE — 80061 LIPID PANEL: CPT

## 2023-09-01 PROCEDURE — 85025 COMPLETE CBC W/AUTO DIFF WBC: CPT

## 2023-09-01 PROCEDURE — 80048 BASIC METABOLIC PNL TOTAL CA: CPT

## 2023-09-01 RX ORDER — LISINOPRIL 20 MG/1
20 TABLET ORAL DAILY
Qty: 90 TABLET | Refills: 4 | Status: SHIPPED | OUTPATIENT
Start: 2023-09-01 | End: 2024-09-12

## 2023-09-01 RX ORDER — SIMVASTATIN 40 MG
40 TABLET ORAL AT BEDTIME
Qty: 90 TABLET | Refills: 4 | Status: SHIPPED | OUTPATIENT
Start: 2023-09-01 | End: 2024-09-12

## 2023-09-01 ASSESSMENT — ENCOUNTER SYMPTOMS
FREQUENCY: 1
CHILLS: 0
DYSURIA: 0
CONSTIPATION: 0
COUGH: 1
SHORTNESS OF BREATH: 0
HEMATURIA: 1
HEADACHES: 1
MYALGIAS: 1
ABDOMINAL PAIN: 0
DIZZINESS: 1
ARTHRALGIAS: 1
JOINT SWELLING: 0
SORE THROAT: 0
NAUSEA: 0
BREAST MASS: 0
FEVER: 0
DIARRHEA: 0
WEAKNESS: 0
HEARTBURN: 0
NERVOUS/ANXIOUS: 1
HEMATOCHEZIA: 0
EYE PAIN: 0
PALPITATIONS: 0
PARESTHESIAS: 0

## 2023-09-01 ASSESSMENT — PAIN SCALES - GENERAL: PAINLEVEL: NO PAIN (0)

## 2023-09-01 ASSESSMENT — ACTIVITIES OF DAILY LIVING (ADL): CURRENT_FUNCTION: NO ASSISTANCE NEEDED

## 2023-09-01 NOTE — PROGRESS NOTES
"SUBJECTIVE:   Jen is a 87 yea old who presents for Preventive Visit.      9/1/2023     7:41 AM   Additional Questions   Roomed by Veronica RUBI CMA       Are you in the first 12 months of your Medicare coverage?  No    Healthy Habits:     In general, how would you rate your overall health?  Good    Frequency of exercise:  None    Do you usually eat at least 4 servings of fruit and vegetables a day, include whole grains    & fiber and avoid regularly eating high fat or \"junk\" foods?  No    Taking medications regularly:  Yes    Medication side effects:  Lightheadedness    Ability to successfully perform activities of daily living:  No assistance needed    Home Safety:  Lack of grab bars in the bathroom    Hearing Impairment:  No hearing concerns    In the past 6 months, have you been bothered by leaking of urine? Yes    In general, how would you rate your overall mental or emotional health?  Good    Additional concerns today:  No        Have you ever done Advance Care Planning? (For example, a Health Directive, POLST, or a discussion with a medical provider or your loved ones about your wishes): Yes, advance care planning is on file.       Fall risk  Fallen 2 or more times in the past year?: No  Any fall with injury in the past year?: No    Cognitive Screening   1) Repeat 3 items (Leader, Season, Table)    2) Clock draw: NORMAL  3) 3 item recall: Recalls 3 objects  Results: 3 items recalled: COGNITIVE IMPAIRMENT LESS LIKELY    Mini-CogTM Copyright S Adrianna. Licensed by the author for use in [facility]; reprinted with permission (brittani@.[email or web address]). All rights reserved.      Do you have sleep apnea, excessive snoring or daytime drowsiness? : no    Reviewed and updated as needed this visit by clinical staff                  Reviewed and updated as needed this visit by Provider                 Social History     Tobacco Use    Smoking status: Never     Passive exposure: Never    Smokeless tobacco: Never "   Substance Use Topics    Alcohol use: No             8/25/2023     9:20 AM   Alcohol Use   Prescreen: >3 drinks/day or >7 drinks/week? Not Applicable     Do you have a current opioid prescription? No  Do you use any other controlled substances or medications that are not prescribed by a provider? None            Current providers sharing in care for this patient include:   Patient Care Team:  Yamile Cotter MD as PCP - General (Family Medicine)  Yamile Cotter MD as Assigned PCP  June Boyle PA-C as Assigned Surgical Provider  Amanda Au MD as MD (Gynecologic Oncology)  Nayana Gutierrez, RN as Specialty Care Coordinator (Gynecologic Oncology)  Tori Johnson APRN CNP as Assigned Cancer Care Provider    The following health maintenance items are reviewed in Epic and correct as of today:  Health Maintenance   Topic Date Due    MEDICARE ANNUAL WELLNESS VISIT  03/05/2022    COVID-19 Vaccine (6 - Pfizer series) 01/08/2023    DTAP/TDAP/TD IMMUNIZATION (5 - Td or Tdap) 03/21/2023    LIPID  05/13/2023    MICROALBUMIN  05/13/2023    ANNUAL REVIEW OF HM ORDERS  05/13/2023    INFLUENZA VACCINE (1) 09/01/2023    BMP  07/06/2024    FALL RISK ASSESSMENT  09/01/2024    ADVANCE CARE PLANNING  03/05/2026    PHQ-2 (once per calendar year)  Completed    Pneumococcal Vaccine: 65+ Years  Completed    URINALYSIS  Completed    ZOSTER IMMUNIZATION  Completed    IPV IMMUNIZATION  Aged Out    HPV IMMUNIZATION  Aged Out    MENINGITIS IMMUNIZATION  Aged Out    MAMMO SCREENING  Discontinued     Labs reviewed in EPIC  Patient Active Problem List   Diagnosis    Essential hypertension    Hyperlipidemia LDL goal <100    Obesity (BMI 30-39.9)    CKD (chronic kidney disease) stage 2, GFR 60-89 ml/min    Advance Care Planning    Impaired fasting glucose    Metabolic syndrome     Past Surgical History:   Procedure Laterality Date    COLONOSCOPY  12/05/05    normal, recheck 10 years -- done at [facility]  AND CURETTAGE, WITH ULTRASOUND GUIDANCE N/A 2023    Procedure: Pelvic examination under anesthesia, dilation and curettage uterus, ultrasound guidance Latex Free;  Surgeon: Amanda Au MD;  Location: [facility] OR    EXAM EYE  2018    Capsulotomy Left eye.   Dr. Skip Villela    EYE SURGERY      HC REMOVAL GALLBLADDER      HYSTEROSCOPY,DIAGNOSTIC  around        Social History     Tobacco Use    Smoking status: Never     Passive exposure: Never    Smokeless tobacco: Never   Substance Use Topics    Alcohol use: No     Family History   Problem Relation Age of Onset    Eye Disorder Mother         glaucoma    Heart Disease Mother          of CHF    Cancer Sister         uterine and ovarian-in remission     Other Cancer Sister     Deep Vein Thrombosis (DVT) Sister     Heart Disease Brother         valve replacement    Lipids Brother     Coronary Artery Disease Brother     Eye Disorder Brother         detached retina    Cancer Paternal Grandfather         carcinoma of the lip, pipe-smoker    Thyroid Disease Daughter         goiter or nodule?    Cancer Son         SKIN    Other Cancer Son     Other Cancer Other     Thyroid Disease Other     Diabetes No family hx of     C.A.D. No family hx of     Breast Cancer No family hx of     Cancer - colorectal No family hx of     Anesthesia Reaction No family hx of          Current Outpatient Medications   Medication Sig Dispense Refill    lisinopril (ZESTRIL) 20 MG tablet Take 1 tablet (20 mg) by mouth daily (Patient taking differently: Take 20 mg by mouth every evening) 90 tablet 4    simvastatin (ZOCOR) 40 MG tablet Take 1 tablet (40 mg) by mouth At Bedtime 90 tablet 4    VITAMIN D 1000 UNIT OR CAPS Take 1 capsule by mouth every morning       Allergies   Allergen Reactions    Hydrochlorothiazide      Triggered two gout attacks, no further problmes since discontinuing drug         Mammogram Screening - Patient over age 75, has elected to discontinue  "screenings.  Pertinent mammograms are reviewed under the imaging tab.    Review of Systems   Constitutional:  Negative for chills and fever.   HENT:  Negative for congestion, ear pain, hearing loss and sore throat.    Eyes:  Negative for pain and visual disturbance.   Respiratory:  Positive for cough. Negative for shortness of breath.    Cardiovascular:  Positive for peripheral edema. Negative for chest pain and palpitations.   Gastrointestinal:  Negative for abdominal pain, constipation, diarrhea, heartburn, hematochezia and nausea.   Breasts:  Negative for tenderness, breast mass and discharge.   Genitourinary:  Positive for frequency, hematuria, urgency and vaginal bleeding. Negative for dysuria, genital sores, pelvic pain and vaginal discharge.   Musculoskeletal:  Positive for arthralgias and myalgias. Negative for joint swelling.   Skin:  Negative for rash.   Neurological:  Positive for dizziness and headaches. Negative for weakness and paresthesias.   Psychiatric/Behavioral:  Negative for mood changes. The patient is nervous/anxious.          OBJECTIVE:   /84   Pulse 79   Temp 98.1  F (36.7  C) (Tympanic)   Resp 16   Ht 1.638 m (5' 4.5\")   Wt 86.2 kg (190 lb)   LMP  (LMP Unknown)   SpO2 96%   BMI 32.11 kg/m   Estimated body mass index is 32.11 kg/m  as calculated from the following:    Height as of this encounter: 1.638 m (5' 4.5\").    Weight as of this encounter: 86.2 kg (190 lb).  Physical Exam  GENERAL APPEARANCE: healthy, alert and no distress  EYES: Eyes grossly normal to inspection, PERRL and conjunctivae and sclerae normal  HENT: ear canals and TM's normal  NECK: no adenopathy, no asymmetry, masses, or scars and thyroid normal to palpation  RESP: lungs clear to auscultation - no rales, rhonchi or wheezes  BREAST: normal without masses, tenderness or nipple discharge and no palpable axillary masses or adenopathy  CV: regular rate and rhythm, normal S1 S2, no S3 or S4, no murmur, click or " "rub, no peripheral edema and peripheral pulses strong  ABDOMEN: soft, nontender, no hepatosplenomegaly, no masses and bowel sounds normal  MS: no musculoskeletal defects are noted and gait is age appropriate without ataxia  SKIN: no suspicious lesions or rashes  NEURO: Normal strength and tone, sensory exam grossly normal, mentation intact and speech normal  PSYCH: mentation appears normal and affect normal/bright    Diagnostic Test Results:  Labs reviewed in Epic    ASSESSMENT / PLAN:   Encounter for Medicare annual wellness exam    Endometrial carcinoma (H)   Having upcoming surgery for endometrial cancer. She is already scheduled for a pre-op.    Hyperlipidemia LDL goal <100  Well controlled. Refilled medication. Check labs.    - Lipid panel reflex to direct LDL Non-fasting; Future  - simvastatin (ZOCOR) 40 MG tablet; Take 1 tablet (40 mg) by mouth At Bedtime    CKD (chronic kidney disease) stage 2, GFR 60-89 ml/min  Stable check labs..  - Albumin Random Urine Quantitative with Creat Ratio; Future  - CBC with Platelets & Differential; Future  - Basic metabolic panel; Future    Essential hypertension  Well controlled. Refilled medication. Check labs.    - lisinopril (ZESTRIL) 20 MG tablet; Take 1 tablet (20 mg) by mouth daily (Patient taking differently: Take 20 mg by mouth every evening)  - Basic metabolic panel; Future        Patient has been advised of split billing requirements and indicates understanding: Yes      COUNSELING:  Reviewed preventive health counseling, as reflected in patient instructions      BMI:   Estimated body mass index is 32.11 kg/m  as calculated from the following:    Height as of this encounter: 1.638 m (5' 4.5\").    Weight as of this encounter: 86.2 kg (190 lb).         She reports that she has never smoked. She has never been exposed to tobacco smoke. She has never used smokeless tobacco.      Appropriate preventive services were discussed with this patient, including applicable " screening as appropriate for cardiovascular disease, diabetes, osteopenia/osteoporosis, and glaucoma.  As appropriate for age/gender, discussed screening for colorectal cancer, prostate cancer, breast cancer, and cervical cancer. Checklist reviewing preventive services available has been given to the patient.    Reviewed patients plan of care and provided an AVS. The Basic Care Plan (routine screening as documented in Health Maintenance) for Jen meets the Care Plan requirement. This Care Plan has been established and reviewed with the Patient.          Yamile Cotter MD  [facility]    Identified Health Risks:

## 2023-09-01 NOTE — NURSING NOTE
"Initial /84   Pulse 79   Temp 98.1  F (36.7  C) (Tympanic)   Resp 16   Ht 1.638 m (5' 4.5\")   Wt 86.2 kg (190 lb)   LMP  (LMP Unknown)   SpO2 96%   BMI 32.11 kg/m   Estimated body mass index is 32.11 kg/m  as calculated from the following:    Height as of this encounter: 1.638 m (5' 4.5\").    Weight as of this encounter: 86.2 kg (190 lb). .    "

## 2023-09-13 ENCOUNTER — PRE VISIT (OUTPATIENT)
Dept: SURGERY | Facility: CLINIC | Age: 88
End: 2023-09-13

## 2023-09-13 ENCOUNTER — VIRTUAL VISIT (OUTPATIENT)
Dept: SURGERY | Facility: CLINIC | Age: 88
End: 2023-09-13
Payer: MEDICARE

## 2023-09-13 ENCOUNTER — ANESTHESIA EVENT (OUTPATIENT)
Dept: SURGERY | Facility: CLINIC | Age: 88
End: 2023-09-13
Payer: MEDICARE

## 2023-09-13 DIAGNOSIS — C54.1 ENDOMETRIAL CANCER (H): ICD-10-CM

## 2023-09-13 DIAGNOSIS — Z01.818 PRE-OP EVALUATION: Primary | ICD-10-CM

## 2023-09-13 PROBLEM — E66.01 MORBID OBESITY (H): Status: RESOLVED | Noted: 2018-08-14 | Resolved: 2023-09-13

## 2023-09-13 PROCEDURE — 99213 OFFICE O/P EST LOW 20 MIN: CPT | Mod: 95 | Performed by: PHYSICIAN ASSISTANT

## 2023-09-13 ASSESSMENT — PAIN SCALES - GENERAL: PAINLEVEL: NO PAIN (0)

## 2023-09-13 ASSESSMENT — ENCOUNTER SYMPTOMS: SEIZURES: 0

## 2023-09-13 ASSESSMENT — LIFESTYLE VARIABLES: TOBACCO_USE: 0

## 2023-09-13 NOTE — PROGRESS NOTES
Jen is a 87 year old who is being evaluated via a billable video visit.      How would you like to obtain your AVS? MyChart  If the video visit is dropped, the invitation should be resent by: Text to cell phone: [phone]        HPI               Review of Systems             Physical Exam

## 2023-09-13 NOTE — PATIENT INSTRUCTIONS
Preparing for Your Surgery      Name:  Jen Layne   MRN:  [id number]   :  1935   Today's Date:  2023       Arriving for surgery:  Surgery date:  23  Arrival time:  09:30 am      Please come to:        [facility] [address]  [address] [place]  [address]      The [facility] Patient /Visitor Ramp is located at [address]. Patients and visitors who self-park will receive the reduced hospital parking rate. If the Patient /Visitor Ramp is full, please follow the signs to the [facility] parking located at the main hospital entrance.     parking is available ( 24 hours/ 7 days a week)    Discounted parking pass options are available for patients and visitors. They can be purchased at the [employer] desk at the [facility] hospital entrance.    -    Stop at the security desk and they will direct surgery patients to the 3rd floor Surgery Waiting Room. [phone]     -  If you are in need of directions, wheelchair or escort please stop at the Information/security desk in the lobby.       What can I eat or drink?  -  You may eat and drink normally up to 8 hours prior to arrival time.  -  You may have clear liquids until 2 hours prior to arrival time.    Examples of clear liquids:  Water  Clear broth  Juices (apple, white grape, white cranberry  and cider) without pulp  Noncarbonated, powder based beverages  (lemonade and Daniel-Aid)  Sodas (Sprite, 7-Up, ginger ale and seltzer)  Coffee or tea (without milk or cream)  Gatorade    -  No Alcohol or cannabis products for at least 24 hours before surgery.     Which medicines can I take?    Hold Aspirin for 7 days before surgery.   Hold Multivitamins for 7 days before surgery.  Hold Supplements for 7 days before surgery.  Hold Ibuprofen (Advil, Motrin) for 1 day(s) before surgery--unless otherwise directed by surgeon.  Hold Naproxen (Aleve) for 4 days before surgery.    -  DO  NOT take these medications the day of surgery:  Vitamin D.    -  PLEASE TAKE these medications the day of surgery:  Tylenol if needed.    How do I prepare myself?  - Please take 2 showers (one the night prior to surgery and one the morning of surgery) using Scrubcare or Hibiclens soap.    Use this soap only from the neck to your toes.     Leave the soap on your skin for one minute--then rinse thoroughly.      You may use your own shampoo and conditioner. No other hair products.   - Please remove all jewelry and body piercings.  - No lotions, deodorants or fragrance.  - No makeup or fingernail polish.   - Bring your ID and insurance card.    -If you have a Deep Brain Stimulator, Spinal Cord Stimulator, or any Neuro Stimulator device---you must bring the remote control to the hospital.      ALL PATIENTS GOING HOME THE SAME DAY OF SURGERY ARE REQUIRED TO HAVE A RESPONSIBLE ADULT TO DRIVE AND BE IN ATTENDANCE WITH THEM FOR 24 HOURS FOLLOWING SURGERY.    Covid testing policy as of 12/06/2022  Your surgeon will notify and schedule you for a COVID test if one is needed before surgery--please direct any questions or COVID symptoms to your surgeon      Questions or Concerns:    - For any questions regarding the day of surgery or your hospital stay, please contact the Pre Admission Nursing Office at [phone].       - If you have health changes between today and your surgery, please call your surgeon.       - For questions after surgery, please call your surgeons office.           Current Visitor Guidelines    You may have 2 visitors in the pre op area.    Visiting hours: 8 a.m. to 8:30 p.m.    You may have four visitors during your inpatient hospital stay.    Patients confirmed or suspected to have symptoms of COVID 19 or flu:     No visitors allowed for adult patients.   Children (under age 18) can have 1 named visitor.     People who are sick or showing symptoms of COVID 19 or flu:    Are not allowed to visit  patients--we can only make exceptions in special situations.       Please follow these guidelines for your visit:          Please maintain social distance          Masking is optional--however at times you may be asked to wear a mask for the safety of yourself and others     Clean your hands with alcohol hand . Do this when you arrive at and leave the building and patient room,    And again after you touch your mask or anything in the room.     Go directly to and from the room you are visiting.     Stay in the patient s room during your visit. Limit going to other places in the hospital as much as possible     Leave bags and jackets at home or in the car.     For everyone s health, please don t come and go during your visit. That includes for smoking   during your visit.

## 2023-09-13 NOTE — H&P
Pre-Operative H & P     CC:  Preoperative exam to assess for increased cardiopulmonary risk while undergoing surgery and anesthesia.    Date of Encounter: 9/13/2023  Primary Care Physician:  Yamile Cotter     Reason for visit:   Encounter Diagnoses   Name Primary?    Pre-op evaluation Yes    Endometrial cancer (H)        HPI  Jen Layne is a 87 year old female who presents for pre-operative H & P in preparation for  Procedure Information       Case: [id number] Date/Time: 09/22/23 1130    Procedures:       Robotic assisted hysterectomy, bilateral salpingo-oophorectomy, cancer staging, sentinel lymph node mapping and sampling (Abdomen)      possible open Hysterectomy, Total Abdominal, With Bilateral Salpingo-Oophorectomy, With Lymphadenectomy (Abdomen)    Anesthesia type: General    Diagnosis: Endometrial cancer (H) [C54.1]    Pre-op diagnosis: Endometrial cancer (H) [C54.1]    Location:  OR  /  OR    Providers: Amanda Au MD            Patient is being evaluated for comorbid conditions of HTN, hyperlipidemia, obesity, and arthritis.    She was recently evaluated for postmenopausal bleeding and has been diagnosed with endometrial cancer after undergoing a pelvic exam under anesthesia and D&C on 7/19/2023. She continues to follow closely with gynecologic oncology and surgical intervention has been recommended for further management. She is now scheduled for surgery as above.    History is obtained from the patient and chart review. Her daughter's Jacqueline and Marsha were present during this visit.    Hx of abnormal bleeding or anti-platelet use: Denies    Menstrual history: No LMP recorded (lmp unknown). Patient is postmenopausal.     Past Medical History  Past Medical History:   Diagnosis Date    Arthritis     Dyslipidemia     Endometrial cancer (H)     Gout 03/14/2011    Triggered by hydrochlorothiazide      Hypertension     Obesity        Past Surgical History  Past Surgical History:    Procedure Laterality Date    COLONOSCOPY  12/05/05    normal, recheck 10 years -- done at [facility]    DILATION AND CURETTAGE, WITH ULTRASOUND GUIDANCE N/A 7/19/2023    Procedure: Pelvic examination under anesthesia, dilation and curettage uterus, ultrasound guidance Latex Free;  Surgeon: Amanda Au MD;  Location: [facility] OR    EXAM EYE  03/30/2018    Capsulotomy Left eye.   Dr. Skip Villela    EYE SURGERY  2015    [facility] REMOVAL GALLBLADDER      HYSTEROSCOPY,DIAGNOSTIC  around 1998       Prior to Admission Medications  Current Outpatient Medications   Medication Sig Dispense Refill    lisinopril (ZESTRIL) 20 MG tablet Take 1 tablet (20 mg) by mouth daily (Patient taking differently: Take 20 mg by mouth every evening) 90 tablet 4    simvastatin (ZOCOR) 40 MG tablet Take 1 tablet (40 mg) by mouth At Bedtime 90 tablet 4    VITAMIN D 1000 UNIT OR CAPS Take 1 capsule by mouth every morning         Allergies  Allergies   Allergen Reactions    Hydrochlorothiazide      Triggered two gout attacks, no further problmes since discontinuing drug       Social History  Social History     Socioeconomic History    Marital status:      Spouse name: Not on file    Number of children: Not on file    Years of education: Not on file    Highest education level: Not on file   Occupational History    Not on file   Tobacco Use    Smoking status: Never     Passive exposure: Never    Smokeless tobacco: Never   Vaping Use    Vaping Use: Never used   Substance and Sexual Activity    Alcohol use: No    Drug use: No    Sexual activity: Never   Other Topics Concern    Parent/sibling w/ CABG, MI or angioplasty before 65F 55M? No   Social History Narrative    2008 --- 4 children, 11 grandchildren, 3 great-grandchildren     Social Determinants of Health     Financial Resource Strain: Not on file   Food Insecurity: Not on file   Transportation Needs: Not on file   Physical Activity: Not on file   Stress: Not on file   Social Connections: Not on  file   Intimate Partner Violence: Not on file   Housing Stability: Not on file       Family History  Family History   Problem Relation Age of Onset    Eye Disorder Mother         glaucoma    Heart Disease Mother          of CHF    Cancer Sister         uterine and ovarian-in remission     Other Cancer Sister     Deep Vein Thrombosis (DVT) Sister     Heart Disease Brother         valve replacement    Lipids Brother     Coronary Artery Disease Brother     Eye Disorder Brother         detached retina    Cancer Paternal Grandfather         carcinoma of the lip, pipe-smoker    Thyroid Disease Daughter         goiter or nodule?    Cancer Son         SKIN    Other Cancer Son     Other Cancer Other     Thyroid Disease Other     Diabetes No family hx of     C.A.D. No family hx of     Breast Cancer No family hx of     Cancer - colorectal No family hx of     Anesthesia Reaction No family hx of        Review of Systems  The complete review of systems is negative other than noted in the HPI or here.   Anesthesia Evaluation   Pt has had prior anesthetic.     No history of anesthetic complications       ROS/MED HX  ENT/Pulmonary:  - neg pulmonary ROS  (-) tobacco use, asthma, sleep apnea and recent URI   Neurologic:  - neg neurologic ROS  (-) no seizures and no CVA   Cardiovascular:     (+) Dyslipidemia hypertension- -   -  - -                                      METS/Exercise Tolerance: 3 - Able to walk 1-2 blocks without stopping Comment: Feels like she is limited due to postmenopausal bleeding because it increases with activity. Able to ascend stairs and walk > 2 blocks. Does own shopping. Denies chest pain/pressure, ALLEN, orthopnea, edema.   Hematologic:  - neg hematologic  ROS  (-) history of blood clots and history of blood transfusion   Musculoskeletal:   (+)  arthritis (feet),             GI/Hepatic:  - neg GI/hepatic ROS  (-) GERD and liver disease   Renal/Genitourinary:  - neg Renal ROS  (-) renal disease   Endo:      (+)               Obesity,    (-) Type II DM and thyroid disease   Psychiatric/Substance Use:  - neg psychiatric ROS     Infectious Disease:  - neg infectious disease ROS     Malignancy:   (+) Malignancy, History of Other.Other CA Endometrial cancer Active status post.    Other:  - neg other ROS          Virtual visit -  No vitals were obtained    Physical Exam  Constitutional: Pleasant female, no apparent distress, and appears stated age.  Eyes: Pupils equal  HENT: Normocephalic and atraumatic  Respiratory: Non labored breathing on room air  Neurologic: Awake, alert, oriented to name, place and time.   Neuropsychiatric: Calm, cooperative. Normal affect.        Prior Labs/Diagnostic Studies   All labs and imaging personally reviewed     EKG/ stress test - if available please see in ROS above   No results found.    The patient's records and results personally reviewed by this provider.     Outside records reviewed from: Care Everywhere      Assessment  Jen Layne is a 87 year old female seen as a PAC referral for risk assessment and optimization for anesthesia.    Plan/Recommendations  Pt will be optimized for the proposed procedure.  See below for details on the assessment, risk, and preoperative recommendations    NEUROLOGY  - No history of TIA, CVA or seizure    -Post Op delirium risk factors:  Age    ENT  - No current airway concerns.  Will need to be reassessed day of surgery.  Mallampati: Unable to assess  TM: Unable to assess    CARDIAC  - No history of CAD and Afib  - Hypertension  Well controlled  - Hyperlipidemia  Well controlled on home regimen  - Patient reports that bleeding is limiting her activity. She is able to walk >2 blocks and ascend stairs without stopping. She is able to do her own shopping. Denies chest pain/pressure, ALLEN, orthopnea, edema, etc.    - METS (Metabolic Equivalents)  Patient CANNOT perform 4 METS exercise          Total Score: 1    Functional Capacity: Unable to complete  "4 METS      RCRI-Very low risk: Class 1 0.4% complication rate            Total Score: 0        PULMONARY    MONAE Low Risk            Total Score: 2    MONAE: Hypertension    MONAE: Over 50 ys old      - Denies asthma or inhaler use  - Tobacco History    History   Smoking Status    Never   Smokeless Tobacco    Never       GI    PONV High Risk  Total Score: 3           1 AN PONV: Pt is Female    1 AN PONV: Patient is not a current smoker    1 AN PONV: Intended Post Op Opioids        /RENAL  - Baseline Creatinine  0.78  - Endometrial Cancer, above procedure planned    ENDOCRINE    - BMI: Estimated body mass index is 32.11 kg/m  as calculated from the following:    Height as of 9/1/23: 1.638 m (5' 4.5\").    Weight as of 9/1/23: 86.2 kg (190 lb).  Obesity (BMI >30)  - No history of Diabetes Mellitus    HEME  VTE High Risk 3%            Total Score: 10    VTE: Greater than 59 yrs old    VTE: Family Hx of VTE    VTE: Current cancer      - No history of abnormal bleeding or antiplatelet use.    A type and screen has been ordered for this patient for DOS by the surgical team    MSK  - Arthritis affecting bilateral feet. Consider careful positioning to limit discomfort and fall precautions.    Different anesthesia methods/types have been discussed with the patient, but they are aware that the final plan will be decided by the assigned anesthesia provider on the date of service.    The patient is optimized for their procedure. AVS with information on surgery time/arrival time, meds and NPO status given by nursing staff. No further diagnostic testing indicated.    Please refer to the physical examination documented by the anesthesiologist in the anesthesia record on the day of surgery.    Video-Visit Details    Type of service:  Video Visit    Provider received verbal consent for a Video Visit from the patient? Yes   Video Start Time:  0903  Video End Time: 0914    Originating Location (pt. Location): Home    Distant Location " (provider location):  Off-site  Mode of Communication:  Video Conference via [employer] [facility]  On the day of service:     Prep time: 8 minutes  Visit time: 11 minutes  Documentation time: 5 minutes  ------------------------------------------  Total time: 24 minutes      Clementina Newell PA-C  Preoperative Assessment Center  [facility]  [facility]  Phone: [phone]  Fax: [phone]

## 2023-09-14 ENCOUNTER — MYC MEDICAL ADVICE (OUTPATIENT)
Dept: FAMILY MEDICINE | Facility: CLINIC | Age: 88
End: 2023-09-14
Payer: MEDICARE

## 2023-09-14 NOTE — PATIENT INSTRUCTIONS
"Patient Education   Personalized Prevention Plan  You are due for the preventive services outlined below.  Your care team is available to assist you in scheduling these services.  If you have already completed any of these items, please share that information with your care team to update in your medical record.  Health Maintenance Due   Topic Date Due     Annual Wellness Visit  03/05/2022     COVID-19 Vaccine (6 - Pfizer series) 01/08/2023     Learning About Being Physically Active  What is physical activity?     Being physically active means doing any kind of activity that gets your body moving.  The types of physical activity that can help you get fit and stay healthy include:  Aerobic or \"cardio\" activities. These make your heart beat faster and make you breathe harder, such as brisk walking, riding a bike, or running. They strengthen your heart and lungs and build up your endurance.  Strength training activities. These make your muscles work against, or \"resist,\" something. Examples include lifting weights or doing push-ups. These activities help tone and strengthen your muscles and bones.  Stretches. These let you move your joints and muscles through their full range of motion. Stretching helps you be more flexible.  Reaching a balance between these three types of physical activity is important because each one contributes to your overall fitness.  What are the benefits of being active?  Being active is one of the best things you can do for your health. It helps you to:  Feel stronger and have more energy to do all the things you like to do.  Focus better at school or work.  Feel, think, and sleep better.  Reach and stay at a healthy weight.  Lose fat and build lean muscle.  Lower your risk for serious health problems, including diabetes, heart attack, high blood pressure, and some cancers.  Keep your heart, lungs, bones, muscles, and joints strong and healthy.  How can you make being active part of your " "life?  Start slowly. Make it your long-term goal to get at least 30 minutes of exercise on most days of the week. Walking is a good choice. You also may want to do other activities, such as running, swimming, cycling, or playing tennis or team sports.  Pick activities that you like--ones that make your heart beat faster, your muscles stronger, and your muscles and joints more flexible. If you find more than one thing you like doing, do them all. You don't have to do the same thing every day.  Get your heart pumping every day. Any activity that makes your heart beat faster and keeps it at that rate for a while counts.  Here are some great ways to get your heart beating faster:  Go for a brisk walk, run, or bike ride.  Go for a hike or swim.  Go in-line skating.  Play a game of touch football, basketball, or soccer.  Ride a bike.  Play tennis or racquetball.  Climb stairs.  Even some household chores can be aerobic--just do them at a faster pace. Vacuuming, raking or mowing the lawn, sweeping the garage, and washing and waxing the car all can help get your heart rate up.  Strengthen your muscles during the week. You don't have to lift heavy weights or grow big, bulky muscles to get stronger. Doing a few simple activities that make your muscles work against, or \"resist,\" something can help you get stronger.  For example, you can:  Do push-ups or sit-ups, which use your own body weight as resistance.  Lift weights or dumbbells or use stretch bands at home or in a gym or community center.  Stretch your muscles often. Stretching will help you as you become more active. It can help you stay flexible, loosen tight muscles, and avoid injury. It can also help improve your balance and posture and can be a great way to relax.  Be sure to stretch the muscles you'll be using when you work out. It's best to warm your muscles slightly before you stretch them. Walk or do some other light aerobic activity for a few minutes, and then " "start stretching.  When you stretch your muscles:  Do it slowly. Stretching is not about going fast or making sudden movements.  Don't push or bounce during a stretch.  Hold each stretch for at least 15 to 30 seconds, if you can. You should feel a stretch in the muscle, but not pain.  Breathe out as you do the stretch. Then breathe in as you hold the stretch. Don't hold your breath.  If you're worried about how more activity might affect your health, have a checkup before you start. Follow any special advice your doctor gives you for getting a smart start.  Where can you learn more?  Go to https://www.[employer].net/patiented  Enter W332 in the search box to learn more about \"Learning About Being Physically Active.\"  Current as of: October 10, 2022               Content Version: 13.7    9603-9229 [employer]   Care instructions adapted under license by your healthcare professional. If you have questions about a medical condition or this instruction, always ask your healthcare professional. [employer] disclaims any warranty or liability for your use of this information.      Learning About Dietary Guidelines  What are the Dietary Guidelines for Americans?     Dietary Guidelines for Americans provide tips for eating well and staying healthy. This helps reduce the risk for long-term (chronic) diseases.  These guidelines recommend that you:  Eat and drink the right amount for you. The U.S. government's food guide is called MyPlate. It can help you make your own well-balanced eating plan.  Try to balance your eating with your activity. This helps you stay at a healthy weight.  Drink alcohol in moderation, if at all.  Limit foods high in salt, saturated fat, trans fat, and added sugar.  These guidelines are from the U.S. Department of Agriculture and the U.S. Department of Health and Human Services. They are updated every 5 years.  What is MyPlate?  MyPlate is the U.S. government's food guide. " "It can help you make your own well-balanced eating plan. A balanced eating plan means that you eat enough, but not too much, and that your food gives you the nutrients you need to stay healthy.  MyPlate focuses on eating plenty of whole grains, fruits, and vegetables, and on limiting fat and sugar. It is available online at www.ChooseMyPlate.gov.  How can you get started?  If you're trying to eat healthier, you can slowly change your eating habits over time. You don't have to make big changes all at once. Start by adding one or two healthy foods to your meals each day.  Grains  Choose whole-grain breads and cereals and whole-wheat pasta and whole-grain crackers.  Vegetables  Eat a variety of vegetables every day. They have lots of nutrients and are part of a heart-healthy diet.  Fruits  Eat a variety of fruits every day. Fruits contain lots of nutrients. Choose fresh fruit instead of fruit juice.  Protein foods  Choose fish and lean poultry more often. Eat red meat and fried meats less often. Dried beans, tofu, and nuts are also good sources of protein.  Dairy  Choose low-fat or fat-free products from this food group. If you have problems digesting milk, try eating cheese or yogurt instead.  Fats and oils  Limit fats and oils if you're trying to cut calories. Choose healthy fats when you cook. These include canola oil and olive oil.  Where can you learn more?  Go to https://www.[employer].net/patiented  Enter D676 in the search box to learn more about \"Learning About Dietary Guidelines.\"  Current as of: March 1, 2023               Content Version: 13.7 2006-2023 [employer].   Care instructions adapted under license by your healthcare professional. If you have questions about a medical condition or this instruction, always ask your healthcare professional. [employer] disclaims any warranty or liability for your use of this information.      Bladder Training: Care Instructions  Your Care " Instructions     Bladder training is used to treat urge incontinence and stress incontinence. Urge incontinence means that the need to urinate comes on so fast that you can't get to a toilet in time. Stress incontinence means that you leak urine because of pressure on your bladder. For example, it may happen when you laugh, cough, or lift something heavy.  Bladder training can increase how long you can wait before you have to urinate. It can also help your bladder hold more urine. And it can give you better control over the urge to urinate.  It is important to remember that bladder training takes a few weeks to a few months to make a difference. You may not see results right away, but don't give up.  Follow-up care is a key part of your treatment and safety. Be sure to make and go to all appointments, and call your doctor if you are having problems. It's also a good idea to know your test results and keep a list of the medicines you take.  How can you care for yourself at home?  Work with your doctor to come up with a bladder training program that is right for you. You may use one or more of the following methods.  Delayed urination  In the beginning, try to keep from urinating for 5 minutes after you first feel the need to go.  While you wait, take deep, slow breaths to relax. Kegel exercises can also help you delay the need to go to the bathroom.  After some practice, when you can easily wait 5 minutes to urinate, try to wait 10 minutes before you urinate.  Slowly increase the waiting period until you are able to control when you have to urinate.  Scheduled urination  Empty your bladder when you first wake up in the morning.  Schedule times throughout the day when you will urinate.  Start by going to the bathroom every hour, even if you don't need to go.  Slowly increase the time between trips to the bathroom.  When you have found a schedule that works well for you, keep doing it.  If you wake up during the night  "and have to urinate, do it. Apply your schedule to waking hours only.  Kegel exercises  These tighten and strengthen pelvic muscles, which can help you control the flow of urine. (If doing these exercises causes pain, stop doing them and talk with your doctor.) To do Kegel exercises:  Squeeze your muscles as if you were trying not to pass gas. Or squeeze your muscles as if you were stopping the flow of urine. Your belly, legs, and buttocks shouldn't move.  Hold the squeeze for 3 seconds, then relax for 5 to 10 seconds.  Start with 3 seconds, then add 1 second each week until you are able to squeeze for 10 seconds.  Repeat the exercise 10 times a session. Do 3 to 8 sessions a day.  When should you call for help?  Watch closely for changes in your health, and be sure to contact your doctor if:    Your incontinence is getting worse.     You do not get better as expected.   Where can you learn more?  Go to https://www.[employer].net/patiented  Enter V684 in the search box to learn more about \"Bladder Training: Care Instructions.\"  Current as of: March 1, 2023               Content Version: 13.7    8962-0216 [employer].   Care instructions adapted under license by your healthcare professional. If you have questions about a medical condition or this instruction, always ask your healthcare professional. [employer] disclaims any warranty or liability for your use of this information.         "

## 2023-09-14 NOTE — TELEPHONE ENCOUNTER
"Her visit on the first was coded as a Medicare annual wellness visit.  I am not sure why it is showing just \"preventive visit\" on her end.  Epic shows that it was a Medicare annual wellness exam and was billed as such.  "

## 2023-09-19 ENCOUNTER — NURSE TRIAGE (OUTPATIENT)
Dept: ONCOLOGY | Facility: CLINIC | Age: 88
End: 2023-09-19
Payer: MEDICARE

## 2023-09-19 ENCOUNTER — PATIENT OUTREACH (OUTPATIENT)
Dept: ONCOLOGY | Facility: CLINIC | Age: 88
End: 2023-09-19
Payer: MEDICARE

## 2023-09-19 NOTE — PROGRESS NOTES
RN reached out to patients daughter to follow up     Daughter unavailable. VM left    Pily Hines RN

## 2023-09-19 NOTE — PROGRESS NOTES
"Patient daughter reached out with questions/concerns    Patient has had light bleeding off for some time. Did have heavy bleeding prior to her  EUA and D&C on 7/19    This past Saturday patients vaginal bleeding increased. Patient is changing her pad every hour and half and did have one clot that was the size of a \"pam chip\". Pad is not saturated. Patient is particular and has been changing them often because they do have blood on them. Daughter does not know how much blood is actually on the pad or how continuous the bleeding is.     Patients son was at the house and stated the patient has increased in how pale she is and has been more tired     Family denies any SOB     RN and daughter reviewed without specifics of how heavy the bleeding is it is hard to advise.     Daughter is going to go to the [facility] and will report back after she sees her mom and can better evaluate the situation.     RN and patient reviewed when to seek immediate care at ED versus calling clinic back.     Daughter is appreciative of RN time and will report back once she gets to the patients [place]     Daughter appreciative of RN time     Pily Hines RN        "

## 2023-09-19 NOTE — TELEPHONE ENCOUNTER
Patient's daughter called and advised this RN that patient's vaginal bleeding has increased over the last couple of days.  She is having power red blood and is having to change her pad about every 1.5 hours.  Daughter advises that her energy level is lower, pallor is paler.  Denies SOB.  Patient's anxiety level has also increased.    Discussed with Tori Johnson and Geetha Hines.  Geetha to call patient and daughter to discuss further and provide appropriate guidance.

## 2023-09-21 ENCOUNTER — PATIENT OUTREACH (OUTPATIENT)
Dept: ONCOLOGY | Facility: CLINIC | Age: 88
End: 2023-09-21
Payer: MEDICARE

## 2023-09-21 NOTE — PROGRESS NOTES
RN reached out to check in     Patient doing better. Vaginal bleeding is much improved.    When daughter went to patients house she was able to see the bleeding was not as heavy as originally thought and was more like at the end of a period type bleeding. The day prior was much heavier but things have steadily improved since then     No concerns at this time and they feel good about moving forward with surgery tomorrow     Daughter appreciative of the call    Pily Hines RN

## 2023-09-22 ENCOUNTER — ANESTHESIA (OUTPATIENT)
Dept: SURGERY | Facility: CLINIC | Age: 88
End: 2023-09-22
Payer: MEDICARE

## 2023-09-22 ENCOUNTER — HOSPITAL ENCOUNTER (OUTPATIENT)
Facility: CLINIC | Age: 88
Discharge: HOME | End: 2023-09-22
Attending: OBSTETRICS & GYNECOLOGY | Admitting: OBSTETRICS & GYNECOLOGY
Payer: MEDICARE

## 2023-09-22 VITALS
RESPIRATION RATE: 18 BRPM | WEIGHT: 188.93 LBS | DIASTOLIC BLOOD PRESSURE: 80 MMHG | TEMPERATURE: 97.8 F | OXYGEN SATURATION: 94 % | SYSTOLIC BLOOD PRESSURE: 147 MMHG | BODY MASS INDEX: 31.48 KG/M2 | HEART RATE: 64 BPM | HEIGHT: 65 IN

## 2023-09-22 DIAGNOSIS — G89.18 POST-OP PAIN: Primary | ICD-10-CM

## 2023-09-22 PROBLEM — Z90.710 S/P HYSTERECTOMY: Status: ACTIVE | Noted: 2023-09-22

## 2023-09-22 LAB
ABO/RH(D): NORMAL
ANTIBODY SCREEN: NEGATIVE
ATRIAL RATE - MUSE: 64 BPM
DIASTOLIC BLOOD PRESSURE - MUSE: NORMAL MMHG
HOLD SPECIMEN: NORMAL
INTERPRETATION ECG - MUSE: NORMAL
P AXIS - MUSE: 33 DEGREES
PR INTERVAL - MUSE: 248 MS
QRS DURATION - MUSE: 90 MS
QT - MUSE: 418 MS
QTC - MUSE: 431 MS
R AXIS - MUSE: -14 DEGREES
SPECIMEN EXPIRATION DATE: NORMAL
SYSTOLIC BLOOD PRESSURE - MUSE: NORMAL MMHG
T AXIS - MUSE: 2 DEGREES
VENTRICULAR RATE- MUSE: 64 BPM

## 2023-09-22 PROCEDURE — 250N000025 HC SEVOFLURANE, PER MIN: Performed by: OBSTETRICS & GYNECOLOGY

## 2023-09-22 PROCEDURE — 272N000001 HC OR GENERAL SUPPLY STERILE: Performed by: OBSTETRICS & GYNECOLOGY

## 2023-09-22 PROCEDURE — 93005 ELECTROCARDIOGRAM TRACING: CPT | Mod: XU

## 2023-09-22 PROCEDURE — 250N000011 HC RX IP 250 OP 636: Performed by: STUDENT IN AN ORGANIZED HEALTH CARE EDUCATION/TRAINING PROGRAM

## 2023-09-22 PROCEDURE — 710N000012 HC RECOVERY PHASE 2, PER MINUTE: Performed by: OBSTETRICS & GYNECOLOGY

## 2023-09-22 PROCEDURE — 93010 ELECTROCARDIOGRAM REPORT: CPT | Performed by: INTERNAL MEDICINE

## 2023-09-22 PROCEDURE — 88112 CYTOPATH CELL ENHANCE TECH: CPT | Mod: TC | Performed by: OBSTETRICS & GYNECOLOGY

## 2023-09-22 PROCEDURE — 360N000080 HC SURGERY LEVEL 7, PER MIN: Performed by: OBSTETRICS & GYNECOLOGY

## 2023-09-22 PROCEDURE — 250N000009 HC RX 250: Performed by: NURSE ANESTHETIST, CERTIFIED REGISTERED

## 2023-09-22 PROCEDURE — 258N000003 HC RX IP 258 OP 636: Performed by: NURSE ANESTHETIST, CERTIFIED REGISTERED

## 2023-09-22 PROCEDURE — 250N000011 HC RX IP 250 OP 636: Performed by: ANESTHESIOLOGY

## 2023-09-22 PROCEDURE — 250N000009 HC RX 250: Performed by: OBSTETRICS & GYNECOLOGY

## 2023-09-22 PROCEDURE — 250N000011 HC RX IP 250 OP 636: Performed by: OBSTETRICS & GYNECOLOGY

## 2023-09-22 PROCEDURE — 999N000141 HC STATISTIC PRE-PROCEDURE NURSING ASSESSMENT: Performed by: OBSTETRICS & GYNECOLOGY

## 2023-09-22 PROCEDURE — 250N000011 HC RX IP 250 OP 636: Mod: JZ | Performed by: NURSE ANESTHETIST, CERTIFIED REGISTERED

## 2023-09-22 PROCEDURE — 88307 TISSUE EXAM BY PATHOLOGIST: CPT | Mod: TC | Performed by: OBSTETRICS & GYNECOLOGY

## 2023-09-22 PROCEDURE — 250N000011 HC RX IP 250 OP 636: Mod: JZ | Performed by: STUDENT IN AN ORGANIZED HEALTH CARE EDUCATION/TRAINING PROGRAM

## 2023-09-22 PROCEDURE — 250N000013 HC RX MED GY IP 250 OP 250 PS 637: Performed by: OBSTETRICS & GYNECOLOGY

## 2023-09-22 PROCEDURE — 250N000011 HC RX IP 250 OP 636: Mod: JZ | Performed by: OBSTETRICS & GYNECOLOGY

## 2023-09-22 PROCEDURE — 36415 COLL VENOUS BLD VENIPUNCTURE: CPT | Performed by: OBSTETRICS & GYNECOLOGY

## 2023-09-22 PROCEDURE — 250N000009 HC RX 250: Performed by: STUDENT IN AN ORGANIZED HEALTH CARE EDUCATION/TRAINING PROGRAM

## 2023-09-22 PROCEDURE — 710N000009 HC RECOVERY PHASE 1, LEVEL 1, PER MIN: Performed by: OBSTETRICS & GYNECOLOGY

## 2023-09-22 PROCEDURE — 86901 BLOOD TYPING SEROLOGIC RH(D): CPT | Performed by: OBSTETRICS & GYNECOLOGY

## 2023-09-22 PROCEDURE — 370N000017 HC ANESTHESIA TECHNICAL FEE, PER MIN: Performed by: OBSTETRICS & GYNECOLOGY

## 2023-09-22 RX ORDER — LIDOCAINE HYDROCHLORIDE 20 MG/ML
INJECTION, SOLUTION INFILTRATION; PERINEURAL PRN
Status: DISCONTINUED | OUTPATIENT
Start: 2023-09-22 | End: 2023-09-22

## 2023-09-22 RX ORDER — FLUMAZENIL 0.1 MG/ML
0.2 INJECTION, SOLUTION INTRAVENOUS
Status: DISCONTINUED | OUTPATIENT
Start: 2023-09-22 | End: 2023-09-22 | Stop reason: HOSPADM

## 2023-09-22 RX ORDER — HEPARIN SODIUM 5000 [USP'U]/.5ML
5000 INJECTION, SOLUTION INTRAVENOUS; SUBCUTANEOUS
Status: COMPLETED | OUTPATIENT
Start: 2023-09-22 | End: 2023-09-22

## 2023-09-22 RX ORDER — OXYCODONE HYDROCHLORIDE 5 MG/1
5 TABLET ORAL
Status: DISCONTINUED | OUTPATIENT
Start: 2023-09-22 | End: 2023-09-22 | Stop reason: HOSPADM

## 2023-09-22 RX ORDER — SODIUM CHLORIDE, SODIUM LACTATE, POTASSIUM CHLORIDE, CALCIUM CHLORIDE 600; 310; 30; 20 MG/100ML; MG/100ML; MG/100ML; MG/100ML
INJECTION, SOLUTION INTRAVENOUS CONTINUOUS
Status: DISCONTINUED | OUTPATIENT
Start: 2023-09-22 | End: 2023-09-22 | Stop reason: HOSPADM

## 2023-09-22 RX ORDER — SODIUM CHLORIDE, SODIUM LACTATE, POTASSIUM CHLORIDE, CALCIUM CHLORIDE 600; 310; 30; 20 MG/100ML; MG/100ML; MG/100ML; MG/100ML
INJECTION, SOLUTION INTRAVENOUS CONTINUOUS PRN
Status: DISCONTINUED | OUTPATIENT
Start: 2023-09-22 | End: 2023-09-22

## 2023-09-22 RX ORDER — NALOXONE HYDROCHLORIDE 0.4 MG/ML
0.2 INJECTION, SOLUTION INTRAMUSCULAR; INTRAVENOUS; SUBCUTANEOUS
Status: DISCONTINUED | OUTPATIENT
Start: 2023-09-22 | End: 2023-09-22 | Stop reason: HOSPADM

## 2023-09-22 RX ORDER — FENTANYL CITRATE 50 UG/ML
INJECTION, SOLUTION INTRAMUSCULAR; INTRAVENOUS PRN
Status: DISCONTINUED | OUTPATIENT
Start: 2023-09-22 | End: 2023-09-22

## 2023-09-22 RX ORDER — ACETAMINOPHEN 325 MG/1
650 TABLET ORAL EVERY 6 HOURS PRN
Qty: 24 TABLET | Refills: 0 | Status: SHIPPED | OUTPATIENT
Start: 2023-09-22 | End: 2023-10-26

## 2023-09-22 RX ORDER — PROPOFOL 10 MG/ML
INJECTION, EMULSION INTRAVENOUS PRN
Status: DISCONTINUED | OUTPATIENT
Start: 2023-09-22 | End: 2023-09-22

## 2023-09-22 RX ORDER — PHENAZOPYRIDINE HYDROCHLORIDE 200 MG/1
200 TABLET, FILM COATED ORAL ONCE
Status: COMPLETED | OUTPATIENT
Start: 2023-09-22 | End: 2023-09-22

## 2023-09-22 RX ORDER — OXYCODONE HYDROCHLORIDE 10 MG/1
10 TABLET ORAL
Status: DISCONTINUED | OUTPATIENT
Start: 2023-09-22 | End: 2023-09-22 | Stop reason: HOSPADM

## 2023-09-22 RX ORDER — ACETAMINOPHEN 325 MG/1
975 TABLET ORAL ONCE
Status: DISCONTINUED | OUTPATIENT
Start: 2023-09-23 | End: 2023-09-22 | Stop reason: HOSPADM

## 2023-09-22 RX ORDER — FENTANYL CITRATE 50 UG/ML
25 INJECTION, SOLUTION INTRAMUSCULAR; INTRAVENOUS EVERY 5 MIN PRN
Status: DISCONTINUED | OUTPATIENT
Start: 2023-09-22 | End: 2023-09-22 | Stop reason: HOSPADM

## 2023-09-22 RX ORDER — ONDANSETRON 2 MG/ML
INJECTION INTRAMUSCULAR; INTRAVENOUS PRN
Status: DISCONTINUED | OUTPATIENT
Start: 2023-09-22 | End: 2023-09-22

## 2023-09-22 RX ORDER — ONDANSETRON 2 MG/ML
4 INJECTION INTRAMUSCULAR; INTRAVENOUS EVERY 30 MIN PRN
Status: DISCONTINUED | OUTPATIENT
Start: 2023-09-22 | End: 2023-09-22 | Stop reason: HOSPADM

## 2023-09-22 RX ORDER — AMOXICILLIN 250 MG
1-2 CAPSULE ORAL 2 TIMES DAILY
Qty: 30 TABLET | Refills: 0 | Status: SHIPPED | OUTPATIENT
Start: 2023-09-22 | End: 2023-10-26

## 2023-09-22 RX ORDER — ONDANSETRON 4 MG/1
4 TABLET, ORALLY DISINTEGRATING ORAL EVERY 30 MIN PRN
Status: DISCONTINUED | OUTPATIENT
Start: 2023-09-22 | End: 2023-09-22 | Stop reason: HOSPADM

## 2023-09-22 RX ORDER — CEFAZOLIN SODIUM/WATER 2 G/20 ML
2 SYRINGE (ML) INTRAVENOUS SEE ADMIN INSTRUCTIONS
Status: DISCONTINUED | OUTPATIENT
Start: 2023-09-22 | End: 2023-09-22 | Stop reason: HOSPADM

## 2023-09-22 RX ORDER — HYDROMORPHONE HCL IN WATER/PF 6 MG/30 ML
0.4 PATIENT CONTROLLED ANALGESIA SYRINGE INTRAVENOUS EVERY 5 MIN PRN
Status: DISCONTINUED | OUTPATIENT
Start: 2023-09-22 | End: 2023-09-22 | Stop reason: HOSPADM

## 2023-09-22 RX ORDER — BUPIVACAINE HYDROCHLORIDE 2.5 MG/ML
INJECTION, SOLUTION EPIDURAL; INFILTRATION; INTRACAUDAL
Status: COMPLETED | OUTPATIENT
Start: 2023-09-22 | End: 2023-09-22

## 2023-09-22 RX ORDER — FENTANYL CITRATE 50 UG/ML
25-50 INJECTION, SOLUTION INTRAMUSCULAR; INTRAVENOUS
Status: DISCONTINUED | OUTPATIENT
Start: 2023-09-22 | End: 2023-09-22 | Stop reason: HOSPADM

## 2023-09-22 RX ORDER — CEFAZOLIN SODIUM/WATER 2 G/20 ML
2 SYRINGE (ML) INTRAVENOUS
Status: COMPLETED | OUTPATIENT
Start: 2023-09-22 | End: 2023-09-22

## 2023-09-22 RX ORDER — NALOXONE HYDROCHLORIDE 0.4 MG/ML
0.4 INJECTION, SOLUTION INTRAMUSCULAR; INTRAVENOUS; SUBCUTANEOUS
Status: DISCONTINUED | OUTPATIENT
Start: 2023-09-22 | End: 2023-09-22 | Stop reason: HOSPADM

## 2023-09-22 RX ORDER — OXYCODONE HYDROCHLORIDE 5 MG/1
5-10 TABLET ORAL EVERY 4 HOURS PRN
Qty: 6 TABLET | Refills: 0 | Status: SHIPPED | OUTPATIENT
Start: 2023-09-22 | End: 2023-10-26

## 2023-09-22 RX ORDER — INDOCYANINE GREEN AND WATER 25 MG
KIT INJECTION PRN
Status: DISCONTINUED | OUTPATIENT
Start: 2023-09-22 | End: 2023-09-22 | Stop reason: HOSPADM

## 2023-09-22 RX ORDER — DEXMEDETOMIDINE HYDROCHLORIDE 4 UG/ML
INJECTION, SOLUTION INTRAVENOUS
Status: COMPLETED | OUTPATIENT
Start: 2023-09-22 | End: 2023-09-22

## 2023-09-22 RX ORDER — METRONIDAZOLE 500 MG/100ML
500 INJECTION, SOLUTION INTRAVENOUS
Status: COMPLETED | OUTPATIENT
Start: 2023-09-22 | End: 2023-09-22

## 2023-09-22 RX ORDER — DEXAMETHASONE SODIUM PHOSPHATE 4 MG/ML
INJECTION, SOLUTION INTRA-ARTICULAR; INTRALESIONAL; INTRAMUSCULAR; INTRAVENOUS; SOFT TISSUE PRN
Status: DISCONTINUED | OUTPATIENT
Start: 2023-09-22 | End: 2023-09-22

## 2023-09-22 RX ORDER — HYDROMORPHONE HCL IN WATER/PF 6 MG/30 ML
0.2 PATIENT CONTROLLED ANALGESIA SYRINGE INTRAVENOUS EVERY 5 MIN PRN
Status: DISCONTINUED | OUTPATIENT
Start: 2023-09-22 | End: 2023-09-22 | Stop reason: HOSPADM

## 2023-09-22 RX ORDER — IBUPROFEN 200 MG
600 TABLET ORAL ONCE
Status: DISCONTINUED | OUTPATIENT
Start: 2023-09-23 | End: 2023-09-22 | Stop reason: HOSPADM

## 2023-09-22 RX ORDER — GLYCOPYRROLATE 0.2 MG/ML
INJECTION, SOLUTION INTRAMUSCULAR; INTRAVENOUS PRN
Status: DISCONTINUED | OUTPATIENT
Start: 2023-09-22 | End: 2023-09-22

## 2023-09-22 RX ORDER — DEXAMETHASONE SODIUM PHOSPHATE 10 MG/ML
INJECTION, SOLUTION INTRAMUSCULAR; INTRAVENOUS
Status: COMPLETED | OUTPATIENT
Start: 2023-09-22 | End: 2023-09-22

## 2023-09-22 RX ORDER — FENTANYL CITRATE 50 UG/ML
50 INJECTION, SOLUTION INTRAMUSCULAR; INTRAVENOUS EVERY 5 MIN PRN
Status: DISCONTINUED | OUTPATIENT
Start: 2023-09-22 | End: 2023-09-22 | Stop reason: HOSPADM

## 2023-09-22 RX ADMIN — DEXAMETHASONE SODIUM PHOSPHATE 4 MG: 4 INJECTION, SOLUTION INTRA-ARTICULAR; INTRALESIONAL; INTRAMUSCULAR; INTRAVENOUS; SOFT TISSUE at 13:08

## 2023-09-22 RX ADMIN — PHENAZOPYRIDINE 200 MG: 200 TABLET ORAL at 10:03

## 2023-09-22 RX ADMIN — HEPARIN SODIUM 5000 UNITS: 5000 INJECTION, SOLUTION INTRAVENOUS; SUBCUTANEOUS at 10:03

## 2023-09-22 RX ADMIN — Medication 2 G: at 12:51

## 2023-09-22 RX ADMIN — SODIUM CHLORIDE, POTASSIUM CHLORIDE, SODIUM LACTATE AND CALCIUM CHLORIDE: 600; 310; 30; 20 INJECTION, SOLUTION INTRAVENOUS at 12:54

## 2023-09-22 RX ADMIN — FENTANYL CITRATE 100 MCG: 50 INJECTION, SOLUTION INTRAMUSCULAR; INTRAVENOUS at 12:54

## 2023-09-22 RX ADMIN — SODIUM CHLORIDE, POTASSIUM CHLORIDE, SODIUM LACTATE AND CALCIUM CHLORIDE: 600; 310; 30; 20 INJECTION, SOLUTION INTRAVENOUS at 15:38

## 2023-09-22 RX ADMIN — SUGAMMADEX 200 MG: 100 INJECTION, SOLUTION INTRAVENOUS at 15:35

## 2023-09-22 RX ADMIN — DEXAMETHASONE SODIUM PHOSPHATE 2 MG: 10 INJECTION, SOLUTION INTRAMUSCULAR; INTRAVENOUS at 10:30

## 2023-09-22 RX ADMIN — FENTANYL CITRATE 25 MCG: 50 INJECTION, SOLUTION INTRAMUSCULAR; INTRAVENOUS at 16:17

## 2023-09-22 RX ADMIN — DEXMEDETOMIDINE 40 MCG: 100 INJECTION, SOLUTION, CONCENTRATE INTRAVENOUS at 10:30

## 2023-09-22 RX ADMIN — ONDANSETRON 4 MG: 2 INJECTION INTRAMUSCULAR; INTRAVENOUS at 15:28

## 2023-09-22 RX ADMIN — GLYCOPYRROLATE 0.1 MG: 0.2 INJECTION, SOLUTION INTRAMUSCULAR; INTRAVENOUS at 14:24

## 2023-09-22 RX ADMIN — Medication 20 MG: at 14:33

## 2023-09-22 RX ADMIN — LIDOCAINE HYDROCHLORIDE 100 MG: 20 INJECTION, SOLUTION INFILTRATION; PERINEURAL at 12:54

## 2023-09-22 RX ADMIN — Medication 50 MG: at 13:08

## 2023-09-22 RX ADMIN — HYDROMORPHONE HYDROCHLORIDE 0.5 MG: 1 INJECTION, SOLUTION INTRAMUSCULAR; INTRAVENOUS; SUBCUTANEOUS at 15:28

## 2023-09-22 RX ADMIN — SUCCINYLCHOLINE CHLORIDE 80 MG: 20 INJECTION, SOLUTION INTRAMUSCULAR; INTRAVENOUS; PARENTERAL at 12:54

## 2023-09-22 RX ADMIN — FENTANYL CITRATE 25 MCG: 50 INJECTION, SOLUTION INTRAMUSCULAR; INTRAVENOUS at 10:32

## 2023-09-22 RX ADMIN — PROPOFOL 120 MG: 10 INJECTION, EMULSION INTRAVENOUS at 12:54

## 2023-09-22 RX ADMIN — METRONIDAZOLE 500 MG: 500 INJECTION, SOLUTION INTRAVENOUS at 10:53

## 2023-09-22 RX ADMIN — FENTANYL CITRATE 25 MCG: 50 INJECTION, SOLUTION INTRAMUSCULAR; INTRAVENOUS at 17:01

## 2023-09-22 RX ADMIN — BUPIVACAINE HYDROCHLORIDE 60 ML: 2.5 INJECTION, SOLUTION EPIDURAL; INFILTRATION; INTRACAUDAL; PERINEURAL at 10:30

## 2023-09-22 ASSESSMENT — ACTIVITIES OF DAILY LIVING (ADL)
ADLS_ACUITY_SCORE: 35

## 2023-09-22 NOTE — BRIEF OP NOTE
[facility]    Brief Operative Note    Pre-operative diagnosis:  Endometrial cancer (H) [C54.1]  Post-operative diagnosis: Same as pre-operative diagnosis    Procedure: Procedure(s):  Robotic assisted hysterectomy, bilateral salpingo-oophorectomy, cancer staging, sentinel lymph node mapping and sampling, pelvic washings  Surgeon: Surgeon(s) and Role:     * Amanda Au MD - Primary       Trupti Chowdhury MD PGY4 - Assistant       Eloisa John MS4 - Assistant    Anesthesia: General with Block   Estimated Blood Loss: 50 ml  UOP: 120 ml  IVF: 1000 ml crystalloid    Drains: None  Specimens:   ID Type Source Tests Collected by Time Destination   1 : Pelvic Washings Washings Pelvis NON-GYNECOLOGIC CYTOLOGY Amanda Au MD 9/22/2023  1:51 PM    2 : Left external iliac sentinel lymph node Tissue Lymph Node(s), Iliac, Left SURGICAL PATHOLOGY EXAM Amanda Au MD 9/22/2023  2:11 PM    3 : Right obturator sentinnel lymph node Tissue Lymph Node(s), Obturator, Right SURGICAL PATHOLOGY EXAM Amanda Au MD 9/22/2023  2:37 PM    4 : Uterus, Cervix, Bilateral Fallopian Tubes & Ovaries Tissue Uterus, Cervix, Bilateral Fallopian Tubes & Ovaries SURGICAL PATHOLOGY EXAM Amanda Au MD 9/22/2023  3:00 PM      Findings:  EUA with normal external genitalia, atrophic vaginal mucosa, small smooth cervix, mobile mid position uterus. On laparoscopy, no evidence of injury upon entry. Physiologic adhesions between descending and sigmoid colon and left pelvic side wall. Normal appearing uterus, bilateral ovaries and fallopian tubes. Ureters visualized and noted to vermiculate bilaterally. [place] lymph nodes mapped and identified, left external iliac and right obturator nodes. Generally poor tissue integrity. Hemostatic surgical sites at the end of the case, Surgicel placed over left pelvic side wall, sigmoid colon, and vaginal cuff.    Complications: None apparent.  Implants: * No  implants in log *    Trupti Chowdhury MD  Gynecologic Oncology, PGY-4  09/22/23 3:49 PM   Team pager: [phone]

## 2023-09-22 NOTE — DISCHARGE INSTRUCTIONS
[facility]  Same-Day Surgery   Adult Discharge Orders & Instructions     For 24 hours after surgery    Get plenty of rest.  A responsible adult must stay with you for at least 24 hours after you leave the hospital.   Do not drive or use heavy equipment.  If you have weakness or tingling, don't drive or use heavy equipment until this feeling goes away.  Do not drink alcohol.  Avoid strenuous or risky activities.  Ask for help when climbing stairs.   You may feel lightheaded.  IF so, sit for a few minutes before standing.  Have someone help you get up.   If you have nausea (feel sick to your stomach): Drink only clear liquids such as apple juice, ginger ale, broth or 7-Up.  Rest may also help.  Be sure to drink enough fluids.  Move to a regular diet as you feel able.  You may have a slight fever. Call the doctor if your fever is over 100 F (37.7 C) (taken under the tongue) or lasts longer than 24 hours.  You may have a dry mouth, a sore throat, muscle aches or trouble sleeping.  These should go away after 24 hours.  Do not make important or legal decisions.   Call your doctor for any of the followin.  Signs of infection (fever, growing tenderness at the surgery site, a large amount of drainage or bleeding, severe pain, foul-smelling drainage, redness, swelling).    2. It has been over 8 to 10 hours since surgery and you are still not able to urinate (pass water).    3.  Headache for over 24 hours.    4.  Numbness, tingling or weakness the day after surgery (if you had spinal anesthesia).  To contact a doctor, call _Dr Au's clinic at [phone] or:     '   [phone] and ask for the resident on call for Gynecology Resident  (answered 24 hours a day)  '   Emergency Department:    [facility]: [phone]       (TTY for hearing impaired: [phone])    [facility]: [phone]       (TTY for hearing impaired: [phone])

## 2023-09-22 NOTE — PROGRESS NOTES
"TAP block performed by Dr. Rodriguez. Pt tolerated well. Patient denies any adverse signs or symptoms. See flow sheet/MAR for monitoring.     Procedure Medication Administration: 25mcg Fentanyl given PIV      BP (!) 155/84   Pulse 65   Temp 98  F (36.7  C) (Oral)   Resp 19   Ht 1.638 m (5' 4.5\")   Wt 85.7 kg (188 lb 15 oz)   LMP  (LMP Unknown)   SpO2 100%   BMI 31.93 kg/m      "

## 2023-09-22 NOTE — OP NOTE
[facility]  Operative Note     Pre-operative diagnosis:  Endometrial cancer (H) [C54.1]  Post-operative diagnosis: Same as pre-operative diagnosis     Procedure:         Procedure(s):  Robotic assisted hysterectomy, bilateral salpingo-oophorectomy, cancer staging, sentinel lymph node mapping and sampling, pelvic washings    Surgeon:            Surgeon(s) and Role:     * Amanda Au MD - Primary       Trupti Chowdhury MD PGY4 - Assistant       Eloisa John MS4 - Assistant     Anesthesia:        General with Block           Estimated Blood Loss: 50 ml  UOP: 120 ml  IVF: 1000 ml crystalloid     Drains:  None  Specimens:          ID Type Source Tests Collected by Time Destination   1 : Pelvic Washings Washings Pelvis NON-GYNECOLOGIC CYTOLOGY Amanda Au MD 9/22/2023  1:51 PM     2 : Left external iliac sentinel lymph node Tissue Lymph Node(s), Iliac, Left SURGICAL PATHOLOGY EXAM Amanda Au MD 9/22/2023  2:11 PM     3 : Right obturator sentinnel lymph node Tissue Lymph Node(s), Obturator, Right SURGICAL PATHOLOGY EXAM Amanda Au MD 9/22/2023  2:37 PM     4 : Uterus, Cervix, Bilateral Fallopian Tubes & Ovaries Tissue Uterus, Cervix, Bilateral Fallopian Tubes & Ovaries SURGICAL PATHOLOGY EXAM Amanda Au MD 9/22/2023  3:00 PM        Findings:          EUA with normal external genitalia, atrophic vaginal mucosa, small smooth cervix, mobile mid position uterus. On laparoscopy, no evidence of injury upon entry. Physiologic adhesions between descending and sigmoid colon and left pelvic side wall. Normal appearing uterus, bilateral ovaries and fallopian tubes. Ureters visualized and noted to vermiculate bilaterally. [place] lymph nodes mapped and identified, left external iliac and right obturator nodes. Generally poor tissue integrity. Hemostatic surgical sites at the end of the case, Surgicel placed over left pelvic side wall, sigmoid colon, and vaginal  cuff.     Complications: None apparent.  Implants:          * No implants in log *    Indication:  Jen Layne is a 87 year old who initially presented with postmenopausal bleeding and was found to have endometrial cancer on tissue biopsy. Management options were discussed and she was recommended to undergo the above listed procedure, to which she agreed. She was consented for the above procedure.    Procedure Details:   Consent was reviewed with the patient in the preoperative setting and confirmed. She received prophylactic antibiotics. The patient was transferred to the operating room where SCDs were placed. General anesthesia was obtained without noted difficulties and she was positioned in dorsal lithotomy position with both arms tucked at sides. Exam under anesthesia was performed with findings as described above. The patient was then prepped and draped in the usual sterile fashion. Surgical time out was performed. A murrell catheter was placed on the sterile field. After placement of a speculum, the cervix was grasped with a single toothed tenaculum and 0.5mg/mL ICG dye in sterile water was injected into the cervix at the 3 and 9 o'clock positions, both deep and superficial, injecting a total of 5 mL. The uterus was sounded and a small V-care uterine manipulator was placed into the uterus with the cervical cap fitted over the cervix.    Attention was then turned to the upper abdomen after gloves were changed. A vertical 3 cm supraumbilical incision was made and S retractors used to dissect subcutaneous tissue down to the fascia. The fascia was grasped with Kocher clamps x2, elevated, and cut with a scalpel. The fascia was tagged with 0 Vicryl on each side. Intraabdominal entry was confirmed and the Garcia robotic port was placed. Pneumoperitoneum was achieved to a maxiumum pressure of 15mm Hg. The Davinci camera was then inserted and the abdomen evaluated with no evidence of intraabdominal trauma from  entry noted. All additional ports were placed under direct visualization without any injuries noted. Three additional 8 mm Davinci ports were placed in the left upper, right and left lower abdomen with a minimum of 10 cm between them. An 8 mm Airseal assistant port was placed between the midline port and the right lower abdomen port. At this point, the patient was placed into steep Trendelenburg. The pelvis was inspected as well as the upper abdomen with findings as noted above. Pelvic washings were collected. The da Dee Dee robot was docked and instruments were inserted including prograsp, bipolar cautery and monopolar scissors.    Attention was turned to lymph node mapping. The left round ligament was sealed and transsected, and the retroperitoneum was opened parallel to the gonadal vessels. The external iliac artery was followed cephalad and the ureter was identified. The para-vesical space was opened to allow visualization of the obturator space. The pelvic sentinel lymph nodes were identified using the ICG dye, tracing the afferent lymphatic channels to the sentinel lymph node. The sentinel lymph node was dissected and removed. The same procedure was performed on the right side, and right sentinel lymph node identified and removed. They were sent for permanent pathology.    The ureter was again identified. The ovarian vessels were isolated, sealed and transected. The posterior broad ligament was transected toward the uterus to begin skeletonizing the uterine artery. The round ligament was transected and the anterior broad ligament was transected toward the uterus and the uterovesical peritoneum was incised. The bladder was dissected away from the lower uterus and upper vagina. The uterine artery was skeletonized and transected. The same procedure was carried out on the contralateral side.    Blanching of the uterus was noted. Serial bites along bilateral cardinal ligaments were completed using the vessel sealer  until the distal cervix was reached on each side. The monopolar scissors were then used to further dissect the cardial ligament so that the ureter and vascular pedicle fell away from the cervix bilaterally. The monopolar scissors were then used to incise the tissue to make the colpotomy circumferentially. This incision was extended around the cervix until the uterus was free of all its attachments. The uterus, cervix, bilateral fallopian tubes and ovaries were then removed through the vagina. A vaginal balloon was inserted to mantain adequate insufflation. The vaginal cuff was closed with interrupted sutures of 0-vicryl with good reapproximation noted. The vaginal cuff was hemostatic and the pelvis irrigated. Hemostasis of all surgical excision sites were noted. Surgicel was placed over the left pelvic side wall and vaginal cuff. All instruments were then removed from the abdomen and the [employer] robot was undocked with ports left in place.     Quigley catheter was removed. Vaginal balloon was removed from the vagina. Vaginal inspection revealed no lacerations or other injuries. The fascia of the supraumbilical port was closed using 0-vicryl in a running fashion. The fascia was palpated and intact. All laparoscopic instruments and ports were then removed and the pneumoperitoneum was expelled. The patient was leveled. Skin was reapproximated with 4-0 monocryl sutures and skin glue applied.     Sponge, instrument and needle counts were correct x 2. The patient tolerated the procedure well and was taken to PACU in stable condition. Dr. Au was present for the entire procedure.    Trupti Chowdhury MD  Gynecologic Oncology, PGY-4  09/22/23 4:22 PM      Attending Attestation:  I was present and scrubbed for the entire surgical procedure. I have reviewed and edited above note and agree with findings as documented.  Amanda Au MD  Gynecologic Oncology  [facility]

## 2023-09-22 NOTE — ANESTHESIA PROCEDURE NOTES
Airway       Patient location during procedure: OR       Procedure Start/Stop Times: 9/22/2023 12:56 PM  Staff -        CRNA: Karla Quintana APRN CRNA       Performed By: CRNAIndications and Patient Condition       Indications for airway management: gamaliel-procedural       Induction type:RSI       Mask difficulty assessment: 1 - vent by mask    Final Airway Details       Final airway type: endotracheal airway       Successful airway: ETT - single  Endotracheal Airway Details        Cuffed: yes       Successful intubation technique: direct laryngoscopy       DL Blade Type: MAC 3       Grade View of Cords: 1       Adjucts: stylet       Position: Right       Measured from: lips       Secured at (cm): 21       Bite block used: None    Post intubation assessment        Placement verified by: capnometry, equal breath sounds and chest rise        Number of attempts at approach: 1       Number of other approaches attempted: 0       Secured with: pink tape       Ease of procedure: easy       Dentition: Intact and Unchanged    Medication(s) Administered   Medication Administration Time: 9/22/2023 12:56 PM

## 2023-09-22 NOTE — PROGRESS NOTES
"Gynecologic Oncology Postoperative Check Note  9/22/2023    S:   Patient reports she is doing well postoperatively. Pain is controlled with PO medications. Ambulating without pain. Voiding spontaneously, noted some spotting when she went to the bathroom but denies any heavier vaginal bleeding. Tolerating water without nausea or vomiting. Denies chest pain, shortness of breath, dizziness, or other concerns at this time. Accompanied by her two daughters, who she will stay with tonight. All questions answered.    O:  Patient Vitals for the past 24 hrs:   BP Temp Temp src Pulse Resp SpO2 Height Weight   09/22/23 1816 (!) 150/90 97.8  F (36.6  C) Oral 61 16 94 % -- --   09/22/23 1730 134/65 -- -- 55 13 (!) 89 % -- --   09/22/23 1715 138/67 97  F (36.1  C) Temporal 60 14 93 % -- --   09/22/23 1700 (!) 149/66 -- -- 62 14 100 % -- --   09/22/23 1645 (!) 145/65 -- -- 61 11 100 % -- --   09/22/23 1630 137/64 -- -- 57 11 99 % -- --   09/22/23 1600 118/66 -- -- 65 15 100 % -- --   09/22/23 1555 129/87 (!) 95.4  F (35.2  C) Temporal 60 14 100 % -- --   09/22/23 1100 119/70 -- -- 64 14 99 % -- --   09/22/23 1050 135/76 -- -- 69 18 100 % -- --   09/22/23 1042 (!) 155/84 -- -- 65 19 100 % -- --   09/22/23 1040 (!) 152/79 -- -- 64 22 100 % -- --   09/22/23 1035 (!) 158/82 -- -- 66 20 100 % -- --   09/22/23 1029 (!) 157/84 -- -- -- 21 -- -- --   09/22/23 1025 (!) 158/83 -- -- 67 21 99 % -- --   09/22/23 0919 (!) 151/88 98  F (36.7  C) Oral 87 16 93 % 1.638 m (5' 4.5\") 85.7 kg (188 lb 15 oz)     Gen: alert and oriented, sitting up in chair  Cardio: rrr, nl s1 and s2, no m/r/g  Resp: lungs CTAB, no wheezes or crackles  Abdomen: soft, non-tender to palpation, non-distended  Incision: C/D/I robotic incision sites x5, covered with skin glue   Extremities: no appreciable edema        A/P:  87 year old POD#0 s/p Robotic assisted hysterectomy, bilateral salpingo-oophorectomy, cancer staging, sentinel lymph node mapping and sampling, pelvic " washings.  Doing well in the post-operative period.  She has voided, tolerated PO intake without n/v, ambulated, and pain is well controlled.  Given instructions to call or come to ED for pain not controlled my PO pain meds, n/v especially if unable to tolerate PO intake all day or not passing gas or had a BM, inability to urinate, fever/chills, heavy vaginal bleeding, or lightheadedness.    Dz: Grade 1 endometrial endometrioid adenocarcinoma   FEN: Advance as tolerated  Pain: Tylenol and Oxy PRN  GI: Bowel Regimen at home   : voiding spontaneously  Dispo: Home, has follow up appointment scheduled with Dr. Au on 10/04/23    Happy early birthday Jen!    Eloisa John- MS4  Medical Student - Gynecologic Oncology   [facility]  09/22/2023 7:15 PM    I was present with the student who participated in the service and in the documentation of the note. I have verified the history and personally performed the physical exam and medical decision-making. I agree with the assessment and plan of care as documented in the note.    Sara Ferrara MD, PGY-3  7:15 PM  [facility]

## 2023-09-22 NOTE — ANESTHESIA CARE TRANSFER NOTE
Patient: Jen Layne    Procedure: Procedure(s):  Robotic assisted hysterectomy, bilateral salpingo-oophorectomy, cancer staging, sentinel lymph node mapping and sampling, pelvic washings       Diagnosis: Endometrial cancer (H) [C54.1]  Diagnosis Additional Information: No value filed.    Anesthesia Type:   General     Note:    Oropharynx: oropharynx clear of all foreign objects and spontaneously breathing  Level of Consciousness: drowsy  Oxygen Supplementation: face mask  Level of Supplemental Oxygen (L/min / FiO2): 6  Independent Airway: airway patency satisfactory and stable  Dentition: dentition unchanged  Vital Signs Stable: post-procedure vital signs reviewed and stable  Report to RN Given: handoff report given  Patient transferred to: PACU    Handoff Report: Identifed the Patient, Identified the Reponsible Provider, Reviewed the pertinent medical history, Discussed the surgical course, Reviewed Intra-OP anesthesia mangement and issues during anesthesia, Set expectations for post-procedure period and Allowed opportunity for questions and acknowledgement of understanding      Vitals:  Vitals Value Taken Time   /87 09/22/23 1554   Temp     Pulse 58 09/22/23 1558   Resp 15 09/22/23 1558   SpO2 100 % 09/22/23 1558   Vitals shown include unvalidated device data.    Electronically Signed By: ÓSCAR Gleason CRNA  September 22, 2023  3:59 PM

## 2023-09-22 NOTE — ANESTHESIA PREPROCEDURE EVALUATION
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   Dr. Skip Villela    EYE SURGERY      [facility] REMOVAL GALLBLADDER      HYSTEROSCOPY,DIAGNOSTIC  around       Allergies   Allergen Reactions    Hydrochlorothiazide      Triggered two gout attacks, no further problmes since discontinuing drug      Social History     Tobacco Use    Smoking status: Never     Passive exposure: Never    Smokeless tobacco: Never   Substance Use Topics    Alcohol use: No      Wt Readings from Last 1 Encounters:   23 85.7 kg (188 lb 15 oz)        Anesthesia Evaluation   Pt has had prior anesthetic. Type: General and MAC.        ROS/MED HX  ENT/Pulmonary:  - neg pulmonary ROS     Neurologic:  - neg neurologic ROS     Cardiovascular:     (+)  hypertension- -   -  - -                                      METS/Exercise Tolerance:     Hematologic:  - neg hematologic  ROS     Musculoskeletal:  - neg musculoskeletal ROS     GI/Hepatic:       Renal/Genitourinary:     (+) renal disease,             Endo:     (+)               Obesity,        Psychiatric/Substance Use:  - neg psychiatric ROS     Infectious Disease:  - neg infectious disease ROS     Malignancy: Comment: Pending pathology in this surgery.      Other:            Physical Exam    Airway  airway exam normal      Mallampati: I   TM distance: > 3 FB   Neck ROM: full   Mouth opening: > 3 cm    Respiratory Devices and Support         Dental       (+) Minor Abnormalities - some fillings, tiny chips      Cardiovascular   cardiovascular exam normal       Rhythm and rate: regular and normal     Pulmonary   pulmonary exam normal        breath sounds clear to auscultation           OUTSIDE LABS:  CBC:   Lab Results   Component Value Date    WBC 9.0 09/01/2023    WBC 9.8 07/06/2023    HGB 14.9 09/01/2023    HGB 15.0 07/19/2023    HCT 45.4 09/01/2023    HCT 42.4 07/06/2023     09/01/2023     07/06/2023     BMP:   Lab Results   Component Value Date     09/01/2023     07/06/2023    POTASSIUM 4.3 09/01/2023    POTASSIUM 4.5 07/06/2023    CHLORIDE 106 09/01/2023    CHLORIDE 106 07/06/2023    CO2 25 09/01/2023    CO2 24 07/06/2023    BUN 18.9 09/01/2023    BUN 18.2 07/06/2023    CR 0.78 09/01/2023    CR 0.81 07/06/2023     (H) 09/01/2023     (H) 07/06/2023     COAGS: No results found for: PTT, INR, FIBR  POC: No results found for: BGM, HCG, HCGS  HEPATIC:   Lab Results   Component Value Date    ALBUMIN 4.0 06/05/2023    PROTTOTAL 6.6 06/05/2023    ALT 21 06/05/2023    AST 31 06/05/2023    ALKPHOS 54 06/05/2023    BILITOTAL 0.5 06/05/2023     OTHER:   Lab Results   Component Value Date    A1C 5.9 (H) 08/14/2018    JOHN 9.2 09/01/2023    PHOS 3.2 08/14/2018    MAG 1.8 05/13/2022    TSH 0.76 06/12/2020       Anesthesia Plan    ASA Status:  3       Anesthesia Type: General.     - Airway: ETT   Induction: Intravenous.   Maintenance: Inhalation.        Consents    Anesthesia Plan(s) and associated risks, benefits, and realistic alternatives discussed. Questions answered and  patient/representative(s) expressed understanding.     - Discussed: Risks, Benefits and Alternatives for BOTH SEDATION and the PROCEDURE were discussed     - Discussed with:  Patient      - Extended Intubation/Ventilatory Support Discussed: Yes.      - Patient is DNR/DNI Status: No     Use of blood products discussed: Yes.     - Discussed with: Patient.     Postoperative Care    Pain management: IV analgesics.   PONV prophylaxis: Ondansetron (or other 5HT-3), Dexamethasone or Solumedrol     Comments:    Other Comments: The material risks, benefits, and alternatives were discussed in detail.  The patient agrees to proceed.  The patient has no other complaints at this time.            Julio Donahue MD

## 2023-09-22 NOTE — ANESTHESIA POSTPROCEDURE EVALUATION
Patient: Jen Layne    Procedure: Procedure(s):  Robotic assisted hysterectomy, bilateral salpingo-oophorectomy, cancer staging, sentinel lymph node mapping and sampling, pelvic washings       Anesthesia Type:  General    Note:  Disposition: Admission   Postop Pain Control: Uneventful            Sign Out: Well controlled pain   PONV: No   Neuro/Psych: Uneventful            Sign Out: Acceptable/Baseline neuro status   Airway/Respiratory: Uneventful            Sign Out: Acceptable/Baseline resp. status   CV/Hemodynamics: Uneventful            Sign Out: Acceptable CV status; No obvious hypovolemia; No obvious fluid overload   Other NRE: NONE   DID A NON-ROUTINE EVENT OCCUR? No    Event details/Postop Comments:  No complications.           Last vitals:  Vitals Value Taken Time   /87 09/22/23 1554   Temp     Pulse 73 09/22/23 1557   Resp 23 09/22/23 1557   SpO2 96 % 09/22/23 1557   Vitals shown include unvalidated device data.    Electronically Signed By: Julio Donahue MD  September 22, 2023  3:58 PM

## 2023-09-22 NOTE — ANESTHESIA PROCEDURE NOTES
TAP Procedure Note    Pre-Procedure   Staff -        Anesthesiologist:  Alexandro Amezquita MD       Resident/Fellow: Shruti Rodriguez MD       Performed By: resident       Location: pre-op       Procedure Start/Stop Times: 9/22/2023 10:30 AM and 9/22/2023 10:42 AM       Pre-Anesthestic Checklist: patient identified, IV checked, risks and benefits discussed, informed consent, monitors and equipment checked, at physician/surgeon's request and post-op pain management  Timeout:       Correct Patient: Yes        Correct Procedure: Yes        Correct Site: Yes        Correct Position: Yes        Correct Laterality: Yes   Procedure Documentation  Procedure: TAP       Diagnosis: BILATERAL SUBCOSTAL AND BILATERAL RECTUS SHEATH       Laterality: bilateral       Patient Position: supine       Patient Prep/Sterile Barriers: sterile gloves, mask       Skin prep: Chloraprep       Needle Type: short bevel       Needle Gauge: 21.        Needle Length (millimeters): 110        Ultrasound guided       1. Ultrasound was used to identify targeted nerve, plexus, vascular marker, or fascial plane and place a needle adjacent to it in real-time.       2. Ultrasound was used to visualize the spread of anesthetic in close proximity to the above referenced structure.       3. A permanent image is entered into the patient's record.    Assessment/Narrative         The placement was negative for: blood aspirated, painful injection and site bleeding       Paresthesias: No.       Bolus given via needle..        Secured via.        Insertion/Infusion Method: Single Shot       Complications: none       Injection made incrementally with aspirations every 5 mL.       Sensory Level Left: T10 and T9.       Sensory Level Right: T10 and T9.    Medication(s) Administered   Bupivacaine 0.25% PF (Infiltration) - Infiltration   60 mL - 9/22/2023 10:30:00 AM  Dexmedetomidine 4 mcg/mL (Perineural) - Perineural   40 mcg - 9/22/2023 10:30:00  "AM  Dexamethasone 10 mg/mL PF (Perineural) - Perineural   2 mg - 9/22/2023 10:30:00 AM  Medication Administration Time: 9/22/2023 10:30 AM     Comments:  Informed consent was obtained. Discussed risks of nerve block, including nerve injury, bleeding, infection. Discussed anticipated incomplete analgesia. Discussed alternative of not performing a nerve block. Ensured understanding, invited questions and all questions were answered. Patient wishes to proceed.    Patient tolerated well. Incremental aspiration every 5 mL. No paresthesia, no heme. Needle tip visualized throughout with appropriate spread of local anesthetic in fascial planes bilaterally. Ultrasound snapshots of plane block are documented in patient's chart.  Block was placed at the surgeon's request for post operative pain control.        FOR [facility] ONLY:   Pain Team Contact information: please page the Pain Team Via [employer]. Search \"Pain\". During daytime hours, please page the attending first. At night please page the resident first.      "

## 2023-09-23 NOTE — OR NURSING
Gyn/onc Resident Sara Ferrara MD came to see the patient and put in discharge order.  Patient voided 2x  Denies nausea  C/o mild pain but decline pain medication  VSS.    Cyndi WOOD

## 2023-09-25 ENCOUNTER — PATIENT OUTREACH (OUTPATIENT)
Dept: ONCOLOGY | Facility: CLINIC | Age: 88
End: 2023-09-25
Payer: MEDICARE

## 2023-09-25 PROCEDURE — 88112 CYTOPATH CELL ENHANCE TECH: CPT | Mod: 26 | Performed by: PATHOLOGY

## 2023-09-25 NOTE — PROGRESS NOTES
Post-Discharge Phone Call     Surgery Date:  9/22/2023     Description of Surgery:    Robotic assisted hysterectomy, bilateral salpingo-oophorectomy, cancer staging, sentinel lymph node mapping and sampling, pelvic washings        Pain:    1) Location: abdominal incision    2) Rate pain on scale 1-10:  3/10    3) Is your pain well controlled on your pain medication?:yes    4) How often are you taking your pain medication?:  Tylenol every 6 hours, took last dose of oxycodone last night. Started that her pain is controled with OTC tylenol, does not need mor oxycodone.. Pain increases with movement, expectedly    5).  What pain medication are you taking? tylenol       GI:    6) Last bowel movement:  9/25/23, diarrhea  Took senna 2 tablets last night and this morning and now has diarrhea. Will not take more senna today and will start with one tablet in the morning. She understands rationale for taking senna so that she does not strain with bowel movements    7) Are you having regular bowel movements?: see above    8) Nausea?: no       Urinary:    9) Are you having problems or difficulty with urination?: no     Lower Extremities:    10) Were lymph nodes removed during surgery?: yes    11) Any pain, redness, or swelling in legs?:no    13) Any area on your legs that are warm to touch?: no    14) Chest pain or severe shortness of breath?: no       Wound:    15) Type of incision:vertical 3 cm supraumbilical incision and small additional incisions for da tai    Any of the following:  Yes/No.  If Yes, elaborate further    - Drainage (color, amount): none    - Odor: none    - Redness: none    - Chills: no      - Fever: no    16 Pt was reminded to wash incision (allow warm, soapy water to run over incision): yes, she did shower today, she let water run over her incision. Did remind her that she can use fragrance free  liquid soap on her finger tips and can then rinse with water    Activity  Up and about at home walking around  and sitting in chair    Oral Intake  Goal of 64 oz per day, she is close to this volume and will monitor more closely  She is eating small frequent meals and will start adding more protein, has tips for adding protein handout in her surgery teach folder       Post-op:    18) Verify date and time of appointment: 10/4/23, in person follow up with Dr. Au at     19) Pt was informed that pathology will be discussed at this appointment: yes     Any other questions or concerns at this time?: no      She has our contact information for future reference ig any questions or concerns arise prior to her follow up apt    Nayana Gutierrez RN

## 2023-09-26 NOTE — PROGRESS NOTES
Consult Notes on Referred Patient    Date: 8/29/2023     The patient returns today for follow-up and treatment planning.    Her history is as follows:    Briefly, she is a 86yo who was seen in ED for intermittent vaginal bleeding x 6 wks. Was actually seen one year ago for hematuria.  At that time, underwent CT urogram 6/8/22 which showed no gross abnormalities to explain hematuria, was noted to have low-density thickening of endometrium.  Underwent a pelvic US on 7/8/22 which showed uterus at 6.8 x 4.6cm with a 3.2cm mass along endometrial canal possibly representing polyp or fibroid.  Never had any further follow-up related to this that I can see.     Was then seen most recently in ED for bleeding. Had repeat pelvic US on 6/5/23 which showed diffusely thickened endometrium.  Had pelvic MRI  8.1 x 4.4 x 5.4 cm in long axis, short axis and transverse dimensions, respectively. An apparent heterogeneous mass-like structure is present in the anterior and right aspect of the uterus in the subendometrial or endometrial  region, measuring approximately 2.5 x 1.6 cm (series 18 image 33). This structure is not well-visualized on T1 weighted images due to artifact. It appears isointense to the uterine myometrium on T2-weighted images. Probable mildly heterogeneous  enhancement of this structure. A few subcentimeter low T2 signal regions in the anterior uterus likely represent very small fibroids.   The endometrial stripe is not well defined due to motion artifact. It measures approximately 0.9 cm in dual-layer anteroposterior thickness. The endometrial region appears mildly irregular on the axial images (for example, series 6 image 15). It cannot be determined on the post contrast images if there is abnormal enhancement associated with the endometrium. Possible restricted diffusion within the region of endometrium.    6/7/23:  Seen in Gyn Onc. Unable to perform EMB due to stenosis    7/19/23:  EUA,  D&C under US guidance.  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  MLH1 hypermethylation +    She returns today for evaluation.  She has been scheduled for definitive surgical management on 9/22/23.  No complaints, no changes in symptoms.           Past Medical History:    Past Medical History:   Diagnosis Date    Arthritis     Dyslipidemia     Endometrial cancer (H)     Gout 03/14/2011    Triggered by hydrochlorothiazide      Hypertension     Obesity          Past Surgical History:    Past Surgical History:   Procedure Laterality Date    COLONOSCOPY  12/05/05    normal, recheck 10 years -- done at [facility] HYSTERECTOMY TOTAL, BILATERAL SALPINGO-OOPHORECTOMY, NODE DISSECTION, COMBINED N/A 9/22/2023    Procedure: Robotic assisted hysterectomy, bilateral salpingo-oophorectomy, cancer staging, sentinel lymph node mapping and sampling, pelvic washings;  Surgeon: Amanda Au MD;  Location: [facility] OR    DILATION AND CURETTAGE, WITH ULTRASOUND GUIDANCE N/A 7/19/2023    Procedure: Pelvic examination under anesthesia, dilation and curettage uterus, ultrasound guidance Latex Free;  Surgeon: Amanda Au MD;  Location: [facility] OR    EXAM EYE  03/30/2018    Capsulotomy Left eye.   Dr. Skip Villela    EYE SURGERY  2015    [facility] REMOVAL GALLBLADDER      HYSTEROSCOPY,DIAGNOSTIC  around 1998         Health Maintenance:  Health Maintenance Due   Topic Date Due    COVID-19 Vaccine (6 - Pfizer series) 01/08/2023         Current Medications:     has a current medication list which includes the following prescription(s): acetaminophen, lisinopril, oxycodone, senna-docusate, simvastatin, and vitamin d.       Allergies:     Hydrochlorothiazide        Social History:     Social History     Tobacco Use    Smoking status: Never     Passive exposure: Never    Smokeless tobacco: Never   Substance Use Topics    Alcohol use: No       History   Drug Use No           Family History:     The patient's family history is  notable for :.    Family History   Problem Relation Age of Onset    Eye Disorder Mother         glaucoma    Heart Disease Mother          of CHF    Cancer Sister         uterine and ovarian-in remission     Other Cancer Sister     Deep Vein Thrombosis (DVT) Sister     Heart Disease Brother         valve replacement    Lipids Brother     Coronary Artery Disease Brother     Eye Disorder Brother         detached retina    Cancer Paternal Grandfather         carcinoma of the lip, pipe-smoker    Thyroid Disease Daughter         goiter or nodule?    Cancer Son         SKIN    Other Cancer Son     Other Cancer Other     Thyroid Disease Other     Diabetes No family hx of     C.A.D. No family hx of     Breast Cancer No family hx of     Cancer - colorectal No family hx of     Anesthesia Reaction No family hx of          Physical Exam:     BP (!) 143/83 (BP Location: Right arm, Patient Position: Sitting, Cuff Size: Adult Large)   Pulse 77   Temp 97.9  F (36.6  C) (Oral)   Wt 87.2 kg (192 lb 3.2 oz)   LMP  (LMP Unknown)   SpO2 97%   BMI 32.97 kg/m    Body mass index is 32.97 kg/m .    General Appearance: healthy and alert, no distress       Assessment:    Jen Layne is a 88 year old woman with a new diagnosis of FIGO Grade 1 endometrioid TOMAS.       30 minutes spent on the date of the encounter doing chart review, history and exam, documentation and further activities as noted above        Plan:     1.)    FIGO Grade 1 endometrioid TOMAS:  pathology reviewed in detail with patient and her family. Reviewed recommendations for surgery.     Diagnosis, staging, treatment options for endometrial cancer reviewed in detail including surgery, radiation and medical management. Reviewed general surgical approach to endometrial cancer including hysterectomy, bilateral salpingo-oophorectomy, lymph node dissection to both treat the disease, determine extent of spread, and identify pathologic risk factors which may dictate  need for post-operative adjuvant therapy. Discussed surgical approach via laparotomy versus minimally invasive robotic-assisted approach. Patient is an appropriate candidate for robotic approach. Therefore, would recommend robotic-assisted hysterectomy, bilateral salpingo-oophorectomy, cancer staging, sentinel lymph node mapping and sampling, possible laparotomy. Risks, benefits and alternatives to proceed discussed in detail with the patient. Risks include but are not limited to bleeding, infection, possible injury to surrounding organs including bowel, bladder, ureter, need for second procedure/surgery related to complications from first procedure, postoperative medical complications such as cardiopulmonary events, lymphedema, lymphocyst, thromboembolic events. Also discussed specific risks related to robotic procedure including potential for conversion to laparotomy, vaginal cuff dehiscence, trochar site hernia.        Questions answered, patient expressed understanding of plan of care.  Proceed with surgery as scheduled.    Amanda Au MD  Gynecologic Oncology  [facility]      CC  Patient Care Team:  Yamile Cotter MD as PCP - General (Family Medicine)  Yamile Cotter MD as Assigned PCP  June Boyle PA-C as Assigned Surgical Provider  Amanda Au MD as MD (Gynecologic Oncology)  Nayana Gutierrez RN as Specialty Care Coordinator (Gynecologic Oncology)  Tori Johnson APRN CNP as Assigned Cancer Care Provider  ESTHER PINO

## 2023-10-02 LAB
PATH REPORT.COMMENTS IMP SPEC: ABNORMAL
PATH REPORT.COMMENTS IMP SPEC: ABNORMAL
PATH REPORT.COMMENTS IMP SPEC: YES
PATH REPORT.FINAL DX SPEC: ABNORMAL
PATH REPORT.GROSS SPEC: ABNORMAL
PATH REPORT.MICROSCOPIC SPEC OTHER STN: ABNORMAL
PATH REPORT.RELEVANT HX SPEC: ABNORMAL
PATHOLOGY SYNOPTIC REPORT: ABNORMAL
PHOTO IMAGE: ABNORMAL

## 2023-10-02 PROCEDURE — 88307 TISSUE EXAM BY PATHOLOGIST: CPT | Mod: 26 | Performed by: STUDENT IN AN ORGANIZED HEALTH CARE EDUCATION/TRAINING PROGRAM

## 2023-10-02 PROCEDURE — 88309 TISSUE EXAM BY PATHOLOGIST: CPT | Mod: 26 | Performed by: STUDENT IN AN ORGANIZED HEALTH CARE EDUCATION/TRAINING PROGRAM

## 2023-10-04 ENCOUNTER — ONCOLOGY VISIT (OUTPATIENT)
Dept: ONCOLOGY | Facility: CLINIC | Age: 88
End: 2023-10-04
Attending: OBSTETRICS & GYNECOLOGY
Payer: MEDICARE

## 2023-10-04 VITALS
DIASTOLIC BLOOD PRESSURE: 65 MMHG | RESPIRATION RATE: 18 BRPM | SYSTOLIC BLOOD PRESSURE: 137 MMHG | HEIGHT: 64 IN | BODY MASS INDEX: 32.61 KG/M2 | WEIGHT: 191 LBS | OXYGEN SATURATION: 97 % | HEART RATE: 70 BPM

## 2023-10-04 DIAGNOSIS — C54.1 ENDOMETRIAL CANCER (H): Primary | ICD-10-CM

## 2023-10-04 PROCEDURE — G0463 HOSPITAL OUTPT CLINIC VISIT: HCPCS | Performed by: OBSTETRICS & GYNECOLOGY

## 2023-10-04 PROCEDURE — 99214 OFFICE O/P EST MOD 30 MIN: CPT | Mod: 24 | Performed by: OBSTETRICS & GYNECOLOGY

## 2023-10-04 ASSESSMENT — PAIN SCALES - GENERAL: PAINLEVEL: NO PAIN (0)

## 2023-10-04 NOTE — PROGRESS NOTES
Consult Notes on Referred Patient    Date: 10/4/2023        The patient returns today for follow-up and treatment planning.     Her history is as follows:     Briefly, she is a 86yo who was seen in ED for intermittent vaginal bleeding x 6 wks. Was actually seen one year ago for hematuria.  At that time, underwent CT urogram 6/8/22 which showed no gross abnormalities to explain hematuria, was noted to have low-density thickening of endometrium.  Underwent a pelvic US on 7/8/22 which showed uterus at 6.8 x 4.6cm with a 3.2cm mass along endometrial canal possibly representing polyp or fibroid.  Never had any further follow-up related to this that I can see.     Was then seen most recently in ED for bleeding. Had repeat pelvic US on 6/5/23 which showed diffusely thickened endometrium.  Had pelvic MRI  8.1 x 4.4 x 5.4 cm in long axis, short axis and transverse dimensions, respectively. An apparent heterogeneous mass-like structure is present in the anterior and right aspect of the uterus in the subendometrial or endometrial  region, measuring approximately 2.5 x 1.6 cm (series 18 image 33). This structure is not well-visualized on T1 weighted images due to artifact. It appears isointense to the uterine myometrium on T2-weighted images. Probable mildly heterogeneous  enhancement of this structure. A few subcentimeter low T2 signal regions in the anterior uterus likely represent very small fibroids.   The endometrial stripe is not well defined due to motion artifact. It measures approximately 0.9 cm in dual-layer anteroposterior thickness. The endometrial region appears mildly irregular on the axial images (for example, series 6 image 15). It cannot be determined on the post contrast images if there is abnormal enhancement associated with the endometrium. Possible restricted diffusion within the region of endometrium.     6/7/23:  Seen in Gyn Onc. Unable to perform EMB due to stenosis     7/19/23:   EUA, D&C under US guidance.  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  MLH1 hypermethylation +    9/22/23:  Robotic assisted hysterectomy, bilateral salpingo-oophorectomy, cancer staging, sentinel lymph node mapping and sampling, pelvic washings.    Final pathology (reviewed): FIGO Grade 1 endometrioid TOMAS, 4.5cm greatest dimension, >50% myometrial invasion (13/22mm), superficial NOLA involvment, no LVSI, cytology negative, margins negative, LN negative. Final FIGO Stage IB Grade 1 endometrioid TOMAS,     Here today with daughters.  Doing well, no complaints.  Energy good, no fevers, no chills, no nausea or vomiting.  No CP, no SOB.  Mild constipation, off pain medication, occ CY (similar to prior to surgery), no VB, no LE swelling.        Past Medical History:    Past Medical History:   Diagnosis Date    Arthritis     Dyslipidemia     Endometrial cancer (H)     Gout 03/14/2011    Triggered by hydrochlorothiazide      Hypertension     Obesity          Past Surgical History:    Past Surgical History:   Procedure Laterality Date    COLONOSCOPY  12/05/05    normal, recheck 10 years -- done at [facility] [place] HYSTERECTOMY TOTAL, BILATERAL SALPINGO-OOPHORECTOMY, NODE DISSECTION, COMBINED N/A 9/22/2023    Procedure: Robotic assisted hysterectomy, bilateral salpingo-oophorectomy, cancer staging, sentinel lymph node mapping and sampling, pelvic washings;  Surgeon: Amanda Au MD;  Location: [facility] OR    DILATION AND CURETTAGE, WITH ULTRASOUND GUIDANCE N/A 7/19/2023    Procedure: Pelvic examination under anesthesia, dilation and curettage uterus, ultrasound guidance Latex Free;  Surgeon: Amanda Au MD;  Location: [facility] OR    EXAM EYE  03/30/2018    Capsulotomy Left eye.   Dr. Skip Villela    EYE SURGERY  2015    [facility] REMOVAL GALLBLADDER      HYSTEROSCOPY,DIAGNOSTIC  around 1998         Health Maintenance:  Health Maintenance Due   Topic Date Due    COVID-19 Vaccine (6 - 2023-24 season) 09/01/2023  "      Current Medications:     has a current medication list which includes the following prescription(s): acetaminophen, lisinopril, oxycodone, senna-docusate, simvastatin, and vitamin d.       Allergies:     Hydrochlorothiazide        Social History:     Social History     Tobacco Use    Smoking status: Never     Passive exposure: Never    Smokeless tobacco: Never   Substance Use Topics    Alcohol use: No       History   Drug Use No           Family History:     The patient's family history is notable for :.    Family History   Problem Relation Age of Onset    Eye Disorder Mother         glaucoma    Heart Disease Mother          of CHF    Cancer Sister         uterine and ovarian-in remission     Other Cancer Sister     Deep Vein Thrombosis (DVT) Sister     Heart Disease Brother         valve replacement    Lipids Brother     Coronary Artery Disease Brother     Eye Disorder Brother         detached retina    Cancer Paternal Grandfather         carcinoma of the lip, pipe-smoker    Thyroid Disease Daughter         goiter or nodule?    Cancer Son         SKIN    Other Cancer Son     Other Cancer Other     Thyroid Disease Other     Diabetes No family hx of     C.A.D. No family hx of     Breast Cancer No family hx of     Cancer - colorectal No family hx of     Anesthesia Reaction No family hx of          Physical Exam:     /65   Pulse 70   Resp 18   Ht 1.626 m (5' 4\")   Wt 86.6 kg (191 lb)   LMP  (LMP Unknown)   SpO2 97%   BMI 32.79 kg/m    Body mass index is 32.79 kg/m .    General Appearance: healthy and alert, no distress     Musculoskeletal: extremities non tender and without edema    Skin: no lesions or rashes     Neurological: normal gait, no gross defects     Psychiatric: appropriate mood and affect                               Hematological: normal cervical, supraclavicular and inguinal lymph nodes     Gastrointestinal:       abdomen soft, non-tender, non-distended, no organomegaly or " masses.  Incisions healing well.     Assessment:    Jen Layne is a 88 year old woman with a new diagnosis of Stage IB Grade 1 endometrioid endometrial TOMAS.       30 minutes spent on the date of the encounter doing chart review, history and exam, documentation and further activities as noted above     Plan:     1.)    FIGO Grade 1 endometrioid TOMAS:  Pathology reviewed in detail with patient and her daughters.  Tumor 4.5cm greatest dimension, >50% myometrial invasion (13/22mm), superficial NOLA involvment, no LVSI, cytology negative, margins negative, LN negative. Final FIGO Stage IB Grade 1 endometrioid TOMAS.  H-IR due to age, DOI.  Discussed options including observation, radiation (VBT).     Will discuss at [facility] Tumor board with Rad Onc. Discussed in detail.  For now, healing well from surgery.  Continue surgical restrictions.    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      Amanda Au MD  Gynecologic Oncology  [facility]      CC  Patient Care Team:  Yamile Cotter MD as PCP - General (Family Medicine)  Yamile Cotter MD as Assigned PCP  June Boyle PA-C as Assigned Surgical Provider  Amanda Au MD as MD (Gynecologic Oncology)  Nayana Gutierrez, [place] as Specialty Care Coordinator (Gynecologic Oncology)  Amanda Au MD as Assigned Cancer Care Provider

## 2023-10-04 NOTE — PATIENT INSTRUCTIONS
Tumor board review. Dr. Au will contact you with results.    Return visit in four weeks for exam    Amanda Au MD  Gynecologic Oncology  [facility]

## 2023-10-04 NOTE — LETTER
"    10/4/2023         RE: Jen Layne  [address]  [address]  [facility] [address]        Dear Colleague,    Thank you for referring your patient, Jen Layne, to the [facility]. Please see a copy of my visit note below.    Oncology Rooming Note    October 4, 2023 8:32 AM   Jen Layne is a 88 year old female who presents for:    Chief Complaint   Patient presents with     Oncology Clinic Visit     Endometrial cancer post op     Initial Vitals: /65   Pulse 70   Resp 18   Ht 1.626 m (5' 4\")   Wt 86.6 kg (191 lb)   LMP  (LMP Unknown)   SpO2 97%   BMI 32.79 kg/m   Estimated body mass index is 32.79 kg/m  as calculated from the following:    Height as of this encounter: 1.626 m (5' 4\").    Weight as of this encounter: 86.6 kg (191 lb). Body surface area is 1.98 meters squared.  No Pain (0) Comment: Data Unavailable   No LMP recorded (lmp unknown). Patient is postmenopausal.  Allergies reviewed: Yes  Medications reviewed: Yes    Medications: Medication refills not needed today.  Pharmacy name entered into EPIC:    [facility] - [place], [place] - [address] [place] [address] AND [facility]    Clinical concerns:  2 week post op      Naya Salazar                                      Consult Notes on Referred Patient    Date: 10/4/2023        The patient returns today for follow-up and treatment planning.     Her history is as follows:     Briefly, she is a 88yo who was seen in ED for intermittent vaginal bleeding x 6 wks. Was actually seen one year ago for hematuria.  At that time, underwent CT urogram 6/8/22 which showed no gross abnormalities to explain hematuria, was noted to have low-density thickening of endometrium.  Underwent a pelvic US on 7/8/22 which showed uterus at 6.8 x 4.6cm with a 3.2cm mass along endometrial canal possibly representing polyp or fibroid.  Never had any further follow-up related to this that I can " see.     Was then seen most recently in ED for bleeding. Had repeat pelvic US on 6/5/23 which showed diffusely thickened endometrium.  Had pelvic MRI  8.1 x 4.4 x 5.4 cm in long axis, short axis and transverse dimensions, respectively. An apparent heterogeneous mass-like structure is present in the anterior and right aspect of the uterus in the subendometrial or endometrial  region, measuring approximately 2.5 x 1.6 cm (series 18 image 33). This structure is not well-visualized on T1 weighted images due to artifact. It appears isointense to the uterine myometrium on T2-weighted images. Probable mildly heterogeneous  enhancement of this structure. A few subcentimeter low T2 signal regions in the anterior uterus likely represent very small fibroids.   The endometrial stripe is not well defined due to motion artifact. It measures approximately 0.9 cm in dual-layer anteroposterior thickness. The endometrial region appears mildly irregular on the axial images (for example, series 6 image 15). It cannot be determined on the post contrast images if there is abnormal enhancement associated with the endometrium. Possible restricted diffusion within the region of endometrium.     6/7/23:  Seen in Gyn Onc. Unable to perform EMB due to stenosis     7/19/23:  EUA, D&C under US guidance.  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  MLH1 hypermethylation +    9/22/23:  Robotic assisted hysterectomy, bilateral salpingo-oophorectomy, cancer staging, sentinel lymph node mapping and sampling, pelvic washings.    Final pathology (reviewed): FIGO Grade 1 endometrioid TOMAS, 4.5cm greatest dimension, >50% myometrial invasion (13/22mm), superficial NOLA involvment, no LVSI, cytology negative, margins negative, LN negative. Final FIGO Stage IB Grade 1 endometrioid TOMAS,     Here today with daughters.  Doing well, no complaints.  Energy good, no fevers, no chills, no nausea or vomiting.  No CP, no SOB.  Mild constipation, off  pain medication, occ CY (similar to prior to surgery), no VB, no LE swelling.        Past Medical History:    Past Medical History:   Diagnosis Date     Arthritis      Dyslipidemia      Endometrial cancer (H)      Gout 2011    Triggered by hydrochlorothiazide       Hypertension      Obesity          Past Surgical History:    Past Surgical History:   Procedure Laterality Date     COLONOSCOPY  05    normal, recheck 10 years -- [place] [facility] [place]STERECTOMY TOTAL, BILATERAL SALPINGO-OOPHORECTOMY, NODE DISSECTION, COMBINED N/A 2023    Procedure: Robotic assisted hysterectomy, bilateral salpingo-oophorectomy, cancer staging, sentinel lymph node mapping and sampling, pelvic washings;  Surgeon: Amanda Au MD;  Location: [facility] OR     DILATION AND CURETTAGE, WITH ULTRASOUND GUIDANCE N/A 2023    Procedure: Pelvic examination under anesthesia, dilation and curettage uterus, ultrasound guidance Latex Free;  Surgeon: Amanda Au MD;  Location: [facility] OR     EXAM EYE  2018    Capsulotomy Left eye.   Dr. Skip Villela     EYE SURGERY       HC REMOVAL GALLBLADDER       HYSTEROSCOPY,DIAGNOSTIC  around          Health Maintenance:  Health Maintenance Due   Topic Date Due     COVID-19 Vaccine ( season) 2023       Current Medications:     has a current medication list which includes the following prescription(s): acetaminophen, lisinopril, oxycodone, senna-docusate, simvastatin, and vitamin d.       Allergies:     Hydrochlorothiazide        Social History:     Social History     Tobacco Use     Smoking status: Never     Passive exposure: Never     Smokeless tobacco: Never   Substance Use Topics     Alcohol use: No       History   Drug Use No           Family History:     The patient's family history is notable for :.    Family History   Problem Relation Age of Onset     Eye Disorder Mother         glaucoma     Heart Disease Mother          of CHF     Cancer Sister   "       uterine and ovarian-in remission 2008     Other Cancer Sister      Deep Vein Thrombosis (DVT) Sister      Heart Disease Brother         valve replacement     Lipids Brother      Coronary Artery Disease Brother      Eye Disorder Brother         detached retina     Cancer Paternal Grandfather         carcinoma of the lip, pipe-smoker     Thyroid Disease Daughter         goiter or nodule?     Cancer Son         SKIN     Other Cancer Son      Other Cancer Other      Thyroid Disease Other      Diabetes No family hx of      C.A.D. No family hx of      Breast Cancer No family hx of      Cancer - colorectal No family hx of      Anesthesia Reaction No family hx of          Physical Exam:     /65   Pulse 70   Resp 18   Ht 1.626 m (5' 4\")   Wt 86.6 kg (191 lb)   LMP  (LMP Unknown)   SpO2 97%   BMI 32.79 kg/m    Body mass index is 32.79 kg/m .    General Appearance: healthy and alert, no distress     Musculoskeletal: extremities non tender and without edema    Skin: no lesions or rashes     Neurological: normal gait, no gross defects     Psychiatric: appropriate mood and affect                               Hematological: normal cervical, supraclavicular and inguinal lymph nodes     Gastrointestinal:       abdomen soft, non-tender, non-distended, no organomegaly or masses.  Incisions healing well.     Assessment:    Jen Layne is a 88 year old woman with a new diagnosis of Stage IB Grade 1 endometrioid endometrial TOMAS.       30 minutes spent on the date of the encounter doing chart review, history and exam, documentation and further activities as noted above     Plan:     1.)    FIGO Grade 1 endometrioid TOMAS:  Pathology reviewed in detail with patient and her daughters.  Tumor 4.5cm greatest dimension, >50% myometrial invasion (13/22mm), superficial NOLA involvment, no LVSI, cytology negative, margins negative, LN negative. Final FIGO Stage IB Grade 1 endometrioid TOMAS.  H-IR due to age, DOI.  Discussed " options including observation, radiation (VBT).     Will discuss at [facility] Tumor board with Rad Onc. Discussed in detail.  For now, healing well from surgery.  Continue surgical restrictions.    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      Amanda Au MD  Gynecologic Oncology  [facility]      CC  Patient Care Team:  Yamile Cotter MD as PCP - General (Family Medicine)  Yamile Cotter MD as Assigned PCP  June Boyle PA-C as Assigned Surgical Provider  Amanda Au MD as MD (Gynecologic Oncology)  Nayana Gutierrez, [place] as Specialty Care Coordinator (Gynecologic Oncology)  Amanda Au MD as Assigned Cancer Care Provider          Again, thank you for allowing me to participate in the care of your patient.        Sincerely,        Amanda Au MD

## 2023-10-04 NOTE — PROGRESS NOTES
"Oncology Rooming Note    October 4, 2023 8:32 AM   Jen Layne is a 88 year old female who presents for:    Chief Complaint   Patient presents with    Oncology Clinic Visit     Endometrial cancer post op     Initial Vitals: /65   Pulse 70   Resp 18   Ht 1.626 m (5' 4\")   Wt 86.6 kg (191 lb)   LMP  (LMP Unknown)   SpO2 97%   BMI 32.79 kg/m   Estimated body mass index is 32.79 kg/m  as calculated from the following:    Height as of this encounter: 1.626 m (5' 4\").    Weight as of this encounter: 86.6 kg (191 lb). Body surface area is 1.98 meters squared.  No Pain (0) Comment: Data Unavailable   No LMP recorded (lmp unknown). Patient is postmenopausal.  Allergies reviewed: Yes  Medications reviewed: Yes    Medications: Medication refills not needed today.  Pharmacy name entered into EPIC:    [facility] - [place], [place] - [address] [place] [address] AND [facility]    Clinical concerns:  2 week post op      Naya Salazar            "

## 2023-10-13 ENCOUNTER — TUMOR CONFERENCE (OUTPATIENT)
Dept: ONCOLOGY | Facility: CLINIC | Age: 88
End: 2023-10-13
Payer: MEDICARE

## 2023-10-14 NOTE — TUMOR CONFERENCE
Gyn Onc Tumor Board Note    Date of Tumor Board Presentation: 10/12/23   Patient: Jen Layne   MRN: [id number]  Age: 88 year old  Gyn Onc Staff: Dr. Au  Disciplines Present: Gynecologic Oncology, Pathology, Radiation Oncology, and Radiology    Oncologic Information  Cancer Type: Uterine  Stage: Stage IB (2018 and 2023)   Clinical Trial Discussion: No  If yes, what clinical trial should be considered: N/A       Consensus/Management Options: Referral to rad onc. Discuss observation vs VBT, as patient has good functional status.       This abstract and interpretation of the conversation at tumor board has been completed by Ileana Carbone. These are the general recommendations/discussion provided at tumor board conference. The definitive recommendation/discussion will be made by the primary health care team and patient after full discussion as appropriate.

## 2023-10-18 ENCOUNTER — PATIENT OUTREACH (OUTPATIENT)
Dept: ONCOLOGY | Facility: CLINIC | Age: 88
End: 2023-10-18
Payer: MEDICARE

## 2023-10-18 DIAGNOSIS — C54.1 ENDOMETRIAL CANCER (H): Primary | ICD-10-CM

## 2023-10-18 NOTE — PROGRESS NOTES
[id number] Called Jen to give update from Dr. Au. Unable to reach Jen, left voicemail asking patient to return call to clinic at her convienence.     [id number] Jen returned call to clinic and this writer relayed Dr. Au's message over the phone. Patient was informed that her case was discussed at Tumor Board and Dr. Au sent referral to Rad Onc. Per Dr. Au she discussed this with patient at previous clinic visit. Patient was informed that Rad Onc should be reaching out to get consult appointment arranged.     Luma Griffin RN...............................10/18/23  11:55 AM

## 2023-10-23 NOTE — TELEPHONE ENCOUNTER
MEDICAL RECORDS REQUEST   Radiation Oncology  [address] [place] [address]  Fax: [phone]          FUTURE VISIT INFORMATION                                                   Jen Layne, : 1935 scheduled for future visit at [facility] Radiation Oncology    RECORDS REQUESTED FOR VISIT                                                     GYNECOLOGY     OFFICE NOTE from PCP [facility] 23: Dr. Yamile Cotter   OFFICE NOTE from gyn onc [facility] 10/04/23: Dr. Amanad Au   OPERATIVE/BIOPSY REPORTS (include exam under anes) Epic 23: Robotic assisted hysterectomy, bilateral salpingo-oophorectomy, cancer staging, sentinel lymph node mapping and sampling, pelvic washings    MEDICATION LIST [facility]    LABS     PATHOLOGY REPORTS Reports in [facility] Surg Path:  23: [id number]  23: [id number]    Non gyn cyto:  23: [id number]  22: [id number]   ANYTHING RELATED TO DIAGNOSIS Epic Most recent 23   IMAGING (NEED IMAGES & REPORT)     CT SCANS PACS 22: CT Urogram   MRI PACS 23: MR Pelvis   ULTRASOUND PACS 23-22: US Pelvic

## 2023-10-24 NOTE — PROGRESS NOTES
Department of Radiation Oncology                   [place] [address]  [address] [place]  [address]  Office:  [phone]  Fax:  [phone]   Radiation Oncology Clinic  [address] [place] [address]  Phone:  [phone]  Fax:  [phone]     RE: Jen Layne : 1935   MRN: [id number] PATRICA: 10/26/2023     OUTPATIENT VISIT NOTE       PROBLEM: FIGO IB, grade 1, endometrioid adenocacinoma     was seen for initial consultation in the Dept of Radiation Oncology on 10/26/2023 at the request of Dr. Au.    HISTORY OF PRESENT ILLNESS:Ms. Layne is an 87 yo female with a newly diagnosed endometrial cancer here for discussion of adjuvant brachytherapy.     She initially saw primary care in 2022 for 3 day history of gross hematuria. Pelvic exam at that time noted blood but source unclear. CT Urogram showed small non-obstructing stone, but also noted thickening of the endometrium with a lobulated low density masslike appearance near the fundus. Pelvic ultrasound revealed a slightly heterogeneous 3.2 cm mass along the endometrial canal representing a submucosal fibroid or polyp.  For reasons that are unclear, Ms. Layne did not have a follow up for this.     In late , Ms. Layne again developed intermittent vaginal spotting as well as urinary urgency and frequency. She presented to ED on 2023. US again showed diffusely thickened endometrium of 10 mm with increased vascularity. Pelvic MRI identified a heterogeneous mass-like structure in the anterior and right aspect of the uterus, about 2.5 x 1.6 cm in addition to mildly irregular appearance of the endometrium.     Given this finding, she saw Dr. Au. An office biopsy could not be completed due to significant cervical stenosis. She proceeded with EUA and D&C under US guidance on 2023. Her cervix was flush with the vaginal tissue. US demonstrated thickened endometrium. Pathology from endometrial  curettage was consistent with endometrioid adenocarcinoma with squamous differentiation, FIGO grade 1, MMR deficient, MLH1 promoter methylation positive.     Ms. Layne then proceeded with RA-TLH/BSO/SLN biopsy on 9/22/2023. Cytology from pelvic washing was negative for malignancy. Final pathology again revealed endometrioid carcinoma, measuring 4.5 cm in greatest dimension, FIGO grade 1. Myometrial invasion was 13 mm out of 22 mm (59%). LVSI was not identified. Disease was present in the lower uterine segment but not in the cervix, serosa, tubes or ovaries. Two SLN, one from each side were negative for malignancy. pT1bN0(sn), FIGO IB (2009 & 2023).    Post-op, Dr. Au discussed observation vs. adjuvant vaginal brachytherapy. She is here today to here more about brachytherapy option.     On interview, she states that she has recovered well from her surgery. She denies vaginal bleeding or discharge. She has no abdominal pain. She also denies any bowel or urinary symptoms. She states that she is very functional, lives independently and performs all of her ADLs. Her mother lived to  and father 88.            PAST MEDICAL HISTORY:   Past Medical History:   Diagnosis Date    Arthritis     Dyslipidemia     Endometrial cancer (H)     Endometrial cancer (H)     Gout 03/14/2011    Triggered by hydrochlorothiazide      Hypertension     Obesity       Past Surgical History:   Procedure Laterality Date    COLONOSCOPY  12/05/05    normal, recheck 10 years -- done at [facility] HYSTERECTOMY TOTAL, BILATERAL SALPINGO-OOPHORECTOMY, NODE DISSECTION, COMBINED N/A 9/22/2023    Procedure: Robotic assisted hysterectomy, bilateral salpingo-oophorectomy, cancer staging, sentinel lymph node mapping and sampling, pelvic washings;  Surgeon: Amanda Au MD;  Location: [facility] OR    DILATION AND CURETTAGE, WITH ULTRASOUND GUIDANCE N/A 7/19/2023    Procedure: Pelvic examination under anesthesia, dilation and curettage uterus,  ultrasound guidance Latex Free;  Surgeon: Amanda Au MD;  Location: [facility] OR    EXAM EYE  03/30/2018    Capsulotomy Left eye.   Dr. Skip Villela    EYE SURGERY  2015    HC REMOVAL GALLBLADDER      HYSTEROSCOPY,DIAGNOSTIC  around 1998        CHEMOTHERAPY HISTORY: None    PAST RADIATION THERAPY HISTORY: None    MEDICATIONS:    Current Outpatient Medications   Medication Sig Dispense Refill    acetaminophen (TYLENOL) 325 MG tablet Take 2 tablets (650 mg) by mouth every 6 hours as needed for mild pain 24 tablet 0    lisinopril (ZESTRIL) 20 MG tablet Take 1 tablet (20 mg) by mouth daily (Patient taking differently: Take 20 mg by mouth every evening) 90 tablet 4    oxyCODONE (ROXICODONE) 5 MG tablet Take 1-2 tablets (5-10 mg) by mouth every 4 hours as needed for moderate to severe pain 6 tablet 0    senna-docusate (SENOKOT-S/PERICOLACE) 8.6-50 MG tablet Take 1-2 tablets by mouth 2 times daily 30 tablet 0    simvastatin (ZOCOR) 40 MG tablet Take 1 tablet (40 mg) by mouth At Bedtime 90 tablet 4    VITAMIN D 1000 UNIT OR CAPS Take 1 capsule by mouth every morning         ALLERGIES:  allergic to hydrochlorothiazide.    SOCIAL HISTORY:     Lives alone in an apartment  Never smoker  Does not drink alcohol    FAMILY HISTORY:    family history includes Cancer in her paternal grandfather, sister, and son; Coronary Artery Disease in her brother; Deep Vein Thrombosis (DVT) in her sister; Eye Disorder in her brother and mother; Heart Disease in her brother and mother; Lipids in her brother; Other Cancer in her sister, son, and another family member; Thyroid Disease in her daughter and another family member.  Sister: endometrial/ovarian cancer    REVIEW OF SYMPTOMS:  A full 14-point review of systems was performed. See nursing assessment sheet and HPI    PHYSICAL EXAMINATION:    BP (!) 195/83   Pulse 78   Wt 86.2 kg (190 lb)   LMP  (LMP Unknown)   SpO2 97%   BMI 32.61 kg/m     Gen: Appears well, appears younger  than stated age  HEENT: unremarkable  CV: well perfused  Resp: breathing comfortably on room air  Pelvic: deferred     ASSESSMENT AND PLAN: In summary, Ms. Layne is an 89 yo female with an early stage endometrial cancer. She is status post TLH/BSO/SLN biopsy. Pathology was significant for a 4.5 cm endometrioid adenocarcinoma, grade 1, with 59% myometrial invasion and no LVSI.     We reviewed the risk factors for recurrence. In her case, her primary risk factors are her age of 88 and deep myometrial invasion. She fits the PORTEC 2 criteria for adjuvant brachytherapy. Also of note is her longevity in her family and her excellent functional status.     We thus presented to her the option of surveillance vs. adjuvant brachytherapy to decrease her risk of cuff recurrence. We emphasized that such treatment will not impact overall survival.     We described the vaginal brachytherapy procedure in great details. We also discussed the options of 5 treatments vs. 3 treatments.     Following a long discussion, Ms. Layne opted to go forward with adjuvant brachytherapy. She would like the time frame to be around Thanksgiving, though is not available the Friday after Thanksgiving. She would like her [place] from [place] to weigh in on 3 vs. 5 treatments. We will thus schedule her brachytherapy accordingly.     Thank you for allowing us to participate in this patient's care.  Please feel free to call with any questions or concerns.       Juan Pablo Jean M.D./Ph.D.  Radiation Oncologist   Department of Radiation Oncology  [facility]  Phone: [phone]            I reviewed patient's chart, internal/external medical records, labs and pathology reports.  I interviewed and counseled the patient face to face.  I additionally discussed the case with patient's referring physicians and care team.         Juan Pablo Jean MD

## 2023-10-26 ENCOUNTER — OFFICE VISIT (OUTPATIENT)
Dept: RADIATION ONCOLOGY | Facility: CLINIC | Age: 88
End: 2023-10-26
Attending: OBSTETRICS & GYNECOLOGY
Payer: MEDICARE

## 2023-10-26 ENCOUNTER — PRE VISIT (OUTPATIENT)
Dept: RADIATION ONCOLOGY | Facility: CLINIC | Age: 88
End: 2023-10-26

## 2023-10-26 VITALS
BODY MASS INDEX: 32.61 KG/M2 | SYSTOLIC BLOOD PRESSURE: 195 MMHG | HEART RATE: 78 BPM | WEIGHT: 190 LBS | OXYGEN SATURATION: 97 % | DIASTOLIC BLOOD PRESSURE: 83 MMHG

## 2023-10-26 DIAGNOSIS — C54.1 ENDOMETRIAL CANCER (H): ICD-10-CM

## 2023-10-26 PROCEDURE — G0463 HOSPITAL OUTPT CLINIC VISIT: HCPCS | Performed by: RADIOLOGY

## 2023-10-26 PROCEDURE — 99204 OFFICE O/P NEW MOD 45 MIN: CPT | Mod: GC | Performed by: RADIOLOGY

## 2023-10-26 ASSESSMENT — ENCOUNTER SYMPTOMS
BACK PAIN: 0
CHILLS: 0
INSOMNIA: 0
SEIZURES: 0
TINGLING: 0
WEIGHT LOSS: 0
VOMITING: 0
DIZZINESS: 0
CONSTIPATION: 0
SHORTNESS OF BREATH: 0
DEPRESSION: 0
BLURRED VISION: 0
BRUISES/BLEEDS EASILY: 0
COUGH: 0
DOUBLE VISION: 0
DIARRHEA: 0
DIAPHORESIS: 0
FALLS: 0
FEVER: 0
FREQUENCY: 1
EYE PAIN: 0
HEADACHES: 0
NAUSEA: 0
SORE THROAT: 0
HEMATURIA: 0
BLOOD IN STOOL: 0
NECK PAIN: 0
NERVOUS/ANXIOUS: 1

## 2023-10-26 NOTE — PROGRESS NOTES
Cancer  Pertinent negatives include no chest pain, chills, coughing, diaphoresis, fever, headaches, nausea, neck pain, rash, sore throat or vomiting.   INITIAL PATIENT ASSESSMENT    Diagnosis: Endometrial cancer, hysterectomy 9/22/23    Prior radiation therapy: None    Prior chemotherapy: None    Prior hormonal therapy:No    Pain Eval:  Denies    Psychosocial  Living arrangements: self  Fall Risk: independent   referral needs: Not needed    Advanced Directive: Yes - Location: In chart  Implantable Cardiac Device? No    Onset of menarche: @ age 15  LMP: No LMP recorded (lmp unknown). Patient is postmenopausal.  Onset of menopause: @ age 62  Abnormal vaginal bleeding/discharge: No  Are you pregnant? No  Reproductive note: 4 children    Nurse face-to-face time: Level 4:  15 min face to face time    Review of Systems   Constitutional:  Positive for malaise/fatigue (Still recoverying from surgery). Negative for chills, diaphoresis, fever and weight loss.   HENT:  Negative for ear pain, nosebleeds and sore throat.    Eyes:  Negative for blurred vision, double vision and pain.   Respiratory:  Negative for cough and shortness of breath.    Cardiovascular:  Negative for chest pain and leg swelling.   Gastrointestinal:  Negative for blood in stool, constipation, diarrhea, nausea and vomiting.   Genitourinary:  Positive for frequency (Since surgery). Negative for hematuria and urgency.   Musculoskeletal:  Negative for back pain, falls, joint pain and neck pain.   Skin:  Negative for rash.   Neurological:  Negative for dizziness, tingling, seizures and headaches.   Endo/Heme/Allergies:  Does not bruise/bleed easily.   Psychiatric/Behavioral:  Negative for depression. The patient is nervous/anxious (With appointments). The patient does not have insomnia.

## 2023-10-26 NOTE — PROGRESS NOTES
Department of Radiation Oncology  [address] [address]  [phone]       Consultation Note    Name: Jen Layne MRN: [id number]   : 1935   Date of Service: 10/26/2023  Referring: Dr. Amanda Au     Diagnosis: Endometrial endometrioid adenocarcinoma, MMR deficient  Stage: FIGO IB    History of Present Illness   Ms. Layne is a 88 year old female with newly diagnosed FIGO IB endometrial endometrioid adenocarcinoma.  She had a history of postmenopausal bleeding versus hematuria evaluated in 2022. CT urogram at the time showed endometrium measuring 14 mm in thickness.  Pelvic exam showed blood within the vagina but did not appear to show blood from the cervical os.  Subsequent pelvic ultrasound showed a 3.2 cm mass, possibly a polyp or fibroid.  The patient had difficulty scheduling follow-up with gynecology and further work-up was not performed.      Her diagnosis came to light in 2023 when she presented to the emergency department with intermittent vaginal bleeding, urinary urgency, and frequency, with a history going back 6 weeks.  Ultrasound of the pelvis showed a 10 mm endometrium, diffusely thickened and heterogeneous with increased vascularity concerning for endometrial carcinoma.  A subsequent MRI revealed a 2.5 x 1.6 cm structure in the subendometrial or endometrial region.  Endometrial biopsy could not be performed on 2023 during follow-up with gynecologic oncology due to stenosis.  She was seen by Dr. Amanda Naranjo who recommended a D&C.    She presented again to the emergency department on  with heavy vaginal bleeding, though was discharged with close follow-up with gynecologic surgery. She underwent D&C with Dr. Au on 2023, and pathology returned endometrial endometrioid adenocarcinoma with squamous differentiation, FIGO grade 1, MMR deficient with loss of PMS 2 and MLH1.    She then proceeded  with robot-assisted hysterectomy, BSO, sentinel lymph node mapping and sampling, with pelvic washings.  Pathology returned endometrial adenocarcinoma, endometrioid type, FIGO grade 1 with squamous differentiation, 4.5 cm in, MMR deficient, with superficial lower uterine segment involvement.  There is no cervical involvement.  Myometrial invasion encompassed 13/22 mm (59%).  No LVSI.  2 sentinel lymph nodes were recovered, 1 from the left external iliac, and the other from the right obturator.  Neither sentinel lymph node was involved with malignancy.  Final staging was FIGO IB (2009 and 2023)    Her case was reviewed in multidisciplinary tumor board.  She was then referred to radiation oncology for discussion of adjuvant vaginal brachytherapy versus observation.    She presents today for consideration of vaginal brachytherapy.  She is accompanied today by her 2 daughters.  She states that she has been recovering well from surgery, about a month ago.  She has no abdominal or pelvic pain today.  She has been avoiding lifting heavy weights.  She denies spotting or pelvic bleeding.    CHEMOTHERAPY HISTORY: None  PACEMAKER: No  PREGNANCY STATUS: No  PAST RADIATION THERAPY HISTORY: None    PAST MEDICAL HISTORY:  Past Medical History:   Diagnosis Date    Arthritis     Dyslipidemia     Endometrial cancer (H) 07/2023    Gout 03/14/2011    Triggered by hydrochlorothiazide      Hypertension     Obesity        PAST SURGICAL HISTORY:  Past Surgical History:   Procedure Laterality Date    COLONOSCOPY  12/05/05    normal, recheck 10 years -- done at [facility] - [place], [place] HYSTERECTOMY TOTAL, BILATERAL SALPINGO-OOPHORECTOMY, NODE DISSECTION, COMBINED N/A 9/22/2023    Procedure: Robotic assisted hysterectomy, bilateral salpingo-oophorectomy, cancer staging, sentinel lymph node mapping and sampling, pelvic washings;  Surgeon: Amanda Au MD;  Location: [facility] OR    DILATION AND CURETTAGE, WITH ULTRASOUND GUIDANCE N/A 7/19/2023     Procedure: Pelvic examination under anesthesia, dilation and curettage uterus, ultrasound guidance Latex Free;  Surgeon: Amanda Au MD;  Location: [facility] OR    EXAM EYE  2018    Capsulotomy Left eye.   Dr. Skip Villela    EYE SURGERY      HC REMOVAL GALLBLADDER      HYSTEROSCOPY,DIAGNOSTIC  around        ALLERGIES:  Allergies as of 10/26/2023 - Reviewed 10/26/2023   Allergen Reaction Noted    Hydrochlorothiazide  2016       MEDICATIONS:  Current Outpatient Medications   Medication Sig Dispense Refill    lisinopril (ZESTRIL) 20 MG tablet Take 1 tablet (20 mg) by mouth daily (Patient taking differently: Take 20 mg by mouth every evening) 90 tablet 4    simvastatin (ZOCOR) 40 MG tablet Take 1 tablet (40 mg) by mouth At Bedtime 90 tablet 4    VITAMIN D 1000 UNIT OR CAPS Take 1 capsule by mouth every morning          FAMILY HISTORY:  Family History   Problem Relation Age of Onset    Eye Disorder Mother         glaucoma    Heart Disease Mother          of CHF    Cancer Sister         uterine and ovarian-in remission     Other Cancer Sister     Deep Vein Thrombosis (DVT) Sister     Uterine Cancer Sister     Heart Disease Brother         valve replacement    Lipids Brother     Coronary Artery Disease Brother     Eye Disorder Brother         detached retina    Cancer Paternal Grandfather         carcinoma of the lip, pipe-smoker    Thyroid Disease Daughter         goiter or nodule?    Cancer Son         SKIN    Other Cancer Son     Other Cancer Other     Thyroid Disease Other     Diabetes No family hx of     C.A.D. No family hx of     Breast Cancer No family hx of     Cancer - colorectal No family hx of     Anesthesia Reaction No family hx of        SOCIAL HISTORY:  Social History     Socioeconomic History    Marital status:      Spouse name: Not on file    Number of children: 4    Years of education: Not on file    Highest education level: Not on file   Occupational History    Not on file    Tobacco Use    Smoking status: Never     Passive exposure: Never    Smokeless tobacco: Never   Vaping Use    Vaping Use: Never used   Substance and Sexual Activity    Alcohol use: No    Drug use: No    Sexual activity: Never   Other Topics Concern    Parent/sibling w/ CABG, MI or angioplasty before 65F 55M? No   Social History Narrative    2008 --- 4 children, 11 grandchildren, 3 great-grandchildren     Social Determinants of Health     Financial Resource Strain: Not on file   Food Insecurity: Not on file   Transportation Needs: Not on file   Physical Activity: Not on file   Stress: Not on file   Social Connections: Not on file   Interpersonal Safety: Not on file   Housing Stability: Not on file         Review of Systems   A 12-point review of systems was performed. Pertinent findings are noted in the HPI.    Physical Exam   ECOG Status: 0    VITALS: BP (!) 195/83   Pulse 78   Wt 86.2 kg (190 lb)   LMP  (LMP Unknown)   SpO2 97%   BMI 32.61 kg/m    GEN: Appears well, alert, oriented, and in NAD  HEENT: EOMI, normal conjunctiva, MMM  CV: Warm and well-perfused  RESP: Breathing comfortably on room air  SKIN: Normal color and turgor  NEURO: No focal deficits, CN 3-12 grossly intact  PSYCH: Appropriate mood and affect    Imaging/Path/Labs   Imaging: per hpi     Path: per hpi     Labs: reviewed    Assessment    Ms. Layne is a 88 year old female with FIGO IB endometrial endometrioid adenocarcinoma, MMR deficient, status post robot-assisted hysterectomy with BSO.  We discussed that the genetic loss of function of the MMR gene within her tumor conveys a slightly elevated risk.  Therefore adjuvant vaginal brachytherapy is offered to a select number of patients who may benefit.  This benefit was quantified as an improvement over the roughly 14% risk of similar risk stratified patients from the [facility] studies from having an isolated vaginal cuff recurrence.    We discussed the typical fractionation which can occur in 5  or 3 sessions.  The logistics were described which include placement of a vaginal cylinder followed by CT simulation and treatment with a remote afterloader.  Additionally the risk of vaginal cuff dehiscence with insertion of the cylinder was explained.  The acute and late toxicities were described in detail which were provided on a copy of the patient's informed consent.  The patient is amenable to proceeding with treatment, and informed consent was obtained.  However, patient would like to think about whether 3 or 5 fraction vaginal brachytherapy is appropriate for her and will come to a decision prior to follow-up in November.     Plan   Return for scheduled vaginal brachytherapy.      Patient was seen and discussed with Dr. Juan Pablo Jean.    Charles Nolasco MD  PGY-3  Radiation Oncology

## 2023-10-26 NOTE — LETTER
10/26/2023         RE: Jen Layne  [address]  [address]  [place] [address]        Dear Colleague,    Thank you for referring your patient, Jen Layne, to the [facility] RADIATION ONCOLOGY. Please see a copy of my visit note below.    Department of Radiation Oncology                   [address]  [address] [place]  [address]  Office:  [phone]  Fax:  [phone]   Radiation Oncology Clinic  [address] [place] [address]  Phone:  [phone]  Fax:  [phone]     RE: Jen Layne : 1935   MRN: [id number] PATRICA: 10/26/2023     OUTPATIENT VISIT NOTE       PROBLEM: FIGO IB, grade 1, endometrioid adenocacinoma     was seen for initial consultation in the Dept of Radiation Oncology on 10/26/2023 at the request of Dr. Au.    HISTORY OF PRESENT ILLNESS:Ms. Layne is an 89 yo female with a newly diagnosed endometrial cancer here for discussion of adjuvant brachytherapy.     She initially saw primary care in 2022 for 3 day history of gross hematuria. Pelvic exam at that time noted blood but source unclear. CT Urogram showed small non-obstructing stone, but also noted thickening of the endometrium with a lobulated low density masslike appearance near the fundus. Pelvic ultrasound revealed a slightly heterogeneous 3.2 cm mass along the endometrial canal representing a submucosal fibroid or polyp.  For reasons that are unclear, Ms. Layne did not have a follow up for this.     In late , Ms. Layne again developed intermittent vaginal spotting as well as urinary urgency and frequency. She presented to ED on 2023. US again showed diffusely thickened endometrium of 10 mm with increased vascularity. Pelvic MRI identified a heterogeneous mass-like structure in the anterior and right aspect of the uterus, about 2.5 x 1.6 cm in addition to mildly irregular appearance of the endometrium.     Given this finding,  she saw Dr. Au. An office biopsy could not be completed due to significant cervical stenosis. She proceeded with EUA and D&C under US guidance on 7/19/2023. Her cervix was flush with the vaginal tissue. US demonstrated thickened endometrium. Pathology from endometrial curettage was consistent with endometrioid adenocarcinoma with squamous differentiation, FIGO grade 1, MMR deficient, MLH1 promoter methylation positive.     Ms. Layne then proceeded with RA-TLH/BSO/SLN biopsy on 9/22/2023. Cytology from pelvic washing was negative for malignancy. Final pathology again revealed endometrioid carcinoma, measuring 4.5 cm in greatest dimension, FIGO grade 1. Myometrial invasion was 13 mm out of 22 mm (59%). LVSI was not identified. Disease was present in the lower uterine segment but not in the cervix, serosa, tubes or ovaries. Two SLN, one from each side were negative for malignancy. pT1bN0(sn), FIGO IB (2009 & 2023).    Post-op, Dr. Au discussed observation vs. adjuvant vaginal brachytherapy. She is here today to here more about brachytherapy option.     On interview, she states that she has recovered well from her surgery. She denies vaginal bleeding or discharge. She has no abdominal pain. She also denies any bowel or urinary symptoms. She states that she is very functional, lives independently and performs all of her ADLs. Her mother lived to 92 and father 88.            PAST MEDICAL HISTORY:   Past Medical History:   Diagnosis Date    Arthritis     Dyslipidemia     Endometrial cancer (H)     Endometrial cancer (H)     Gout 03/14/2011    Triggered by hydrochlorothiazide      Hypertension     Obesity       Past Surgical History:   Procedure Laterality Date    COLONOSCOPY  12/05/05    normal, recheck 10 years -- done at [facility] HYSTERECTOMY TOTAL, BILATERAL SALPINGO-OOPHORECTOMY, NODE DISSECTION, COMBINED N/A 9/22/2023    Procedure: Robotic assisted hysterectomy, bilateral salpingo-oophorectomy,  cancer staging, sentinel lymph node mapping and sampling, pelvic washings;  Surgeon: Amanda Au MD;  Location: [facility] OR    DILATION AND CURETTAGE, WITH ULTRASOUND GUIDANCE N/A 7/19/2023    Procedure: Pelvic examination under anesthesia, dilation and curettage uterus, ultrasound guidance Latex Free;  Surgeon: Amanda Au MD;  Location: [facility] OR    EXAM EYE  03/30/2018    Capsulotomy Left eye.   Dr. Skip Villela    EYE SURGERY  2015    [facility] REMOVAL GALLBLADDER      HYSTEROSCOPY,DIAGNOSTIC  around 1998        CHEMOTHERAPY HISTORY: None    PAST RADIATION THERAPY HISTORY: None    MEDICATIONS:    Current Outpatient Medications   Medication Sig Dispense Refill    acetaminophen (TYLENOL) 325 MG tablet Take 2 tablets (650 mg) by mouth every 6 hours as needed for mild pain 24 tablet 0    lisinopril (ZESTRIL) 20 MG tablet Take 1 tablet (20 mg) by mouth daily (Patient taking differently: Take 20 mg by mouth every evening) 90 tablet 4    oxyCODONE (ROXICODONE) 5 MG tablet Take 1-2 tablets (5-10 mg) by mouth every 4 hours as needed for moderate to severe pain 6 tablet 0    senna-docusate (SENOKOT-S/PERICOLACE) 8.6-50 MG tablet Take 1-2 tablets by mouth 2 times daily 30 tablet 0    simvastatin (ZOCOR) 40 MG tablet Take 1 tablet (40 mg) by mouth At Bedtime 90 tablet 4    VITAMIN D 1000 UNIT OR CAPS Take 1 capsule by mouth every morning         ALLERGIES:  allergic to hydrochlorothiazide.    SOCIAL HISTORY:     Lives alone in an apartment  Never smoker  Does not drink alcohol    FAMILY HISTORY:    family history includes Cancer in her paternal grandfather, sister, and son; Coronary Artery Disease in her brother; Deep Vein Thrombosis (DVT) in her sister; Eye Disorder in her brother and mother; Heart Disease in her brother and mother; Lipids in her brother; Other Cancer in her sister, son, and another family member; Thyroid Disease in her daughter and another family member.  Sister: endometrial/ovarian  cancer    REVIEW OF SYMPTOMS:  A full 14-point review of systems was performed. See nursing assessment sheet and HPI    PHYSICAL EXAMINATION:    BP (!) 195/83   Pulse 78   Wt 86.2 kg (190 lb)   LMP  (LMP Unknown)   SpO2 97%   BMI 32.61 kg/m     Gen: Appears well, appears younger than stated age  HEENT: unremarkable  CV: well perfused  Resp: breathing comfortably on room air  Pelvic: deferred     ASSESSMENT AND PLAN: In summary, Ms. Layne is an 89 yo female with an early stage endometrial cancer. She is status post TLH/BSO/SLN biopsy. Pathology was significant for a 4.5 cm endometrioid adenocarcinoma, grade 1, with 59% myometrial invasion and no LVSI.     We reviewed the risk factors for recurrence. In her case, her primary risk factors are her age of 88 and deep myometrial invasion. She fits the PORTEC 2 criteria for adjuvant brachytherapy. Also of note is her longevity in her family and her excellent functional status.     We thus presented to her the option of surveillance vs. adjuvant brachytherapy to decrease her risk of cuff recurrence. We emphasized that such treatment will not impact overall survival.     We described the vaginal brachytherapy procedure in great details. We also discussed the options of 5 treatments vs. 3 treatments.     Following a long discussion, Ms. Layne opted to go forward with adjuvant brachytherapy. She would like the time frame to be around Thanksgiving, though is not available the Friday after Thanksgiving. She would like her daughter from [place] to weigh in on 3 vs. 5 treatments. We will thus schedule her brachytherapy accordingly.     Thank you for allowing us to participate in this patient's care.  Please feel free to call with any questions or concerns.       Juan Pablo Jean M.D./Ph.D.  Radiation Oncologist   Department of Radiation Oncology  [facility]  Phone: [phone]            I reviewed patient's chart, internal/external medical  records, labs and pathology reports.  I interviewed and counseled the patient face to face.  I additionally discussed the case with patient's referring physicians and care team.         Juan Pablo Jean MD        Department of Radiation Oncology  [address] [address] [phone]       Consultation Note    Name: Jen Layne MRN: [id number]   : 1935   Date of Service: 10/26/2023  Referring: Dr. Amanda Au     Diagnosis: Endometrial endometrioid adenocarcinoma, MMR deficient  Stage: FIGO IB    History of Present Illness   Ms. Layne is a 88 year old female with newly diagnosed FIGO IB endometrial endometrioid adenocarcinoma.  She had a history of postmenopausal bleeding versus hematuria evaluated in 2022. CT urogram at the time showed endometrium measuring 14 mm in thickness.  Pelvic exam showed blood within the vagina but did not appear to show blood from the cervical os.  Subsequent pelvic ultrasound showed a 3.2 cm mass, possibly a polyp or fibroid.  The patient had difficulty scheduling follow-up with gynecology and further work-up was not performed.      Her diagnosis came to light in 2023 when she presented to the emergency department with intermittent vaginal bleeding, urinary urgency, and frequency, with a history going back 6 weeks.  Ultrasound of the pelvis showed a 10 mm endometrium, diffusely thickened and heterogeneous with increased vascularity concerning for endometrial carcinoma.  A subsequent MRI revealed a 2.5 x 1.6 cm structure in the subendometrial or endometrial region.  Endometrial biopsy could not be performed on 2023 during follow-up with gynecologic oncology due to stenosis.  She was seen by Dr. Amanda Naranjo who recommended a D&C.    She presented again to the emergency department on  with heavy vaginal bleeding, though was discharged with close follow-up with gynecologic surgery. She underwent  D&C with Dr. Au on 7/19/2023, and pathology returned endometrial endometrioid adenocarcinoma with squamous differentiation, FIGO grade 1, MMR deficient with loss of PMS 2 and MLH1.    She then proceeded with robot-assisted hysterectomy, BSO, sentinel lymph node mapping and sampling, with pelvic washings.  Pathology returned endometrial adenocarcinoma, endometrioid type, FIGO grade 1 with squamous differentiation, 4.5 cm in, MMR deficient, with superficial lower uterine segment involvement.  There is no cervical involvement.  Myometrial invasion encompassed 13/22 mm (59%).  No LVSI.  2 sentinel lymph nodes were recovered, 1 from the left external iliac, and the other from the right obturator.  Neither sentinel lymph node was involved with malignancy.  Final staging was FIGO IB (2009 and 2023)    Her case was reviewed in multidisciplinary tumor board.  She was then referred to radiation oncology for discussion of adjuvant vaginal brachytherapy versus observation.    She presents today for consideration of vaginal brachytherapy.  She is accompanied today by her 2 daughters.  She states that she has been recovering well from surgery, about a month ago.  She has no abdominal or pelvic pain today.  She has been avoiding lifting heavy weights.  She denies spotting or pelvic bleeding.    CHEMOTHERAPY HISTORY: None  PACEMAKER: No  PREGNANCY STATUS: No  PAST RADIATION THERAPY HISTORY: None    PAST MEDICAL HISTORY:  Past Medical History:   Diagnosis Date    Arthritis     Dyslipidemia     Endometrial cancer (H) 07/2023    Gout 03/14/2011    Triggered by hydrochlorothiazide      Hypertension     Obesity        PAST SURGICAL HISTORY:  Past Surgical History:   Procedure Laterality Date    COLONOSCOPY  12/05/05    normal, recheck 10 years -- done at [facility] [place] HYSTERECTOMY TOTAL, BILATERAL SALPINGO-OOPHORECTOMY, NODE DISSECTION, COMBINED N/A 9/22/2023    Procedure: Robotic assisted hysterectomy, bilateral  salpingo-oophorectomy, cancer staging, sentinel lymph node mapping and sampling, pelvic washings;  Surgeon: Amanda Au MD;  Location: [facility] OR    DILATION AND CURETTAGE, WITH ULTRASOUND GUIDANCE N/A 2023    Procedure: Pelvic examination under anesthesia, dilation and curettage uterus, ultrasound guidance Latex Free;  Surgeon: Amanda Au MD;  Location: [facility] OR    EXAM EYE  2018    Capsulotomy Left eye.   Dr. Skip Villela    EYE SURGERY      [facility] REMOVAL GALLBLADDER      HYSTEROSCOPY,DIAGNOSTIC  around        ALLERGIES:  Allergies as of 10/26/2023 - Reviewed 10/26/2023   Allergen Reaction Noted    Hydrochlorothiazide  2016       MEDICATIONS:  Current Outpatient Medications   Medication Sig Dispense Refill    lisinopril (ZESTRIL) 20 MG tablet Take 1 tablet (20 mg) by mouth daily (Patient taking differently: Take 20 mg by mouth every evening) 90 tablet 4    simvastatin (ZOCOR) 40 MG tablet Take 1 tablet (40 mg) by mouth At Bedtime 90 tablet 4    VITAMIN D 1000 UNIT OR CAPS Take 1 capsule by mouth every morning          FAMILY HISTORY:  Family History   Problem Relation Age of Onset    Eye Disorder Mother         glaucoma    Heart Disease Mother          of CHF    Cancer Sister         uterine and ovarian-in remission     Other Cancer Sister     Deep Vein Thrombosis (DVT) Sister     Uterine Cancer Sister     Heart Disease Brother         valve replacement    Lipids Brother     Coronary Artery Disease Brother     Eye Disorder Brother         detached retina    Cancer Paternal Grandfather         carcinoma of the lip, pipe-smoker    Thyroid Disease Daughter         goiter or nodule?    Cancer Son         SKIN    Other Cancer Son     Other Cancer Other     Thyroid Disease Other     Diabetes No family hx of     C.A.D. No family hx of     Breast Cancer No family hx of     Cancer - colorectal No family hx of     Anesthesia Reaction No family hx of        SOCIAL HISTORY:  Social History      Socioeconomic History    Marital status:      Spouse name: Not on file    Number of children: 4    Years of education: Not on file    Highest education level: Not on file   Occupational History    Not on file   Tobacco Use    Smoking status: Never     Passive exposure: Never    Smokeless tobacco: Never   Vaping Use    Vaping Use: Never used   Substance and Sexual Activity    Alcohol use: No    Drug use: No    Sexual activity: Never   Other Topics Concern    Parent/sibling w/ CABG, MI or angioplasty before 65F 55M? No   Social History Narrative    2008 --- 4 children, 11 grandchildren, 3 great-grandchildren     Social Determinants of Health     Financial Resource Strain: Not on file   Food Insecurity: Not on file   Transportation Needs: Not on file   Physical Activity: Not on file   Stress: Not on file   Social Connections: Not on file   Interpersonal Safety: Not on file   Housing Stability: Not on file         Review of Systems   A 12-point review of systems was performed. Pertinent findings are noted in the HPI.    Physical Exam   ECOG Status: 0    VITALS: BP (!) 195/83   Pulse 78   Wt 86.2 kg (190 lb)   LMP  (LMP Unknown)   SpO2 97%   BMI 32.61 kg/m    GEN: Appears well, alert, oriented, and in NAD  HEENT: EOMI, normal conjunctiva, MMM  CV: Warm and well-perfused  RESP: Breathing comfortably on room air  SKIN: Normal color and turgor  NEURO: No focal deficits, CN 3-12 grossly intact  PSYCH: Appropriate mood and affect    Imaging/Path/Labs   Imaging: per hpi     Path: per hpi     Labs: reviewed    Assessment    Ms. Layne is a 88 year old female with FIGO IB endometrial endometrioid adenocarcinoma, MMR deficient, status post robot-assisted hysterectomy with BSO.  We discussed that the genetic loss of function of the MMR gene within her tumor conveys a slightly elevated risk.  Therefore adjuvant vaginal brachytherapy is offered to a select number of patients who may benefit.  This benefit was  quantified as an improvement over the roughly 14% risk of similar risk stratified patients from the PORTEC studies from having an isolated vaginal cuff recurrence.    We discussed the typical fractionation which can occur in 5 or 3 sessions.  The logistics were described which include placement of a vaginal cylinder followed by CT simulation and treatment with a remote afterloader.  Additionally the risk of vaginal cuff dehiscence with insertion of the cylinder was explained.  The acute and late toxicities were described in detail which were provided on a copy of the patient's informed consent.  The patient is amenable to proceeding with treatment, and informed consent was obtained.  However, patient would like to think about whether 3 or 5 fraction vaginal brachytherapy is appropriate for her and will come to a decision prior to follow-up in November.     Plan   Return for scheduled vaginal brachytherapy.      Patient was seen and discussed with Dr. Juan Pablo Jean.    Charles Nolasco MD  PGY-3  Radiation Oncology      Cancer  Pertinent negatives include no chest pain, chills, coughing, diaphoresis, fever, headaches, nausea, neck pain, rash, sore throat or vomiting.   INITIAL PATIENT ASSESSMENT    Diagnosis: Endometrial cancer, hysterectomy 9/22/23    Prior radiation therapy: None    Prior chemotherapy: None    Prior hormonal therapy:No    Pain Eval:  Denies    Psychosocial  Living arrangements: self  Fall Risk: independent   referral needs: Not needed    Advanced Directive: Yes - Location: In chart  Implantable Cardiac Device? No    Onset of menarche: @ age 15  LMP: No LMP recorded (lmp unknown). Patient is postmenopausal.  Onset of menopause: @ age 62  Abnormal vaginal bleeding/discharge: No  Are you pregnant? No  Reproductive note: 4 children    Nurse face-to-face time: Level 4:  15 min face to face time    Review of Systems   Constitutional:  Positive for malaise/fatigue (Still recoverying from  surgery). Negative for chills, diaphoresis, fever and weight loss.   HENT:  Negative for ear pain, nosebleeds and sore throat.    Eyes:  Negative for blurred vision, double vision and pain.   Respiratory:  Negative for cough and shortness of breath.    Cardiovascular:  Negative for chest pain and leg swelling.   Gastrointestinal:  Negative for blood in stool, constipation, diarrhea, nausea and vomiting.   Genitourinary:  Positive for frequency (Since surgery). Negative for hematuria and urgency.   Musculoskeletal:  Negative for back pain, falls, joint pain and neck pain.   Skin:  Negative for rash.   Neurological:  Negative for dizziness, tingling, seizures and headaches.   Endo/Heme/Allergies:  Does not bruise/bleed easily.   Psychiatric/Behavioral:  Negative for depression. The patient is nervous/anxious (With appointments). The patient does not have insomnia.               Again, thank you for allowing me to participate in the care of your patient.        Sincerely,        Juan Pablo Jean MD

## 2023-11-01 ENCOUNTER — ONCOLOGY VISIT (OUTPATIENT)
Dept: ONCOLOGY | Facility: CLINIC | Age: 88
End: 2023-11-01
Attending: OBSTETRICS & GYNECOLOGY
Payer: MEDICARE

## 2023-11-01 VITALS
RESPIRATION RATE: 18 BRPM | OXYGEN SATURATION: 95 % | HEART RATE: 77 BPM | WEIGHT: 190.8 LBS | BODY MASS INDEX: 32.75 KG/M2 | SYSTOLIC BLOOD PRESSURE: 139 MMHG | DIASTOLIC BLOOD PRESSURE: 64 MMHG | TEMPERATURE: 97.6 F

## 2023-11-01 DIAGNOSIS — C54.1 ENDOMETRIAL CANCER (H): Primary | ICD-10-CM

## 2023-11-01 PROCEDURE — 99213 OFFICE O/P EST LOW 20 MIN: CPT | Mod: 24 | Performed by: OBSTETRICS & GYNECOLOGY

## 2023-11-01 PROCEDURE — G0463 HOSPITAL OUTPT CLINIC VISIT: HCPCS | Performed by: OBSTETRICS & GYNECOLOGY

## 2023-11-01 ASSESSMENT — PAIN SCALES - GENERAL: PAINLEVEL: NO PAIN (0)

## 2023-11-01 NOTE — PATIENT INSTRUCTIONS
Return in three months      Amanda Au MD  Gynecologic Oncology  [facility]

## 2023-11-01 NOTE — LETTER
"    11/1/2023         RE: Jen Layne  [address]  [address]  [place] [address]        Dear Colleague,    Thank you for referring your patient, Jen Layne, to the [facility]. Please see a copy of my visit note below.    Oncology Rooming Note    November 1, 2023 11:20 AM   Jen Layne is a 88 year old female who presents for:    Chief Complaint   Patient presents with     Oncology Clinic Visit     2 week post op     Initial Vitals: /64   Pulse 77   Temp 97.6  F (36.4  C)   Resp 18   Wt 86.5 kg (190 lb 12.8 oz)   LMP  (LMP Unknown)   SpO2 95%   BMI 32.75 kg/m   Estimated body mass index is 32.75 kg/m  as calculated from the following:    Height as of 10/4/23: 1.626 m (5' 4\").    Weight as of this encounter: 86.5 kg (190 lb 12.8 oz). Body surface area is 1.98 meters squared.  No Pain (0) Comment: Data Unavailable   No LMP recorded (lmp unknown). Patient is postmenopausal.  Allergies reviewed: Yes  Medications reviewed: Yes    Medications: Medication refills not needed today.  Pharmacy name entered into EPIC:    [facility], [employer] [facility] - [place], [place] - [address]    Clinical concerns: None       Kathya Cotter LPN                            Consult Notes on Referred Patient    Date: 11/1/2023     The patient returns today for follow-up and treatment planning.     Her history is as follows:     Briefly, she is a 86yo who was seen in ED for intermittent vaginal bleeding x 6 wks. Was actually seen one year ago for hematuria.  At that time, underwent CT urogram 6/8/22 which showed no gross abnormalities to explain hematuria, was noted to have low-density thickening of endometrium.  Underwent a pelvic US on 7/8/22 which showed uterus at 6.8 x 4.6cm with a 3.2cm mass along endometrial canal possibly representing polyp or fibroid.  Never had any further follow-up related to this that I can see.     Was then " seen most recently in ED for bleeding. Had repeat pelvic US on 6/5/23 which showed diffusely thickened endometrium.  Had pelvic MRI  8.1 x 4.4 x 5.4 cm in long axis, short axis and transverse dimensions, respectively. An apparent heterogeneous mass-like structure is present in the anterior and right aspect of the uterus in the subendometrial or endometrial  region, measuring approximately 2.5 x 1.6 cm (series 18 image 33). This structure is not well-visualized on T1 weighted images due to artifact. It appears isointense to the uterine myometrium on T2-weighted images. Probable mildly heterogeneous  enhancement of this structure. A few subcentimeter low T2 signal regions in the anterior uterus likely represent very small fibroids.   The endometrial stripe is not well defined due to motion artifact. It measures approximately 0.9 cm in dual-layer anteroposterior thickness. The endometrial region appears mildly irregular on the axial images (for example, series 6 image 15). It cannot be determined on the post contrast images if there is abnormal enhancement associated with the endometrium. Possible restricted diffusion within the region of endometrium.     6/7/23:  Seen in Gyn Onc. Unable to perform EMB due to stenosis     7/19/23:  EUA, D&C under US guidance.  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  MLH1 hypermethylation +     9/22/23:  Robotic assisted hysterectomy, bilateral salpingo-oophorectomy, cancer staging, sentinel lymph node mapping and sampling, pelvic washings.     Final pathology (reviewed): FIGO Grade 1 endometrioid TOMAS, 4.5cm greatest dimension, >50% myometrial invasion (13/22mm), superficial NOLA involvment, no LVSI, cytology negative, margins negative, LN negative. Final FIGO Stage IB Grade 1 endometrioid TOMAS,      Discussed at tumor board with recs for VBT versus observation.  The patient has seen rad onc.  She is here today with her daughters.  Healing well, no complaints, no  vaginal bleeding.     Past Medical History:    Past Medical History:   Diagnosis Date     Arthritis      Dyslipidemia      Endometrial cancer (H) 2023     Gout 2011    Triggered by hydrochlorothiazide       Hypertension      Obesity          Past Surgical History:    Past Surgical History:   Procedure Laterality Date     COLONOSCOPY  05    normal, recheck 10 years -- done at [facility] [place] HYSTERECTOMY TOTAL, BILATERAL SALPINGO-OOPHORECTOMY, NODE DISSECTION, COMBINED N/A 2023    Procedure: Robotic assisted hysterectomy, bilateral salpingo-oophorectomy, cancer staging, sentinel lymph node mapping and sampling, pelvic washings;  Surgeon: Amanda Au MD;  Location: [facility] OR     DILATION AND CURETTAGE, WITH ULTRASOUND GUIDANCE N/A 2023    Procedure: Pelvic examination under anesthesia, dilation and curettage uterus, ultrasound guidance Latex Free;  Surgeon: Amanda Au MD;  Location: [facility] OR     EXAM EYE  2018    Capsulotomy Left eye.   Dr. Skip Villela     EYE SURGERY       HC REMOVAL GALLBLADDER       HYSTEROSCOPY,DIAGNOSTIC  around          Health Maintenance:  Health Maintenance Due   Topic Date Due     RSV VACCINE 60+ (1 - 1-dose 60+ series) Never done         Current Medications:     has a current medication list which includes the following prescription(s): lisinopril, simvastatin, and vitamin d.       Allergies:     Hydrochlorothiazide        Social History:     Social History     Tobacco Use     Smoking status: Never     Passive exposure: Never     Smokeless tobacco: Never   Substance Use Topics     Alcohol use: No       History   Drug Use No           Family History:     The patient's family history is notable for :.    Family History   Problem Relation Age of Onset     Eye Disorder Mother         glaucoma     Heart Disease Mother          of CHF     Cancer Sister         uterine and ovarian-in remission      Other Cancer Sister      Deep Vein Thrombosis  (DVT) Sister      Uterine Cancer Sister      Heart Disease Brother         valve replacement     Lipids Brother      Coronary Artery Disease Brother      Eye Disorder Brother         detached retina     Cancer Paternal Grandfather         carcinoma of the lip, pipe-smoker     Thyroid Disease Daughter         goiter or nodule?     Cancer Son         SKIN     Other Cancer Son      Other Cancer Other      Thyroid Disease Other      Diabetes No family hx of      C.A.D. No family hx of      Breast Cancer No family hx of      Cancer - colorectal No family hx of      Anesthesia Reaction No family hx of          Physical Exam:     /64   Pulse 77   Temp 97.6  F (36.4  C)   Resp 18   Wt 86.5 kg (190 lb 12.8 oz)   LMP  (LMP Unknown)   SpO2 95%   BMI 32.75 kg/m    Body mass index is 32.75 kg/m .    General Appearance: healthy and alert, no distress     Musculoskeletal: extremities non tender and without edema    Skin: no lesions or rashes     Neurological: normal gait, no gross defects     Psychiatric: appropriate mood and affect                               Hematological: normal cervical, supraclavicular and inguinal lymph nodes     Gastrointestinal:       abdomen soft, non-tender, non-distended, no organomegaly or masses    Genitourinary: External genitalia and urethral meatus appears normal.  Vagina is smooth without nodularity or masses.  [place] intact, no lesions, no bleeding.    Assessment:     Jen Layne is a 88 year old woman with a new diagnosis of Stage IB Grade 1 endometrioid endometrial TOMAS.         30 minutes spent on the date of the encounter doing chart review, history and exam, documentation and further activities as noted above     Plan:      1.)        FIGO Grade 1 endometrioid TOMAS:  Pathology again reviewed in detail with patient and her daughters.  Tumor 4.5cm greatest dimension, >50% myometrial invasion (13/22mm), superficial NOLA involvment, no LVSI, cytology negative, margins negative, LN  negative. Final FIGO Stage IB Grade 1 endometrioid TOMAS.  H-IR due to age, DOI.  Discussed options including observation, radiation (VBT).      Patient has been with Rad Onc for consideration of VBT.  She is undecided and is currently leaning towards observation.  Discussed rationale for VBT, patterns for recurrence, treatment in the event of isolated vaginal cuff recurrence versus multifocal disease, need for possible EBRT over VBT in the future.  Many excellent questions.    Reviewed in detail.  She is favoring observation at this time.  Will plan to see in three months.    Questions answered, precautions given.      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        Amanda Au MD  Gynecologic Oncology  [facility]    CC  Patient Care Team:  Yamile Cotter MD as PCP - General (Family Medicine)  Yamile Cotter MD as Assigned PCP  June Boyle PA-C as Assigned Surgical Provider  Amanda Au MD as MD (Gynecologic Oncology)  Nayana Gutierrez RN as Specialty Care Coordinator (Gynecologic Oncology)  Amanda Au MD as Assigned Cancer Care Provider                       Again, thank you for allowing me to participate in the care of your patient.        Sincerely,        Amanda Au MD

## 2023-11-01 NOTE — PROGRESS NOTES
"Oncology Rooming Note    November 1, 2023 11:20 AM   Jen Layne is a 88 year old female who presents for:    Chief Complaint   Patient presents with    Oncology Clinic Visit     2 week post op     Initial Vitals: /64   Pulse 77   Temp 97.6  F (36.4  C)   Resp 18   Wt 86.5 kg (190 lb 12.8 oz)   LMP  (LMP Unknown)   SpO2 95%   BMI 32.75 kg/m   Estimated body mass index is 32.75 kg/m  as calculated from the following:    Height as of 10/4/23: 1.626 m (5' 4\").    Weight as of this encounter: 86.5 kg (190 lb 12.8 oz). Body surface area is 1.98 meters squared.  No Pain (0) Comment: Data Unavailable   No LMP recorded (lmp unknown). Patient is postmenopausal.  Allergies reviewed: Yes  Medications reviewed: Yes    Medications: Medication refills not needed today.  Pharmacy name entered into EPIC:    [facility]. - [address] [facility]    Clinical concerns: None       Kathya Cotter LPN                            Consult Notes on Referred Patient    Date: 11/1/2023     The patient returns today for follow-up and treatment planning.     Her history is as follows:     Briefly, she is a 88yo who was seen in ED for intermittent vaginal bleeding x 6 wks. Was actually seen one year ago for hematuria.  At that time, underwent CT urogram 6/8/22 which showed no gross abnormalities to explain hematuria, was noted to have low-density thickening of endometrium.  Underwent a pelvic US on 7/8/22 which showed uterus at 6.8 x 4.6cm with a 3.2cm mass along endometrial canal possibly representing polyp or fibroid.  Never had any further follow-up related to this that I can see.     Was then seen most recently in ED for bleeding. Had repeat pelvic US on 6/5/23 which showed diffusely thickened endometrium.  Had pelvic MRI  8.1 x 4.4 x 5.4 cm in long axis, short axis and transverse dimensions, respectively. An apparent heterogeneous mass-like structure is present " in the anterior and right aspect of the uterus in the subendometrial or endometrial  region, measuring approximately 2.5 x 1.6 cm (series 18 image 33). This structure is not well-visualized on T1 weighted images due to artifact. It appears isointense to the uterine myometrium on T2-weighted images. Probable mildly heterogeneous  enhancement of this structure. A few subcentimeter low T2 signal regions in the anterior uterus likely represent very small fibroids.   The endometrial stripe is not well defined due to motion artifact. It measures approximately 0.9 cm in dual-layer anteroposterior thickness. The endometrial region appears mildly irregular on the axial images (for example, series 6 image 15). It cannot be determined on the post contrast images if there is abnormal enhancement associated with the endometrium. Possible restricted diffusion within the region of endometrium.     6/7/23:  Seen in Gyn Onc. Unable to perform EMB due to stenosis     7/19/23:  EUA, D&C under US guidance.  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  MLH1 hypermethylation +     9/22/23:  Robotic assisted hysterectomy, bilateral salpingo-oophorectomy, cancer staging, sentinel lymph node mapping and sampling, pelvic washings.     Final pathology (reviewed): FIGO Grade 1 endometrioid TOMSA, 4.5cm greatest dimension, >50% myometrial invasion (13/22mm), superficial NOLA involvment, no LVSI, cytology negative, margins negative, LN negative. Final FIGO Stage IB Grade 1 endometrioid TOMAS,      Discussed at tumor board with recs for VBT versus observation.  The patient has seen rad onc.  She is here today with her daughters.  Healing well, no complaints, no vaginal bleeding.     Past Medical History:    Past Medical History:   Diagnosis Date    Arthritis     Dyslipidemia     Endometrial cancer (H) 07/2023    Gout 03/14/2011    Triggered by hydrochlorothiazide      Hypertension     Obesity          Past Surgical History:    Past  Surgical History:   Procedure Laterality Date    COLONOSCOPY  05    normal, [place] 10 years [place] [facility] [place], BILATERAL SALPINGO-OOPHORECTOMY, NODE DISSECTION, COMBINED N/A 2023    Procedure: Robotic assisted hysterectomy, bilateral salpingo-oophorectomy, cancer staging, sentinel lymph node mapping and sampling, pelvic washings;  Surgeon: Amanda Au MD;  Location: [facility] OR    DILATION AND CURETTAGE, WITH ULTRASOUND GUIDANCE N/A 2023    Procedure: Pelvic examination under anesthesia, dilation and curettage uterus, ultrasound guidance Latex Free;  Surgeon: Amanda Au MD;  Location: [facility] OR    EXAM EYE  2018    Capsulotomy Left eye.   Dr. Skip Villela    EYE SURGERY      HC REMOVAL GALLBLADDER      HYSTEROSCOPY,DIAGNOSTIC  around          Health Maintenance:  Health Maintenance Due   Topic Date Due    RSV VACCINE 60+ (1 - 1-dose 60+ series) Never done         Current Medications:     has a current medication list which includes the following prescription(s): lisinopril, simvastatin, and vitamin d.       Allergies:     Hydrochlorothiazide        Social History:     Social History     Tobacco Use    Smoking status: Never     Passive exposure: Never    Smokeless tobacco: Never   Substance Use Topics    Alcohol use: No       History   Drug Use No           Family History:     The patient's family history is notable for :.    Family History   Problem Relation Age of Onset    Eye Disorder Mother         glaucoma    Heart Disease Mother          of CHF    Cancer Sister         uterine and ovarian-in remission     Other Cancer Sister     Deep Vein Thrombosis (DVT) Sister     Uterine Cancer Sister     Heart Disease Brother         valve replacement    Lipids Brother     Coronary Artery Disease Brother     Eye Disorder Brother         detached retina    Cancer Paternal Grandfather         carcinoma of the lip, pipe-smoker    Thyroid Disease Daughter          goiter or nodule?    Cancer Son         SKIN    Other Cancer Son     Other Cancer Other     Thyroid Disease Other     Diabetes No family hx of     C.A.D. No family hx of     Breast Cancer No family hx of     Cancer - colorectal No family hx of     Anesthesia Reaction No family hx of          Physical Exam:     /64   Pulse 77   Temp 97.6  F (36.4  C)   Resp 18   Wt 86.5 kg (190 lb 12.8 oz)   LMP  (LMP Unknown)   SpO2 95%   BMI 32.75 kg/m    Body mass index is 32.75 kg/m .    General Appearance: healthy and alert, no distress     Musculoskeletal: extremities non tender and without edema    Skin: no lesions or rashes     Neurological: normal gait, no gross defects     Psychiatric: appropriate mood and affect                               Hematological: normal cervical, supraclavicular and inguinal lymph nodes     Gastrointestinal:       abdomen soft, non-tender, non-distended, no organomegaly or masses    Genitourinary: External genitalia and urethral meatus appears normal.  Vagina is smooth without nodularity or masses.  [place] intact, no lesions, no bleeding.    Assessment:     Jen Layne is a 88 year old woman with a new diagnosis of Stage IB Grade 1 endometrioid endometrial TOMAS.         30 minutes spent on the date of the encounter doing chart review, history and exam, documentation and further activities as noted above     Plan:      1.)        FIGO Grade 1 endometrioid TOMAS:  Pathology again reviewed in detail with patient and her daughters.  Tumor 4.5cm greatest dimension, >50% myometrial invasion (13/22mm), superficial NOLA involvment, no LVSI, cytology negative, margins negative, LN negative. Final FIGO Stage IB Grade 1 endometrioid TOMAS.  H-IR due to age, DOI.  Discussed options including observation, radiation (VBT).      Patient has been with Rad Onc for consideration of VBT.  She is undecided and is currently leaning towards observation.  Discussed rationale for VBT, patterns for  recurrence, treatment in the event of isolated vaginal cuff recurrence versus multifocal disease, need for possible EBRT over VBT in the future.  Many excellent questions.    Reviewed in detail.  She is favoring observation at this time.  Will plan to see in three months.    Questions answered, precautions given.      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        Amanda Au MD  Gynecologic Oncology  [facility]    CC  Patient Care Team:  Yamile Cotter MD as PCP - General (Family Medicine)  Yamile Cotter MD as Assigned PCP  June Boyle PA-C as Assigned Surgical Provider  Amanda Au MD as MD (Gynecologic Oncology)  Nayana Gutierrez, [place] as Specialty Care Coordinator (Gynecologic Oncology)  Amanda Au MD as Assigned Cancer Care Provider

## 2024-02-07 ENCOUNTER — ONCOLOGY VISIT (OUTPATIENT)
Dept: ONCOLOGY | Facility: HOSPITAL | Age: 89
End: 2024-02-07
Attending: OBSTETRICS & GYNECOLOGY
Payer: MEDICARE

## 2024-02-07 VITALS
SYSTOLIC BLOOD PRESSURE: 166 MMHG | HEIGHT: 64 IN | DIASTOLIC BLOOD PRESSURE: 77 MMHG | RESPIRATION RATE: 18 BRPM | OXYGEN SATURATION: 98 % | WEIGHT: 191 LBS | HEART RATE: 75 BPM | BODY MASS INDEX: 32.61 KG/M2

## 2024-02-07 DIAGNOSIS — C54.1 ENDOMETRIAL CANCER (H): Primary | ICD-10-CM

## 2024-02-07 PROCEDURE — 99214 OFFICE O/P EST MOD 30 MIN: CPT | Performed by: OBSTETRICS & GYNECOLOGY

## 2024-02-07 PROCEDURE — G0463 HOSPITAL OUTPT CLINIC VISIT: HCPCS | Performed by: OBSTETRICS & GYNECOLOGY

## 2024-02-07 ASSESSMENT — PAIN SCALES - GENERAL: PAINLEVEL: NO PAIN (0)

## 2024-02-07 NOTE — PROGRESS NOTES
Consult Notes on Referred Patient    Date: 2/7/2024     The patient returns today for follow-up and treatment planning.     Her history is as follows:     Briefly, she is a 87yo who was seen in ED for intermittent vaginal bleeding x 6 wks. Was actually seen one year ago for hematuria.  At that time, underwent CT urogram 6/8/22 which showed no gross abnormalities to explain hematuria, was noted to have low-density thickening of endometrium.  Underwent a pelvic US on 7/8/22 which showed uterus at 6.8 x 4.6cm with a 3.2cm mass along endometrial canal possibly representing polyp or fibroid.  Never had any further follow-up related to this that I can see.     Was then seen most recently in ED for bleeding. Had repeat pelvic US on 6/5/23 which showed diffusely thickened endometrium.  Had pelvic MRI  8.1 x 4.4 x 5.4 cm in long axis, short axis and transverse dimensions, respectively. An apparent heterogeneous mass-like structure is present in the anterior and right aspect of the uterus in the subendometrial or endometrial  region, measuring approximately 2.5 x 1.6 cm (series 18 image 33). This structure is not well-visualized on T1 weighted images due to artifact. It appears isointense to the uterine myometrium on T2-weighted images. Probable mildly heterogeneous  enhancement of this structure. A few subcentimeter low T2 signal regions in the anterior uterus likely represent very small fibroids.   The endometrial stripe is not well defined due to motion artifact. It measures approximately 0.9 cm in dual-layer anteroposterior thickness. The endometrial region appears mildly irregular on the axial images (for example, series 6 image 15). It cannot be determined on the post contrast images if there is abnormal enhancement associated with the endometrium. Possible restricted diffusion within the region of endometrium.     6/7/23:  Seen in Gyn Onc. Unable to perform EMB due to stenosis     7/19/23:  EUA,  D&C under US guidance.  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  MLH1 hypermethylation +     9/22/23:  Robotic assisted hysterectomy, bilateral salpingo-oophorectomy, cancer staging, sentinel lymph node mapping and sampling, pelvic washings.     Final pathology (reviewed): FIGO Grade 1 endometrioid TOMAS, 4.5cm greatest dimension, >50% myometrial invasion (13/22mm), superficial NOLA involvment, no LVSI, cytology negative, margins negative, LN negative. Final FIGO Stage IB Grade 1 endometrioid TOMAS,      Discussed at tumor board with recs for VBT versus observation. The patient saw Rad Onc and ultimately decided not to do XRT.    She returns today for three month follow-up.  Doing well, no complaints, no vaginal bleeding.           Past Medical History:    Past Medical History:   Diagnosis Date    Arthritis     Dyslipidemia     Endometrial cancer (H) 07/2023    Gout 03/14/2011    Triggered by hydrochlorothiazide      Hypertension     Obesity          Past Surgical History:    Past Surgical History:   Procedure Laterality Date    COLONOSCOPY  12/05/05    normal, recheck 10 years -- done at [facility] HYSTERECTOMY TOTAL, BILATERAL SALPINGO-OOPHORECTOMY, NODE DISSECTION, COMBINED N/A 9/22/2023    Procedure: Robotic assisted hysterectomy, bilateral salpingo-oophorectomy, cancer staging, sentinel lymph node mapping and sampling, pelvic washings;  Surgeon: Amanda Au MD;  Location: [facility]    DILATION AND CURETTAGE, WITH ULTRASOUND GUIDANCE N/A 7/19/2023    Procedure: Pelvic examination under anesthesia, dilation and curettage uterus, ultrasound guidance Latex Free;  Surgeon: Amanda Au MD;  Location: [facility] OR    EXAM EYE  03/30/2018    Capsulotomy Left eye.   Dr. Skip Villela    EYE SURGERY  2015    [facility] REMOVAL GALLBLADDER      HYSTEROSCOPY,DIAGNOSTIC  around 1998         Health Maintenance:  Health Maintenance Due   Topic Date Due    RSV VACCINE (Pregnancy & 60+) (1 - 1-dose 60+ series) Never  "done    PHQ-2 (once per calendar year)  2024           Current Medications:     has a current medication list which includes the following prescription(s): lisinopril, simvastatin, and vitamin d.       Allergies:     Hydrochlorothiazide        Social History:     Social History     Tobacco Use    Smoking status: Never     Passive exposure: Never    Smokeless tobacco: Never   Substance Use Topics    Alcohol use: No       History   Drug Use No           Family History:     The patient's family history is notable for :.    Family History   Problem Relation Age of Onset    Eye Disorder Mother         glaucoma    Heart Disease Mother          of CHF    Cancer Sister         uterine and ovarian-in remission     Other Cancer Sister     Deep Vein Thrombosis (DVT) Sister     Uterine Cancer Sister     Heart Disease Brother         valve replacement    Lipids Brother     Coronary Artery Disease Brother     Eye Disorder Brother         detached retina    Cancer Paternal Grandfather         carcinoma of the lip, pipe-smoker    Thyroid Disease Daughter         goiter or nodule?    Cancer Son         SKIN    Other Cancer Son     Other Cancer Other     Thyroid Disease Other     Diabetes No family hx of     C.A.D. No family hx of     Breast Cancer No family hx of     Cancer - colorectal No family hx of     Anesthesia Reaction No family hx of          Physical Exam:     BP (!) 166/77   Pulse 75   Resp 18   Ht 1.626 m (5' 4\")   Wt 86.6 kg (191 lb)   LMP  (LMP Unknown)   SpO2 98%   BMI 32.79 kg/m    Body mass index is 32.79 kg/m .    General Appearance: healthy and alert, no distress     Musculoskeletal: extremities non tender and without edema    Skin: no lesions or rashes     Neurological: normal gait, no gross defects     Psychiatric: appropriate mood and affect                               Hematological: normal cervical, supraclavicular and inguinal lymph nodes     Gastrointestinal:       abdomen soft, " non-tender, non-distended, no organomegaly or masses    Genitourinary: External genitalia and urethral meatus appears normal.  Vagina is smooth without nodularity or masses.  [place] intact, no lesions, no bleeding.     Assessment:     Jen Layne is a 88 year old woman with a new diagnosis of Stage IB Grade 1 endometrioid endometrial TOMAS.         30 minutes spent on the date of the encounter doing chart review, history and exam, documentation and further activities as noted above     Plan:      1.)        FIGO Grade 1 endometrioid TOMAS:  Pathology previously reviewed in detail.  Tumor 4.5cm greatest dimension, >50% myometrial invasion (13/22mm), superficial NOLA involvment, no LVSI, cytology negative, margins negative, LN negative. Final FIGO Stage IB Grade 1 endometrioid TOMAS.  H-IR due to age, DOI.  Discussed options including observation, radiation (VBT).      Patient was seen by Rad Onc and ultimately elected for observation.  Today, normal exam.  Continue close follow-up.       Questions answered, precautions given.      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        Amanda Au MD  Gynecologic Oncology  [facility]    CC  Patient Care Team:  Yamile Cotter MD as PCP - General (Family Medicine)  Yamile Cotter MD as Assigned PCP  Amanda Au MD as MD (Gynecologic Oncology)  Nayana Gutierrez, RN as Specialty Care Coordinator (Gynecologic Oncology)  Amanda Au MD as Assigned Cancer Care Provider  AMANDA AU

## 2024-02-07 NOTE — Clinical Note
"    2/7/2024         RE: Jen Layne  [address]  [address]  [place] [address]        Dear Colleague,    Thank you for referring your patient, Jen Layne, to the [facility]. Please see a copy of my visit note below.    Oncology Rooming Note    February 7, 2024 10:38 AM   Jen Layne is a 88 year old female who presents for:    Chief Complaint   Patient presents with    Oncology Clinic Visit     Endometrial cancer     Initial Vitals: BP (!) 166/77   Pulse 75   Resp 18   Ht 1.626 m (5' 4\")   Wt 86.6 kg (191 lb)   LMP  (LMP Unknown)   SpO2 98%   BMI 32.79 kg/m   Estimated body mass index is 32.79 kg/m  as calculated from the following:    Height as of this encounter: 1.626 m (5' 4\").    Weight as of this encounter: 86.6 kg (191 lb). Body surface area is 1.98 meters squared.  No Pain (0) Comment: Data Unavailable   No LMP recorded (lmp unknown). Patient is postmenopausal.  Allergies reviewed: Yes  Medications reviewed: Yes    Medications: Medication refills not needed today.  Pharmacy name entered into EPIC:    [facility] - [place], [place] - [address] [place] [address] AND CLINICS    Frailty Screening:   Is the patient here for a new oncology consult visit in cancer care? 2. No      Clinical concerns:  3 month follow       Naya Salazar                                      Consult Notes on Referred Patient    Date: 2/7/2024     The patient returns today for follow-up and treatment planning.     Her history is as follows:     Briefly, she is a 86yo who was seen in ED for intermittent vaginal bleeding x 6 wks. Was actually seen one year ago for hematuria.  At that time, underwent CT urogram 6/8/22 which showed no gross abnormalities to explain hematuria, was noted to have low-density thickening of endometrium.  Underwent a pelvic US on 7/8/22 which showed uterus at 6.8 x 4.6cm with a 3.2cm mass along endometrial canal possibly " representing polyp or fibroid.  Never had any further follow-up related to this that I can see.     Was then seen most recently in ED for bleeding. Had repeat pelvic US on 6/5/23 which showed diffusely thickened endometrium.  Had pelvic MRI  8.1 x 4.4 x 5.4 cm in long axis, short axis and transverse dimensions, respectively. An apparent heterogeneous mass-like structure is present in the anterior and right aspect of the uterus in the subendometrial or endometrial  region, measuring approximately 2.5 x 1.6 cm (series 18 image 33). This structure is not well-visualized on T1 weighted images due to artifact. It appears isointense to the uterine myometrium on T2-weighted images. Probable mildly heterogeneous  enhancement of this structure. A few subcentimeter low T2 signal regions in the anterior uterus likely represent very small fibroids.   The endometrial stripe is not well defined due to motion artifact. It measures approximately 0.9 cm in dual-layer anteroposterior thickness. The endometrial region appears mildly irregular on the axial images (for example, series 6 image 15). It cannot be determined on the post contrast images if there is abnormal enhancement associated with the endometrium. Possible restricted diffusion within the region of endometrium.     6/7/23:  Seen in Gyn Onc. Unable to perform EMB due to stenosis     7/19/23:  EUA, D&C under US guidance.  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  MLH1 hypermethylation +     9/22/23:  Robotic assisted hysterectomy, bilateral salpingo-oophorectomy, cancer staging, sentinel lymph node mapping and sampling, pelvic washings.     Final pathology (reviewed): FIGO Grade 1 endometrioid TOMAS, 4.5cm greatest dimension, >50% myometrial invasion (13/22mm), superficial NOLA involvment, no LVSI, cytology negative, margins negative, LN negative. Final FIGO Stage IB Grade 1 endometrioid TOMAS,      Discussed at tumor board with recs for VBT versus  observation. The patient saw Rad Onc and ultimately decided not to do XRT.    She returns today for three month follow-up.          Past Medical History:    Past Medical History:   Diagnosis Date     Arthritis      Dyslipidemia      Endometrial cancer (H) 07/2023     Gout 03/14/2011    Triggered by hydrochlorothiazide       Hypertension      Obesity          Past Surgical History:    Past Surgical History:   Procedure Laterality Date     COLONOSCOPY  12/05/05    normal, recheck 10 years -- done at [facility] [place] HYSTERECTOMY TOTAL, BILATERAL SALPINGO-OOPHORECTOMY, NODE DISSECTION, COMBINED N/A 9/22/2023    Procedure: Robotic assisted hysterectomy, bilateral salpingo-oophorectomy, cancer staging, sentinel lymph node mapping and sampling, pelvic washings;  Surgeon: Amanda Au MD;  Location: [facility]     DILATION AND CURETTAGE, WITH ULTRASOUND GUIDANCE N/A 7/19/2023    Procedure: Pelvic examination under anesthesia, dilation and curettage uterus, ultrasound guidance Latex Free;  Surgeon: Amanda Au MD;  Location: [facility] OR     EXAM EYE  03/30/2018    Capsulotomy Left eye.   Dr. Skip Villela     EYE SURGERY  2015     [facility] REMOVAL GALLBLADDER       HYSTEROSCOPY,DIAGNOSTIC  around 1998         Health Maintenance:  Health Maintenance Due   Topic Date Due     RSV VACCINE (Pregnancy & 60+) (1 - 1-dose 60+ series) Never done     PHQ-2 (once per calendar year)  01/01/2024           Current Medications:     has a current medication list which includes the following prescription(s): lisinopril, simvastatin, and vitamin d.       Allergies:     Hydrochlorothiazide        Social History:     Social History     Tobacco Use     Smoking status: Never     Passive exposure: Never     Smokeless tobacco: Never   Substance Use Topics     Alcohol use: No       History   Drug Use No           Family History:     The patient's family history is notable for :.    Family History   Problem Relation Age of Onset     Eye Disorder Mother      "    glaucoma     Heart Disease Mother          of CHF     Cancer Sister         uterine and ovarian-in remission      Other Cancer Sister      Deep Vein Thrombosis (DVT) Sister      Uterine Cancer Sister      Heart Disease Brother         valve replacement     Lipids Brother      Coronary Artery Disease Brother      Eye Disorder Brother         detached retina     Cancer Paternal Grandfather         carcinoma of the lip, pipe-smoker     Thyroid Disease Daughter         goiter or nodule?     Cancer Son         SKIN     Other Cancer Son      Other Cancer Other      Thyroid Disease Other      Diabetes No family hx of      C.A.D. No family hx of      Breast Cancer No family hx of      Cancer - colorectal No family hx of      Anesthesia Reaction No family hx of          Physical Exam:     BP (!) 166/77   Pulse 75   Resp 18   Ht 1.626 m (5' 4\")   Wt 86.6 kg (191 lb)   LMP  (LMP Unknown)   SpO2 98%   BMI 32.79 kg/m    Body mass index is 32.79 kg/m .    General Appearance: healthy and alert, no distress     Musculoskeletal: extremities non tender and without edema    Skin: no lesions or rashes     Neurological: normal gait, no gross defects     Psychiatric: appropriate mood and affect                               Hematological: normal cervical, supraclavicular and inguinal lymph nodes     Gastrointestinal:       abdomen soft, non-tender, non-distended, no organomegaly or masses    Genitourinary: External genitalia and urethral meatus appears normal.  Vagina is smooth without nodularity or masses.  [place] intact, no lesions, no bleeding.     Assessment:     Jen Layne is a 88 year old woman with a new diagnosis of Stage IB Grade 1 endometrioid endometrial TOMAS.         30 minutes spent on the date of the encounter doing chart review, history and exam, documentation and further activities as noted above     Plan:      1.)        FIGO Grade 1 endometrioid TOMAS:  Pathology previously reviewed in detail.  Tumor " 4.5cm greatest dimension, >50% myometrial invasion (13/22mm), superficial NOLA involvment, no LVSI, cytology negative, margins negative, LN negative. Final FIGO Stage IB Grade 1 endometrioid TOMAS.  H-IR due to age, DOI.  Discussed options including observation, radiation (VBT).      Patient was seen by Rad Onc and ultimately elected for observation.  Today, normal exam.  Continue close follow-up.       Questions answered, precautions given.      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        Amanda Au MD  Gynecologic Oncology  [facility]    CC  Patient Care Team:  Yamile Cotter MD as PCP - General (Family Medicine)  Yamile Cotter MD as Assigned PCP  Amanda Au MD as MD (Gynecologic Oncology)  Nayana Gutierrez, RN as Specialty Care Coordinator (Gynecologic Oncology)  Amanda Au MD as Assigned Cancer Care Provider  AMANDA AU          Again, thank you for allowing me to participate in the care of your patient.        Sincerely,        Amanda Au MD

## 2024-02-07 NOTE — PATIENT INSTRUCTIONS
Normal exam    Return in three months with NP    Amanda Au MD  Gynecologic Oncology  [facility]

## 2024-02-07 NOTE — PROGRESS NOTES
"Oncology Rooming Note    February 7, 2024 10:38 AM   Jen CHRISTIANE Layne is a 88 year old female who presents for:    Chief Complaint   Patient presents with    Oncology Clinic Visit     Endometrial cancer     Initial Vitals: BP (!) 166/77   Pulse 75   Resp 18   Ht 1.626 m (5' 4\")   Wt 86.6 kg (191 lb)   LMP  (LMP Unknown)   SpO2 98%   BMI 32.79 kg/m   Estimated body mass index is 32.79 kg/m  as calculated from the following:    Height as of this encounter: 1.626 m (5' 4\").    Weight as of this encounter: 86.6 kg (191 lb). Body surface area is 1.98 meters squared.  No Pain (0) Comment: Data Unavailable   No LMP recorded (lmp unknown). Patient is postmenopausal.  Allergies reviewed: Yes  Medications reviewed: Yes    Medications: Medication refills not needed today.  Pharmacy name entered into EPIC:    [facility] [employer] - [place], [place] - [address] [place] [address] AND [facility]    Frailty Screening:   Is the patient here for a new oncology consult visit in cancer care? 2. No      Clinical concerns:  3 month follow       Naya Salazar            "

## 2024-05-23 ENCOUNTER — ONCOLOGY VISIT (OUTPATIENT)
Dept: ONCOLOGY | Facility: HOSPITAL | Age: 89
End: 2024-05-23
Attending: OBSTETRICS & GYNECOLOGY
Payer: MEDICARE

## 2024-05-23 VITALS
SYSTOLIC BLOOD PRESSURE: 158 MMHG | WEIGHT: 195 LBS | DIASTOLIC BLOOD PRESSURE: 74 MMHG | HEIGHT: 64 IN | HEART RATE: 69 BPM | OXYGEN SATURATION: 97 % | RESPIRATION RATE: 16 BRPM | BODY MASS INDEX: 33.29 KG/M2

## 2024-05-23 DIAGNOSIS — C54.1 ENDOMETRIAL CANCER (H): Primary | ICD-10-CM

## 2024-05-23 DIAGNOSIS — N89.8 VAGINAL LESION: ICD-10-CM

## 2024-05-23 PROCEDURE — 99213 OFFICE O/P EST LOW 20 MIN: CPT | Mod: 25 | Performed by: OBSTETRICS & GYNECOLOGY

## 2024-05-23 PROCEDURE — 88342 IMHCHEM/IMCYTCHM 1ST ANTB: CPT | Mod: TC | Performed by: OBSTETRICS & GYNECOLOGY

## 2024-05-23 PROCEDURE — G0463 HOSPITAL OUTPT CLINIC VISIT: HCPCS | Mod: 25 | Performed by: OBSTETRICS & GYNECOLOGY

## 2024-05-23 PROCEDURE — 57100 BIOPSY VAGINAL MUCOSA SIMPLE: CPT | Performed by: OBSTETRICS & GYNECOLOGY

## 2024-05-23 ASSESSMENT — PAIN SCALES - GENERAL: PAINLEVEL: NO PAIN (1)

## 2024-05-23 NOTE — PROGRESS NOTES
"Oncology Rooming Note    May 23, 2024 10:53 AM   Jen CHRISTIANE Layne is a 88 year old female who presents for:    Chief Complaint   Patient presents with    Oncology Clinic Visit     Endometrial cancer     Initial Vitals: BP (!) 158/74   Pulse 69   Resp 16   Ht 1.626 m (5' 4\")   Wt 88.5 kg (195 lb)   LMP  (LMP Unknown)   SpO2 97%   BMI 33.47 kg/m   Estimated body mass index is 33.47 kg/m  as calculated from the following:    Height as of this encounter: 1.626 m (5' 4\").    Weight as of this encounter: 88.5 kg (195 lb). Body surface area is 2 meters squared.  No Pain (1) Comment: Data Unavailable   No LMP recorded (lmp unknown). Patient is postmenopausal.  Allergies reviewed: Yes  Medications reviewed: Yes    Medications: Medication refills not needed today.  Pharmacy name entered into EPIC:    [facility] - [place], [place] - [address] [place] [address] AND [facility]    Frailty Screening:   Is the patient here for a new oncology consult visit in cancer care? 2. No      Clinical concerns:  3 month follow up      Naya Salazar            "

## 2024-05-23 NOTE — PATIENT INSTRUCTIONS
Vaginal biopsy done today    You will be contacted with results    Amanda Au MD  Gynecologic Oncology  [facility]

## 2024-05-23 NOTE — LETTER
"    5/23/2024         RE: Jen Layne  [address]  [address]  [facility] [address]        Dear Colleague,    Thank you for referring your patient, Jen Layne, to the [facility]. Please see a copy of my visit note below.    Oncology Rooming Note    May 23, 2024 10:53 AM   Jen Layne is a 88 year old female who presents for:    Chief Complaint   Patient presents with     Oncology Clinic Visit     Endometrial cancer     Initial Vitals: BP (!) 158/74   Pulse 69   Resp 16   Ht 1.626 m (5' 4\")   Wt 88.5 kg (195 lb)   LMP  (LMP Unknown)   SpO2 97%   BMI 33.47 kg/m   Estimated body mass index is 33.47 kg/m  as calculated from the following:    Height as of this encounter: 1.626 m (5' 4\").    Weight as of this encounter: 88.5 kg (195 lb). Body surface area is 2 meters squared.  No Pain (1) Comment: Data Unavailable   No LMP recorded (lmp unknown). Patient is postmenopausal.  Allergies reviewed: Yes  Medications reviewed: Yes    Medications: Medication refills not needed today.  Pharmacy name entered into EPIC:    [facility] - [place], [place] - [address] [place] [address] AND CLINICS    Frailty Screening:   Is the patient here for a new oncology consult visit in cancer care? 2. No      Clinical concerns:  3 month follow up      Naya Salazar                                      Consult Notes on Referred Patient    Date: 5/23/2024     The patient returns today for follow-up and treatment planning.     Her history is as follows:     Briefly, she is a 89yo who was seen in ED for intermittent vaginal bleeding x 6 wks. Was actually seen one year ago for hematuria.  At that time, underwent CT urogram 6/8/22 which showed no gross abnormalities to explain hematuria, was noted to have low-density thickening of endometrium.  Underwent a pelvic US on 7/8/22 which showed uterus at 6.8 x 4.6cm with a 3.2cm mass along endometrial canal possibly " representing polyp or fibroid.  Never had any further follow-up related to this that I can see.     Was then seen most recently in ED for bleeding. Had repeat pelvic US on 6/5/23 which showed diffusely thickened endometrium.  Had pelvic MRI  8.1 x 4.4 x 5.4 cm in long axis, short axis and transverse dimensions, respectively. An apparent heterogeneous mass-like structure is present in the anterior and right aspect of the uterus in the subendometrial or endometrial  region, measuring approximately 2.5 x 1.6 cm (series 18 image 33). This structure is not well-visualized on T1 weighted images due to artifact. It appears isointense to the uterine myometrium on T2-weighted images. Probable mildly heterogeneous  enhancement of this structure. A few subcentimeter low T2 signal regions in the anterior uterus likely represent very small fibroids.   The endometrial stripe is not well defined due to motion artifact. It measures approximately 0.9 cm in dual-layer anteroposterior thickness. The endometrial region appears mildly irregular on the axial images (for example, series 6 image 15). It cannot be determined on the post contrast images if there is abnormal enhancement associated with the endometrium. Possible restricted diffusion within the region of endometrium.     6/7/23:  Seen in Gyn Onc. Unable to perform EMB due to stenosis     7/19/23:  EUA, D&C under US guidance.  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  MLH1 hypermethylation +     9/22/23:  Robotic assisted hysterectomy, bilateral salpingo-oophorectomy, cancer staging, sentinel lymph node mapping and sampling, pelvic washings.     Final pathology (reviewed): FIGO Grade 1 endometrioid TOMAS, 4.5cm greatest dimension, >50% myometrial invasion (13/22mm), superficial NOLA involvment, no LVSI, cytology negative, margins negative, LN negative. Final FIGO Stage IB Grade 1 endometrioid TOMAS,      Discussed at tumor board with recs for VBT versus  observation. The patient saw Rad Onc and ultimately decided not to do XRT.     She returns today for three month follow-up. She reports some recent spotting over the last couple of weeks.  No other symptoms.         Past Medical History:    Past Medical History:   Diagnosis Date     Arthritis      Dyslipidemia      Endometrial cancer (H) 07/2023     Gout 03/14/2011    Triggered by hydrochlorothiazide       Hypertension      Obesity          Past Surgical History:    Past Surgical History:   Procedure Laterality Date     COLONOSCOPY  12/05/05    normal, recheck 10 years -- done at [facility] [place] HYSTERECTOMY TOTAL, BILATERAL SALPINGO-OOPHORECTOMY, NODE DISSECTION, COMBINED N/A 9/22/2023    Procedure: Robotic assisted hysterectomy, bilateral salpingo-oophorectomy, cancer staging, sentinel lymph node mapping and sampling, pelvic washings;  Surgeon: Amanda Au MD;  Location: [facility] OR     DILATION AND CURETTAGE, WITH ULTRASOUND GUIDANCE N/A 7/19/2023    Procedure: Pelvic examination under anesthesia, dilation and curettage uterus, ultrasound guidance Latex Free;  Surgeon: Amanda Au MD;  Location: [facility] OR     EXAM EYE  03/30/2018    Capsulotomy Left eye.   Dr. Skip Villela     EYE SURGERY  2015     [facility] REMOVAL GALLBLADDER       HYSTEROSCOPY,DIAGNOSTIC  around 1998         Health Maintenance:  Health Maintenance Due   Topic Date Due     RSV VACCINE (Pregnancy & 60+) (1 - 1-dose 60+ series) Never done     PHQ-2 (once per calendar year)  01/01/2024     COVID-19 Vaccine (7 - 2023-24 season) 02/12/2024         Current Medications:     has a current medication list which includes the following prescription(s): lisinopril, simvastatin, and vitamin d.       Allergies:     Hydrochlorothiazide        Social History:     Social History     Tobacco Use     Smoking status: Never     Passive exposure: Never     Smokeless tobacco: Never   Substance Use Topics     Alcohol use: No       History   Drug Use No           Family  "History:     The patient's family history is notable for :.    Family History   Problem Relation Age of Onset     Eye Disorder Mother         glaucoma     Heart Disease Mother          of CHF     Cancer Sister         uterine and ovarian-in remission      Other Cancer Sister      Deep Vein Thrombosis (DVT) Sister      Uterine Cancer Sister      Heart Disease Brother         valve replacement     Lipids Brother      Coronary Artery Disease Brother      Eye Disorder Brother         detached retina     Cancer Paternal Grandfather         carcinoma of the lip, pipe-smoker     Thyroid Disease Daughter         goiter or nodule?     Cancer Son         SKIN     Other Cancer Son      Other Cancer Other      Thyroid Disease Other      Diabetes No family hx of      C.A.D. No family hx of      Breast Cancer No family hx of      Cancer - colorectal No family hx of      Anesthesia Reaction No family hx of          Physical Exam:     BP (!) 158/74   Pulse 69   Resp 16   Ht 1.626 m (5' 4\")   Wt 88.5 kg (195 lb)   LMP  (LMP Unknown)   SpO2 97%   BMI 33.47 kg/m    Body mass index is 33.47 kg/m .    General Appearance: healthy and alert, no distress     Musculoskeletal: extremities non tender and without edema    Skin: no lesions or rashes     Neurological: normal gait, no gross defects     Psychiatric: appropriate mood and affect                               Hematological: normal cervical, supraclavicular and inguinal lymph nodes     Gastrointestinal:       abdomen soft, non-tender, non-distended, no organomegaly or masses       Genitourinary: External genitalia and urethral meatus appears normal.  Vagina with polypoid mass arriving from vaginal apex.  Biopsy taken.     Assessment:     Jen Layne is a 88 year old woman with a diagnosis of Stage IB Grade 1 endometrioid endometrial TOMAS, now with likely vaginal cuff recurrence        30 minutes spent on the date of the encounter doing chart review, history and " exam, documentation and further activities as noted above     Plan:      1.)        FIGO Grade 1 endometrioid TOMAS:  Pathology previously reviewed in detail.  Tumor 4.5cm greatest dimension, >50% myometrial invasion (13/22mm), superficial NOLA involvment, no LVSI, cytology negative, margins negative, LN negative. Final FIGO Stage IB Grade 1 endometrioid TOMAS.  H-IR due to age, DOI.  Discussed options including observation, radiation (VBT).      Patient was seen by Rad Onc and ultimately elected for observation.  Today, findings are concerning for cuff recurrence.  Biopsy taken.  I will contact  patient with results.  If confirmed recurrence, will need PET.       Questions answered, precautions given.      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        Amanda Au MD  Gynecologic Oncology  [facility]       CC  Patient Care Team:  Yamile Cotter MD as PCP - General (Family Medicine)  Yamile Cotter MD as Assigned PCP  Amanda Au MD as MD (Gynecologic Oncology)  Nayana Gutierrez, [place] as Specialty Care Coordinator (Gynecologic Oncology)  Amanda Au MD as Assigned Cancer Care Provider  AMANDA AU        Again, thank you for allowing me to participate in the care of your patient.        Sincerely,        Amanda Au MD

## 2024-05-23 NOTE — PROGRESS NOTES
Consult Notes on Referred Patient    Date: 5/23/2024     The patient returns today for follow-up and treatment planning.     Her history is as follows:     Briefly, she is a 89yo who was seen in ED for intermittent vaginal bleeding x 6 wks. Was actually seen one year ago for hematuria.  At that time, underwent CT urogram 6/8/22 which showed no gross abnormalities to explain hematuria, was noted to have low-density thickening of endometrium.  Underwent a pelvic US on 7/8/22 which showed uterus at 6.8 x 4.6cm with a 3.2cm mass along endometrial canal possibly representing polyp or fibroid.  Never had any further follow-up related to this that I can see.     Was then seen most recently in ED for bleeding. Had repeat pelvic US on 6/5/23 which showed diffusely thickened endometrium.  Had pelvic MRI  8.1 x 4.4 x 5.4 cm in long axis, short axis and transverse dimensions, respectively. An apparent heterogeneous mass-like structure is present in the anterior and right aspect of the uterus in the subendometrial or endometrial  region, measuring approximately 2.5 x 1.6 cm (series 18 image 33). This structure is not well-visualized on T1 weighted images due to artifact. It appears isointense to the uterine myometrium on T2-weighted images. Probable mildly heterogeneous  enhancement of this structure. A few subcentimeter low T2 signal regions in the anterior uterus likely represent very small fibroids.   The endometrial stripe is not well defined due to motion artifact. It measures approximately 0.9 cm in dual-layer anteroposterior thickness. The endometrial region appears mildly irregular on the axial images (for example, series 6 image 15). It cannot be determined on the post contrast images if there is abnormal enhancement associated with the endometrium. Possible restricted diffusion within the region of endometrium.     6/7/23:  Seen in Gyn Onc. Unable to perform EMB due to stenosis     7/19/23:   EUA, D&C under US guidance.  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  MLH1 hypermethylation +     9/22/23:  Robotic assisted hysterectomy, bilateral salpingo-oophorectomy, cancer staging, sentinel lymph node mapping and sampling, pelvic washings.     Final pathology (reviewed): FIGO Grade 1 endometrioid TOMAS, 4.5cm greatest dimension, >50% myometrial invasion (13/22mm), superficial NOLA involvment, no LVSI, cytology negative, margins negative, LN negative. Final FIGO Stage IB Grade 1 endometrioid TOMAS,      Discussed at tumor board with recs for VBT versus observation. The patient saw Rad Onc and ultimately decided not to do XRT.     She returns today for three month follow-up. She reports some recent spotting over the last couple of weeks.  No other symptoms.         Past Medical History:    Past Medical History:   Diagnosis Date    Arthritis     Dyslipidemia     Endometrial cancer (H) 07/2023    Gout 03/14/2011    Triggered by hydrochlorothiazide      Hypertension     Obesity          Past Surgical History:    Past Surgical History:   Procedure Laterality Date    COLONOSCOPY  12/05/05    normal, recheck 10 years -- done at [facility] HYSTERECTOMY TOTAL, BILATERAL SALPINGO-OOPHORECTOMY, NODE DISSECTION, COMBINED N/A 9/22/2023    Procedure: Robotic assisted hysterectomy, bilateral salpingo-oophorectomy, cancer staging, sentinel lymph node mapping and sampling, pelvic washings;  Surgeon: Amanda Au MD;  Location: [facility] OR    DILATION AND CURETTAGE, WITH ULTRASOUND GUIDANCE N/A 7/19/2023    Procedure: Pelvic examination under anesthesia, dilation and curettage uterus, ultrasound guidance Latex Free;  Surgeon: Amanda Au MD;  Location: [facility] OR    EXAM EYE  03/30/2018    Capsulotomy Left eye.   Dr. Skip Villela    EYE SURGERY  2015    [facility] REMOVAL GALLBLADDER      HYSTEROSCOPY,DIAGNOSTIC  around 1998         Health Maintenance:  Health Maintenance Due   Topic Date Due    RSV VACCINE  "(Pregnancy & 60+) (1 - 1-dose 60+ series) Never done    PHQ-2 (once per calendar year)  2024    COVID-19 Vaccine (2023- season) 2024         Current Medications:     has a current medication list which includes the following prescription(s): lisinopril, simvastatin, and vitamin d.       Allergies:     Hydrochlorothiazide        Social History:     Social History     Tobacco Use    Smoking status: Never     Passive exposure: Never    Smokeless tobacco: Never   Substance Use Topics    Alcohol use: No       History   Drug Use No           Family History:     The patient's family history is notable for :.    Family History   Problem Relation Age of Onset    Eye Disorder Mother         glaucoma    Heart Disease Mother          of CHF    Cancer Sister         uterine and ovarian-in remission     Other Cancer Sister     Deep Vein Thrombosis (DVT) Sister     Uterine Cancer Sister     Heart Disease Brother         valve replacement    Lipids Brother     Coronary Artery Disease Brother     Eye Disorder Brother         detached retina    Cancer Paternal Grandfather         carcinoma of the lip, pipe-smoker    Thyroid Disease Daughter         goiter or nodule?    Cancer Son         SKIN    Other Cancer Son     Other Cancer Other     Thyroid Disease Other     Diabetes No family hx of     C.A.D. No family hx of     Breast Cancer No family hx of     Cancer - colorectal No family hx of     Anesthesia Reaction No family hx of          Physical Exam:     BP (!) 158/74   Pulse 69   Resp 16   Ht 1.626 m (5' 4\")   Wt 88.5 kg (195 lb)   LMP  (LMP Unknown)   SpO2 97%   BMI 33.47 kg/m    Body mass index is 33.47 kg/m .    General Appearance: healthy and alert, no distress     Musculoskeletal: extremities non tender and without edema    Skin: no lesions or rashes     Neurological: normal gait, no gross defects     Psychiatric: appropriate mood and affect                               Hematological: normal " cervical, supraclavicular and inguinal lymph nodes     Gastrointestinal:       abdomen soft, non-tender, non-distended, no organomegaly or masses       Genitourinary: External genitalia and urethral meatus appears normal.  Vagina with polypoid mass arriving from vaginal apex.  Biopsy taken.     Assessment:     Jen Layne is a 88 year old woman with a diagnosis of Stage IB Grade 1 endometrioid endometrial TOMAS, now with likely vaginal cuff recurrence        30 minutes spent on the date of the encounter doing chart review, history and exam, documentation and further activities as noted above     Plan:      1.)        FIGO Grade 1 endometrioid TOMAS:  Pathology previously reviewed in detail.  Tumor 4.5cm greatest dimension, >50% myometrial invasion (13/22mm), superficial NOLA involvment, no LVSI, cytology negative, margins negative, LN negative. Final FIGO Stage IB Grade 1 endometrioid TOMAS.  H-IR due to age, DOI.  Discussed options including observation, radiation (VBT).      Patient was seen by Rad Onc and ultimately elected for observation.  Today, findings are concerning for cuff recurrence.  Biopsy taken.  I will contact  patient with results.  If confirmed recurrence, will need PET.       Questions answered, precautions given.      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        Amanda Au MD  Gynecologic Oncology  [facility]       CC  Patient Care Team:  Yamile Cotter MD as PCP - General (Family Medicine)  Yamile Cotter MD as Assigned PCP  Amanda Au MD as MD (Gynecologic Oncology)  Nayana Gutierrez RN as Specialty Care Coordinator (Gynecologic Oncology)  Amanda Au MD as Assigned Cancer Care Provider  AMANDA AU

## 2024-05-26 PROCEDURE — 88341 IMHCHEM/IMCYTCHM EA ADD ANTB: CPT | Mod: 26 | Performed by: PATHOLOGY

## 2024-05-26 PROCEDURE — 88342 IMHCHEM/IMCYTCHM 1ST ANTB: CPT | Mod: 26 | Performed by: PATHOLOGY

## 2024-05-26 PROCEDURE — 88305 TISSUE EXAM BY PATHOLOGIST: CPT | Mod: 26 | Performed by: PATHOLOGY

## 2024-05-26 PROCEDURE — 88360 TUMOR IMMUNOHISTOCHEM/MANUAL: CPT | Mod: 26 | Performed by: PATHOLOGY

## 2024-05-29 DIAGNOSIS — C54.1 ENDOMETRIAL CANCER (H): Primary | ICD-10-CM

## 2024-05-29 DIAGNOSIS — N89.8 VAGINAL MASS: ICD-10-CM

## 2024-06-02 LAB
PATH REPORT.ADDENDUM SPEC: ABNORMAL
PATH REPORT.COMMENTS IMP SPEC: ABNORMAL
PATH REPORT.COMMENTS IMP SPEC: YES
PATH REPORT.FINAL DX SPEC: ABNORMAL
PATH REPORT.GROSS SPEC: ABNORMAL
PATH REPORT.MICROSCOPIC SPEC OTHER STN: ABNORMAL
PATH REPORT.RELEVANT HX SPEC: ABNORMAL
PHOTO IMAGE: ABNORMAL

## 2024-06-03 LAB
INTERPRETATION: NORMAL
SIGNIFICANT RESULTS: NORMAL
SPECIMEN DESCRIPTION: NORMAL
TEST DETAILS, MDL: NORMAL

## 2024-06-03 PROCEDURE — G0452 MOLECULAR PATHOLOGY INTERPR: HCPCS | Mod: 26 | Performed by: PATHOLOGY

## 2024-06-03 PROCEDURE — 81479 UNLISTED MOLECULAR PATHOLOGY: CPT | Performed by: OBSTETRICS & GYNECOLOGY

## 2024-06-10 ENCOUNTER — HOSPITAL ENCOUNTER (OUTPATIENT)
Dept: PET IMAGING | Facility: HOSPITAL | Age: 89
Discharge: HOME | End: 2024-06-10
Attending: OBSTETRICS & GYNECOLOGY
Payer: MEDICARE

## 2024-06-10 DIAGNOSIS — N89.8 VAGINAL MASS: ICD-10-CM

## 2024-06-10 DIAGNOSIS — C54.1 ENDOMETRIAL CANCER (H): ICD-10-CM

## 2024-06-10 LAB
CREAT BLD-MCNC: 0.8 MG/DL (ref 0.6–1.1)
EGFRCR SERPLBLD CKD-EPI 2021: >60 ML/MIN/1.73M2
GLUCOSE BLDC GLUCOMTR-MCNC: 112 MG/DL (ref 70–99)

## 2024-06-10 PROCEDURE — 250N000011 HC RX IP 250 OP 636: Performed by: OBSTETRICS & GYNECOLOGY

## 2024-06-10 PROCEDURE — A9552 F18 FDG: HCPCS | Performed by: OBSTETRICS & GYNECOLOGY

## 2024-06-10 PROCEDURE — 343N000001 HC RX 343: Performed by: OBSTETRICS & GYNECOLOGY

## 2024-06-10 PROCEDURE — 71260 CT THORAX DX C+: CPT

## 2024-06-10 PROCEDURE — 82565 ASSAY OF CREATININE: CPT

## 2024-06-10 PROCEDURE — 82962 GLUCOSE BLOOD TEST: CPT

## 2024-06-10 PROCEDURE — 78816 PET IMAGE W/CT FULL BODY: CPT | Mod: MG,PI

## 2024-06-10 RX ORDER — FLUDEOXYGLUCOSE F 18 200 MCI/ML
7-16 INJECTION, SOLUTION INTRAVENOUS ONCE
Status: COMPLETED | OUTPATIENT
Start: 2024-06-10 | End: 2024-06-10

## 2024-06-10 RX ORDER — IOPAMIDOL 755 MG/ML
95 INJECTION, SOLUTION INTRAVASCULAR ONCE
Status: COMPLETED | OUTPATIENT
Start: 2024-06-10 | End: 2024-06-10

## 2024-06-10 RX ADMIN — FLUDEOXYGLUCOSE F 18 11.66 MILLICURIE: 200 INJECTION, SOLUTION INTRAVENOUS at 11:55

## 2024-06-10 RX ADMIN — IOPAMIDOL 95 ML: 755 INJECTION, SOLUTION INTRAVENOUS at 12:50

## 2024-06-18 ENCOUNTER — VIRTUAL VISIT (OUTPATIENT)
Dept: ONCOLOGY | Facility: CLINIC | Age: 89
End: 2024-06-18
Attending: OBSTETRICS & GYNECOLOGY
Payer: MEDICARE

## 2024-06-18 VITALS — HEIGHT: 65 IN | BODY MASS INDEX: 31.99 KG/M2 | WEIGHT: 192 LBS

## 2024-06-18 DIAGNOSIS — C54.1 ENDOMETRIAL CANCER (H): Primary | ICD-10-CM

## 2024-06-18 DIAGNOSIS — N89.8 VAGINAL MASS: ICD-10-CM

## 2024-06-18 PROCEDURE — G2211 COMPLEX E/M VISIT ADD ON: HCPCS | Mod: 95 | Performed by: OBSTETRICS & GYNECOLOGY

## 2024-06-18 PROCEDURE — 99214 OFFICE O/P EST MOD 30 MIN: CPT | Mod: 95 | Performed by: OBSTETRICS & GYNECOLOGY

## 2024-06-18 ASSESSMENT — PAIN SCALES - GENERAL: PAINLEVEL: NO PAIN (0)

## 2024-06-18 NOTE — PATIENT INSTRUCTIONS
Await tumor board    Return visit [facility] to discuss recommendations    Amanda Au MD  Gynecologic Oncology  [facility]

## 2024-06-18 NOTE — NURSING NOTE
Is the patient currently in the state of [place]? YES    Visit mode:VIDEO    If the visit is dropped, the patient can be reconnected by: VIDEO VISIT: Text to cell phone:   Telephone Information:   Mobile [phone]       Will anyone else be joining the visit? NO  (If patient encounters technical issues they should call [phone]    How would you like to obtain your AVS? MyChart    Are changes needed to the allergy or medication list? No, Pt stated no changes to allergies, and Pt stated no med changes    Are refills needed on medications prescribed by this physician? NO    Reason for visit: RECHECK (Return [facility])    Kathya GENTILE

## 2024-06-18 NOTE — PROGRESS NOTES
Virtual Visit Details    Type of service:  Video Visit   Originating Location (pt. Location): Home    Distant Location (provider location):  On-site  Platform used for Video Visit: Caterina

## 2024-06-18 NOTE — LETTER
6/18/2024      Jen Layne  [address]  [address]  [place] [address]      Dear Colleague,    Thank you for referring your patient, Jen Layne, to the [facility]. Please see a copy of my visit note below.    Virtual Visit Details    Type of service:  Video Visit   Originating Location (pt. Location): Home    Distant Location (provider location):  On-site  Platform used for Video Visit: Caterina                            Consult Notes on Referred Patient    Date: 6/18/2024     The patient returns today for follow-up and treatment planning.  She is seen on video with her family     Her history is as follows:     Briefly, she is a 87yo who was seen in ED for intermittent vaginal bleeding x 6 wks. Was actually seen one year ago for hematuria.  At that time, underwent CT urogram 6/8/22 which showed no gross abnormalities to explain hematuria, was noted to have low-density thickening of endometrium.  Underwent a pelvic US on 7/8/22 which showed uterus at 6.8 x 4.6cm with a 3.2cm mass along endometrial canal possibly representing polyp or fibroid.  Never had any further follow-up related to this that I can see.     Was then seen most recently in ED for bleeding. Had repeat pelvic US on 6/5/23 which showed diffusely thickened endometrium.  Had pelvic MRI  8.1 x 4.4 x 5.4 cm in long axis, short axis and transverse dimensions, respectively. An apparent heterogeneous mass-like structure is present in the anterior and right aspect of the uterus in the subendometrial or endometrial  region, measuring approximately 2.5 x 1.6 cm (series 18 image 33). This structure is not well-visualized on T1 weighted images due to artifact. It appears isointense to the uterine myometrium on T2-weighted images. Probable mildly heterogeneous  enhancement of this structure. A few subcentimeter low T2 signal regions in the anterior uterus likely represent very small fibroids.   The endometrial stripe  is not well defined due to motion artifact. It measures approximately 0.9 cm in dual-layer anteroposterior thickness. The endometrial region appears mildly irregular on the axial images (for example, series 6 image 15). It cannot be determined on the post contrast images if there is abnormal enhancement associated with the endometrium. Possible restricted diffusion within the region of endometrium.     6/7/23:  Seen in Gyn Onc. Unable to perform EMB due to stenosis     7/19/23:  EUA, D&C under US guidance.  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  MLH1 hypermethylation +     9/22/23:  Robotic assisted hysterectomy, bilateral salpingo-oophorectomy, cancer staging, sentinel lymph node mapping and sampling, pelvic washings.     Final pathology (reviewed): FIGO Grade 1 endometrioid TOMAS, 4.5cm greatest dimension, >50% myometrial invasion (13/22mm), superficial NOLA involvment, no LVSI, cytology negative, margins negative, LN negative. Final FIGO Stage IB Grade 1 endometrioid TOMAS,      Discussed at tumor board with recs for VBT versus observation. The patient saw Rad Onc and ultimately decided not to do XRT.    5/23/24: Seen for three month surveillance exam.  Reported vaginal spotting.  Exam notable for a polypoid lesion vaginal apex that was biopsied.    Path reviewed:  Final Diagnosis   VAGINAL MUCOSA, BIOPSY:  -MODERATELY DIFFERENTIATED ADENOCARCINOMA  -PLEASE SEE COMMENT     Addendum   This addendum is being issued in order to report the results of immunohistochemical stains.  The results are as follows:     PAX8: Strongly and diffusely positive  P16 strongly positive, marking the majority of the columnar cells  Estrogen receptor: Positive (95% of cells, strong staining)  Cytokeratin-7: Strongly and diffusely positive  Cytokeratin-20: Negative  CDX2: Negative (absence of enteric differentiation)     The combined morphologic and immunophenotypic features of this lesion are compatible with the  clinical history of endometrial carcinoma.     6/10/24:  PET/CT images reviewed  IMPRESSION:     1.  Focal hypermetabolic uptake at the vaginal cuff compatible with biopsy-proven recurrent malignancy. FDG avid right pelvic lymph nodes are suspicious for man metastases.  2.  Subcentimeter pulmonary nodules are below the resolution of PET. Attention on future oncologic imaging.    The patient is seen today on video to discuss results.  She continues to have some intermittent bleeding.     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         Past Surgical History:    Past Surgical History:   Procedure Laterality Date     COLONOSCOPY  12/05/05    normal, recheck 10 years -- done at [facility] HYSTERECTOMY TOTAL, BILATERAL SALPINGO-OOPHORECTOMY, NODE DISSECTION, COMBINED N/A 9/22/2023    Procedure: Robotic assisted hysterectomy, bilateral salpingo-oophorectomy, cancer staging, sentinel lymph node mapping and sampling, pelvic washings;  Surgeon: Amanda Au MD;  Location[facility]     DILATION AND CURETTAGE, WITH ULTRASOUND GUIDANCE N/A 7/19/2023    Procedure: Pelvic examination under anesthesia, dilation and curettage uterus, ultrasound guidance Latex Free;  Surgeon: Amanda Au MD;  Location: [facility] OR     EXAM EYE  03/30/2018    Capsulotomy Left eye.   Dr. Skip Villela     EYE SURGERY  2015     HC REMOVAL GALLBLADDER       HYSTEROSCOPY,DIAGNOSTIC  around 1998     IR CHEST PORT PLACEMENT > 5 YRS OF AGE  7/16/2024         Health Maintenance:  Health Maintenance Due   Topic Date Due     RSV VACCINE (Pregnancy & 60+) (1 - 1-dose 60+ series) Never done     COVID-19 Vaccine (7 - 2023-24 season) 12/07/2023     PHQ-2 (once per calendar year)   "2024         Current Medications:     has a current medication list which includes the following prescription(s): dexamethasone, lidocaine-prilocaine, lisinopril, ondansetron, prochlorperazine, simvastatin, and vitamin d.       Allergies:     Hydrochlorothiazide        Social History:     Social History     Tobacco Use     Smoking status: Never     Passive exposure: Never     Smokeless tobacco: Never   Substance Use Topics     Alcohol use: No       History   Drug Use No           Family History:     The patient's family history is notable for :.    Family History   Problem Relation Age of Onset     Eye Disorder Mother         glaucoma     Heart Disease Mother          of CHF     Cancer Sister         uterine and ovarian-in remission      Other Cancer Sister      Deep Vein Thrombosis (DVT) Sister      Uterine Cancer Sister      Heart Disease Brother         valve replacement     Lipids Brother      Coronary Artery Disease Brother      Eye Disorder Brother         detached retina     Cancer Paternal Grandfather         carcinoma of the lip, pipe-smoker     Thyroid Disease Daughter         goiter or nodule?     Cancer Son         SKIN     Other Cancer Son      Other Cancer Other      Thyroid Disease Other      Diabetes No family hx of      C.A.D. No family hx of      Breast Cancer No family hx of      Cancer - colorectal No family hx of      Anesthesia Reaction No family hx of          Physical Exam:     Ht 1.638 m (5' 4.5\")   Wt 87.1 kg (192 lb)   LMP  (LMP Unknown)   BMI 32.45 kg/m    Body mass index is 32.45 kg/m .    General Appearance: healthy and alert, no distress       Assessment:     Jen Layne is a 88 year old woman with a diagnosis of Stage IB Grade 1 endometrioid endometrial TOMAS, with vaginal cuff recurrence and local man disease        30 minutes spent on the date of the encounter doing chart review, history and exam, documentation and further activities as noted above     The " longitudinal plan of care for the diagnosis(es)/condition(s) as documented were addressed during this visit. Due to the added complexity in care, I will continue to support Jen in the subsequent management and with ongoing continuity of care.    TT video 19 minutes  Plan:      1.)        FIGO Grade 1 endometrioid TOMAS:  Pathology previously reviewed in detail.  Tumor 4.5cm greatest dimension, >50% myometrial invasion (13/22mm), superficial ONLA involvment, no LVSI, cytology negative, margins negative, LN negative. Final FIGO Stage IB Grade 1 endometrioid TOMAS.  H-IR due to age, DOI.  Discussed options including observation, radiation (VBT).      Patient was seen by Rad Onc and ultimately elected for observation.      Recently seen for bleeding with exam findings consistent with vaginal cuff recurrence.  This has now been biopsy proven and PET/CT scan supports vaginal cuff recurrence with likely right pelvic lymph node spread.    Discussed systemic treatment options including chemotherapy and possible vaginal cuff radiation depending on symptoms.    Will review at tumor board and contact patient with recommendations.     Questions answered, patient and family expressed understanding of plan of care.     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        Amanda Au MD  Gynecologic Oncology  [facility]  CC  Patient Care Team:  Yamile Cotter MD as PCP - General (Family Medicine)  Yamile Cotter MD as Assigned PCP  Amanda Au MD as MD (Gynecologic Oncology)  Nayana Gutierrez RN as Specialty Care Coordinator (Gynecologic Oncology)  Amanda Au MD as Assigned Cancer Care Provider  SELF, REFERRED        Again, thank you for allowing me to participate in the care of your patient.        Sincerely,        Amanda Au MD

## 2024-06-27 ENCOUNTER — TUMOR CONFERENCE (OUTPATIENT)
Dept: ONCOLOGY | Facility: CLINIC | Age: 89
End: 2024-06-27
Payer: MEDICARE

## 2024-06-27 ENCOUNTER — ONCOLOGY VISIT (OUTPATIENT)
Dept: ONCOLOGY | Facility: HOSPITAL | Age: 89
End: 2024-06-27
Attending: OBSTETRICS & GYNECOLOGY
Payer: MEDICARE

## 2024-06-27 VITALS
RESPIRATION RATE: 16 BRPM | SYSTOLIC BLOOD PRESSURE: 140 MMHG | DIASTOLIC BLOOD PRESSURE: 67 MMHG | HEART RATE: 70 BPM | WEIGHT: 194.6 LBS | HEIGHT: 65 IN | OXYGEN SATURATION: 97 % | BODY MASS INDEX: 32.42 KG/M2

## 2024-06-27 DIAGNOSIS — C54.1 ENDOMETRIAL CANCER (H): Primary | ICD-10-CM

## 2024-06-27 DIAGNOSIS — C80.1 SOLID MALIGNANT NEOPLASM WITH HIGH-FREQUENCY MICROSATELLITE INSTABILITY (MSI-H) (H): ICD-10-CM

## 2024-06-27 PROCEDURE — G2211 COMPLEX E/M VISIT ADD ON: HCPCS | Performed by: OBSTETRICS & GYNECOLOGY

## 2024-06-27 PROCEDURE — 99214 OFFICE O/P EST MOD 30 MIN: CPT | Performed by: OBSTETRICS & GYNECOLOGY

## 2024-06-27 PROCEDURE — G0463 HOSPITAL OUTPT CLINIC VISIT: HCPCS | Performed by: OBSTETRICS & GYNECOLOGY

## 2024-06-27 ASSESSMENT — PAIN SCALES - GENERAL: PAINLEVEL: NO PAIN (0)

## 2024-06-27 NOTE — PROGRESS NOTES
"Oncology Rooming Note    June 27, 2024 8:28 AM   Jen Layne is a 88 year old female who presents for:    Chief Complaint   Patient presents with    Oncology Clinic Visit     Endometrial cancer     Initial Vitals: BP (!) 140/67 (BP Location: Right arm, Patient Position: Sitting, Cuff Size: Adult Large)   Pulse 70   Resp 16   Ht 1.638 m (5' 4.5\")   Wt 88.3 kg (194 lb 9.6 oz)   LMP  (LMP Unknown)   SpO2 97%   BMI 32.89 kg/m   Estimated body mass index is 32.89 kg/m  as calculated from the following:    Height as of this encounter: 1.638 m (5' 4.5\").    Weight as of this encounter: 88.3 kg (194 lb 9.6 oz). Body surface area is 2 meters squared.  No Pain (0) Comment: Data Unavailable   No LMP recorded (lmp unknown). Patient is postmenopausal.  Allergies reviewed: Yes  Medications reviewed: Yes    Medications: Medication refills not needed today.  Pharmacy name entered into EPIC:    [facility] - [place], [place] - [address] [place] [address] AND CLINICS    Frailty Screening:   Is the patient here for a new oncology consult visit in cancer care? 2. No      Clinical concerns: Tx  plan.       Elizabeth Butler MA              "

## 2024-06-27 NOTE — LETTER
"6/27/2024      Jen Layne  [address]  [address]  [place] [address]      Dear Colleague,    Thank you for referring your patient, Jen Layne, to the LTAC, located within [facility]. Please see a copy of my visit note below.    Oncology Rooming Note    June 27, 2024 8:28 AM   Jen Layne is a 88 year old female who presents for:    Chief Complaint   Patient presents with     Oncology Clinic Visit     Endometrial cancer     Initial Vitals: BP (!) 140/67 (BP Location: Right arm, Patient Position: Sitting, Cuff Size: Adult Large)   Pulse 70   Resp 16   Ht 1.638 m (5' 4.5\")   Wt 88.3 kg (194 lb 9.6 oz)   LMP  (LMP Unknown)   SpO2 97%   BMI 32.89 kg/m   Estimated body mass index is 32.89 kg/m  as calculated from the following:    Height as of this encounter: 1.638 m (5' 4.5\").    Weight as of this encounter: 88.3 kg (194 lb 9.6 oz). Body surface area is 2 meters squared.  No Pain (0) Comment: Data Unavailable   No LMP recorded (lmp unknown). Patient is postmenopausal.  Allergies reviewed: Yes  Medications reviewed: Yes    Medications: Medication refills not needed today.  Pharmacy name entered into EPIC:    [facility] - [place], [place] - [address] [place] [address] AND CLINICS    Frailty Screening:   Is the patient here for a new oncology consult visit in cancer care? 2. No      Clinical concerns: Tx  plan.       Elizabeth Butler MA                                        Consult Notes on Referred Patient    Date: 6/27/2024     The patient returns today for follow-up and treatment planning.  She is seen with her family     Her history is as follows:     Briefly, she is a 89yo who was seen in ED for intermittent vaginal bleeding x 6 wks. Was actually seen one year ago for hematuria.  At that time, underwent CT urogram 6/8/22 which showed no gross abnormalities to explain hematuria, was noted to have low-density thickening of endometrium.  Underwent a pelvic US " on 7/8/22 which showed uterus at 6.8 x 4.6cm with a 3.2cm mass along endometrial canal possibly representing polyp or fibroid.  Never had any further follow-up related to this that I can see.     Was then seen most recently in ED for bleeding. Had repeat pelvic US on 6/5/23 which showed diffusely thickened endometrium.  Had pelvic MRI  8.1 x 4.4 x 5.4 cm in long axis, short axis and transverse dimensions, respectively. An apparent heterogeneous mass-like structure is present in the anterior and right aspect of the uterus in the subendometrial or endometrial  region, measuring approximately 2.5 x 1.6 cm (series 18 image 33). This structure is not well-visualized on T1 weighted images due to artifact. It appears isointense to the uterine myometrium on T2-weighted images. Probable mildly heterogeneous  enhancement of this structure. A few subcentimeter low T2 signal regions in the anterior uterus likely represent very small fibroids.   The endometrial stripe is not well defined due to motion artifact. It measures approximately 0.9 cm in dual-layer anteroposterior thickness. The endometrial region appears mildly irregular on the axial images (for example, series 6 image 15). It cannot be determined on the post contrast images if there is abnormal enhancement associated with the endometrium. Possible restricted diffusion within the region of endometrium.     6/7/23:  Seen in Gyn Onc. Unable to perform EMB due to stenosis     7/19/23:  EUA, D&C under US guidance.  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  MLH1 hypermethylation +     9/22/23:  Robotic assisted hysterectomy, bilateral salpingo-oophorectomy, cancer staging, sentinel lymph node mapping and sampling, pelvic washings.     Final pathology (reviewed): FIGO Grade 1 endometrioid TOMAS, 4.5cm greatest dimension, >50% myometrial invasion (13/22mm), superficial NOLA involvment, no LVSI, cytology negative, margins negative, LN negative. Final FIGO Stage  IB Grade 1 endometrioid TOMAS,      Discussed at tumor board with recs for VBT versus observation. The patient saw Rad Onc and ultimately decided not to do XRT.     5/23/24: Seen for three month surveillance exam.  Reported vaginal spotting.  Exam notable for a polypoid lesion vaginal apex that was biopsied.     Path reviewed:  Final Diagnosis   VAGINAL MUCOSA, BIOPSY:  -MODERATELY DIFFERENTIATED ADENOCARCINOMA  -PLEASE SEE COMMENT      Addendum   This addendum is being issued in order to report the results of immunohistochemical stains.  The results are as follows:     PAX8: Strongly and diffusely positive  P16 strongly positive, marking the majority of the columnar cells  Estrogen receptor: Positive (95% of cells, strong staining)  Cytokeratin-7: Strongly and diffusely positive  Cytokeratin-20: Negative  CDX2: Negative (absence of enteric differentiation)     The combined morphologic and immunophenotypic features of this lesion are compatible with the clinical history of endometrial carcinoma.      6/10/24:  PET/CT images reviewed  IMPRESSION:     1.  Focal hypermetabolic uptake at the vaginal cuff compatible with biopsy-proven recurrent malignancy. FDG avid right pelvic lymph nodes are suspicious for man metastases.  2.  Subcentimeter pulmonary nodules are below the resolution of PET. Attention on future oncologic imaging.     6/27/24:  Tumor board: Consensus/Management Options: As patient has good functional status and the pelvic lymph node is FDG avid, recommendation for chemotherapy + IO. Also can consider  trial. Discuss with patient for final treatment plan.    Patient is here with her family.  She is doing well, no new complaints, intermittent bleeding.     Past Medical History:    Past Medical History:   Diagnosis Date     Arthritis      Dyslipidemia      Endometrial cancer (H) 07/2023     Gout 03/14/2011    Triggered by hydrochlorothiazide       Hypertension      Obesity      Solid malignant neoplasm  with high-frequency microsatellite instability (MSI-H) (H) 2024         Past Surgical History:    Past Surgical History:   Procedure Laterality Date     COLONOSCOPY  05    normal, recheck 10 years -- done at [facility] HYSTERECTOMY TOTAL, BILATERAL SALPINGO-OOPHORECTOMY, NODE DISSECTION, COMBINED N/A 2023    Procedure: Robotic assisted hysterectomy, bilateral salpingo-oophorectomy, cancer staging, sentinel lymph node mapping and sampling, pelvic washings;  Surgeon: Amanda Au MD;  Location: [facility] OR     DILATION AND CURETTAGE, WITH ULTRASOUND GUIDANCE N/A 2023    Procedure: Pelvic examination under anesthesia, dilation and curettage uterus, ultrasound guidance Latex Free;  Surgeon: Amanda Au MD;  Location: [facility] OR     EXAM EYE  2018    Capsulotomy Left eye.   Dr. Skip Villela     EYE SURGERY       HC REMOVAL GALLBLADDER       HYSTEROSCOPY,DIAGNOSTIC  around      IR CHEST PORT PLACEMENT > 5 YRS OF AGE  2024         Health Maintenance:  Health Maintenance Due   Topic Date Due     RSV VACCINE (Pregnancy & 60+) (1 - 1-dose 60+ series) Never done     COVID-19 Vaccine (2023-24 season) 2023     PHQ-2 (once per calendar year)  2024         Current Medications:     has a current medication list which includes the following prescription(s): lisinopril, simvastatin, vitamin d, dexamethasone, lidocaine-prilocaine, ondansetron, and prochlorperazine.       Allergies:     Hydrochlorothiazide        Social History:     Social History     Tobacco Use     Smoking status: Never     Passive exposure: Never     Smokeless tobacco: Never   Substance Use Topics     Alcohol use: No       History   Drug Use No           Family History:     The patient's family history is notable for :.    Family History   Problem Relation Age of Onset     Eye Disorder Mother         glaucoma     Heart Disease Mother          of CHF     Cancer Sister         uterine and ovarian-in  "remission 2008     Other Cancer Sister      Deep Vein Thrombosis (DVT) Sister      Uterine Cancer Sister      Heart Disease Brother         valve replacement     Lipids Brother      Coronary Artery Disease Brother      Eye Disorder Brother         detached retina     Cancer Paternal Grandfather         carcinoma of the lip, pipe-smoker     Thyroid Disease Daughter         goiter or nodule?     Cancer Son         SKIN     Other Cancer Son      Other Cancer Other      Thyroid Disease Other      Diabetes No family hx of      C.A.D. No family hx of      Breast Cancer No family hx of      Cancer - colorectal No family hx of      Anesthesia Reaction No family hx of          Physical Exam:     BP (!) 140/67 (BP Location: Right arm, Patient Position: Sitting, Cuff Size: Adult Large)   Pulse 70   Resp 16   Ht 1.638 m (5' 4.5\")   Wt 88.3 kg (194 lb 9.6 oz)   LMP  (LMP Unknown)   SpO2 97%   BMI 32.89 kg/m    Body mass index is 32.89 kg/m .    General Appearance: healthy and alert, no distress       Assessment:     Jen Layne is a 88 year old woman with a diagnosis of Stage IB Grade 1 endometrioid endometrial TOMAS, with vaginal cuff recurrence and local man disease        30 minutes spent on the date of the encounter doing chart review, history and exam, documentation and further activities as noted above     The longitudinal plan of care for the diagnosis(es)/condition(s) as documented were addressed during this visit. Due to the added complexity in care, I will continue to support Jen in the subsequent management and with ongoing continuity of care.       Plan:      1.)        FIGO Grade 1 endometrioid TOMAS:  Pathology previously reviewed in detail.  Tumor 4.5cm greatest dimension, >50% myometrial invasion (13/22mm), superficial NOLA involvment, no LVSI, cytology negative, margins negative, LN negative. Final FIGO Stage IB Grade 1 endometrioid TOMAS.  H-IR due to age, DOI.  Discussed options including " observation, radiation (VBT).      Patient was seen by Rad Onc and ultimately elected for observation.       Recently seen for bleeding with exam findings consistent with vaginal cuff recurrence.  This has now been biopsy proven and PET/CT scan supports vaginal cuff recurrence with likely right pelvic lymph node spread.     Discussed systemic treatment options including chemotherapy and possible vaginal cuff radiation depending on symptoms.     Tumor board review discussed with patient and her family. Recommend systemic chemotherapy with Carboplatin/Taxol and Dostarblimab followed by maintenance dostarlimab. Alternatively, discussed clinical trial and gave her information regarding .  The standard at this time would be chemo with IO which is what I recommended to patient.  However, if she does not tolerate chemo, I think she would be a good candidate for .  She is interested in learning more about the study and I will have the team reach out to her.    In the meantime, will plan on proceeding with chemotherapy as outlined above in combination with IO. Discussed chemotherapy in detail including drugs used, administration, port placement, toxcitites, anticipated side-effects including fatigue, alopecia, neuropathy, nausea, hematologic toxicities (anemia/neutropenia), changes in kidney function, allergic reaction, anaphylaxis, infection risk, sepsis.  Discussed treatment schedule, monitoring during and after chemotherapy.  Discussed IO therapy, maintenance therapy, schedule, side-effects of immuontherapy and management if they arise. Discussed potential need for blood transfusion, IV hydration, growth factor support.  Will arrange for port placement. Discussed in detail, questions answered.           Questions answered, patient and family expressed understanding of plan of care.     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        Amanda Au MD  Gynecologic Oncology  [facility]  CC    CC  Patient Care Team:  Yamile Cotter MD as PCP - General (Family Medicine)  Yamile Cotter MD as Assigned PCP  Amanda Au MD as MD (Gynecologic Oncology)  Nayana Gutierrez RN as Specialty Care Coordinator (Gynecologic Oncology)  Amanda Au MD as Assigned Cancer Care Provider  SELF, REFERRED        Again, thank you for allowing me to participate in the care of your patient.        Sincerely,        Amanda Au MD

## 2024-06-27 NOTE — PATIENT INSTRUCTIONS
Plan chemo    Clinical trial information    Chemo location preference: [place] [facility] and then [facility]    Amanda Au MD  Gynecologic Oncology  [facility]

## 2024-06-28 PROBLEM — C80.1: Status: ACTIVE | Noted: 2024-06-28

## 2024-06-28 PROBLEM — C54.1 ENDOMETRIAL CANCER (H): Status: ACTIVE | Noted: 2024-06-28

## 2024-07-02 NOTE — TUMOR CONFERENCE
Gyn Onc Tumor Board Note    Date of Tumor Board Presentation: 6/27/24   Patient: Jen Layne   MRN: [id number]  Age: 88 year old  Gyn Onc Staff: Dr. Au   Disciplines Present: Gynecologic Oncology, Pathology, Radiation Oncology, and Radiology    Oncologic Information  Cancer Type: Uterine  Stage: Recurrent   Clinical Trial Discussion: Yes  If yes, what clinical trial should be considered:  - A Phase IB and Randomized Phase II trial of Megestrol Acetate with or without Ipatasertib in Recurrent or Metastatic Endometrioid Endometrial Cancer ?       Consensus/Management Options: As patient has good functional status and the pelvic lymph node is FDG avid, recommendation for chemotherapy + IO. Also can consider  trial. Discuss with patient for final treatment plan.      This abstract and interpretation of the conversation at tumor board has been completed by Ileana Carbone. These are the general recommendations/discussion provided at tumor board conference. The definitive recommendation/discussion will be made by the primary health care team and patient after full discussion as appropriate.

## 2024-07-08 NOTE — PROGRESS NOTES
Virtual Visit Details    Type of service:  Video Visit   Video Start Time:  12:30 pm  Video End Time: 1:28 pm    Originating Location (pt. 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  She presents for a disease management visit by video.    Oncology History:  Initially, she was seen in the ED for intermittent vaginal bleeding x 6 wks. Was actually seen  for hematuria.  At that time, underwent CT urogram 22 which showed no gross abnormalities to explain hematuria, was noted to have low-density thickening of endometrium.  Underwent a pelvic US on 22 which showed uterus at 6.8 x 4.6cm with a 3.2cm mass along endometrial canal possibly representing polyp or fibroid.  Never had any further follow-up.     She had a repeat pelvic US on 23 which showed diffusely thickened endometrium.  Had pelvic MRI  8.1 x 4.4 x 5.4 cm in long axis, short axis and transverse dimensions, respectively. An apparent heterogeneous mass-like structure is present in the anterior and right aspect of the uterus in the subendometrial or endometrial  region, measuring approximately 2.5 x 1.6 cm (series 18 image 33). This structure is not well-visualized on T1 weighted images due to artifact. It appears isointense to the uterine myometrium on T2-weighted images. Probable mildly heterogeneous  enhancement of this structure. A few subcentimeter low T2 signal regions in the anterior uterus likely represent very small fibroids.  The endometrial stripe is not well defined due to motion artifact. It measures approximately 0.9 cm in dual-layer  anteroposterior thickness. The endometrial region appears mildly irregular on the axial images (for example, series 6 image 15). It cannot be determined on the post contrast images if there is abnormal enhancement associated with the endometrium. Possible restricted diffusion within the region of endometrium.     6/7/23:  Seen in Gyn Onc. Unable to perform EMB due to stenosis     7/19/23:  EUA, D&C under US guidance.  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  MLH1 hypermethylation +     9/22/23:  Robotic assisted hysterectomy, bilateral salpingo-oophorectomy, cancer staging, sentinel lymph node mapping and sampling, pelvic washings.  Final pathology (reviewed): FIGO Grade 1 endometrioid TOMAS, 4.5cm greatest dimension, >50% myometrial invasion (13/22mm), superficial NOLA involvment, no LVSI, cytology negative, margins negative, LN negative. Final FIGO Stage IB Grade 1 endometrioid TOMAS,      Discussed at tumor board with recs for VBT versus observation. The patient saw Rad Onc and ultimately decided not to do XRT.    5/23/24: VAGINAL MUCOSA, BIOPSY:  -MODERATELY DIFFERENTIATED ADENOCARCINOMA  PAX8: Strongly and diffusely positive  P16 strongly positive, marking the majority of the columnar cells  Estrogen receptor: Positive (95% of cells, strong staining)  Cytokeratin-7: Strongly and diffusely positive  Cytokeratin-20: Negative  CDX2: Negative (absence of enteric differentiation)  The combined morphologic and immunophenotypic features of this lesion are compatible with the clinical history of endometrial carcinoma.    6/10/24: PET CT  IMPRESSION:  1.  Focal hypermetabolic uptake at the vaginal cuff compatible with biopsy-proven recurrent malignancy. FDG avid right pelvic lymph nodes are suspicious for man metastases.  2.  Subcentimeter pulmonary nodules are below the resolution of PET. Attention on future oncologic imaging.    Plan: Carboplatin AUC 5, paclitaxel 175 mg/m2, and dorstarlimab 500 mg IV  "every 3 weeks.             Today she reports with her daughters that she is feeling well overall and ready to hear about her upcoming treatment.  She had been having some vaginal spotting and will occasionally still have \"a trace\" of vaginal bleeding.  No abdominal pain.  No nausea or vomiting.  Eating and drinking without difficulty.  Weight has been stable.  She is urinating without difficulty but perhaps more frequently than usual.  Normal bowel function.  No pre-existing neuropathy.  No history of diabetes.  No history of thyroid dysfunction.  Daughter does report that her right calf is larger than her left.  No tenderness, warmth, or redness.  She has been using compression stockings.  No fever/chills.  No chest pain.  No shortness of breath.  She is interested in having a port placed she has hard veins.  She has a history of intermittent back pain that is a longstanding issue for which she controls with occasional Aleve.            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         Past Surgical History:    Past Surgical History:   Procedure Laterality Date    COLONOSCOPY  12/05/05    normal, recheck 10 years -- done at [facility] HYSTERECTOMY TOTAL, BILATERAL SALPINGO-OOPHORECTOMY, NODE DISSECTION, COMBINED N/A 9/22/2023    Procedure: Robotic assisted hysterectomy, bilateral salpingo-oophorectomy, cancer staging, sentinel lymph node mapping and sampling, pelvic washings;  Surgeon: Amanda Au MD;  Location: [facility] OR    DILATION AND CURETTAGE, WITH ULTRASOUND GUIDANCE N/A 7/19/2023    Procedure: Pelvic examination under anesthesia, dilation and curettage uterus, ultrasound guidance Latex Free;  Surgeon: Amanda Au MD;  Location: " [facility] OR    EXAM EYE  03/30/2018    Capsulotomy Left eye.   Dr. Skip Villela    EYE SURGERY  2015    [facility] REMOVAL GALLBLADDER      HYSTEROSCOPY,DIAGNOSTIC  around 1998         Health Maintenance Due   Topic Date Due    RSV VACCINE (Pregnancy & 60+) (1 - 1-dose 60+ series) Never done    COVID-19 Vaccine (7 - 2023-24 season) 12/07/2023    PHQ-2 (once per calendar year)  01/01/2024       Current Medications:     Current Outpatient Medications   Medication Sig Dispense Refill    dexAMETHasone (DECADRON) 4 MG tablet Take 2 tablets (8 mg) by mouth daily Take for 3 days, starting the day after chemo. Take with food. 6 tablet 5    lidocaine-prilocaine (EMLA) 2.5-2.5 % external cream Apply topically as needed (pain due to port access) Apply to port 30 minutes prior to lab appointment. 30 g 6    lisinopril (ZESTRIL) 20 MG tablet Take 1 tablet (20 mg) by mouth daily (Patient taking differently: Take 20 mg by mouth every evening) 90 tablet 4    ondansetron (ZOFRAN) 8 MG tablet Take 1 tablet (8 mg) by mouth every 8 hours as needed for nausea (vomiting) Do not take for 3 days after chemo. 30 tablet 2    prochlorperazine (COMPAZINE) 10 MG tablet Take 1 tablet (10 mg) by mouth every 6 hours as needed for nausea or vomiting 30 tablet 2    simvastatin (ZOCOR) 40 MG tablet Take 1 tablet (40 mg) by mouth At Bedtime 90 tablet 4    VITAMIN D 1000 UNIT OR CAPS Take 1 capsule by mouth every morning           Allergies:        Allergies   Allergen Reactions    Hydrochlorothiazide      Triggered two gout attacks, no further problmes since discontinuing drug        Social History:     Social History     Tobacco Use    Smoking status: Never     Passive exposure: Never    Smokeless tobacco: Never   Substance Use Topics    Alcohol use: No       History   Drug Use No         Family History:     The patient's family history is notable for:    Family History   Problem Relation Age of Onset    Eye Disorder Mother         glaucoma    Heart Disease Mother           of CHF    Cancer Sister         uterine and ovarian-in remission     Other Cancer Sister     Deep Vein Thrombosis (DVT) Sister     Uterine Cancer Sister     Heart Disease Brother         valve replacement    Lipids Brother     Coronary Artery Disease Brother     Eye Disorder Brother         detached retina    Cancer Paternal Grandfather         carcinoma of the lip, pipe-smoker    Thyroid Disease Daughter         goiter or nodule?    Cancer Son         SKIN    Other Cancer Son     Other Cancer Other     Thyroid Disease Other     Diabetes No family hx of     C.A.D. No family hx of     Breast Cancer No family hx of     Cancer - colorectal No family hx of     Anesthesia Reaction No family hx of          Physical Exam:     LMP  (LMP Unknown)   There is no height or weight on file to calculate BMI.      General Appearance: alert, no distress    Eyes:  Eyes grossly normal to inspection.  No discharge or erythema, or obvious scleral/conjunctival abnormalities.    Respiratory: No audible wheeze, cough, or visible cyanosis.  No visible retractions or increased work of breathing.     Musculoskeletal: extremities non tender and without edema    Skin: no lesions or rashes on visible skin    Neurological: normal gait, no gross defects     Psychiatric: appropriate mood and affect. Mentation appears normal, affect normal/bright, judgement and insight intact, normal speech and appearance well-groomed                            The rest of a comprehensive physical examination is deferred due to video visit restrictions.      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  She presents for a disease management visit by video.      70 minutes spent on the date of the encounter doing chart review, history and exam, documentation, and further activities as noted above.              Plan:     1.) Endometrial cancer:  Reviewed  patient s diagnosis and treatment plan (including carboplatin, paclitaxel, dorstarlimab) as recommended by Dr. Au. The goal of treatment is palliative intent.  [facility] 7/17 for labs, provider visit, and first cycle.  Cycle 1 to be signed by Dr. Au.  Currently scheduled appointments available via My Chart.  Take home meds signed in her treatment plan and released to her preferred pharmacy.  She desires a port, orders placed they will contact her to schedule.  Reviewed signs and symptoms for when she should contact the clinic or seek additional care; and contact information of the Gynecologic Oncology Clinic.  Patient to contact the clinic with any questions or concerns in the interim.      Discussed that the administration of chemotherapy and immunotherapy can carry certain risks, including failure to obtain the desired result, discomforts, injury, and the need for additional therapy. Reviewed possible side effects of these drugs including: Allergic reaction; Pancytopenia including Anemia causing weakness/fatigue, Low WBC causing infection, Low platelets causing bruising/bleeding; Mouth sores; Thyroid function; Fatigue; Brief periods of forgetfulness; Nausea and vomiting; Neuropathy; Diarrhea/Constipation; Hair loss; Skin and nail changes; Liver effects; Heart effects; Kidney/Bladder effects; Lung effects; Loss of appetite; Weight gain or loss; Visual/Auditory changes; Sexual effects; Mood effects; Menopausal symptoms; Reproductive/Fertility Effects; Other. Discussed that the patient may have side effects from chemotherapy that have not been discussed today because each patient may respond differently to chemotherapy and could have side effects that have not been previously reported by others.     Discussed ways to manage certain side effects at home and when to call the clinic or go to the emergency department.     Patient will be provided via mail by [facility] with a copy of [facility] drug information regarding  (carboplatin, paclitaxel, dorstarlimab drugs); [facility] Cancer Care packet including: Getting Ready for Chemotherapy, Comparing Chemotherapy, Immunotherapy, and targeted Therapy, Your Cancer Care team and MyChart, Understanding Clinical Trials, Honoring Choices, Cancer Care Services, and Integrative Therapies.     Reviewed resources available including nutrition services, rehabilitation and exercise, pharmacy services, tobacco cessation programs, social work services, spiritual health, cancer risk management program, cancer survivorship program, and palliative care program.     Discussed nutrition and the importance of eating small frequent meal, high protein, and drinking 8-10 glasses of noncaffeinated and nonalcoholic beverages.         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  MLH1 promoter methylation was positive making La syndrome unlikely.    Labs and/or tests ordered include:  RLE US.     2.) Health maintenance:  Issues addressed today include following up with PCP for annual health maintenance and non-gynecologic issues.     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  No concerns for metastatic lesions in her most recent PET CT.  Pain is intermittent and not everyday.  Discussed taking tylenol first as needed for pain if needed Aleve occasionally is ok.  Discussed risk of regular use of Aleve or ibuprofen especially while undergoing chemotherapy.    4.) Right leg swelling: Right calf swelling unsure for how long, noted by daughter yesterday.  She does have concern for some lymphadenopathy in her right external iliac lymph nodes on her most recent PET CT.  No redness, pain, or warmth.  Discussed risks for blood clot.  Orders placed for RLE US which can be done at [place] which is her preferred location.    The longitudinal plan of care for the diagnosis(es)/condition(s) as documented were addressed during this visit. Due  to the added complexity in care, I will continue to support Jen in the subsequent management and with ongoing continuity of care.      Lindsay Mcguire, DNP, APRN, FNP-C, AOCNP  Oncology Nurse Practitioner   Division of Gynecologic Oncology  Pager: [phone]     CC  Patient Care Team:  Yamile Cotter MD as PCP - General (Family Medicine)  Yamile Cotter MD as Assigned PCP  Amanda Au MD as MD (Gynecologic Oncology)  Nayana Gutierrez, [place] as Specialty Care Coordinator (Gynecologic Oncology)  Amanda Au MD as Assigned Cancer Care Provider  AMANDA AU

## 2024-07-09 ENCOUNTER — VIRTUAL VISIT (OUTPATIENT)
Dept: ONCOLOGY | Facility: HOSPITAL | Age: 89
End: 2024-07-09
Attending: NURSE PRACTITIONER
Payer: MEDICARE

## 2024-07-09 ENCOUNTER — PATIENT OUTREACH (OUTPATIENT)
Dept: ONCOLOGY | Facility: HOSPITAL | Age: 89
End: 2024-07-09

## 2024-07-09 DIAGNOSIS — C80.1 SOLID MALIGNANT NEOPLASM WITH HIGH-FREQUENCY MICROSATELLITE INSTABILITY (MSI-H) (H): ICD-10-CM

## 2024-07-09 DIAGNOSIS — M79.89 RIGHT LEG SWELLING: ICD-10-CM

## 2024-07-09 DIAGNOSIS — C54.1 ENDOMETRIAL CANCER (H): Primary | ICD-10-CM

## 2024-07-09 PROCEDURE — 99417 PROLNG OP E/M EACH 15 MIN: CPT | Mod: 95 | Performed by: NURSE PRACTITIONER

## 2024-07-09 PROCEDURE — 99215 OFFICE O/P EST HI 40 MIN: CPT | Mod: 95 | Performed by: NURSE PRACTITIONER

## 2024-07-09 PROCEDURE — G2211 COMPLEX E/M VISIT ADD ON: HCPCS | Mod: 95 | Performed by: NURSE PRACTITIONER

## 2024-07-09 RX ORDER — DEXAMETHASONE 4 MG/1
8 TABLET ORAL DAILY
Qty: 6 TABLET | Refills: 5 | Status: SHIPPED | OUTPATIENT
Start: 2024-07-09 | End: 2024-08-06

## 2024-07-09 RX ORDER — ONDANSETRON 8 MG/1
8 TABLET, FILM COATED ORAL EVERY 8 HOURS PRN
Qty: 30 TABLET | Refills: 2 | Status: SHIPPED | OUTPATIENT
Start: 2024-07-09

## 2024-07-09 RX ORDER — LIDOCAINE/PRILOCAINE 2.5 %-2.5%
CREAM (GRAM) TOPICAL PRN
Qty: 30 G | Refills: 6 | Status: SHIPPED | OUTPATIENT
Start: 2024-07-09

## 2024-07-09 RX ORDER — PROCHLORPERAZINE MALEATE 10 MG
10 TABLET ORAL EVERY 6 HOURS PRN
Qty: 30 TABLET | Refills: 2 | Status: SHIPPED | OUTPATIENT
Start: 2024-07-09 | End: 2024-08-06

## 2024-07-09 NOTE — NURSING NOTE
Current patient location: [address]    [facility] [address]    Is the patient currently in the state of [place]? YES    Visit mode:VIDEO    If the visit is dropped, the patient can be reconnected by: VIDEO VISIT: Text to cell phone:   Telephone Information:   Mobile [phone]       Will anyone else be joining the visit? NO  (If patient encounters technical issues they should call [phone]    How would you like to obtain your AVS? MyChart    Are changes needed to the allergy or medication list? Pt stated no changes to allergies and Pt stated no med changes    Are refills needed on medications prescribed by this physician? NO    Reason for visit: RECHECK    Alva GENTILE

## 2024-07-09 NOTE — PATIENT INSTRUCTIONS
Your visit today was with ÓSCAR Toro CNP for chemotherapy education.    Plan:  Your proposed regimen: carboplatin, paclitaxel, and dorstarlimab   Schedule: every 21 days x 6 cycles, followed by maintenance dorstarlimab every 6 weeks  You will have a lab appointment and a provider appointment prior to your infusions- please note these times and arrive at the scheduled appointment times for both lab and your provider appointments 10-15 minutes early if possible.    Your team:  Gynecologic oncologist: Amanda Au MD  RN care coordinator: Luma Griffin RNCC; [phone]  Nurse practitioner:Lindsay Mcguire CNP  Triage RN line: [facility] triage: [phone]    Your take home medications:  -Dexamethasone- This is a steroid and can help prevent nausea. Take 8mg daily for three days in a row, starting the morning after chemotherapy. Take with food and water.  This medication can cause elevated blood sugars, difficulty sleeping, agitation, anxiety, and jitteriness.  If you notice these symptoms you can stop it.  -Prochlorperazine (aka Compazine)- this is an anti-nausea medication that you can take as needed. The dose is one tablet every six hours as needed.  -Ondansetron (aka Zofran)- this is an anti-nausea medication that you can take as needed. The dose is one tablet every eight hours as needed. Wait until 72 hours after chemotherapy to take this medication as you were given a long acting medication in this same drug class.  -EMLA cream- this is a numbing cream. Apply to your port site 30-60 minutes before your scheduled lab draw. You can cover this with plastic wrap so that it does not rub off on your clothing.      When to call for help:  A fever of 100.4 F or greater  Shaking chills  Shortness of breath or difficulty breathing  Repeated signs of bleeding, such as nose bleeds that do not stop with pressure, vaginal bleeding that saturates a pad an hour for two hours in a row, easy bruising or black  tarry stools  Mouth sores that stop you from eating or cause pain when you swallow  Nausea and vomiting that do not get better with your medications  Diarrhea that does not get better (particularly three or more loose watery stools in one day)  Extreme weakness  Feeling confused or extremely sleepy  Constipation for more than 48 hours that does not respond to laxatives  New rashes  New swelling in the legs, arms, or face- particularly if one leg is more swollen than the other, hot, red, or painful  Any new symptoms that you did not have before your treatments    WHO to call for help:  Call your RN care coordinator first- if you have difficulty reaching her, then call the triage line. If you have difficulty reaching triage during off hours, you can call [phone] and ask to speak to the gynecologic oncology resident on call.    For urgent questions or concerns, please call rather than send a medical message.    Wig/Headwear Resources  Name Details Address Phone Website   [employer] Nonprofit that makes no-cost human hair wigs for people experiencing hair loss due to cancer drugs/treatment. Requires referral page from oncologist.   $25 donated wigs available at https://www.[employer]/shop/  [address] [place] [address] [phone] https://Hire An [email or web address].[email or web address]/    [employer] Synthetic and human hair wigs, custom made and on hand. Natural Steps, hair systems.  Appointment only.  [address] [place] [address] [phone] http://Cognitive Health Innovations    [employer] Make human hair wigs out of your own hair (often supplement), require hair to be 8in long to use. Takes 8-11 wks, can rush 5-6 wks.  [address], [address]  [place] [address]  (By appointment only) [phone] [email or web address]://[email or web address]/    [employer] Human and synthetic wigs, toppers, hair extensions & hair pieces. [address] [place] [address]      [address]  [place] [address] [place]: [phone]  *appointment only    [place]: [phone]  *can walk in [email or web address]    [employer] Open Tue- Sat. Men & Women hats & wigs. Call to reserve a consultation.  [address] [address] [phone] [email or web address]/[email or web address]    Coils to Locs Coils to Locs, founded by a cancer survivor and natural hair blogger. Provides access to contemporary, high quality, coily, curly synthetic wigs. Online shop. Mailing Address:   [employer]  [address] [place] [address] [address]  [place] [address] [email or web address]    [employer] Free, mailed wigs for women. Recipient of wig is responsible for shipping/handling costs. Required to send note from oncologist, photos of yourself, information about wig sought, and online request.  Based in [place] and [place] [phone] https://[email or web address]/[email or web address]/    [employer] Lace wigs, wigs, clarence, weaves, hair pieces, hair care products, make-up No standalone store Email: sales@[email or web address] [place] https://www.S[employer] [place]/    Headcovers Headwear, wigs & hairpieces, eyebrows, cosmetics, clothing & accessories  Online only  [phone] https://[email or web address]    It's Still Me Wigs, headwear, accessories. Call to reserve a consultation. [address], [address], [place], [place] [address] [phone] http://www.[employer]rapeutics    Angela Stinson Seeing clients in home. Will see people predominately in [facility], call for information. Wigs, head coverings, post-mastectomy items clothing and skin care products Your home, or another preferred location [phone] http://[email or web address]/    Tish Hui Wigs, head gear, skin care, make-up products, cosmetics  [place]: Certificates available, appointments only. Workers used to volunteer at [employer]    Zoie: free fascial  when you come in, essential oils, CBD  *call to schedule  [address] [place]  [facility] [place] [address]     [address] [place] [address] [facility]: [phone]          [place]: [phone] https://www.[email or web address]/homepage    [employer] Hair loss prevention and camouflage products, wigs for men/women/children. Coloring of human hair wigs/alterations. Headwear, turbans, wig care products and accessories. [address] [place], [phone] https://VDP.[email or web address].ly/    Jackie Parra Wigs, wiglets, hair pieces for women. No standalone store, can request catalog [phone] https://www.[email or web address]/    [employer] Wig Closet All wigs, hats, and hairdressing on wigs is free. [address] [place] [address] [phone] or   [phone] https://[facility].org/eoifw-zcm-zdmkyv/    [employer] Wigs and scarves. Call to schedule consultation [address]  [place]. [address] [phone] [email or web address]    Todd ARIAS     Hair regrowth, replacement & wigs [address] [place] [address] [phone] https://[email or web address].ly/    [employer] [place] [employer] Hats, scarves, wigs, bras, breast forms, post surgical garments. Male wigs available. No standalone store [phone]  Can call to order www.Carrot.mxrect.org    [facility] that provides free human hair cap wigs to cancer patients. Prescription/letter from oncologist required, must be addressed to the [facility], [place] and dated within 30 days of submission. Other forms will not be accepted and will delay your application approval process. [facility] [phone] [email or web address]      Vatgia.com Class/Individual Connection  Name Details Locations Contact Information   Look [employer] Better [employer] Virtual Workshops are taught by beauty, wig, and styling professionals to provide tips and tricks to help cancer  patients cope with appearance-related side effects of treatment and connect with fellow cancer patients across the country Virtual (to [place]) https://lookgoodfeelbetter.org/    Hello Gorgeous Virtual Makeovers, free resources, education on how to keep you safe Virtual (to [place]) https://www.MetabolomxlogSavaari Car [employer]als.org/      Health Insurance Coverage for Wig (information from Triage Cancer: https://triagecancer.org/quick-guides/manage-side-effects )  Some private insurance plans (e.g., individual or employer-sponsored plans) cover wigs for medically-induced hair loss under the classification of  durable medical goods.  Tip: get a prescription for a  cranial prosthesis.  Note: some insurance companies specifically list wigs as an excluded benefit. If your plan does cover the wig, you will likely still be responsible for out-of-pocket costs (i.e., a co-pay or co-insurance amount). Check your plan's summary of benefits and coverage for details.  Medicare Part B does not cover wigs for hair loss due to cancer treatment.  Medicaid does not cover wigs in any state.  [facility] and [facility] both cover one wig per the lifetime of a beneficiary, for hair loss that occurs due to cancer treatment. Note: this does not include wig supplies or maintenance (e.g., wig cap, comb, glue, etc.).

## 2024-07-11 ENCOUNTER — MYC MEDICAL ADVICE (OUTPATIENT)
Dept: INTERVENTIONAL RADIOLOGY/VASCULAR | Facility: CLINIC | Age: 89
End: 2024-07-11
Payer: MEDICARE

## 2024-07-11 ENCOUNTER — HOSPITAL ENCOUNTER (OUTPATIENT)
Dept: ULTRASOUND IMAGING | Facility: CLINIC | Age: 89
Discharge: HOME | End: 2024-07-11
Attending: NURSE PRACTITIONER | Admitting: NURSE PRACTITIONER
Payer: MEDICARE

## 2024-07-11 DIAGNOSIS — C54.1 ENDOMETRIAL CANCER (H): ICD-10-CM

## 2024-07-11 DIAGNOSIS — M79.89 RIGHT LEG SWELLING: ICD-10-CM

## 2024-07-11 PROCEDURE — 93971 EXTREMITY STUDY: CPT | Mod: RT

## 2024-07-16 ENCOUNTER — HOSPITAL ENCOUNTER (OUTPATIENT)
Dept: INTERVENTIONAL RADIOLOGY/VASCULAR | Facility: HOSPITAL | Age: 89
Discharge: HOME | End: 2024-07-16
Attending: NURSE PRACTITIONER | Admitting: RADIOLOGY
Payer: MEDICARE

## 2024-07-16 VITALS
HEART RATE: 60 BPM | TEMPERATURE: 97.8 F | RESPIRATION RATE: 20 BRPM | OXYGEN SATURATION: 99 % | DIASTOLIC BLOOD PRESSURE: 65 MMHG | SYSTOLIC BLOOD PRESSURE: 143 MMHG

## 2024-07-16 DIAGNOSIS — C54.1 ENDOMETRIAL CANCER (H): ICD-10-CM

## 2024-07-16 PROCEDURE — 272N000500 HC NEEDLE CR2

## 2024-07-16 PROCEDURE — 250N000011 HC RX IP 250 OP 636

## 2024-07-16 PROCEDURE — C1788 PORT, INDWELLING, IMP: HCPCS

## 2024-07-16 PROCEDURE — 250N000011 HC RX IP 250 OP 636: Performed by: RADIOLOGY

## 2024-07-16 PROCEDURE — 36561 INSERT TUNNELED CV CATH: CPT

## 2024-07-16 PROCEDURE — 99152 MOD SED SAME PHYS/QHP 5/>YRS: CPT

## 2024-07-16 PROCEDURE — C1769 GUIDE WIRE: HCPCS

## 2024-07-16 PROCEDURE — 250N000009 HC RX 250: Performed by: RADIOLOGY

## 2024-07-16 RX ORDER — NALOXONE HYDROCHLORIDE 0.4 MG/ML
0.4 INJECTION, SOLUTION INTRAMUSCULAR; INTRAVENOUS; SUBCUTANEOUS
Status: DISCONTINUED | OUTPATIENT
Start: 2024-07-16 | End: 2024-07-17 | Stop reason: HOSPADM

## 2024-07-16 RX ORDER — SODIUM CHLORIDE 9 MG/ML
INJECTION, SOLUTION INTRAVENOUS CONTINUOUS
Status: DISCONTINUED | OUTPATIENT
Start: 2024-07-16 | End: 2024-07-17 | Stop reason: HOSPADM

## 2024-07-16 RX ORDER — LIDOCAINE 40 MG/G
CREAM TOPICAL
Status: DISCONTINUED | OUTPATIENT
Start: 2024-07-16 | End: 2024-07-17 | Stop reason: HOSPADM

## 2024-07-16 RX ORDER — NALOXONE HYDROCHLORIDE 0.4 MG/ML
0.2 INJECTION, SOLUTION INTRAMUSCULAR; INTRAVENOUS; SUBCUTANEOUS
Status: DISCONTINUED | OUTPATIENT
Start: 2024-07-16 | End: 2024-07-17 | Stop reason: HOSPADM

## 2024-07-16 RX ORDER — FLUMAZENIL 0.1 MG/ML
0.2 INJECTION, SOLUTION INTRAVENOUS
Status: DISCONTINUED | OUTPATIENT
Start: 2024-07-16 | End: 2024-07-17 | Stop reason: HOSPADM

## 2024-07-16 RX ORDER — HEPARIN SODIUM,PORCINE 10 UNIT/ML
5-10 VIAL (ML) INTRAVENOUS
OUTPATIENT
Start: 2024-07-16

## 2024-07-16 RX ORDER — HEPARIN SODIUM,PORCINE 10 UNIT/ML
5-10 VIAL (ML) INTRAVENOUS EVERY 24 HOURS
OUTPATIENT
Start: 2024-07-16

## 2024-07-16 RX ORDER — HEPARIN SODIUM (PORCINE) LOCK FLUSH IV SOLN 100 UNIT/ML 100 UNIT/ML
500 SOLUTION INTRAVENOUS ONCE
Status: COMPLETED | OUTPATIENT
Start: 2024-07-16 | End: 2024-07-16

## 2024-07-16 RX ORDER — LIDOCAINE HYDROCHLORIDE AND EPINEPHRINE 10; 10 MG/ML; UG/ML
INJECTION, SOLUTION INFILTRATION; PERINEURAL PRN
Status: COMPLETED | OUTPATIENT
Start: 2024-07-16 | End: 2024-07-16

## 2024-07-16 RX ORDER — HEPARIN SODIUM (PORCINE) LOCK FLUSH IV SOLN 100 UNIT/ML 100 UNIT/ML
5-10 SOLUTION INTRAVENOUS
OUTPATIENT
Start: 2024-07-16

## 2024-07-16 RX ORDER — CEFAZOLIN SODIUM/WATER 2 G/20 ML
2 SYRINGE (ML) INTRAVENOUS
Status: COMPLETED | OUTPATIENT
Start: 2024-07-16 | End: 2024-07-16

## 2024-07-16 RX ORDER — FENTANYL CITRATE 50 UG/ML
25-50 INJECTION, SOLUTION INTRAMUSCULAR; INTRAVENOUS EVERY 5 MIN PRN
Status: DISCONTINUED | OUTPATIENT
Start: 2024-07-16 | End: 2024-07-17 | Stop reason: HOSPADM

## 2024-07-16 RX ADMIN — LIDOCAINE HYDROCHLORIDE 30 ML: 10 INJECTION, SOLUTION INFILTRATION; PERINEURAL at 09:34

## 2024-07-16 RX ADMIN — MIDAZOLAM HYDROCHLORIDE 0.5 MG: 1 INJECTION, SOLUTION INTRAMUSCULAR; INTRAVENOUS at 09:36

## 2024-07-16 RX ADMIN — MIDAZOLAM HYDROCHLORIDE 1 MG: 1 INJECTION, SOLUTION INTRAMUSCULAR; INTRAVENOUS at 09:26

## 2024-07-16 RX ADMIN — LIDOCAINE HYDROCHLORIDE AND EPINEPHRINE 1 ML: 10; 10 INJECTION, SOLUTION INFILTRATION; PERINEURAL at 09:35

## 2024-07-16 RX ADMIN — Medication 2 G: at 09:23

## 2024-07-16 RX ADMIN — FENTANYL CITRATE 50 MCG: 50 INJECTION, SOLUTION INTRAMUSCULAR; INTRAVENOUS at 09:30

## 2024-07-16 RX ADMIN — FENTANYL CITRATE 25 MCG: 50 INJECTION, SOLUTION INTRAMUSCULAR; INTRAVENOUS at 09:38

## 2024-07-16 RX ADMIN — Medication 500 UNITS: at 09:44

## 2024-07-16 NOTE — PRE-PROCEDURE
GENERAL PRE-PROCEDURE:   Procedure:  Right chest port placement  Date/Time:  7/16/2024 9:11 AM    Written consent obtained?: Yes    Risks and benefits: Risks, benefits and alternatives were discussed    Consent given by:  Patient  Patient states understanding of procedure being performed: Yes    Patient's understanding of procedure matches consent: Yes    Procedure consent matches procedure scheduled: Yes    Expected level of sedation:  Moderate  Appropriately NPO:  Yes  ASA Class:  2  Mallampati  :  Grade 2- soft palate, base of uvula, tonsillar pillars, and portion of posterior pharyngeal wall visible  Lungs:  Other (comment)  Lung exam comment:  Breathing comfortably on RA  Heart:  Other (comment)  Heart exam comment:  Regular rate  History & Physical reviewed:  History and physical reviewed and no updates needed  Statement of review:  I have reviewed the lab findings, diagnostic data, medications, and the plan for sedation    Chad Soliman MD  Interventional Radiology

## 2024-07-16 NOTE — DISCHARGE INSTRUCTIONS
Port Placement Procedure Discharge Instructions:  You had a port placed. A port is a small medical device that is placed under the skin and is connected to a vein with a catheter (thin, flexible tube). Ports can be used to administer IV medications (including chemotherapy), fluids or blood products or for blood lab draws. Please follow the below instructions after your procedure:    Care Instructions:  - If you received sedation for your procedure, do not drive or operate heavy machinery for the rest of the day.  - You may shower beginning tomorrow (post procedure day #1). Do not scrub site until well healed; pat dry gently with a towel.  - You likely have skin adhesive over your port site. Skin adhesive works like a bandage to keep the site covered and protected. Do not use antibiotic ointment or creams/lotions over adhesive as it can break it down. The skin adhesive will peel off on its own (typically in 5-14 days).  - Avoid submerging the port site under water (ex: tub baths, Jacuzzis, lakes, hot tubs and pools) for 10 days or until your site is well healed.  - You may have some discomfort, minimal swelling, redness and/or bruising at your port site/procedure site. You may take over the counter pain medication for discomfort (follow the package directions) or apply an ice pack wrapped in a towel over the site (rotating 20 minutes with ice pack on and 20 minutes with ice pack off) for comfort as needed. It can take several days for these to resolve.  - Avoid heavy lifting (greater than 10 pounds) and strenuous activities for 2 days following your procedure.   - If you experience significant bleeding at site, apply pressure with hands above the clavicle bone, sit upright and seek immediate medical assistance.  - Ports need to be flushed approximately every 4-6 weeks, if not being used more frequently. Follow up with the provider who ordered your port placement for further instructions for this.    Seek medical  evaluation or contact Gianluca ARENAS RN Line at [phone] if you experience the following:  - Uncontrolled bleeding from port site  - Fever (greater than 101 F (38.3C))  - Purulent (yellow/green/foul smelling) drainage from port insertion site  - Increasing pain at port site  - Increasing redness at port site

## 2024-07-16 NOTE — PROGRESS NOTES
Virtual Visit Details    Type of service:  Video Visit   Video Start Time:  12:28 pm  Video End Time: 12:42 pm    Originating Location (pt. 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  She presents for follow up and disease management by video.      Oncology History:  Initially, she was seen in the ED for intermittent vaginal bleeding x 6 wks. Was actually seen  for hematuria.  At that time, underwent CT urogram 22 which showed no gross abnormalities to explain hematuria, was noted to have low-density thickening of endometrium.  Underwent a pelvic US on 22 which showed uterus at 6.8 x 4.6cm with a 3.2cm mass along endometrial canal possibly representing polyp or fibroid.  Never had any further follow-up.     She had a repeat pelvic US on 23 which showed diffusely thickened endometrium.  Had pelvic MRI  8.1 x 4.4 x 5.4 cm in long axis, short axis and transverse dimensions, respectively. An apparent heterogeneous mass-like structure is present in the anterior and right aspect of the uterus in the subendometrial or endometrial  region, measuring approximately 2.5 x 1.6 cm (series 18 image 33). This structure is not well-visualized on T1 weighted images due to artifact. It appears isointense to the uterine myometrium on T2-weighted images. Probable mildly heterogeneous  enhancement of this structure. A few subcentimeter low T2 signal regions in the anterior uterus likely represent very small fibroids.  The endometrial stripe is not well defined due to motion artifact. It measures approximately 0.9 cm in  dual-layer anteroposterior thickness. The endometrial region appears mildly irregular on the axial images (for example, series 6 image 15). It cannot be determined on the post contrast images if there is abnormal enhancement associated with the endometrium. Possible restricted diffusion within the region of endometrium.     6/7/23:  Seen in Gyn Onc. Unable to perform EMB due to stenosis     7/19/23:  EUA, D&C under US guidance.  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  MLH1 hypermethylation +     9/22/23:  Robotic assisted hysterectomy, bilateral salpingo-oophorectomy, cancer staging, sentinel lymph node mapping and sampling, pelvic washings.  Final pathology (reviewed): FIGO Grade 1 endometrioid TOMAS, 4.5cm greatest dimension, >50% myometrial invasion (13/22mm), superficial NOLA involvment, no LVSI, cytology negative, margins negative, LN negative. Final FIGO Stage IB Grade 1 endometrioid TOMAS,      Discussed at tumor board with recs for VBT versus observation. The patient saw Rad Onc and ultimately decided not to do XRT.     5/23/24: VAGINAL MUCOSA, BIOPSY:  -MODERATELY DIFFERENTIATED ADENOCARCINOMA  PAX8: Strongly and diffusely positive  P16 strongly positive, marking the majority of the columnar cells  Estrogen receptor: Positive (95% of cells, strong staining)  Cytokeratin-7: Strongly and diffusely positive  Cytokeratin-20: Negative  CDX2: Negative (absence of enteric differentiation)  The combined morphologic and immunophenotypic features of this lesion are compatible with the clinical history of endometrial carcinoma.     6/10/24: PET CT  IMPRESSION:  1.  Focal hypermetabolic uptake at the vaginal cuff compatible with biopsy-proven recurrent malignancy. FDG avid right pelvic lymph nodes are suspicious for man metastases.  2.  Subcentimeter pulmonary nodules are below the resolution of PET. Attention on future oncologic imaging.     Plan: Carboplatin AUC 5, paclitaxel 175 mg/m2, and  "dorstarlimab 500 mg IV every 3 weeks.    7/11/24: RLE US negative for DVT.  7/16/24: Port placement.  7/18/24: Cycle 1 carboplatin, paclitaxel, and dorstarlimab.              Today she reports with her daughters that she is feeling well and ready to start treatment tomorrow.  She had per port placed yesterday which she feels went well.  She continues to have \"trace\" vaginal spotting intermittently.  No abdominal pain.  Eating and drinking without difficulty.  No nausea or vomiting.  Normal bowel and bladder habits.  She continues to have some swelling in her right calf which has not changed.  No fevers/chills.  No chest pain.  No SOB.  No history of diabetes. No history of thyroid dysfunction.   They have a few questions about her upcoming treatment regimen.              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         Past Surgical History:    Past Surgical History:   Procedure Laterality Date    COLONOSCOPY  12/05/05    normal, recheck 10 years -- done at [facility] HYSTERECTOMY TOTAL, BILATERAL SALPINGO-OOPHORECTOMY, NODE DISSECTION, COMBINED N/A 9/22/2023    Procedure: Robotic assisted hysterectomy, bilateral salpingo-oophorectomy, cancer staging, sentinel lymph node mapping and sampling, pelvic washings;  Surgeon: Amanda Au MD;  Location: [facility] OR    DILATION AND CURETTAGE, WITH ULTRASOUND GUIDANCE N/A 7/19/2023    Procedure: Pelvic examination under anesthesia, dilation and curettage uterus, ultrasound guidance Latex Free;  Surgeon: Amanda Au MD;  Location: [facility] OR    EXAM EYE  03/30/2018    Capsulotomy Left eye.   Dr. Skip Villela    EYE SURGERY  2015    HC REMOVAL GALLBLADDER      HYSTEROSCOPY,DIAGNOSTIC  around 1998    IR " CHEST PORT PLACEMENT > 5 YRS OF AGE  2024         Health Maintenance Due   Topic Date Due    RSV VACCINE (Pregnancy & 60+) (1 - 1-dose 60+ series) Never done    COVID-19 Vaccine (2023- season) 2023    PHQ-2 (once per calendar year)  2024       Current Medications:     Current Outpatient Medications   Medication Sig Dispense Refill    dexAMETHasone (DECADRON) 4 MG tablet Take 2 tablets (8 mg) by mouth daily Take for 3 days, starting the day after chemo. Take with food. 6 tablet 5    lidocaine-prilocaine (EMLA) 2.5-2.5 % external cream Apply topically as needed (pain due to port access) Apply to port 30 minutes prior to lab appointment. 30 g 6    lisinopril (ZESTRIL) 20 MG tablet Take 1 tablet (20 mg) by mouth daily (Patient taking differently: Take 20 mg by mouth every evening) 90 tablet 4    ondansetron (ZOFRAN) 8 MG tablet Take 1 tablet (8 mg) by mouth every 8 hours as needed for nausea (vomiting) Do not take for 3 days after chemo. 30 tablet 2    prochlorperazine (COMPAZINE) 10 MG tablet Take 1 tablet (10 mg) by mouth every 6 hours as needed for nausea or vomiting 30 tablet 2    simvastatin (ZOCOR) 40 MG tablet Take 1 tablet (40 mg) by mouth At Bedtime 90 tablet 4    VITAMIN D 1000 UNIT OR CAPS Take 1 capsule by mouth every morning           Allergies:        Allergies   Allergen Reactions    Hydrochlorothiazide      Triggered two gout attacks, no further problmes since discontinuing drug        Social History:     Social History     Tobacco Use    Smoking status: Never     Passive exposure: Never    Smokeless tobacco: Never   Substance Use Topics    Alcohol use: No       History   Drug Use No         Family History:     The patient's family history is notable for:    Family History   Problem Relation Age of Onset    Eye Disorder Mother         glaucoma    Heart Disease Mother          of CHF    Cancer Sister         uterine and ovarian-in remission     Other Cancer Sister     Deep  "Vein Thrombosis (DVT) Sister     Uterine Cancer Sister     Heart Disease Brother         valve replacement    Lipids Brother     Coronary Artery Disease Brother     Eye Disorder Brother         detached retina    Cancer Paternal Grandfather         carcinoma of the lip, pipe-smoker    Thyroid Disease Daughter         goiter or nodule?    Cancer Son         SKIN    Other Cancer Son     Other Cancer Other     Thyroid Disease Other     Diabetes No family hx of     C.A.D. No family hx of     Breast Cancer No family hx of     Cancer - colorectal No family hx of     Anesthesia Reaction No family hx of          Physical Exam:     BP (!) 143/65   Ht 1.638 m (5' 4.5\")   LMP  (LMP Unknown)   BMI 32.89 kg/m    Body mass index is 32.89 kg/m .    General Appearance: alert, no distress    Eyes:  Eyes grossly normal to inspection.  No discharge or erythema, or obvious scleral/conjunctival abnormalities.    Respiratory: No audible wheeze, cough, or visible cyanosis.  No visible retractions or increased work of breathing.     Musculoskeletal: extremities non tender and without edema    Skin: no lesions or rashes on visible skin    Neurological: normal gait, no gross defects     Psychiatric: appropriate mood and affect. Mentation appears normal, affect normal/bright, judgement and insight intact, normal speech and appearance well-groomed                            The rest of a comprehensive physical examination is deferred due to video visit restrictions.     No results found for this or any previous visit (from the past 24 hour(s)).       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  She presents for follow up and disease management by video.     20 minutes spent on the date of the encounter doing chart review, history and exam, documentation, and further activities as noted above.      Plan:     1.) Endometrial cancer:  OK to " proceed with planned treatment tomorrow if labs are WDL.  Cycle 1 to be signed by Dr. Au.  Take home meds previously sent to her local pharmacy.  RTC in 3 weeks for labs, provider visit, and next cycle; this is already scheduled.  Discussed possible SE and anticipated timeline for SE.  Questions answered.  Reviewed signs and symptoms for when she should contact the clinic or seek additional care.  Patient to contact the clinic with any questions or concerns in the interim.      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  MLH1 promoter methylation was positive making La syndrome unlikely .    Labs and/or tests ordered include:  CBC. CMP. Mag. TSH reflex T4.     2.) Health maintenance:  Issues addressed today include following up with PCP for annual health maintenance and non-gynecologic issues.     3.) Right leg swelling: Right calf swelling; unchanged. US last week was negative for DVT.  Encouraged continuing to monitor and to notify the clinic of changes.    Lindsay Mcguire, DNP, APRN, FNP-C, AOCNP  Oncology Nurse Practitioner  Division of Gynecologic Oncology  Pager: [phone]     CC  Patient Care Team:  Yamile Cotter MD as PCP - General (Family Medicine)  Yamile Cotter MD as Assigned PCP  Amanda Au MD as MD (Gynecologic Oncology)  Nayana Gutierrez, [place] as Specialty Care Coordinator (Gynecologic Oncology)  Amanda Au MD as Assigned Cancer Care Provider  AMANDA AU

## 2024-07-16 NOTE — PROCEDURES
[facility] [place]    Procedure: Right chest port placement    Date/Time: 7/16/2024 9:53 AM    Performed by: Marc Soliman MD  Authorized by: Marc Soliman MD      UNIVERSAL PROTOCOL   Site Marked: NA  Prior Images Obtained and Reviewed:  Yes  Required items: Required blood products, implants, devices and special equipment available    Patient identity confirmed:  Verbally with patient, arm band, provided demographic data and hospital-assigned identification number  Patient was reevaluated immediately before administering moderate or deep sedation or anesthesia  Confirmation Checklist:  Patient's identity using two indicators, relevant allergies, procedure was appropriate and matched the consent or emergent situation and correct equipment/implants were available  Time out: Immediately prior to the procedure a time out was called    Universal Protocol: the Joint Commission Universal Protocol was followed    Preparation: Patient was prepped and draped in usual sterile fashion       ANESTHESIA    Anesthesia:  Local infiltration  Local Anesthetic:  Lidocaine 1% without epinephrine      SEDATION  Patient Sedated: Yes    Sedation Type:  Moderate (conscious) sedation  Vital signs: Vital signs monitored during sedation    See dictated procedure note for full details.  Findings: Successful right chest port placement.    Specimens: none    Complications: None    Condition: Stable      PROCEDURE    Patient Tolerance:  Patient tolerated the procedure well with no immediate complications  Length of time physician/provider present for 1:1 monitoring during sedation: 15    Chad Soliman MD  Interventional Radiology

## 2024-07-17 ENCOUNTER — PATIENT OUTREACH (OUTPATIENT)
Dept: ONCOLOGY | Facility: HOSPITAL | Age: 89
End: 2024-07-17

## 2024-07-17 ENCOUNTER — PATIENT OUTREACH (OUTPATIENT)
Dept: CARE COORDINATION | Facility: CLINIC | Age: 89
End: 2024-07-17
Payer: MEDICARE

## 2024-07-17 ENCOUNTER — VIRTUAL VISIT (OUTPATIENT)
Dept: ONCOLOGY | Facility: CLINIC | Age: 89
End: 2024-07-17
Attending: NURSE PRACTITIONER
Payer: MEDICARE

## 2024-07-17 VITALS — BODY MASS INDEX: 32.89 KG/M2 | HEIGHT: 65 IN | SYSTOLIC BLOOD PRESSURE: 143 MMHG | DIASTOLIC BLOOD PRESSURE: 65 MMHG

## 2024-07-17 DIAGNOSIS — Z51.12 ENCOUNTER FOR ANTINEOPLASTIC CHEMOTHERAPY AND IMMUNOTHERAPY: ICD-10-CM

## 2024-07-17 DIAGNOSIS — C54.1 ENDOMETRIAL CANCER (H): Primary | ICD-10-CM

## 2024-07-17 DIAGNOSIS — Z51.11 ENCOUNTER FOR ANTINEOPLASTIC CHEMOTHERAPY AND IMMUNOTHERAPY: ICD-10-CM

## 2024-07-17 PROCEDURE — 99213 OFFICE O/P EST LOW 20 MIN: CPT | Mod: 95 | Performed by: NURSE PRACTITIONER

## 2024-07-17 ASSESSMENT — PAIN SCALES - GENERAL: PAINLEVEL: NO PAIN (0)

## 2024-07-17 NOTE — LETTER
2024      Jen Layne  [address]  [address]  [place] [address]      Dear Colleague,    Thank you for referring your patient, Jen Layne, to the [facility]. Please see a copy of my visit note below.    Virtual Visit Details    Type of service:  Video Visit   Video Start Time:  12:28 pm  Video End Time: 12:42 pm    Originating Location (pt. 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  She presents for follow up and disease management by video.      Oncology History:  Initially, she was seen in the ED for intermittent vaginal bleeding x 6 wks. Was actually seen  for hematuria.  At that time, underwent CT urogram 22 which showed no gross abnormalities to explain hematuria, was noted to have low-density thickening of endometrium.  Underwent a pelvic US on 22 which showed uterus at 6.8 x 4.6cm with a 3.2cm mass along endometrial canal possibly representing polyp or fibroid.  Never had any further follow-up.     She had a repeat pelvic US on 23 which showed diffusely thickened endometrium.  Had pelvic MRI  8.1 x 4.4 x 5.4 cm in long axis, short axis and transverse dimensions, respectively. An apparent heterogeneous mass-like structure is present in the anterior and right aspect of the uterus in the subendometrial or endometrial  region, measuring approximately 2.5 x 1.6 cm (series 18 image 33). This structure is not well-visualized on T1 weighted images due to artifact. It appears isointense to the uterine myometrium on T2-weighted images. Probable mildly  heterogeneous  enhancement of this structure. A few subcentimeter low T2 signal regions in the anterior uterus likely represent very small fibroids.  The endometrial stripe is not well defined due to motion artifact. It measures approximately 0.9 cm in dual-layer anteroposterior thickness. The endometrial region appears mildly irregular on the axial images (for example, series 6 image 15). It cannot be determined on the post contrast images if there is abnormal enhancement associated with the endometrium. Possible restricted diffusion within the region of endometrium.     6/7/23:  Seen in Gyn Onc. Unable to perform EMB due to stenosis     7/19/23:  EUA, D&C under US guidance.  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  MLH1 hypermethylation +     9/22/23:  Robotic assisted hysterectomy, bilateral salpingo-oophorectomy, cancer staging, sentinel lymph node mapping and sampling, pelvic washings.  Final pathology (reviewed): FIGO Grade 1 endometrioid TOMAS, 4.5cm greatest dimension, >50% myometrial invasion (13/22mm), superficial NOLA involvment, no LVSI, cytology negative, margins negative, LN negative. Final FIGO Stage IB Grade 1 endometrioid TOMAS,      Discussed at tumor board with recs for VBT versus observation. The patient saw Rad Onc and ultimately decided not to do XRT.     5/23/24: VAGINAL MUCOSA, BIOPSY:  -MODERATELY DIFFERENTIATED ADENOCARCINOMA  PAX8: Strongly and diffusely positive  P16 strongly positive, marking the majority of the columnar cells  Estrogen receptor: Positive (95% of cells, strong staining)  Cytokeratin-7: Strongly and diffusely positive  Cytokeratin-20: Negative  CDX2: Negative (absence of enteric differentiation)  The combined morphologic and immunophenotypic features of this lesion are compatible with the clinical history of endometrial carcinoma.     6/10/24: PET CT  IMPRESSION:  1.  Focal hypermetabolic uptake at the vaginal cuff compatible with biopsy-proven recurrent  "malignancy. FDG avid right pelvic lymph nodes are suspicious for man metastases.  2.  Subcentimeter pulmonary nodules are below the resolution of PET. Attention on future oncologic imaging.     Plan: Carboplatin AUC 5, paclitaxel 175 mg/m2, and dorstarlimab 500 mg IV every 3 weeks.    7/11/24: RLE US negative for DVT.  7/16/24: Port placement.  7/18/24: Cycle 1 carboplatin, paclitaxel, and dorstarlimab.              Today she reports with her daughters that she is feeling well and ready to start treatment tomorrow.  She had per port placed yesterday which she feels went well.  She continues to have \"trace\" vaginal spotting intermittently.  No abdominal pain.  Eating and drinking without difficulty.  No nausea or vomiting.  Normal bowel and bladder habits.  She continues to have some swelling in her right calf which has not changed.  No fevers/chills.  No chest pain.  No SOB.  No history of diabetes. No history of thyroid dysfunction.   They have a few questions about her upcoming treatment regimen.              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         Past Surgical History:    Past Surgical History:   Procedure Laterality Date     COLONOSCOPY  12/05/05    normal, recheck 10 years -- done at [facility] HYSTERECTOMY TOTAL, BILATERAL SALPINGO-OOPHORECTOMY, NODE DISSECTION, COMBINED N/A 9/22/2023    Procedure: Robotic assisted hysterectomy, bilateral salpingo-oophorectomy, cancer staging, sentinel lymph node mapping and sampling, pelvic washings;  Surgeon: Amanda Au MD;  Location: [facility] OR     DILATION AND CURETTAGE, WITH ULTRASOUND GUIDANCE N/A 7/19/2023    Procedure: Pelvic examination under anesthesia, dilation and " curettage uterus, ultrasound guidance Latex Free;  Surgeon: Amanda Au MD;  Location: [facility] OR     EXAM EYE  03/30/2018    Capsulotomy Left eye.   Dr. Skip Villela     EYE SURGERY  2015     HC REMOVAL GALLBLADDER       HYSTEROSCOPY,DIAGNOSTIC  around 1998     IR CHEST PORT PLACEMENT > 5 YRS OF AGE  7/16/2024         Health Maintenance Due   Topic Date Due     RSV VACCINE (Pregnancy & 60+) (1 - 1-dose 60+ series) Never done     COVID-19 Vaccine (7 - 2023-24 season) 12/07/2023     PHQ-2 (once per calendar year)  01/01/2024       Current Medications:     Current Outpatient Medications   Medication Sig Dispense Refill     dexAMETHasone (DECADRON) 4 MG tablet Take 2 tablets (8 mg) by mouth daily Take for 3 days, starting the day after chemo. Take with food. 6 tablet 5     lidocaine-prilocaine (EMLA) 2.5-2.5 % external cream Apply topically as needed (pain due to port access) Apply to port 30 minutes prior to lab appointment. 30 g 6     lisinopril (ZESTRIL) 20 MG tablet Take 1 tablet (20 mg) by mouth daily (Patient taking differently: Take 20 mg by mouth every evening) 90 tablet 4     ondansetron (ZOFRAN) 8 MG tablet Take 1 tablet (8 mg) by mouth every 8 hours as needed for nausea (vomiting) Do not take for 3 days after chemo. 30 tablet 2     prochlorperazine (COMPAZINE) 10 MG tablet Take 1 tablet (10 mg) by mouth every 6 hours as needed for nausea or vomiting 30 tablet 2     simvastatin (ZOCOR) 40 MG tablet Take 1 tablet (40 mg) by mouth At Bedtime 90 tablet 4     VITAMIN D 1000 UNIT OR CAPS Take 1 capsule by mouth every morning           Allergies:        Allergies   Allergen Reactions     Hydrochlorothiazide      Triggered two gout attacks, no further problmes since discontinuing drug        Social History:     Social History     Tobacco Use     Smoking status: Never     Passive exposure: Never     Smokeless tobacco: Never   Substance Use Topics     Alcohol use: No       History   Drug Use No         Family History:  "    The patient's family history is notable for:    Family History   Problem Relation Age of Onset     Eye Disorder Mother         glaucoma     Heart Disease Mother          of CHF     Cancer Sister         uterine and ovarian-in remission      Other Cancer Sister      Deep Vein Thrombosis (DVT) Sister      Uterine Cancer Sister      Heart Disease Brother         valve replacement     Lipids Brother      Coronary Artery Disease Brother      Eye Disorder Brother         detached retina     Cancer Paternal Grandfather         carcinoma of the lip, pipe-smoker     Thyroid Disease Daughter         goiter or nodule?     Cancer Son         SKIN     Other Cancer Son      Other Cancer Other      Thyroid Disease Other      Diabetes No family hx of      C.A.D. No family hx of      Breast Cancer No family hx of      Cancer - colorectal No family hx of      Anesthesia Reaction No family hx of          Physical Exam:     BP (!) 143/65   Ht 1.638 m (5' 4.5\")   LMP  (LMP Unknown)   BMI 32.89 kg/m    Body mass index is 32.89 kg/m .    General Appearance: alert, no distress    Eyes:  Eyes grossly normal to inspection.  No discharge or erythema, or obvious scleral/conjunctival abnormalities.    Respiratory: No audible wheeze, cough, or visible cyanosis.  No visible retractions or increased work of breathing.     Musculoskeletal: extremities non tender and without edema    Skin: no lesions or rashes on visible skin    Neurological: normal gait, no gross defects     Psychiatric: appropriate mood and affect. Mentation appears normal, affect normal/bright, judgement and insight intact, normal speech and appearance well-groomed                            The rest of a comprehensive physical examination is deferred due to video visit restrictions.     No results found for this or any previous visit (from the past 24 hour(s)).       Assessment:    Jen Layne is a 88 year old woman with a diagnosis of recurrent stage IB " grade 1 endometrial endometrioid adenocarcinoma.  She presents for follow up and disease management by video.     20 minutes spent on the date of the encounter doing chart review, history and exam, documentation, and further activities as noted above.      Plan:     1.) Endometrial cancer:  OK to proceed with planned treatment tomorrow if labs are WDL.  Cycle 1 to be signed by Dr. Au.  Take home meds previously sent to her local pharmacy.  RTC in 3 weeks for labs, provider visit, and next cycle; this is already scheduled.  Discussed possible SE and anticipated timeline for SE.  Questions answered.  Reviewed signs and symptoms for when she should contact the clinic or seek additional care.  Patient to contact the clinic with any questions or concerns in the interim.      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  MLH1 promoter methylation was positive making La syndrome unlikely .    Labs and/or tests ordered include:  CBC. CMP. Mag. TSH reflex T4.     2.) Health maintenance:  Issues addressed today include following up with PCP for annual health maintenance and non-gynecologic issues.     3.) Right leg swelling: Right calf swelling; unchanged. US last week was negative for DVT.  Encouraged continuing to monitor and to notify the clinic of changes.    Lindsay Mcguire, MISAEL, APRN, FNP-C, AOCNP  Oncology Nurse Practitioner  Division of Gynecologic Oncology  Pager: [phone]     CC  Patient Care Team:  Yamile Cotter MD as PCP - General (Family Medicine)  Yamile Cotter MD as Assigned PCP  Amanda Au MD as MD (Gynecologic Oncology)  Nayana Gutierrez RN as Specialty Care Coordinator (Gynecologic Oncology)  Amanda Au MD as Assigned Cancer Care Provider  AMANDA AU      Again, thank you for allowing me to participate in the care of your patient.        Sincerely,        ÓSCAR Toro CNP

## 2024-07-17 NOTE — PROGRESS NOTES
Consult Notes on Referred Patient    Date: 6/18/2024     The patient returns today for follow-up and treatment planning.  She is seen on video with her family     Her history is as follows:     Briefly, she is a 87yo who was seen in ED for intermittent vaginal bleeding x 6 wks. Was actually seen one year ago for hematuria.  At that time, underwent CT urogram 6/8/22 which showed no gross abnormalities to explain hematuria, was noted to have low-density thickening of endometrium.  Underwent a pelvic US on 7/8/22 which showed uterus at 6.8 x 4.6cm with a 3.2cm mass along endometrial canal possibly representing polyp or fibroid.  Never had any further follow-up related to this that I can see.     Was then seen most recently in ED for bleeding. Had repeat pelvic US on 6/5/23 which showed diffusely thickened endometrium.  Had pelvic MRI  8.1 x 4.4 x 5.4 cm in long axis, short axis and transverse dimensions, respectively. An apparent heterogeneous mass-like structure is present in the anterior and right aspect of the uterus in the subendometrial or endometrial  region, measuring approximately 2.5 x 1.6 cm (series 18 image 33). This structure is not well-visualized on T1 weighted images due to artifact. It appears isointense to the uterine myometrium on T2-weighted images. Probable mildly heterogeneous  enhancement of this structure. A few subcentimeter low T2 signal regions in the anterior uterus likely represent very small fibroids.   The endometrial stripe is not well defined due to motion artifact. It measures approximately 0.9 cm in dual-layer anteroposterior thickness. The endometrial region appears mildly irregular on the axial images (for example, series 6 image 15). It cannot be determined on the post contrast images if there is abnormal enhancement associated with the endometrium. Possible restricted diffusion within the region of endometrium.     6/7/23:  Seen in Gyn Onc. Unable to perform  EMB due to stenosis     7/19/23:  EUA, D&C under US guidance.  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  MLH1 hypermethylation +     9/22/23:  Robotic assisted hysterectomy, bilateral salpingo-oophorectomy, cancer staging, sentinel lymph node mapping and sampling, pelvic washings.     Final pathology (reviewed): FIGO Grade 1 endometrioid TOMAS, 4.5cm greatest dimension, >50% myometrial invasion (13/22mm), superficial NOLA involvment, no LVSI, cytology negative, margins negative, LN negative. Final FIGO Stage IB Grade 1 endometrioid TOMAS,      Discussed at tumor board with recs for VBT versus observation. The patient saw Rad Onc and ultimately decided not to do XRT.    5/23/24: Seen for three month surveillance exam.  Reported vaginal spotting.  Exam notable for a polypoid lesion vaginal apex that was biopsied.    Path reviewed:  Final Diagnosis   VAGINAL MUCOSA, BIOPSY:  -MODERATELY DIFFERENTIATED ADENOCARCINOMA  -PLEASE SEE COMMENT     Addendum   This addendum is being issued in order to report the results of immunohistochemical stains.  The results are as follows:     PAX8: Strongly and diffusely positive  P16 strongly positive, marking the majority of the columnar cells  Estrogen receptor: Positive (95% of cells, strong staining)  Cytokeratin-7: Strongly and diffusely positive  Cytokeratin-20: Negative  CDX2: Negative (absence of enteric differentiation)     The combined morphologic and immunophenotypic features of this lesion are compatible with the clinical history of endometrial carcinoma.     6/10/24:  PET/CT images reviewed  IMPRESSION:     1.  Focal hypermetabolic uptake at the vaginal cuff compatible with biopsy-proven recurrent malignancy. FDG avid right pelvic lymph nodes are suspicious for man metastases.  2.  Subcentimeter pulmonary nodules are below the resolution of PET. Attention on future oncologic imaging.    The patient is seen today on video to discuss results.  She continues to  have some intermittent bleeding.     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         Past Surgical History:    Past Surgical History:   Procedure Laterality Date    COLONOSCOPY  12/05/05    normal, recheck 10 years -- done at [facility] HYSTERECTOMY TOTAL, BILATERAL SALPINGO-OOPHORECTOMY, NODE DISSECTION, COMBINED N/A 9/22/2023    Procedure: Robotic assisted hysterectomy, bilateral salpingo-oophorectomy, cancer staging, sentinel lymph node mapping and sampling, pelvic washings;  Surgeon: Amanda Au MD;  Location: [facility] OR    DILATION AND CURETTAGE, WITH ULTRASOUND GUIDANCE N/A 7/19/2023    Procedure: Pelvic examination under anesthesia, dilation and curettage uterus, ultrasound guidance Latex Free;  Surgeon: Amanda Au MD;  Location: [facility] OR    EXAM EYE  03/30/2018    Capsulotomy Left eye.   Dr. Skip Villela    EYE SURGERY  2015    HC REMOVAL GALLBLADDER      HYSTEROSCOPY,DIAGNOSTIC  around 1998    IR CHEST PORT PLACEMENT > 5 YRS OF AGE  7/16/2024         Health Maintenance:  Health Maintenance Due   Topic Date Due    RSV VACCINE (Pregnancy & 60+) (1 - 1-dose 60+ series) Never done    COVID-19 Vaccine (7 - 2023-24 season) 12/07/2023    PHQ-2 (once per calendar year)  01/01/2024         Current Medications:     has a current medication list which includes the following prescription(s): dexamethasone, lidocaine-prilocaine, lisinopril, ondansetron, prochlorperazine, simvastatin, and vitamin d.       Allergies:     Hydrochlorothiazide        Social History:     Social History     Tobacco Use    Smoking status: Never     Passive exposure: Never    Smokeless tobacco: Never   Substance Use Topics    Alcohol use: No       History   Drug Use No  "          Family History:     The patient's family history is notable for :.    Family History   Problem Relation Age of Onset    Eye Disorder Mother         glaucoma    Heart Disease Mother          of CHF    Cancer Sister         uterine and ovarian-in remission     Other Cancer Sister     Deep Vein Thrombosis (DVT) Sister     Uterine Cancer Sister     Heart Disease Brother         valve replacement    Lipids Brother     Coronary Artery Disease Brother     Eye Disorder Brother         detached retina    Cancer Paternal Grandfather         carcinoma of the lip, pipe-smoker    Thyroid Disease Daughter         goiter or nodule?    Cancer Son         SKIN    Other Cancer Son     Other Cancer Other     Thyroid Disease Other     Diabetes No family hx of     C.A.D. No family hx of     Breast Cancer No family hx of     Cancer - colorectal No family hx of     Anesthesia Reaction No family hx of          Physical Exam:     Ht 1.638 m (5' 4.5\")   Wt 87.1 kg (192 lb)   LMP  (LMP Unknown)   BMI 32.45 kg/m    Body mass index is 32.45 kg/m .    General Appearance: healthy and alert, no distress       Assessment:     Jen Layne is a 88 year old woman with a diagnosis of Stage IB Grade 1 endometrioid endometrial TOMAS, with vaginal cuff recurrence and local man disease        30 minutes spent on the date of the encounter doing chart review, history and exam, documentation and further activities as noted above     The longitudinal plan of care for the diagnosis(es)/condition(s) as documented were addressed during this visit. Due to the added complexity in care, I will continue to support Jen in the subsequent management and with ongoing continuity of care.    TT video 19 minutes  Plan:      1.)        FIGO Grade 1 endometrioid TOMAS:  Pathology previously reviewed in detail.  Tumor 4.5cm greatest dimension, >50% myometrial invasion (13/22mm), superficial NOLA involvment, no LVSI, cytology negative, margins " negative, LN negative. Final FIGO Stage IB Grade 1 endometrioid TOMAS.  H-IR due to age, DOI.  Discussed options including observation, radiation (VBT).      Patient was seen by Rad Onc and ultimately elected for observation.      Recently seen for bleeding with exam findings consistent with vaginal cuff recurrence.  This has now been biopsy proven and PET/CT scan supports vaginal cuff recurrence with likely right pelvic lymph node spread.    Discussed systemic treatment options including chemotherapy and possible vaginal cuff radiation depending on symptoms.    Will review at tumor board and contact patient with recommendations.     Questions answered, patient and family expressed understanding of plan of care.     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        Amanda Au MD  Gynecologic Oncology  [facility]  CC  Patient Care Team:  Yamile Cotter MD as PCP - General (Family Medicine)  Yamile Cotter MD as Assigned PCP  Amanda Au MD as MD (Gynecologic Oncology)  Nayana Gutierrez RN as Specialty Care Coordinator (Gynecologic Oncology)  Amanda Au MD as Assigned Cancer Care Provider  SELF, REFERRED

## 2024-07-17 NOTE — TELEPHONE ENCOUNTER
Social Work - Distress Screen Intervention  [facility]    Identified Concern and Score from Distress Screenin. How concerned are you about your ability to eat? 0     2. How concerned are you about unintended weight loss or your current weight? 2     3. How concerned are you about feeling depressed or very sad?  0     4. How concerned are you about feeling anxious or very scared?  5     5. Do you struggle with the loss of meaning and jelena in your life?  Not at all     6. How concerned are you about work and home life issues that may be affected by your cancer?  3     7. How concerned are you about knowing what resources are available to help you?  (!) 8     8. Do you currently have what you would describe as Holiness or spiritual struggles? Not at all     9. If you want to be contacted by one of our professionals, I can send a message to them right now.  No data recorded     Date of Distress Screen: 24  Data: At time of last visit, patient scored positive on distress screening.  outreached to patient today to follow up on elevated distress and introduce psychosocial services and support.  Intervention/Education provided:  contacted patient by phone to discuss distress screening results. Jen is doing well at time of call. She starts chemo tomorrow. She isn't sure what she may need, but appreciated call from  with information of support available. She took  contact information and will reach out as needs arise.   Follow-up Required:  will remain available to patient for support as needed    STUART Eubanks, [facility]  Adult Oncology Clinics  [place] (M,W), [place] (T) & [place] ()  *I am off Friday  Office: [phone]

## 2024-07-17 NOTE — PROGRESS NOTES
[facility]: Cancer Care Follow-Up Note                                    Discussion with Patient:                                                      Call to Jen to touch base with her prior to chemotherapy initiation scheduled for 7/17/24. Jen reports that she feels well prepared to start treatment. Plan of care was reviewed with Lindsay Mcguire CNP. Jen has picked up take home medications. Daughter will go to infusion with her tomorrow. Reviewed appropriate clothing and time frame. Also reviewed that Jen may bring food with her as she will be in infusion center for about 5 hours. Verified that Jen has received paper copy of chemotherapy education in the mail and has contact information for questions or concerns.       Dates of Treatment:                                                      Infusion given in last 28 days       None            Assessment:                                                    N//A    Intervention/Education provided during outreach:                                                         Patient to follow up as scheduled at next appt  Patient to call/tvCompasst message with updates  Confirmed patient has clinic and triage numbers    Signature:  Luma Griffin RN

## 2024-07-17 NOTE — NURSING NOTE
Current patient location: [address]    [place] [address]    Is the patient currently in the state of [place]? YES    Visit mode:VIDEO    If the visit is dropped, the patient can be reconnected by: VIDEO VISIT: Text to cell phone:   Telephone Information:   Mobile [phone]       Will anyone else be joining the visit? NO  (If patient encounters technical issues they should call [phone]    How would you like to obtain your AVS? MyChart    Are changes needed to the allergy or medication list? No, Pt stated no changes to allergies, and Pt stated no med changes    Are refills needed on medications prescribed by this physician? NO    Reason for visit: RECHECK (Return CCSL)    Kathya GENTILE

## 2024-07-18 ENCOUNTER — INFUSION THERAPY VISIT (OUTPATIENT)
Dept: INFUSION THERAPY | Facility: HOSPITAL | Age: 89
End: 2024-07-18
Attending: OBSTETRICS & GYNECOLOGY
Payer: MEDICARE

## 2024-07-18 VITALS
WEIGHT: 193 LBS | TEMPERATURE: 97.6 F | RESPIRATION RATE: 16 BRPM | DIASTOLIC BLOOD PRESSURE: 88 MMHG | SYSTOLIC BLOOD PRESSURE: 160 MMHG | OXYGEN SATURATION: 96 % | HEART RATE: 80 BPM | BODY MASS INDEX: 32.62 KG/M2

## 2024-07-18 DIAGNOSIS — C80.1 SOLID MALIGNANT NEOPLASM WITH HIGH-FREQUENCY MICROSATELLITE INSTABILITY (MSI-H) (H): Primary | ICD-10-CM

## 2024-07-18 DIAGNOSIS — C54.1 ENDOMETRIAL CANCER (H): ICD-10-CM

## 2024-07-18 LAB
ALBUMIN SERPL BCG-MCNC: 3.7 G/DL (ref 3.5–5.2)
ALP SERPL-CCNC: 56 U/L (ref 40–150)
ALT SERPL W P-5'-P-CCNC: 11 U/L (ref 0–50)
ANION GAP SERPL CALCULATED.3IONS-SCNC: 9 MMOL/L (ref 7–15)
AST SERPL W P-5'-P-CCNC: 16 U/L (ref 0–45)
BASOPHILS # BLD AUTO: 0.1 10E3/UL (ref 0–0.2)
BASOPHILS NFR BLD AUTO: 1 %
BILIRUB SERPL-MCNC: 0.4 MG/DL
BUN SERPL-MCNC: 16.6 MG/DL (ref 8–23)
CALCIUM SERPL-MCNC: 8.8 MG/DL (ref 8.8–10.4)
CHLORIDE SERPL-SCNC: 106 MMOL/L (ref 98–107)
CREAT SERPL-MCNC: 0.73 MG/DL (ref 0.51–0.95)
EGFRCR SERPLBLD CKD-EPI 2021: 80 ML/MIN/1.73M2
EOSINOPHIL # BLD AUTO: 0.2 10E3/UL (ref 0–0.7)
EOSINOPHIL NFR BLD AUTO: 3 %
ERYTHROCYTE [DISTWIDTH] IN BLOOD BY AUTOMATED COUNT: 12.4 % (ref 10–15)
GLUCOSE SERPL-MCNC: 108 MG/DL (ref 70–99)
HCO3 SERPL-SCNC: 27 MMOL/L (ref 22–29)
HCT VFR BLD AUTO: 42.4 % (ref 35–47)
HGB BLD-MCNC: 14.3 G/DL (ref 11.7–15.7)
IMM GRANULOCYTES # BLD: 0 10E3/UL
IMM GRANULOCYTES NFR BLD: 0 %
LYMPHOCYTES # BLD AUTO: 1.6 10E3/UL (ref 0.8–5.3)
LYMPHOCYTES NFR BLD AUTO: 19 %
MAGNESIUM SERPL-MCNC: 1.9 MG/DL (ref 1.7–2.3)
MCH RBC QN AUTO: 30.8 PG (ref 26.5–33)
MCHC RBC AUTO-ENTMCNC: 33.7 G/DL (ref 31.5–36.5)
MCV RBC AUTO: 91 FL (ref 78–100)
MONOCYTES # BLD AUTO: 0.7 10E3/UL (ref 0–1.3)
MONOCYTES NFR BLD AUTO: 8 %
NEUTROPHILS # BLD AUTO: 5.9 10E3/UL (ref 1.6–8.3)
NEUTROPHILS NFR BLD AUTO: 69 %
NRBC # BLD AUTO: 0 10E3/UL
NRBC BLD AUTO-RTO: 0 /100
PLATELET # BLD AUTO: 244 10E3/UL (ref 150–450)
POTASSIUM SERPL-SCNC: 4 MMOL/L (ref 3.4–5.3)
PROT SERPL-MCNC: 6.2 G/DL (ref 6.4–8.3)
RBC # BLD AUTO: 4.65 10E6/UL (ref 3.8–5.2)
SODIUM SERPL-SCNC: 142 MMOL/L (ref 135–145)
TSH SERPL DL<=0.005 MIU/L-ACNC: 0.72 UIU/ML (ref 0.3–4.2)
WBC # BLD AUTO: 8.5 10E3/UL (ref 4–11)

## 2024-07-18 PROCEDURE — 96375 TX/PRO/DX INJ NEW DRUG ADDON: CPT

## 2024-07-18 PROCEDURE — 96367 TX/PROPH/DG ADDL SEQ IV INF: CPT

## 2024-07-18 PROCEDURE — 250N000011 HC RX IP 250 OP 636: Performed by: NURSE PRACTITIONER

## 2024-07-18 PROCEDURE — 96417 CHEMO IV INFUS EACH ADDL SEQ: CPT

## 2024-07-18 PROCEDURE — 96415 CHEMO IV INFUSION ADDL HR: CPT

## 2024-07-18 PROCEDURE — 83735 ASSAY OF MAGNESIUM: CPT | Performed by: OBSTETRICS & GYNECOLOGY

## 2024-07-18 PROCEDURE — 85025 COMPLETE CBC W/AUTO DIFF WBC: CPT | Performed by: OBSTETRICS & GYNECOLOGY

## 2024-07-18 PROCEDURE — 96413 CHEMO IV INFUSION 1 HR: CPT

## 2024-07-18 PROCEDURE — 250N000011 HC RX IP 250 OP 636: Performed by: OBSTETRICS & GYNECOLOGY

## 2024-07-18 PROCEDURE — 84443 ASSAY THYROID STIM HORMONE: CPT | Performed by: OBSTETRICS & GYNECOLOGY

## 2024-07-18 PROCEDURE — 258N000003 HC RX IP 258 OP 636: Performed by: NURSE PRACTITIONER

## 2024-07-18 PROCEDURE — 258N000003 HC RX IP 258 OP 636: Performed by: OBSTETRICS & GYNECOLOGY

## 2024-07-18 PROCEDURE — 80053 COMPREHEN METABOLIC PANEL: CPT | Performed by: OBSTETRICS & GYNECOLOGY

## 2024-07-18 PROCEDURE — 36591 DRAW BLOOD OFF VENOUS DEVICE: CPT | Performed by: OBSTETRICS & GYNECOLOGY

## 2024-07-18 RX ORDER — ALBUTEROL SULFATE 90 UG/1
1-2 AEROSOL, METERED RESPIRATORY (INHALATION)
Status: DISCONTINUED | OUTPATIENT
Start: 2024-07-18 | End: 2024-07-18 | Stop reason: HOSPADM

## 2024-07-18 RX ORDER — HEPARIN SODIUM (PORCINE) LOCK FLUSH IV SOLN 100 UNIT/ML 100 UNIT/ML
5 SOLUTION INTRAVENOUS
Status: DISCONTINUED | OUTPATIENT
Start: 2024-07-18 | End: 2024-07-18 | Stop reason: HOSPADM

## 2024-07-18 RX ORDER — LORAZEPAM 2 MG/ML
0.5 INJECTION INTRAMUSCULAR EVERY 4 HOURS PRN
Status: CANCELLED | OUTPATIENT
Start: 2024-07-18

## 2024-07-18 RX ORDER — METHYLPREDNISOLONE SODIUM SUCCINATE 125 MG/2ML
125 INJECTION, POWDER, LYOPHILIZED, FOR SOLUTION INTRAMUSCULAR; INTRAVENOUS
Status: DISCONTINUED | OUTPATIENT
Start: 2024-07-18 | End: 2024-07-18 | Stop reason: HOSPADM

## 2024-07-18 RX ORDER — DIPHENHYDRAMINE HCL 50 MG
50 CAPSULE ORAL ONCE
Status: CANCELLED
Start: 2024-07-18

## 2024-07-18 RX ORDER — EPINEPHRINE 1 MG/ML
0.3 INJECTION, SOLUTION INTRAMUSCULAR; SUBCUTANEOUS EVERY 5 MIN PRN
Status: DISCONTINUED | OUTPATIENT
Start: 2024-07-18 | End: 2024-07-18 | Stop reason: HOSPADM

## 2024-07-18 RX ORDER — PALONOSETRON 0.05 MG/ML
0.25 INJECTION, SOLUTION INTRAVENOUS ONCE
Status: COMPLETED | OUTPATIENT
Start: 2024-07-18 | End: 2024-07-18

## 2024-07-18 RX ORDER — ALBUTEROL SULFATE 0.83 MG/ML
2.5 SOLUTION RESPIRATORY (INHALATION)
Status: DISCONTINUED | OUTPATIENT
Start: 2024-07-18 | End: 2024-07-18 | Stop reason: HOSPADM

## 2024-07-18 RX ORDER — MEPERIDINE HYDROCHLORIDE 25 MG/ML
25 INJECTION INTRAMUSCULAR; INTRAVENOUS; SUBCUTANEOUS EVERY 30 MIN PRN
Status: DISCONTINUED | OUTPATIENT
Start: 2024-07-18 | End: 2024-07-18 | Stop reason: HOSPADM

## 2024-07-18 RX ORDER — DIPHENHYDRAMINE HYDROCHLORIDE 50 MG/ML
50 INJECTION INTRAMUSCULAR; INTRAVENOUS
Status: DISCONTINUED | OUTPATIENT
Start: 2024-07-18 | End: 2024-07-18 | Stop reason: HOSPADM

## 2024-07-18 RX ORDER — HEPARIN SODIUM,PORCINE 10 UNIT/ML
5-20 VIAL (ML) INTRAVENOUS DAILY PRN
Status: CANCELLED | OUTPATIENT
Start: 2024-07-18

## 2024-07-18 RX ADMIN — PACLITAXEL 348 MG: 6 INJECTION, SOLUTION INTRAVENOUS at 10:51

## 2024-07-18 RX ADMIN — DEXAMETHASONE SODIUM PHOSPHATE: 10 INJECTION, SOLUTION INTRAMUSCULAR; INTRAVENOUS at 09:53

## 2024-07-18 RX ADMIN — Medication 5 ML: at 14:28

## 2024-07-18 RX ADMIN — FAMOTIDINE 20 MG: 10 INJECTION, SOLUTION INTRAVENOUS at 09:29

## 2024-07-18 RX ADMIN — DIPHENHYDRAMINE HYDROCHLORIDE 50 MG: 50 INJECTION INTRAMUSCULAR; INTRAVENOUS at 09:35

## 2024-07-18 RX ADMIN — SODIUM CHLORIDE 500 MG: 9 INJECTION, SOLUTION INTRAVENOUS at 10:16

## 2024-07-18 RX ADMIN — CARBOPLATIN 425 MG: 10 INJECTION, SOLUTION INTRAVENOUS at 13:53

## 2024-07-18 RX ADMIN — SODIUM CHLORIDE 250 ML: 9 INJECTION, SOLUTION INTRAVENOUS at 09:28

## 2024-07-18 RX ADMIN — PALONOSETRON 0.25 MG: 0.05 INJECTION, SOLUTION INTRAVENOUS at 09:29

## 2024-07-18 NOTE — PROGRESS NOTES
Infusion Nursing Note:  Jen Brightmookpamella presents today for D1C1.    Patient seen by provider today: No   present during visit today: Not Applicable.    Note: Pt/daughter state they have received written information on medications given today. Also received chemo education and have chemo folder.   Pt/daughter educated on chemo precautions/reasons to call the MD after chemo. Specifically fever of 100.5 or greater. Pt verbalized understanding of education given.      Intravenous Access:  Implanted Port.    Treatment Conditions:  Lab Results   Component Value Date    HGB 14.3 07/18/2024    WBC 8.5 07/18/2024    ANEU 5.7 08/14/2018    ANEUTAUTO 5.9 07/18/2024     07/18/2024        Lab Results   Component Value Date     07/18/2024    POTASSIUM 4.0 07/18/2024    MAG 1.9 07/18/2024    CR 0.73 07/18/2024    JOHN 8.8 07/18/2024    BILITOTAL 0.4 07/18/2024    ALBUMIN 3.7 07/18/2024    ALT 11 07/18/2024    AST 16 07/18/2024       Results reviewed, labs MET treatment parameters, ok to proceed with treatment.      Post Infusion Assessment:  Patient tolerated infusion without incident.  Blood return noted pre and post infusion.  Site patent and intact, free from redness, edema or discomfort.  No evidence of extravasations.  Access discontinued per protocol.       Discharge Plan:   Patient and/or family verbalized understanding of discharge instructions and all questions answered.  Copy of AVS reviewed with patient and/or family.  Patient will return 8/8/24 for next appointment.  Patient discharged in stable condition accompanied by: daughter.  Departure Mode: Ambulatory.      Victoria Polo RN

## 2024-07-22 ENCOUNTER — PATIENT OUTREACH (OUTPATIENT)
Dept: ONCOLOGY | Facility: HOSPITAL | Age: 89
End: 2024-07-22
Payer: MEDICARE

## 2024-07-22 NOTE — PROGRESS NOTES
Consult Notes on Referred Patient    Date: 6/27/2024     The patient returns today for follow-up and treatment planning.  She is seen with her family     Her history is as follows:     Briefly, she is a 89yo who was seen in ED for intermittent vaginal bleeding x 6 wks. Was actually seen one year ago for hematuria.  At that time, underwent CT urogram 6/8/22 which showed no gross abnormalities to explain hematuria, was noted to have low-density thickening of endometrium.  Underwent a pelvic US on 7/8/22 which showed uterus at 6.8 x 4.6cm with a 3.2cm mass along endometrial canal possibly representing polyp or fibroid.  Never had any further follow-up related to this that I can see.     Was then seen most recently in ED for bleeding. Had repeat pelvic US on 6/5/23 which showed diffusely thickened endometrium.  Had pelvic MRI  8.1 x 4.4 x 5.4 cm in long axis, short axis and transverse dimensions, respectively. An apparent heterogeneous mass-like structure is present in the anterior and right aspect of the uterus in the subendometrial or endometrial  region, measuring approximately 2.5 x 1.6 cm (series 18 image 33). This structure is not well-visualized on T1 weighted images due to artifact. It appears isointense to the uterine myometrium on T2-weighted images. Probable mildly heterogeneous  enhancement of this structure. A few subcentimeter low T2 signal regions in the anterior uterus likely represent very small fibroids.   The endometrial stripe is not well defined due to motion artifact. It measures approximately 0.9 cm in dual-layer anteroposterior thickness. The endometrial region appears mildly irregular on the axial images (for example, series 6 image 15). It cannot be determined on the post contrast images if there is abnormal enhancement associated with the endometrium. Possible restricted diffusion within the region of endometrium.     6/7/23:  Seen in Gyn Onc. Unable to perform EMB due  to stenosis     7/19/23:  EUA, D&C under US guidance.  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  MLH1 hypermethylation +     9/22/23:  Robotic assisted hysterectomy, bilateral salpingo-oophorectomy, cancer staging, sentinel lymph node mapping and sampling, pelvic washings.     Final pathology (reviewed): FIGO Grade 1 endometrioid TOMAS, 4.5cm greatest dimension, >50% myometrial invasion (13/22mm), superficial NOLA involvment, no LVSI, cytology negative, margins negative, LN negative. Final FIGO Stage IB Grade 1 endometrioid TOMAS,      Discussed at tumor board with recs for VBT versus observation. The patient saw Rad Onc and ultimately decided not to do XRT.     5/23/24: Seen for three month surveillance exam.  Reported vaginal spotting.  Exam notable for a polypoid lesion vaginal apex that was biopsied.     Path reviewed:  Final Diagnosis   VAGINAL MUCOSA, BIOPSY:  -MODERATELY DIFFERENTIATED ADENOCARCINOMA  -PLEASE SEE COMMENT      Addendum   This addendum is being issued in order to report the results of immunohistochemical stains.  The results are as follows:     PAX8: Strongly and diffusely positive  P16 strongly positive, marking the majority of the columnar cells  Estrogen receptor: Positive (95% of cells, strong staining)  Cytokeratin-7: Strongly and diffusely positive  Cytokeratin-20: Negative  CDX2: Negative (absence of enteric differentiation)     The combined morphologic and immunophenotypic features of this lesion are compatible with the clinical history of endometrial carcinoma.      6/10/24:  PET/CT images reviewed  IMPRESSION:     1.  Focal hypermetabolic uptake at the vaginal cuff compatible with biopsy-proven recurrent malignancy. FDG avid right pelvic lymph nodes are suspicious for man metastases.  2.  Subcentimeter pulmonary nodules are below the resolution of PET. Attention on future oncologic imaging.     6/27/24:  Tumor board: Consensus/Management Options: As patient has good  functional status and the pelvic lymph node is FDG avid, recommendation for chemotherapy + IO. Also can consider  trial. Discuss with patient for final treatment plan.    Patient is here with her family.  She is doing well, no new complaints, intermittent bleeding.     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 HYSTERECTOMY TOTAL, BILATERAL SALPINGO-OOPHORECTOMY, NODE DISSECTION, COMBINED N/A 9/22/2023    Procedure: Robotic assisted hysterectomy, bilateral salpingo-oophorectomy, cancer staging, sentinel lymph node mapping and sampling, pelvic washings;  Surgeon: Amanda Au MD;  Location: [facility] OR    DILATION AND CURETTAGE, WITH ULTRASOUND GUIDANCE N/A 7/19/2023    Procedure: Pelvic examination under anesthesia, dilation and curettage uterus, ultrasound guidance Latex Free;  Surgeon: Amanda Au MD;  Location: [facility] OR    EXAM EYE  03/30/2018    Capsulotomy Left eye.   Dr. Skip Villela    EYE SURGERY  2015    HC REMOVAL GALLBLADDER      HYSTEROSCOPY,DIAGNOSTIC  around 1998    IR CHEST PORT PLACEMENT > 5 YRS OF AGE  7/16/2024         Health Maintenance:  Health Maintenance Due   Topic Date Due    RSV VACCINE (Pregnancy & 60+) (1 - 1-dose 60+ series) Never done    COVID-19 Vaccine (7 - 2023-24 season) 12/07/2023    PHQ-2 (once per calendar year)  01/01/2024         Current Medications:     has a current medication list which includes the following prescription(s): lisinopril, simvastatin, vitamin d, dexamethasone, lidocaine-prilocaine, ondansetron, and prochlorperazine.  "      Allergies:     Hydrochlorothiazide        Social History:     Social History     Tobacco Use    Smoking status: Never     Passive exposure: Never    Smokeless tobacco: Never   Substance Use Topics    Alcohol use: No       History   Drug Use No           Family History:     The patient's family history is notable for :.    Family History   Problem Relation Age of Onset    Eye Disorder Mother         glaucoma    Heart Disease Mother          of CHF    Cancer Sister         uterine and ovarian-in remission     Other Cancer Sister     Deep Vein Thrombosis (DVT) Sister     Uterine Cancer Sister     Heart Disease Brother         valve replacement    Lipids Brother     Coronary Artery Disease Brother     Eye Disorder Brother         detached retina    Cancer Paternal Grandfather         carcinoma of the lip, pipe-smoker    Thyroid Disease Daughter         goiter or nodule?    Cancer Son         SKIN    Other Cancer Son     Other Cancer Other     Thyroid Disease Other     Diabetes No family hx of     C.A.D. No family hx of     Breast Cancer No family hx of     Cancer - colorectal No family hx of     Anesthesia Reaction No family hx of          Physical Exam:     BP (!) 140/67 (BP Location: Right arm, Patient Position: Sitting, Cuff Size: Adult Large)   Pulse 70   Resp 16   Ht 1.638 m (5' 4.5\")   Wt 88.3 kg (194 lb 9.6 oz)   LMP  (LMP Unknown)   SpO2 97%   BMI 32.89 kg/m    Body mass index is 32.89 kg/m .    General Appearance: healthy and alert, no distress       Assessment:     Jen Layne is a 88 year old woman with a diagnosis of Stage IB Grade 1 endometrioid endometrial TOMAS, with vaginal cuff recurrence and local man disease        30 minutes spent on the date of the encounter doing chart review, history and exam, documentation and further activities as noted above     The longitudinal plan of care for the diagnosis(es)/condition(s) as documented were addressed during this visit. Due to the " added complexity in care, I will continue to support Jen in the subsequent management and with ongoing continuity of care.       Plan:      1.)        FIGO Grade 1 endometrioid TOMAS:  Pathology previously reviewed in detail.  Tumor 4.5cm greatest dimension, >50% myometrial invasion (13/22mm), superficial NOLA involvment, no LVSI, cytology negative, margins negative, LN negative. Final FIGO Stage IB Grade 1 endometrioid TOMAS.  H-IR due to age, DOI.  Discussed options including observation, radiation (VBT).      Patient was seen by Rad Onc and ultimately elected for observation.       Recently seen for bleeding with exam findings consistent with vaginal cuff recurrence.  This has now been biopsy proven and PET/CT scan supports vaginal cuff recurrence with likely right pelvic lymph node spread.     Discussed systemic treatment options including chemotherapy and possible vaginal cuff radiation depending on symptoms.     Tumor board review discussed with patient and her family. Recommend systemic chemotherapy with Carboplatin/Taxol and Dostarblimab followed by maintenance dostarlimab. Alternatively, discussed clinical trial and gave her information regarding .  The standard at this time would be chemo with IO which is what I recommended to patient.  However, if she does not tolerate chemo, I think she would be a good candidate for .  She is interested in learning more about the study and I will have the team reach out to her.    In the meantime, will plan on proceeding with chemotherapy as outlined above in combination with IO. Discussed chemotherapy in detail including drugs used, administration, port placement, toxcitites, anticipated side-effects including fatigue, alopecia, neuropathy, nausea, hematologic toxicities (anemia/neutropenia), changes in kidney function, allergic reaction, anaphylaxis, infection risk, sepsis.  Discussed treatment schedule, monitoring during and after chemotherapy.  Discussed IO  therapy, maintenance therapy, schedule, side-effects of immuontherapy and management if they arise. Discussed potential need for blood transfusion, IV hydration, growth factor support.  Will arrange for port placement. Discussed in detail, questions answered.           Questions answered, patient and family expressed understanding of plan of care.     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        Amanda Au MD  Gynecologic Oncology  [facility]  CC    CC  Patient Care Team:  Yamile Cotter MD as PCP - General (Family Medicine)  Yamile Cotter MD as Assigned PCP  Amanda Au MD as MD (Gynecologic Oncology)  Nayana Gutierrez RN as Specialty Care Coordinator (Gynecologic Oncology)  Amanda Au MD as Assigned Cancer Care Provider  SELF, REFERRED

## 2024-07-22 NOTE — PROGRESS NOTES
[facility]: Cancer Care Follow-Up Note                                    Discussion with Patient:                                                      Call to Jen to see how she is feeling post chemotherapy initiation. Jen reports that she is feeling good other than a constant dull headache. Jen rates this headache at 5/10 on pain scale. Denies GI symptoms. Pain is relieved with tylenol. Jen also reports dressing on port site is coming off and is wondering what she should do about it.     Dates of Treatment:                                                      Infusion given in last 28 days       Administered MAR Action Medication Dose Rate Visit    07/18/2024 10:16 New Bag dostarlimab-gxly (JEMPERLI) 500 mg in sodium chloride 0.9 % 120 mL infusion 500 mg 240 mL/hr Infusion Therapy Visit on 07/18/2024 in [facility]    07/18/2024 10:51 New Bag PACLitaxel (TAXOL) 348 mg in sodium chloride 0.9% in non-PVC container 608 mL infusion 348 mg 202.7 mL/hr Infusion Therapy Visit on 07/18/2024 in [facility]    07/18/2024 13:53 New Bag CARBOplatin 425 mg in sodium chloride 0.9 % 317.5 mL infusion 425 mg 635 mL/hr Infusion Therapy Visit on 07/18/2024 in [facility]            Assessment:                                                    HA 5/10 on pain scale, dull in quality. Occurs daily and is relieved with tylenol.     Jen's port site is currently de accessed, no concerns about site other than dressing is coming off.    Intervention/Education provided during outreach:                                                       Message to Lindsay Mcguire CNP regarding continued headaches. UPDATE: Jen reports that headaches have resolved with increasing oral intake. She reports that she doesn't think she was eating enough. Encouraged to return call to clinic if headaches return. Reviewed dressing for port-a-cath  with infusion nurses. Per infusion nurse, as long as Jen's port is not accessed, she can have open to air. This information was relayed to Jen, who verbalized understanding .     Patient to follow up as scheduled at next appt  Patient to call/MyChart message with updates  Confirmed patient has clinic and triage numbers    Signature:  Luma Griffin RN

## 2024-07-22 NOTE — PROGRESS NOTES
[facility]/Voicemail    Clinical Data: Care Coordinator Outreach    Outreach attempted x 1.  Left message on patient's voicemail with call back information and requested return call.    Plan: Care Coordinator will try to reach patient again in 1-2 business days.    Of note: Calling to see how Jen is doing post chemotherapy initiation on 7/18.

## 2024-08-02 ENCOUNTER — PATIENT OUTREACH (OUTPATIENT)
Dept: CARE COORDINATION | Facility: CLINIC | Age: 89
End: 2024-08-02
Payer: MEDICARE

## 2024-08-05 NOTE — PROGRESS NOTES
Virtual Visit Details    Type of service:  Video Visit   Video Start Time:  9:50 am  Video End Time: 10:07 am    Originating Location (pt. Location): Home    Distant Location (provider location):  On-site  Platform used for Video Visit: 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  She presents for follow up and disease management by video.      Oncology History:  Initially, she was seen in the ED for intermittent vaginal bleeding x 6 wks. Was actually seen  for hematuria.  At that time, underwent CT urogram 22 which showed no gross abnormalities to explain hematuria, was noted to have low-density thickening of endometrium.  Underwent a pelvic US on 22 which showed uterus at 6.8 x 4.6cm with a 3.2cm mass along endometrial canal possibly representing polyp or fibroid.  Never had any further follow-up.     She had a repeat pelvic US on 23 which showed diffusely thickened endometrium.  Had pelvic MRI  8.1 x 4.4 x 5.4 cm in long axis, short axis and transverse dimensions, respectively. An apparent heterogeneous mass-like structure is present in the anterior and right aspect of the uterus in the subendometrial or endometrial  region, measuring approximately 2.5 x 1.6 cm (series 18 image 33). This structure is not well-visualized on T1 weighted images due to artifact. It appears isointense to the uterine myometrium on T2-weighted images. Probable mildly heterogeneous  enhancement of this structure. A few subcentimeter low T2 signal regions in the anterior uterus likely represent very small fibroids.  The endometrial stripe is not well defined due to motion artifact. It measures approximately 0.9 cm in  dual-layer anteroposterior thickness. The endometrial region appears mildly irregular on the axial images (for example, series 6 image 15). It cannot be determined on the post contrast images if there is abnormal enhancement associated with the endometrium. Possible restricted diffusion within the region of endometrium.     6/7/23:  Seen in Gyn Onc. Unable to perform EMB due to stenosis     7/19/23:  EUA, D&C under US guidance.  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  MLH1 hypermethylation +     9/22/23:  Robotic assisted hysterectomy, bilateral salpingo-oophorectomy, cancer staging, sentinel lymph node mapping and sampling, pelvic washings.  Final pathology (reviewed): FIGO Grade 1 endometrioid TOMAS, 4.5cm greatest dimension, >50% myometrial invasion (13/22mm), superficial NOLA involvment, no LVSI, cytology negative, margins negative, LN negative. Final FIGO Stage IB Grade 1 endometrioid TOMAS,      Discussed at tumor board with recs for VBT versus observation. The patient saw Rad Onc and ultimately decided not to do XRT.     5/23/24: VAGINAL MUCOSA, BIOPSY:  -MODERATELY DIFFERENTIATED ADENOCARCINOMA  PAX8: Strongly and diffusely positive  P16 strongly positive, marking the majority of the columnar cells  Estrogen receptor: Positive (95% of cells, strong staining)  Cytokeratin-7: Strongly and diffusely positive  Cytokeratin-20: Negative  CDX2: Negative (absence of enteric differentiation)  The combined morphologic and immunophenotypic features of this lesion are compatible with the clinical history of endometrial carcinoma.     6/10/24: PET CT  IMPRESSION:  1.  Focal hypermetabolic uptake at the vaginal cuff compatible with biopsy-proven recurrent malignancy. FDG avid right pelvic lymph nodes are suspicious for man metastases.  2.  Subcentimeter pulmonary nodules are below the resolution of PET. Attention on future oncologic imaging.    6/27/24:  Tumor board: Consensus/Management Options: As  patient has good functional status and the pelvic lymph node is FDG avid, recommendation for chemotherapy + IO. Also can consider  trial. Discuss with patient for final treatment plan.      Plan: Carboplatin AUC 5, paclitaxel 175 mg/m2, and dorstarlimab 500 mg IV every 3 weeks.     7/11/24: RLE US negative for DVT.  7/16/24: Port placement.  7/18/24: Cycle 1 carboplatin, paclitaxel, and dorstarlimab.  8/8/24: Cycle 2 carboplatin, paclitaxel, and dorstarlimab.              Today she reports with her daughters that she is feeling well overall and ready for her next cycle of treatments.  No rashes or skin changes after treatment.  No mouth sores.  Her slight vaginal bleeding has resolved.  No real nausea and no vomiting.  She has been taking been taking compazine at bedtime nightly.  No loss of appetite.  She has struggled with food choices and is not sure what she can or can't eat while on chemo.  She thinks she may have lost 5 lbs but has not weighed herself.  She has developed some diarrhea over the last week initially pure liquid which have improved to loose or softer stools, 2-3 times per day.  She has imodium but did not take it.  She has some intermittent tingling in her fingertips, no symptoms in toes, no significant impact to function.  She has some right leg swelling that has not changed.  No fevers/chills.  No chest pain.  No SOB.  She does have some ALLEN with stairs.  Her hair is starting to fall out.  She had some headaches after treatment that have resolved.  She has some baseline back pain managed with tylenol but would like to take Aleve.  Some fatigue improved with rest, naps in the afternoons as needed otherwise able to be active.              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) 6/28/2024         Past Surgical History:    Past Surgical History:   Procedure Laterality Date    COLONOSCOPY  12/05/05    normal, recheck 10 years -- done at [facility] [place] HYSTERECTOMY TOTAL, BILATERAL SALPINGO-OOPHORECTOMY, NODE DISSECTION, COMBINED N/A 9/22/2023    Procedure: Robotic assisted hysterectomy, bilateral salpingo-oophorectomy, cancer staging, sentinel lymph node mapping and sampling, pelvic washings;  Surgeon: Amanda Au MD;  Location: [facility] OR    DILATION AND CURETTAGE, WITH ULTRASOUND GUIDANCE N/A 7/19/2023    Procedure: Pelvic examination under anesthesia, dilation and curettage uterus, ultrasound guidance Latex Free;  Surgeon: Amanda Au MD;  Location: [facility] OR    EXAM EYE  03/30/2018    Capsulotomy Left eye.   Dr. Skip Villela    EYE SURGERY  2015    HC REMOVAL GALLBLADDER      HYSTEROSCOPY,DIAGNOSTIC  around 1998    IR CHEST PORT PLACEMENT > 5 YRS OF AGE  7/16/2024         Health Maintenance Due   Topic Date Due    RSV VACCINE (Pregnancy & 60+) (1 - 1-dose 60+ series) Never done    COVID-19 Vaccine (7 - 2023-24 season) 12/07/2023    PHQ-2 (once per calendar year)  01/01/2024    MEDICARE ANNUAL WELLNESS VISIT  09/01/2024    LIPID  09/01/2024    MICROALBUMIN  09/01/2024    FALL RISK ASSESSMENT  09/01/2024       Current Medications:     Current Outpatient Medications   Medication Sig Dispense Refill    dexAMETHasone (DECADRON) 4 MG tablet Take 2 tablets (8 mg) by mouth daily Take for 3 days, starting the day after chemo. Take with food. 6 tablet 5    prochlorperazine (COMPAZINE) 10 MG tablet Take 1 tablet (10 mg) by mouth every 6 hours as needed for nausea or vomiting 30 tablet 2    lidocaine-prilocaine (EMLA) 2.5-2.5 % external cream Apply topically as needed (pain due to port access) Apply to port 30 minutes prior to lab appointment. 30 g 6    lisinopril (ZESTRIL) 20 MG tablet Take 1 tablet (20 mg) by mouth daily (Patient taking  "differently: Take 20 mg by mouth every evening) 90 tablet 4    ondansetron (ZOFRAN) 8 MG tablet Take 1 tablet (8 mg) by mouth every 8 hours as needed for nausea (vomiting) Do not take for 3 days after chemo. 30 tablet 2    simvastatin (ZOCOR) 40 MG tablet Take 1 tablet (40 mg) by mouth At Bedtime 90 tablet 4    VITAMIN D 1000 UNIT OR CAPS Take 1 capsule by mouth every morning           Allergies:        Allergies   Allergen Reactions    Hydrochlorothiazide      Triggered two gout attacks, no further problmes since discontinuing drug        Social History:     Social History     Tobacco Use    Smoking status: Never     Passive exposure: Never    Smokeless tobacco: Never   Substance Use Topics    Alcohol use: No       History   Drug Use No         Family History:     The patient's family history is notable for:    Family History   Problem Relation Age of Onset    Eye Disorder Mother         glaucoma    Heart Disease Mother          of CHF    Cancer Sister         uterine and ovarian-in remission     Other Cancer Sister     Deep Vein Thrombosis (DVT) Sister     Uterine Cancer Sister     Heart Disease Brother         valve replacement    Lipids Brother     Coronary Artery Disease Brother     Eye Disorder Brother         detached retina    Cancer Paternal Grandfather         carcinoma of the lip, pipe-smoker    Thyroid Disease Daughter         goiter or nodule?    Cancer Son         SKIN    Other Cancer Son     Other Cancer Other     Thyroid Disease Other     Diabetes No family hx of     C.A.D. No family hx of     Breast Cancer No family hx of     Cancer - colorectal No family hx of     Anesthesia Reaction No family hx of          Physical Exam:     Ht 1.626 m (5' 4\")   Wt 83.9 kg (185 lb)   LMP  (LMP Unknown)   BMI 31.76 kg/m    Body mass index is 31.76 kg/m .    General Appearance: alert, no distress    Eyes:  Eyes grossly normal to inspection.  No discharge or erythema, or obvious scleral/conjunctival " abnormalities.    Respiratory: No audible wheeze, cough, or visible cyanosis.  No visible retractions or increased work of breathing.     Musculoskeletal: extremities non tender and without edema    Skin: no lesions or rashes on visible skin    Neurological: normal gait, no gross defects     Psychiatric: appropriate mood and affect. Mentation appears normal, affect normal/bright, judgement and insight intact, normal speech and appearance well-groomed                            The rest of a comprehensive physical examination is deferred due to video visit restrictions.     No results found for this or any previous visit (from the past 24 hour(s)).       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  She presents for follow up and disease management by video.     30 minutes spent on the date of the encounter doing chart review, history and exam, documentation, and further activities as noted above.      Plan:     1.)        Endometrial cancer:  OK to proceed with planned treatment Thursday if labs are WDL.  RTC in 3 weeks for labs, provider visit, and next cycle; this is already scheduled.  Message sent to Dr. Au inquiring about timing of imaging.  Reviewed antiemetics regimen - she does not need to take compazine if not needed.  Refill sent to her local pharmacy.  Ok to eat as she usually does she nelson not need to restrict her diet.  Reviewed signs and symptoms for when she should contact the clinic or seek additional care.  Patient to contact the clinic with any questions or concerns in the interim.     Addendum: Per Dr. Au CT CAP after cycle 3.  Message sent for scheduling along with request for follow up with Dr. Au and cycle 4.     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  MLH1 promoter methylation was positive making La syndrome unlikely .     Labs  and/or tests ordered include:  CBC. CMP. Mag. TSH reflex T4.                2.)        Health maintenance:  Issues addressed today include following up with PCP for annual health maintenance and non-gynecologic issues.      3.) Right leg swelling: Right calf swelling; unchanged. US 7/11/24 was negative for DVT.  Encouraged continuing to monitor and to notify the clinic of changes.    4.) Diarrhea: Diarrhea with pure liquid stools initially now soft to loose up to twice per day.  She has imodium but hasn't tried it.  Encouraged to start imodium if she is having >2 loose stools especially if they are pure liquid.      Lindsay Mcguire, DNP, APRN, FNP-C, AOCNP  Oncology Nurse Practitioner  Division of Gynecologic Oncology  Pager: [phone]     CC  Patient Care Team:  Yamile Cotter MD as PCP - General (Family Medicine)  Yamile Cotter MD as Assigned PCP  Amanda Au MD as MD (Gynecologic Oncology)  Nayana Gutierrez, [place] as Specialty Care Coordinator (Gynecologic Oncology)  Amanda Au MD as Assigned Cancer Care Provider  AMANDA AU

## 2024-08-06 ENCOUNTER — VIRTUAL VISIT (OUTPATIENT)
Dept: ONCOLOGY | Facility: HOSPITAL | Age: 89
End: 2024-08-06
Attending: NURSE PRACTITIONER
Payer: MEDICARE

## 2024-08-06 VITALS — HEIGHT: 64 IN | BODY MASS INDEX: 31.58 KG/M2 | WEIGHT: 185 LBS

## 2024-08-06 DIAGNOSIS — C54.1 ENDOMETRIAL CANCER (H): Primary | ICD-10-CM

## 2024-08-06 DIAGNOSIS — Z51.11 ENCOUNTER FOR ANTINEOPLASTIC CHEMOTHERAPY AND IMMUNOTHERAPY: ICD-10-CM

## 2024-08-06 DIAGNOSIS — C80.1 SOLID MALIGNANT NEOPLASM WITH HIGH-FREQUENCY MICROSATELLITE INSTABILITY (MSI-H) (H): ICD-10-CM

## 2024-08-06 DIAGNOSIS — Z51.12 ENCOUNTER FOR ANTINEOPLASTIC CHEMOTHERAPY AND IMMUNOTHERAPY: ICD-10-CM

## 2024-08-06 PROCEDURE — 99214 OFFICE O/P EST MOD 30 MIN: CPT | Mod: 95 | Performed by: NURSE PRACTITIONER

## 2024-08-06 RX ORDER — LORAZEPAM 2 MG/ML
0.5 INJECTION INTRAMUSCULAR EVERY 4 HOURS PRN
Status: CANCELLED | OUTPATIENT
Start: 2024-08-08

## 2024-08-06 RX ORDER — HEPARIN SODIUM,PORCINE 10 UNIT/ML
5-20 VIAL (ML) INTRAVENOUS DAILY PRN
Status: CANCELLED | OUTPATIENT
Start: 2024-08-08

## 2024-08-06 RX ORDER — PROCHLORPERAZINE MALEATE 10 MG
10 TABLET ORAL EVERY 6 HOURS PRN
Qty: 30 TABLET | Refills: 2 | Status: SHIPPED | OUTPATIENT
Start: 2024-08-06

## 2024-08-06 RX ORDER — EPINEPHRINE 1 MG/ML
0.3 INJECTION, SOLUTION, CONCENTRATE INTRAVENOUS EVERY 5 MIN PRN
Status: CANCELLED | OUTPATIENT
Start: 2024-08-08

## 2024-08-06 RX ORDER — DIPHENHYDRAMINE HCL 50 MG
50 CAPSULE ORAL ONCE
Status: CANCELLED
Start: 2024-08-08

## 2024-08-06 RX ORDER — DIPHENHYDRAMINE HYDROCHLORIDE 50 MG/ML
50 INJECTION INTRAMUSCULAR; INTRAVENOUS
Status: CANCELLED
Start: 2024-08-08

## 2024-08-06 RX ORDER — PALONOSETRON 0.05 MG/ML
0.25 INJECTION, SOLUTION INTRAVENOUS ONCE
Status: CANCELLED | OUTPATIENT
Start: 2024-08-08

## 2024-08-06 RX ORDER — MEPERIDINE HYDROCHLORIDE 25 MG/ML
25 INJECTION INTRAMUSCULAR; INTRAVENOUS; SUBCUTANEOUS EVERY 30 MIN PRN
Status: CANCELLED | OUTPATIENT
Start: 2024-08-08

## 2024-08-06 RX ORDER — ALBUTEROL SULFATE 0.83 MG/ML
2.5 SOLUTION RESPIRATORY (INHALATION)
Status: CANCELLED | OUTPATIENT
Start: 2024-08-08

## 2024-08-06 RX ORDER — ALBUTEROL SULFATE 90 UG/1
1-2 AEROSOL, METERED RESPIRATORY (INHALATION)
Status: CANCELLED
Start: 2024-08-08

## 2024-08-06 RX ORDER — METHYLPREDNISOLONE SODIUM SUCCINATE 125 MG/2ML
125 INJECTION, POWDER, LYOPHILIZED, FOR SOLUTION INTRAMUSCULAR; INTRAVENOUS
Status: CANCELLED
Start: 2024-08-08

## 2024-08-06 RX ORDER — HEPARIN SODIUM (PORCINE) LOCK FLUSH IV SOLN 100 UNIT/ML 100 UNIT/ML
5 SOLUTION INTRAVENOUS
Status: CANCELLED | OUTPATIENT
Start: 2024-08-08

## 2024-08-06 RX ORDER — DEXAMETHASONE 4 MG/1
8 TABLET ORAL DAILY
Qty: 6 TABLET | Refills: 5 | Status: SHIPPED | OUTPATIENT
Start: 2024-08-06 | End: 2024-08-26

## 2024-08-06 ASSESSMENT — PAIN SCALES - GENERAL: PAINLEVEL: NO PAIN (0)

## 2024-08-06 NOTE — NURSING NOTE
Current patient location: [address] [facility]    [facility] [address]    Is the patient currently in the state of [place]? YES    Visit mode:VIDEO    If the visit is dropped, the patient can be reconnected by: VIDEO VISIT: Send to e-mail at: [email or web address]    Will anyone else be joining the visit? NO  (If patient encounters technical issues they should call [phone]    How would you like to obtain your AVS? MyChart    Are changes needed to the allergy or medication list? No    Are refills needed on medications prescribed by this physician? NO    Rooming Documentation:  Questionnaire(s) not done per department protocol      Reason for visit: BETHANY GENTILE

## 2024-08-08 ENCOUNTER — INFUSION THERAPY VISIT (OUTPATIENT)
Dept: INFUSION THERAPY | Facility: HOSPITAL | Age: 89
End: 2024-08-08
Attending: OBSTETRICS & GYNECOLOGY
Payer: MEDICARE

## 2024-08-08 VITALS
DIASTOLIC BLOOD PRESSURE: 73 MMHG | HEART RATE: 86 BPM | SYSTOLIC BLOOD PRESSURE: 131 MMHG | RESPIRATION RATE: 18 BRPM | TEMPERATURE: 98.1 F | OXYGEN SATURATION: 94 %

## 2024-08-08 DIAGNOSIS — C54.1 ENDOMETRIAL CANCER (H): ICD-10-CM

## 2024-08-08 DIAGNOSIS — C80.1 SOLID MALIGNANT NEOPLASM WITH HIGH-FREQUENCY MICROSATELLITE INSTABILITY (MSI-H) (H): Primary | ICD-10-CM

## 2024-08-08 LAB
ALBUMIN SERPL BCG-MCNC: 3.6 G/DL (ref 3.5–5.2)
ALP SERPL-CCNC: 56 U/L (ref 40–150)
ALT SERPL W P-5'-P-CCNC: 18 U/L (ref 0–50)
ANION GAP SERPL CALCULATED.3IONS-SCNC: 10 MMOL/L (ref 7–15)
AST SERPL W P-5'-P-CCNC: 20 U/L (ref 0–45)
BASOPHILS # BLD AUTO: 0.1 10E3/UL (ref 0–0.2)
BASOPHILS NFR BLD AUTO: 1 %
BILIRUB SERPL-MCNC: 0.2 MG/DL
BUN SERPL-MCNC: 16 MG/DL (ref 8–23)
CALCIUM SERPL-MCNC: 8.6 MG/DL (ref 8.8–10.4)
CHLORIDE SERPL-SCNC: 105 MMOL/L (ref 98–107)
CREAT SERPL-MCNC: 0.89 MG/DL (ref 0.51–0.95)
EGFRCR SERPLBLD CKD-EPI 2021: 63 ML/MIN/1.73M2
EOSINOPHIL # BLD AUTO: 0 10E3/UL (ref 0–0.7)
EOSINOPHIL NFR BLD AUTO: 0 %
ERYTHROCYTE [DISTWIDTH] IN BLOOD BY AUTOMATED COUNT: 13 % (ref 10–15)
GLUCOSE SERPL-MCNC: 117 MG/DL (ref 70–99)
HCO3 SERPL-SCNC: 27 MMOL/L (ref 22–29)
HCT VFR BLD AUTO: 41 % (ref 35–47)
HGB BLD-MCNC: 13.6 G/DL (ref 11.7–15.7)
IMM GRANULOCYTES # BLD: 0.1 10E3/UL
IMM GRANULOCYTES NFR BLD: 1 %
LYMPHOCYTES # BLD AUTO: 1.7 10E3/UL (ref 0.8–5.3)
LYMPHOCYTES NFR BLD AUTO: 24 %
MAGNESIUM SERPL-MCNC: 1.6 MG/DL (ref 1.7–2.3)
MCH RBC QN AUTO: 30.2 PG (ref 26.5–33)
MCHC RBC AUTO-ENTMCNC: 33.2 G/DL (ref 31.5–36.5)
MCV RBC AUTO: 91 FL (ref 78–100)
MONOCYTES # BLD AUTO: 0.8 10E3/UL (ref 0–1.3)
MONOCYTES NFR BLD AUTO: 11 %
NEUTROPHILS # BLD AUTO: 4.7 10E3/UL (ref 1.6–8.3)
NEUTROPHILS NFR BLD AUTO: 64 %
NRBC # BLD AUTO: 0 10E3/UL
NRBC BLD AUTO-RTO: 0 /100
PLATELET # BLD AUTO: 281 10E3/UL (ref 150–450)
POTASSIUM SERPL-SCNC: 4.1 MMOL/L (ref 3.4–5.3)
PROT SERPL-MCNC: 6 G/DL (ref 6.4–8.3)
RBC # BLD AUTO: 4.51 10E6/UL (ref 3.8–5.2)
SODIUM SERPL-SCNC: 142 MMOL/L (ref 135–145)
TSH SERPL DL<=0.005 MIU/L-ACNC: 0.45 UIU/ML (ref 0.3–4.2)
WBC # BLD AUTO: 7.3 10E3/UL (ref 4–11)

## 2024-08-08 PROCEDURE — 85049 AUTOMATED PLATELET COUNT: CPT | Performed by: NURSE PRACTITIONER

## 2024-08-08 PROCEDURE — 96417 CHEMO IV INFUS EACH ADDL SEQ: CPT

## 2024-08-08 PROCEDURE — 83735 ASSAY OF MAGNESIUM: CPT | Performed by: NURSE PRACTITIONER

## 2024-08-08 PROCEDURE — 250N000011 HC RX IP 250 OP 636: Performed by: NURSE PRACTITIONER

## 2024-08-08 PROCEDURE — 96415 CHEMO IV INFUSION ADDL HR: CPT

## 2024-08-08 PROCEDURE — 250N000011 HC RX IP 250 OP 636: Performed by: OBSTETRICS & GYNECOLOGY

## 2024-08-08 PROCEDURE — 258N000003 HC RX IP 258 OP 636: Performed by: NURSE PRACTITIONER

## 2024-08-08 PROCEDURE — 36591 DRAW BLOOD OFF VENOUS DEVICE: CPT | Performed by: NURSE PRACTITIONER

## 2024-08-08 PROCEDURE — 80053 COMPREHEN METABOLIC PANEL: CPT | Performed by: NURSE PRACTITIONER

## 2024-08-08 PROCEDURE — 96367 TX/PROPH/DG ADDL SEQ IV INF: CPT

## 2024-08-08 PROCEDURE — 250N000013 HC RX MED GY IP 250 OP 250 PS 637: Performed by: NURSE PRACTITIONER

## 2024-08-08 PROCEDURE — 84443 ASSAY THYROID STIM HORMONE: CPT | Performed by: NURSE PRACTITIONER

## 2024-08-08 PROCEDURE — 96375 TX/PRO/DX INJ NEW DRUG ADDON: CPT

## 2024-08-08 PROCEDURE — 258N000003 HC RX IP 258 OP 636: Performed by: OBSTETRICS & GYNECOLOGY

## 2024-08-08 PROCEDURE — 96413 CHEMO IV INFUSION 1 HR: CPT

## 2024-08-08 RX ORDER — ALBUTEROL SULFATE 0.83 MG/ML
2.5 SOLUTION RESPIRATORY (INHALATION)
Status: DISCONTINUED | OUTPATIENT
Start: 2024-08-08 | End: 2024-08-08 | Stop reason: HOSPADM

## 2024-08-08 RX ORDER — ALBUTEROL SULFATE 90 UG/1
1-2 AEROSOL, METERED RESPIRATORY (INHALATION)
Status: DISCONTINUED | OUTPATIENT
Start: 2024-08-08 | End: 2024-08-08 | Stop reason: HOSPADM

## 2024-08-08 RX ORDER — HEPARIN SODIUM (PORCINE) LOCK FLUSH IV SOLN 100 UNIT/ML 100 UNIT/ML
5 SOLUTION INTRAVENOUS
Status: DISCONTINUED | OUTPATIENT
Start: 2024-08-08 | End: 2024-08-08 | Stop reason: HOSPADM

## 2024-08-08 RX ORDER — DIPHENHYDRAMINE HCL 50 MG
50 CAPSULE ORAL ONCE
Status: COMPLETED | OUTPATIENT
Start: 2024-08-08 | End: 2024-08-08

## 2024-08-08 RX ORDER — EPINEPHRINE 1 MG/ML
0.3 INJECTION, SOLUTION INTRAMUSCULAR; SUBCUTANEOUS EVERY 5 MIN PRN
Status: DISCONTINUED | OUTPATIENT
Start: 2024-08-08 | End: 2024-08-08 | Stop reason: HOSPADM

## 2024-08-08 RX ORDER — MEPERIDINE HYDROCHLORIDE 25 MG/ML
25 INJECTION INTRAMUSCULAR; INTRAVENOUS; SUBCUTANEOUS EVERY 30 MIN PRN
Status: DISCONTINUED | OUTPATIENT
Start: 2024-08-08 | End: 2024-08-08 | Stop reason: HOSPADM

## 2024-08-08 RX ORDER — PALONOSETRON 0.05 MG/ML
0.25 INJECTION, SOLUTION INTRAVENOUS ONCE
Status: COMPLETED | OUTPATIENT
Start: 2024-08-08 | End: 2024-08-08

## 2024-08-08 RX ORDER — METHYLPREDNISOLONE SODIUM SUCCINATE 125 MG/2ML
125 INJECTION, POWDER, LYOPHILIZED, FOR SOLUTION INTRAMUSCULAR; INTRAVENOUS
Status: DISCONTINUED | OUTPATIENT
Start: 2024-08-08 | End: 2024-08-08 | Stop reason: HOSPADM

## 2024-08-08 RX ORDER — DIPHENHYDRAMINE HYDROCHLORIDE 50 MG/ML
50 INJECTION INTRAMUSCULAR; INTRAVENOUS
Status: DISCONTINUED | OUTPATIENT
Start: 2024-08-08 | End: 2024-08-08 | Stop reason: HOSPADM

## 2024-08-08 RX ADMIN — SODIUM CHLORIDE 250 ML: 9 INJECTION, SOLUTION INTRAVENOUS at 10:33

## 2024-08-08 RX ADMIN — DIPHENHYDRAMINE HYDROCHLORIDE 50 MG: 50 CAPSULE ORAL at 10:33

## 2024-08-08 RX ADMIN — Medication 5 ML: at 15:23

## 2024-08-08 RX ADMIN — CARBOPLATIN 365 MG: 450 INJECTION, SOLUTION INTRAVENOUS at 14:50

## 2024-08-08 RX ADMIN — PALONOSETRON 0.25 MG: 0.05 INJECTION, SOLUTION INTRAVENOUS at 10:36

## 2024-08-08 RX ADMIN — FAMOTIDINE 20 MG: 10 INJECTION, SOLUTION INTRAVENOUS at 10:36

## 2024-08-08 RX ADMIN — SODIUM CHLORIDE 500 MG: 9 INJECTION, SOLUTION INTRAVENOUS at 11:19

## 2024-08-08 RX ADMIN — PACLITAXEL 348 MG: 6 INJECTION, SOLUTION INTRAVENOUS at 11:51

## 2024-08-08 RX ADMIN — DEXAMETHASONE SODIUM PHOSPHATE: 10 INJECTION, SOLUTION INTRAMUSCULAR; INTRAVENOUS at 10:54

## 2024-08-08 NOTE — PROGRESS NOTES
Infusion Nursing Note:  Jen Layne presents today for D1C2.    Patient seen by provider today: No   present during visit today: Not Applicable.    Note: Pt pre-medicated with oral Benadryl.      Intravenous Access:  Implanted Port.    Treatment Conditions:  Lab Results   Component Value Date    HGB 13.6 08/08/2024    WBC 7.3 08/08/2024    ANEU 5.7 08/14/2018    ANEUTAUTO 4.7 08/08/2024     08/08/2024        Lab Results   Component Value Date     08/08/2024    POTASSIUM 4.1 08/08/2024    MAG 1.6 (L) 08/08/2024    CR 0.89 08/08/2024    JOHN 8.6 (L) 08/08/2024    BILITOTAL 0.2 08/08/2024    ALBUMIN 3.6 08/08/2024    ALT 18 08/08/2024    AST 20 08/08/2024       Results reviewed, labs MET treatment parameters, ok to proceed with treatment.      Post Infusion Assessment:  Patient tolerated infusion without incident.  Blood return noted pre and post infusion.  Site patent and intact, free from redness, edema or discomfort.  No evidence of extravasations.  Access discontinued per protocol.       Discharge Plan:   Patient discharged in stable condition accompanied by: daughter.  Departure Mode: Ambulatory.      Victoria Polo RN

## 2024-08-13 ENCOUNTER — TELEPHONE (OUTPATIENT)
Dept: ONCOLOGY | Facility: HOSPITAL | Age: 89
End: 2024-08-13
Payer: MEDICARE

## 2024-08-13 DIAGNOSIS — R21 RASH: Primary | ICD-10-CM

## 2024-08-13 NOTE — TELEPHONE ENCOUNTER
"Daughter Marsha called and left message regarding rash on patient head.   No consent to communicate on file.   We conference called patient, she verified name and .  One week ago, patient went to her  and had her head shaved due to hair loss.Head was washed. Electric razor was used.   On , daughter noticed some small , less than pencil eraser size flat, reddish dots. Not itchy. \"Dry\"   Each day, more have appeared, pinkish red. Not draining. Covers entire scalp area.  Patient has been using some Curel lotion.  Patient also has been using some hats and scarves. She knows she wore one that was not washed before wearing.   RN told them it is likely from combination of above, although Dostarlimab has rash as side effect, which would likely be more systemic.  Advised she try Vanicream lotion as Curel is scented.   Avoid hats and scarves at this time.   Monitor for fever, increasing rash, open areas, etc.  Call with ongoing concerns.  Discussed consent to communicate form, patient will complete at next visit.  Sheree Henley RN      "

## 2024-08-14 ENCOUNTER — PATIENT OUTREACH (OUTPATIENT)
Dept: ONCOLOGY | Facility: HOSPITAL | Age: 89
End: 2024-08-14
Payer: MEDICARE

## 2024-08-14 RX ORDER — MUPIROCIN 20 MG/G
OINTMENT TOPICAL 3 TIMES DAILY
Qty: 30 G | Refills: 0 | Status: SHIPPED | OUTPATIENT
Start: 2024-08-14

## 2024-08-14 RX ORDER — TRIAMCINOLONE ACETONIDE 1 MG/G
OINTMENT TOPICAL 2 TIMES DAILY
Qty: 30 G | Refills: 0 | Status: SHIPPED | OUTPATIENT
Start: 2024-08-14

## 2024-08-16 ENCOUNTER — PATIENT OUTREACH (OUTPATIENT)
Dept: CARE COORDINATION | Facility: CLINIC | Age: 89
End: 2024-08-16
Payer: MEDICARE

## 2024-08-19 ENCOUNTER — HOSPITAL ENCOUNTER (EMERGENCY)
Facility: HOSPITAL | Age: 89
Discharge: HOME | End: 2024-08-19
Attending: FAMILY MEDICINE | Admitting: FAMILY MEDICINE
Payer: MEDICARE

## 2024-08-19 ENCOUNTER — APPOINTMENT (OUTPATIENT)
Dept: CT IMAGING | Facility: HOSPITAL | Age: 89
End: 2024-08-19
Attending: FAMILY MEDICINE
Payer: MEDICARE

## 2024-08-19 VITALS
OXYGEN SATURATION: 89 % | SYSTOLIC BLOOD PRESSURE: 136 MMHG | WEIGHT: 182 LBS | RESPIRATION RATE: 24 BRPM | DIASTOLIC BLOOD PRESSURE: 56 MMHG | TEMPERATURE: 98.7 F | HEART RATE: 74 BPM | BODY MASS INDEX: 31.24 KG/M2

## 2024-08-19 DIAGNOSIS — M54.2 CERVICALGIA: ICD-10-CM

## 2024-08-19 DIAGNOSIS — E83.42 HYPOMAGNESEMIA: ICD-10-CM

## 2024-08-19 DIAGNOSIS — R55 NEAR SYNCOPE: ICD-10-CM

## 2024-08-19 DIAGNOSIS — C54.1 ENDOMETRIAL CANCER (H): ICD-10-CM

## 2024-08-19 DIAGNOSIS — I10 ESSENTIAL HYPERTENSION: ICD-10-CM

## 2024-08-19 LAB
ANION GAP SERPL CALCULATED.3IONS-SCNC: 10 MMOL/L (ref 7–15)
BASOPHILS # BLD AUTO: 0 10E3/UL (ref 0–0.2)
BASOPHILS NFR BLD AUTO: 1 %
BUN SERPL-MCNC: 18.2 MG/DL (ref 8–23)
CALCIUM SERPL-MCNC: 8 MG/DL (ref 8.8–10.4)
CHLORIDE SERPL-SCNC: 107 MMOL/L (ref 98–107)
CREAT SERPL-MCNC: 0.88 MG/DL (ref 0.51–0.95)
EGFRCR SERPLBLD CKD-EPI 2021: 64 ML/MIN/1.73M2
EOSINOPHIL # BLD AUTO: 0 10E3/UL (ref 0–0.7)
EOSINOPHIL NFR BLD AUTO: 0 %
ERYTHROCYTE [DISTWIDTH] IN BLOOD BY AUTOMATED COUNT: 12.9 % (ref 10–15)
GLUCOSE SERPL-MCNC: 117 MG/DL (ref 70–99)
HCO3 SERPL-SCNC: 24 MMOL/L (ref 22–29)
HCT VFR BLD AUTO: 35.3 % (ref 35–47)
HGB BLD-MCNC: 11.5 G/DL (ref 11.7–15.7)
IMM GRANULOCYTES # BLD: 0 10E3/UL
IMM GRANULOCYTES NFR BLD: 0 %
LYMPHOCYTES # BLD AUTO: 0.9 10E3/UL (ref 0.8–5.3)
LYMPHOCYTES NFR BLD AUTO: 36 %
MAGNESIUM SERPL-MCNC: 1.3 MG/DL (ref 1.7–2.3)
MCH RBC QN AUTO: 29.7 PG (ref 26.5–33)
MCHC RBC AUTO-ENTMCNC: 32.6 G/DL (ref 31.5–36.5)
MCV RBC AUTO: 91 FL (ref 78–100)
MONOCYTES # BLD AUTO: 0.7 10E3/UL (ref 0–1.3)
MONOCYTES NFR BLD AUTO: 31 %
NEUTROPHILS # BLD AUTO: 0.8 10E3/UL (ref 1.6–8.3)
NEUTROPHILS NFR BLD AUTO: 32 %
NRBC # BLD AUTO: 0 10E3/UL
NRBC BLD AUTO-RTO: 0 /100
PLATELET # BLD AUTO: 159 10E3/UL (ref 150–450)
POTASSIUM SERPL-SCNC: 4 MMOL/L (ref 3.4–5.3)
RBC # BLD AUTO: 3.87 10E6/UL (ref 3.8–5.2)
SODIUM SERPL-SCNC: 141 MMOL/L (ref 135–145)
TROPONIN T SERPL HS-MCNC: 24 NG/L
TROPONIN T SERPL HS-MCNC: 27 NG/L
WBC # BLD AUTO: 2.4 10E3/UL (ref 4–11)

## 2024-08-19 PROCEDURE — 72125 CT NECK SPINE W/O DYE: CPT | Mod: MA

## 2024-08-19 PROCEDURE — 250N000011 HC RX IP 250 OP 636: Performed by: FAMILY MEDICINE

## 2024-08-19 PROCEDURE — 80048 BASIC METABOLIC PNL TOTAL CA: CPT | Performed by: FAMILY MEDICINE

## 2024-08-19 PROCEDURE — 36415 COLL VENOUS BLD VENIPUNCTURE: CPT | Performed by: FAMILY MEDICINE

## 2024-08-19 PROCEDURE — 84484 ASSAY OF TROPONIN QUANT: CPT | Mod: 91 | Performed by: FAMILY MEDICINE

## 2024-08-19 PROCEDURE — 83735 ASSAY OF MAGNESIUM: CPT | Performed by: FAMILY MEDICINE

## 2024-08-19 PROCEDURE — 70496 CT ANGIOGRAPHY HEAD: CPT | Mod: MA

## 2024-08-19 PROCEDURE — 96365 THER/PROPH/DIAG IV INF INIT: CPT | Mod: 59

## 2024-08-19 PROCEDURE — 93005 ELECTROCARDIOGRAM TRACING: CPT | Performed by: FAMILY MEDICINE

## 2024-08-19 PROCEDURE — 85025 COMPLETE CBC W/AUTO DIFF WBC: CPT | Performed by: FAMILY MEDICINE

## 2024-08-19 PROCEDURE — 99285 EMERGENCY DEPT VISIT HI MDM: CPT | Mod: 25

## 2024-08-19 RX ORDER — HEPARIN SODIUM (PORCINE) LOCK FLUSH IV SOLN 100 UNIT/ML 100 UNIT/ML
SOLUTION INTRAVENOUS
Status: COMPLETED
Start: 2024-08-19 | End: 2024-08-19

## 2024-08-19 RX ORDER — MAGNESIUM OXIDE 400 MG/1
400 TABLET ORAL DAILY
Qty: 30 TABLET | Refills: 0 | Status: SHIPPED | OUTPATIENT
Start: 2024-08-19

## 2024-08-19 RX ORDER — HYDROCODONE BITARTRATE AND ACETAMINOPHEN 5; 325 MG/1; MG/1
1 TABLET ORAL EVERY 6 HOURS PRN
Qty: 10 TABLET | Refills: 0 | Status: SHIPPED | OUTPATIENT
Start: 2024-08-19 | End: 2024-08-22

## 2024-08-19 RX ORDER — IOPAMIDOL 755 MG/ML
67 INJECTION, SOLUTION INTRAVASCULAR ONCE
Status: COMPLETED | OUTPATIENT
Start: 2024-08-19 | End: 2024-08-19

## 2024-08-19 RX ORDER — MAGNESIUM SULFATE HEPTAHYDRATE 40 MG/ML
2 INJECTION, SOLUTION INTRAVENOUS ONCE
Status: COMPLETED | OUTPATIENT
Start: 2024-08-19 | End: 2024-08-19

## 2024-08-19 RX ORDER — HEPARIN SODIUM (PORCINE) LOCK FLUSH IV SOLN 100 UNIT/ML 100 UNIT/ML
5-10 SOLUTION INTRAVENOUS
Status: DISCONTINUED | OUTPATIENT
Start: 2024-08-19 | End: 2024-08-19 | Stop reason: HOSPADM

## 2024-08-19 RX ORDER — CYCLOBENZAPRINE HCL 5 MG
5 TABLET ORAL 3 TIMES DAILY PRN
Qty: 10 TABLET | Refills: 0 | Status: SHIPPED | OUTPATIENT
Start: 2024-08-19

## 2024-08-19 RX ADMIN — HEPARIN 5 ML: 100 SYRINGE at 17:12

## 2024-08-19 RX ADMIN — HEPARIN SODIUM (PORCINE) LOCK FLUSH IV SOLN 100 UNIT/ML 5 ML: 100 SOLUTION at 17:12

## 2024-08-19 RX ADMIN — MAGNESIUM SULFATE HEPTAHYDRATE 2 G: 40 INJECTION, SOLUTION INTRAVENOUS at 14:11

## 2024-08-19 RX ADMIN — IOPAMIDOL 67 ML: 755 INJECTION, SOLUTION INTRAVENOUS at 14:51

## 2024-08-19 ASSESSMENT — ACTIVITIES OF DAILY LIVING (ADL)
ADLS_ACUITY_SCORE: 35

## 2024-08-19 ASSESSMENT — COLUMBIA-SUICIDE SEVERITY RATING SCALE - C-SSRS
2. HAVE YOU ACTUALLY HAD ANY THOUGHTS OF KILLING YOURSELF IN THE PAST MONTH?: NO
1. IN THE PAST MONTH, HAVE YOU WISHED YOU WERE DEAD OR WISHED YOU COULD GO TO SLEEP AND NOT WAKE UP?: NO
1. IN THE PAST MONTH, HAVE YOU WISHED YOU WERE DEAD OR WISHED YOU COULD GO TO SLEEP AND NOT WAKE UP?: NO
6. HAVE YOU EVER DONE ANYTHING, STARTED TO DO ANYTHING, OR PREPARED TO DO ANYTHING TO END YOUR LIFE?: NO
2. HAVE YOU ACTUALLY HAD ANY THOUGHTS OF KILLING YOURSELF IN THE PAST MONTH?: NO
6. HAVE YOU EVER DONE ANYTHING, STARTED TO DO ANYTHING, OR PREPARED TO DO ANYTHING TO END YOUR LIFE?: NO

## 2024-08-19 NOTE — ED TRIAGE NOTES
Arrival [facility] pt had sudden back pain sat down became lightheaded, felt like was going to pass out. On ems arrival sbp60's . 500cc flush given sbp 120's . Hx CA, last chemo 2 weeks ago.     Triage Assessment (Adult)       Row Name 08/19/24 1220          Triage Assessment    Airway WDL WDL        Respiratory WDL    Respiratory WDL WDL        Skin Circulation/Temperature WDL    Skin Circulation/Temperature WDL WDL        Cardiac WDL    Cardiac WDL WDL

## 2024-08-19 NOTE — DISCHARGE INSTRUCTIONS
Cardiology clinic will call you to schedule follow-up appointment.  Follow-up with your primary care doctor as well.    Magnesium liquid has a high risk of causing diarrhea which is causing low magnesium.  You may split the pills in half if they are difficult to swallow.

## 2024-08-19 NOTE — ED NOTES
Bed: [place] [address]  Expected date:   Expected time:   Means of arrival: Ambulance  Comments:  [facility] - Near syncope, Hypotension

## 2024-08-19 NOTE — ED PROVIDER NOTES
EMERGENCY DEPARTMENT ENCOUNTER      NAME: Jen Layne  AGE: 88 year old female  YOB: 1935  MRN: [id number]  EVALUATION DATE & TIME: 8/19/2024 12:14 PM    PCP: Yamile Cotter    ED PROVIDER: Charles Green M.D.    Chief Complaint   Patient presents with    Near Syncope       FINAL IMPRESSION:  1. Near syncope    2. Cervicalgia    3. Endometrial cancer (H)    4. Essential hypertension    5. Hypomagnesemia        ED COURSE & MEDICAL DECISION MAKING:    Pertinent Labs & Imaging studies independently interpreted by me. 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  Patient recently started chemotherapy.  ED Course as of 08/19/24 1911   Mon Aug 19, 2024   1240 Patient seen and examined, presents with near syncopal episode and neck pain.  Patient says she was sitting when she had sudden onset of pain in the back of the neck that went upward, became lightheaded and sat down.  She did not pass out, she did not fall or hit her head.  She feels back to her usual self now, denies any neck pain, head pain, numbness or tingling of the arms or legs.  She had no preceding chest pain or palpitations with this episode.  She is on chemotherapy and has had some diarrhea with that but generally is eating and drinking well.  On exam here, she has no cervical tenderness and indicates the area of the pain was in the C5-6 region.  Heart is regular with no extrasystoles, no abdominal tenderness, no lower extremity swelling.  Labs are ordered along with CT of the neck to evaluate for bony abnormality or dissection.  Considered pulmonary embolism and CT PE study but patient has no pleuritic chest pain or shortness of breath, no hypoxia or  tachycardia.   1304 EKG:    Independently reviewed and interpreted by me  Performed at: 1:02 PM  Impression: Normal EKG  Rate: 74  Rhythm: Sinus  Axis: Normal  OR Interval: 196  QRS Interval: 70  QTc Interval: 439  ST Changes: No acute ischemic changes  Comparison: September 2023, no acute change   1353 Labs ordered and independently interpreted by me with hypomagnesemia which will be replaced intravenously, basic panel reassuring, CBC with anemia and leukopenia consistent with chemotherapy but .  Troponin is slightly elevated as well and will be rechecked given history of syncope.  With history of malignancy, did consider D-dimer or CT PE study, however patient did not experience any chest pain or shortness of breath with this episode and is asymptomatic at this time.   1540 CT scan of the cervical spine independently interpreted by me negative for acute findings.  Reviewed radiology interpretation CTA of the head and neck negative for acute findings.  Repeat troponin is pending, if this is negative patient can be discharged.  I had a long conversation with patient and family regarding results so far and plan, they are agreeable.   1609 Repeat troponin is 24 which is stable for the patient.  Stable for discharge with outpatient follow-up.  Family is quite concerned about patient's near syncopal episode which seems vasovagal but patient was given cardiology referral for further evaluation.     At the conclusion of the encounter I discussed the results of all of the tests and the disposition. The questions were answered. The patient or family acknowledged understanding and was agreeable with the care plan.     Medical Decision Making  Obtained supplemental history:Supplemental history obtained?: Family Member/Significant Other  Reviewed external records: External records reviewed?: Documented in chart  Care impacted by chronic illness:Hypertension  Care significantly affected by social determinants of  health:Access to Affordable Health Care  Did you consider but not order tests?: Work up considered but not performed and documented in chart, if applicable  Did you interpret images independently?: Independent interpretation of ECG and images noted in documentation, when applicable.  Consultation discussion with other provider:Did you involve another provider (consultant, , pharmacy, etc.)?: I discussed the care with another health care provider, see documentation for details.  Discharge. I recommended the patient continue their current prescription strength medication(s):  . I considered admission, but discharged the patient after share decision making conversation.    MEDICATIONS GIVEN IN THE EMERGENCY:  Medications   heparin lock flush 100 unit/mL injection 5-10 mL (5 mLs Intracatheter $Given 8/19/24 1712)   sodium chloride (PF) 0.9% PF flush 10-20 mL (has no administration in time range)   magnesium sulfate 2 g in 50 mL sterile water intermittent infusion (0 g Intravenous Stopped 8/19/24 1532)   iopamidol (ISOVUE-370) solution 67 mL (67 mLs Intravenous $Given 8/19/24 1451)       NEW PRESCRIPTIONS STARTED AT TODAY'S ER VISIT  Discharge Medication List as of 8/19/2024  5:16 PM        START taking these medications    Details   cyclobenzaprine (FLEXERIL) 5 MG tablet Take 1 tablet (5 mg) by mouth 3 times daily as needed for muscle spasms (neck pain), Disp-10 tablet, R-0, E-Prescribe      HYDROcodone-acetaminophen (NORCO) 5-325 MG tablet Take 1 tablet by mouth every 6 hours as needed for severe pain, Disp-10 tablet, R-0, E-Prescribe      magnesium oxide (MAG-OX) 400 MG tablet Take 1 tablet (400 mg) by mouth daily, Disp-30 tablet, R-0, E-Prescribe             =================================================================    HPI    Patient information was obtained from: Patient      Jen Layne is a 88 year old female with a pertinent history of endometrial cancer on chemotherapy who presents to this ED  near-syncope.  Patient says she had sudden pain in her neck and subsequently became lightheaded.  Did not pass out, did not fall.  No complaints now, feels fine.  No chest pain.      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       PAST SURGICAL HISTORY:  Past Surgical History:   Procedure Laterality Date    COLONOSCOPY  12/05/05    normal, recheck 10 years -- done at [facility] [place] HYSTERECTOMY TOTAL, BILATERAL SALPINGO-OOPHORECTOMY, NODE DISSECTION, COMBINED N/A 9/22/2023    Procedure: Robotic assisted hysterectomy, bilateral salpingo-oophorectomy, cancer staging, sentinel lymph node mapping and sampling, pelvic washings;  Surgeon: Amanda Au MD;  Location: [facility] OR    DILATION AND CURETTAGE, WITH ULTRASOUND GUIDANCE N/A 7/19/2023    Procedure: Pelvic examination under anesthesia, dilation and curettage uterus, ultrasound guidance Latex Free;  Surgeon: Amanda Au MD;  Location: [facility] OR    EXAM EYE  03/30/2018    Capsulotomy Left eye.   Dr. Skip Villela    EYE SURGERY  2015    [facility] REMOVAL GALLBLADDER      HYSTEROSCOPY,DIAGNOSTIC  around 1998    IR CHEST PORT PLACEMENT > 5 YRS OF AGE  7/16/2024       CURRENT MEDICATIONS:    Current Facility-Administered Medications   Medication Dose Route Frequency Provider Last Rate Last Admin    heparin lock flush 100 unit/mL injection 5-10 mL  5-10 mL Intracatheter Q28 Days Charles Green MD   5 mL at 08/19/24 1712    sodium chloride (PF) 0.9% PF flush 10-20 mL  10-20 mL Intracatheter Q1H PRN Charles Green MD         Current Outpatient Medications   Medication Sig Dispense Refill    cyclobenzaprine (FLEXERIL) 5 MG  tablet Take 1 tablet (5 mg) by mouth 3 times daily as needed for muscle spasms (neck pain) 10 tablet 0    HYDROcodone-acetaminophen (NORCO) 5-325 MG tablet Take 1 tablet by mouth every 6 hours as needed for severe pain 10 tablet 0    magnesium oxide (MAG-OX) 400 MG tablet Take 1 tablet (400 mg) by mouth daily 30 tablet 0    dexAMETHasone (DECADRON) 4 MG tablet Take 2 tablets (8 mg) by mouth daily Take for 3 days, starting the day after chemo. Take with food. 6 tablet 5    lidocaine-prilocaine (EMLA) 2.5-2.5 % external cream Apply topically as needed (pain due to port access) Apply to port 30 minutes prior to lab appointment. 30 g 6    lisinopril (ZESTRIL) 20 MG tablet Take 1 tablet (20 mg) by mouth daily (Patient taking differently: Take 20 mg by mouth every evening) 90 tablet 4    mupirocin (BACTROBAN) 2 % external ointment Apply topically 3 times daily 30 g 0    ondansetron (ZOFRAN) 8 MG tablet Take 1 tablet (8 mg) by mouth every 8 hours as needed for nausea (vomiting) Do not take for 3 days after chemo. 30 tablet 2    prochlorperazine (COMPAZINE) 10 MG tablet Take 1 tablet (10 mg) by mouth every 6 hours as needed for nausea or vomiting 30 tablet 2    simvastatin (ZOCOR) 40 MG tablet Take 1 tablet (40 mg) by mouth At Bedtime 90 tablet 4    triamcinolone (KENALOG) 0.1 % external ointment Apply topically 2 times daily 30 g 0    VITAMIN D 1000 UNIT OR CAPS Take 1 capsule by mouth every morning         ALLERGIES:  Allergies   Allergen Reactions    Hydrochlorothiazide      Triggered two gout attacks, no further problmes since discontinuing drug       FAMILY HISTORY:  Family History   Problem Relation Age of Onset    Eye Disorder Mother         glaucoma    Heart Disease Mother          of CHF    Cancer Sister         uterine and ovarian-in remission     Other Cancer Sister     Deep Vein Thrombosis (DVT) Sister     Uterine Cancer Sister     Heart Disease Brother         valve replacement    Lipids Brother      Coronary Artery Disease Brother     Eye Disorder Brother         detached retina    Cancer Paternal Grandfather         carcinoma of the lip, pipe-smoker    Thyroid Disease Daughter         goiter or nodule?    Cancer Son         SKIN    Other Cancer Son     Other Cancer Other     Thyroid Disease Other     Diabetes No family hx of     C.A.D. No family hx of     Breast Cancer No family hx of     Cancer - colorectal No family hx of     Anesthesia Reaction No family hx of        SOCIAL HISTORY:   Social History     Socioeconomic History    Marital status:     Number of children: 4   Tobacco Use    Smoking status: Never     Passive exposure: Never    Smokeless tobacco: Never   Vaping Use    Vaping status: Never Used   Substance and Sexual Activity    Alcohol use: No    Drug use: No    Sexual activity: Never   Other Topics Concern    Parent/sibling w/ CABG, MI or angioplasty before 65F 55M? No   Social History Narrative    2008 --- 4 children, 11 grandchildren, 3 great-grandchildren       VITALS:  /56   Pulse 74   Temp 98.7  F (37.1  C) (Oral)   Resp 24   Wt 82.6 kg (182 lb)   LMP  (LMP Unknown)   SpO2 (!) 89%   BMI 31.24 kg/m      PHYSICAL EXAM:  Physical Exam  Vitals and nursing note reviewed.   Constitutional:       Appearance: Normal appearance.   HENT:      Head: Normocephalic and atraumatic.      Right Ear: External ear normal.      Left Ear: External ear normal.      Nose: Nose normal.      Mouth/Throat:      Mouth: Mucous membranes are moist.   Eyes:      Extraocular Movements: Extraocular movements intact.      Conjunctiva/sclera: Conjunctivae normal.      Pupils: Pupils are equal, round, and reactive to light.   Cardiovascular:      Rate and Rhythm: Normal rate and regular rhythm.   Pulmonary:      Effort: Pulmonary effort is normal.      Breath sounds: Normal breath sounds. No wheezing or rales.      Comments: Port in the right chest, no redness, tenderness, induration  Abdominal:       General: Abdomen is flat. There is no distension.      Palpations: Abdomen is soft.      Tenderness: There is no abdominal tenderness. There is no guarding.   Musculoskeletal:         General: Normal range of motion.      Cervical back: Normal range of motion and neck supple.      Right lower leg: No edema.      Left lower leg: No edema.   Lymphadenopathy:      Cervical: No cervical adenopathy.   Skin:     General: Skin is warm and dry.   Neurological:      General: No focal deficit present.      Mental Status: She is alert and oriented to person, place, and time. Mental status is at baseline.      Comments: No gross focal neurologic deficits   Psychiatric:         Mood and Affect: Mood normal.         Behavior: Behavior normal.         Thought Content: Thought content normal.     LAB:  All pertinent labs reviewed and interpreted.  Results for orders placed or performed during the hospital encounter of 08/19/24   CTA Head Neck with Contrast    Impression    IMPRESSION:   HEAD CT:  1.  No CT evidence for acute intracranial process.  2.  Brain atrophy and presumed chronic microvascular ischemic changes as above.    HEAD CTA:   1.  No significant stenosis, aneurysm, or high flow vascular malformation identified.    NECK CTA:  1.  No hemodynamically significant stenosis in the neck vessels.   2.  No evidence for dissection.   CT Cervical Spine w/o Contrast    Impression    IMPRESSION:  1.  No fracture or posttraumatic subluxation.  2.  Mild cervical spondylosis as above.   Basic metabolic panel   Result Value Ref Range    Sodium 141 135 - 145 mmol/L    Potassium 4.0 3.4 - 5.3 mmol/L    Chloride 107 98 - 107 mmol/L    Carbon Dioxide (CO2) 24 22 - 29 mmol/L    Anion Gap 10 7 - 15 mmol/L    Urea Nitrogen 18.2 8.0 - 23.0 mg/dL    Creatinine 0.88 0.51 - 0.95 mg/dL    GFR Estimate 64 >60 mL/min/1.73m2    Calcium 8.0 (L) 8.8 - 10.4 mg/dL    Glucose 117 (H) 70 - 99 mg/dL   Result Value Ref Range    Troponin T, High Sensitivity  27 (H) <=14 ng/L   Result Value Ref Range    Magnesium 1.3 (L) 1.7 - 2.3 mg/dL   CBC with platelets and differential   Result Value Ref Range    WBC Count 2.4 (L) 4.0 - 11.0 10e3/uL    RBC Count 3.87 3.80 - 5.20 10e6/uL    Hemoglobin 11.5 (L) 11.7 - 15.7 g/dL    Hematocrit 35.3 35.0 - 47.0 %    MCV 91 78 - 100 fL    MCH 29.7 26.5 - 33.0 pg    MCHC 32.6 31.5 - 36.5 g/dL    RDW 12.9 10.0 - 15.0 %    Platelet Count 159 150 - 450 10e3/uL    % Neutrophils 32 %    % Lymphocytes 36 %    % Monocytes 31 %    % Eosinophils 0 %    % Basophils 1 %    % Immature Granulocytes 0 %    NRBCs per 100 WBC 0 <1 /100    Absolute Neutrophils 0.8 (L) 1.6 - 8.3 10e3/uL    Absolute Lymphocytes 0.9 0.8 - 5.3 10e3/uL    Absolute Monocytes 0.7 0.0 - 1.3 10e3/uL    Absolute Eosinophils 0.0 0.0 - 0.7 10e3/uL    Absolute Basophils 0.0 0.0 - 0.2 10e3/uL    Absolute Immature Granulocytes 0.0 <=0.4 10e3/uL    Absolute NRBCs 0.0 10e3/uL   Result Value Ref Range    Troponin T, High Sensitivity 24 (H) <=14 ng/L       RADIOLOGY:  Reviewed all pertinent imaging. Please see official radiology report.  CTA Head Neck with Contrast   Final Result   IMPRESSION:    HEAD CT:   1.  No CT evidence for acute intracranial process.   2.  Brain atrophy and presumed chronic microvascular ischemic changes as above.      HEAD CTA:    1.  No significant stenosis, aneurysm, or high flow vascular malformation identified.      NECK CTA:   1.  No hemodynamically significant stenosis in the neck vessels.    2.  No evidence for dissection.      CT Cervical Spine w/o Contrast   Final Result   IMPRESSION:   1.  No fracture or posttraumatic subluxation.   2.  Mild cervical spondylosis as above.        Charles Green M.D.  Emergency Medicine  [facility] EMERGENCY DEPARTMENT  [address] [place] [id number] [phone]  Dept: [phone]       Charles Green MD  08/19/24 1918

## 2024-08-20 ENCOUNTER — PATIENT OUTREACH (OUTPATIENT)
Dept: FAMILY MEDICINE | Facility: CLINIC | Age: 89
End: 2024-08-20
Payer: MEDICARE

## 2024-08-20 DIAGNOSIS — C80.1 SOLID MALIGNANT NEOPLASM WITH HIGH-FREQUENCY MICROSATELLITE INSTABILITY (MSI-H) (H): Primary | ICD-10-CM

## 2024-08-20 NOTE — TELEPHONE ENCOUNTER
What type of discharge? Emergency Department  Risk of Hospital admission or ED visit: 63.7%  Is a TCM episode required? No  When should the patient follow up with PCP? 7 days of discharge.    Amrita Gaitan RN  [facility]

## 2024-08-21 LAB
ATRIAL RATE - MUSE: 74 BPM
DIASTOLIC BLOOD PRESSURE - MUSE: 70 MMHG
INTERPRETATION ECG - MUSE: NORMAL
P AXIS - MUSE: 43 DEGREES
PR INTERVAL - MUSE: 196 MS
QRS DURATION - MUSE: 72 MS
QT - MUSE: 396 MS
QTC - MUSE: 439 MS
R AXIS - MUSE: 6 DEGREES
SYSTOLIC BLOOD PRESSURE - MUSE: 148 MMHG
T AXIS - MUSE: 49 DEGREES
VENTRICULAR RATE- MUSE: 74 BPM

## 2024-08-23 NOTE — PROGRESS NOTES
Virtual Visit Details    Type of service:  Video Visit   Video Start Time:  3:04 pm  Video End Time: 3:21 pm    Originating Location (pt. 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  She presents for follow up and disease management by video.      Oncology History:  Initially, she was seen in the ED for intermittent vaginal bleeding x 6 wks. Was actually seen  for hematuria.  At that time, underwent CT urogram 22 which showed no gross abnormalities to explain hematuria, was noted to have low-density thickening of endometrium.  Underwent a pelvic US on 22 which showed uterus at 6.8 x 4.6cm with a 3.2cm mass along endometrial canal possibly representing polyp or fibroid.  Never had any further follow-up.     She had a repeat pelvic US on 23 which showed diffusely thickened endometrium.  Had pelvic MRI  8.1 x 4.4 x 5.4 cm in long axis, short axis and transverse dimensions, respectively. An apparent heterogeneous mass-like structure is present in the anterior and right aspect of the uterus in the subendometrial or endometrial  region, measuring approximately 2.5 x 1.6 cm (series 18 image 33). This structure is not well-visualized on T1 weighted images due to artifact. It appears isointense to the uterine myometrium on T2-weighted images. Probable mildly heterogeneous  enhancement of this structure. A few subcentimeter low T2 signal regions in the anterior uterus likely represent very small fibroids.  The endometrial stripe is not well defined due to motion artifact. It measures approximately 0.9 cm in dual-layer  anteroposterior thickness. The endometrial region appears mildly irregular on the axial images (for example, series 6 image 15). It cannot be determined on the post contrast images if there is abnormal enhancement associated with the endometrium. Possible restricted diffusion within the region of endometrium.     6/7/23:  Seen in Gyn Onc. Unable to perform EMB due to stenosis     7/19/23:  EUA, D&C under US guidance.  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  MLH1 hypermethylation +     9/22/23:  Robotic assisted hysterectomy, bilateral salpingo-oophorectomy, cancer staging, sentinel lymph node mapping and sampling, pelvic washings.  Final pathology (reviewed): FIGO Grade 1 endometrioid TOMAS, 4.5cm greatest dimension, >50% myometrial invasion (13/22mm), superficial NOLA involvment, no LVSI, cytology negative, margins negative, LN negative. Final FIGO Stage IB Grade 1 endometrioid TOMAS,      Discussed at tumor board with recs for VBT versus observation. The patient saw Rad Onc and ultimately decided not to do XRT.     5/23/24: VAGINAL MUCOSA, BIOPSY:  -MODERATELY DIFFERENTIATED ADENOCARCINOMA  PAX8: Strongly and diffusely positive  P16 strongly positive, marking the majority of the columnar cells  Estrogen receptor: Positive (95% of cells, strong staining)  Cytokeratin-7: Strongly and diffusely positive  Cytokeratin-20: Negative  CDX2: Negative (absence of enteric differentiation)  The combined morphologic and immunophenotypic features of this lesion are compatible with the clinical history of endometrial carcinoma.     6/10/24: PET CT  IMPRESSION:  1.  Focal hypermetabolic uptake at the vaginal cuff compatible with biopsy-proven recurrent malignancy. FDG avid right pelvic lymph nodes are suspicious for man metastases.  2.  Subcentimeter pulmonary nodules are below the resolution of PET. Attention on future oncologic imaging.     6/27/24:  Tumor board: Consensus/Management Options: As patient has  good functional status and the pelvic lymph node is FDG avid, recommendation for chemotherapy + IO. Also can consider  trial. Discuss with patient for final treatment plan.      Plan: Carboplatin AUC 5, paclitaxel 175 mg/m2, and dorstarlimab 500 mg IV every 3 weeks x 3 cycles followed by CT and follow up with Dr. Au.     7/11/24: RLE US negative for DVT.  7/16/24: Port placement.  7/18/24: Cycle 1 carboplatin, paclitaxel, and dorstarlimab.  8/8/24: Cycle 2 carboplatin, paclitaxel, and dorstarlimab.  8/19/24: ED for presyncope and neck pain.  Work up negative.  8/29/24: Cycle 3 carboplatin, paclitaxel, and dorstarlimab.             Today she reports with her daughters.  Her daughters provide context leading to her ED visit last week.  She had an episode where she was not responsive, developed tremors, BPs 60-80/30s.  Paramedics were called and she was brought to the ED.  Symptoms resolved by the time she got to the ED. They were told in the ED she had a vasovagal episode.  Work up was largely negative with the exception of some dehydration and low magnesium.  She has had some dizziness and light headedness since. Follow up with cardiology and PCP next week.    -Rashes/skin changes: No  -Vaginal bleeding/discharge: No, vaginal bleeding is gone  -Abdominal pain: No  -Nausea or vomiting: No nausea or vomiting after treatment  -Appetite: Appetite is ok, she drinks throughout the day but thinks its not as much as she should.  -Weight loss: She has lost some weight since her last treatment 182-183 lbs while in the ED last week  -Diarrhea/Constipation: Bowels have been normal this week, but some diarrhea last week, did use imodium which worked well only used once or twice  -Neuropathy symptoms: She has a little bit of tingling in her thumbs and pointers, no symptoms in feet, no impact to function.  -Leg swelling: Some swelling in her right leg, no changes   -Fevers: No  -Chest pain: No  -SOB: No  Tracking BP at  home since her ED visit 117/69, 123/72  She has some soreness in her bilateral thighs and she notices that she is weaker then previous.            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         Past Surgical History:    Past Surgical History:   Procedure Laterality Date    COLONOSCOPY  12/05/05    normal, recheck 10 years -- done at [facility] HYSTERECTOMY TOTAL, BILATERAL SALPINGO-OOPHORECTOMY, NODE DISSECTION, COMBINED N/A 9/22/2023    Procedure: Robotic assisted hysterectomy, bilateral salpingo-oophorectomy, cancer staging, sentinel lymph node mapping and sampling, pelvic washings;  Surgeon: Amanda Au MD;  Location: [facility] OR    DILATION AND CURETTAGE, WITH ULTRASOUND GUIDANCE N/A 7/19/2023    Procedure: Pelvic examination under anesthesia, dilation and curettage uterus, ultrasound guidance Latex Free;  Surgeon: Amanda Au MD;  Location: [facility] OR    EXAM EYE  03/30/2018    Capsulotomy Left eye.   Dr. Skip Villela    EYE SURGERY  2015    HC REMOVAL GALLBLADDER      HYSTEROSCOPY,DIAGNOSTIC  around 1998    IR CHEST PORT PLACEMENT > 5 YRS OF AGE  7/16/2024         Health Maintenance Due   Topic Date Due    RSV VACCINE (Pregnancy & 60+) (1 - 1-dose 60+ series) Never done    COVID-19 Vaccine (7 - 2023-24 season) 12/07/2023    PHQ-2 (once per calendar year)  01/01/2024    MEDICARE ANNUAL WELLNESS VISIT  09/01/2024    LIPID  09/01/2024    MICROALBUMIN  09/01/2024    FALL RISK ASSESSMENT  09/01/2024       Current Medications:     Current Outpatient Medications   Medication Sig Dispense Refill    dexAMETHasone (DECADRON) 4 MG tablet Take 2 tablets (8 mg) by mouth daily. Take for 3 days, starting the day after chemo. Take with food. Each  cycle. 6 tablet 5    cyclobenzaprine (FLEXERIL) 5 MG tablet Take 1 tablet (5 mg) by mouth 3 times daily as needed for muscle spasms (neck pain) 10 tablet 0    lidocaine-prilocaine (EMLA) 2.5-2.5 % external cream Apply topically as needed (pain due to port access) Apply to port 30 minutes prior to lab appointment. 30 g 6    lisinopril (ZESTRIL) 20 MG tablet Take 1 tablet (20 mg) by mouth daily (Patient taking differently: Take 20 mg by mouth every evening) 90 tablet 4    magnesium oxide (MAG-OX) 400 MG tablet Take 1 tablet (400 mg) by mouth daily 30 tablet 0    mupirocin (BACTROBAN) 2 % external ointment Apply topically 3 times daily 30 g 0    ondansetron (ZOFRAN) 8 MG tablet Take 1 tablet (8 mg) by mouth every 8 hours as needed for nausea (vomiting) Do not take for 3 days after chemo. 30 tablet 2    prochlorperazine (COMPAZINE) 10 MG tablet Take 1 tablet (10 mg) by mouth every 6 hours as needed for nausea or vomiting 30 tablet 2    simvastatin (ZOCOR) 40 MG tablet Take 1 tablet (40 mg) by mouth At Bedtime 90 tablet 4    triamcinolone (KENALOG) 0.1 % external ointment Apply topically 2 times daily 30 g 0    VITAMIN D 1000 UNIT OR CAPS Take 1 capsule by mouth every morning           Allergies:        Allergies   Allergen Reactions    Hydrochlorothiazide      Triggered two gout attacks, no further problmes since discontinuing drug        Social History:     Social History     Tobacco Use    Smoking status: Never     Passive exposure: Never    Smokeless tobacco: Never   Substance Use Topics    Alcohol use: No       History   Drug Use No         Family History:     The patient's family history is notable for:    Family History   Problem Relation Age of Onset    Eye Disorder Mother         glaucoma    Heart Disease Mother          of CHF    Cancer Sister         uterine and ovarian-in remission     Other Cancer Sister     Deep Vein Thrombosis (DVT) Sister     Uterine Cancer Sister     Heart Disease Brother          "valve replacement    Lipids Brother     Coronary Artery Disease Brother     Eye Disorder Brother         detached retina    Cancer Paternal Grandfather         carcinoma of the lip, pipe-smoker    Thyroid Disease Daughter         goiter or nodule?    Cancer Son         SKIN    Other Cancer Son     Other Cancer Other     Thyroid Disease Other     Diabetes No family hx of     C.A.D. No family hx of     Breast Cancer No family hx of     Cancer - colorectal No family hx of     Anesthesia Reaction No family hx of          Physical Exam:     Ht 1.638 m (5' 4.5\")   Wt 83 kg (183 lb)   LMP  (LMP Unknown)   BMI 30.93 kg/m    Body mass index is 30.93 kg/m .    General Appearance: alert, no distress    Eyes:  Eyes grossly normal to inspection.  No discharge or erythema, or obvious scleral/conjunctival abnormalities.    Respiratory: No audible wheeze, cough, or visible cyanosis.  No visible retractions or increased work of breathing.     Musculoskeletal: extremities non tender and without edema    Skin: no lesions or rashes on visible skin    Neurological: normal gait, no gross defects     Psychiatric: appropriate mood and affect. Mentation appears normal, affect normal/bright, judgement and insight intact, normal speech and appearance well-groomed                            The rest of a comprehensive physical examination is deferred due to video visit restrictions.     No results found for this or any previous visit (from the past 24 hour(s)).       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  She presents for follow up and disease management by video.     40 minutes spent on the date of the encounter doing chart review, history and exam, documentation, and further activities as noted above.      Plan:     1.)        Endometrial cancer:  OK to proceed with planned treatment Thursday if labs are WDL.  RTC as scheduled for CT, " follow up with Dr. Au, labs, and next cycle.  Reviewed signs and symptoms for when she should contact the clinic or seek additional care.  Patient to contact the clinic with any questions or concerns in the interim.       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  MLH1 promoter methylation was positive making La syndrome unlikely .     Labs and/or tests ordered include:  CBC. CMP. Mag. TSH reflex T4.                2.)        Health maintenance:  Issues addressed today include following up with PCP for annual health maintenance and non-gynecologic issues.      3.) Right leg swelling: Right calf swelling; unchanged. US 7/11/24 was negative for DVT.  Encouraged continuing to monitor and to notify the clinic of changes.     4.)        Diarrhea: Diarrhea with pure liquid stools initially now regular.  She has imodium which she used once or twice with improvement.  Encouraged to use imodium if needed to prevent dehydration.  Suspect the dehydration and low magnesium were from the diarrhea.  Discussed that if she was having a lot of cramping with the diarrhea that could have initiated the presyncopal/vasovagal episode.    5.) Vasovagal episode: Recent ED visit ultimately felt to be a vasovagal episode.  Encouraged adequate hydration.  As above the diarrhea she was having likely added to the dehydration and hypomagnesemia and possibly the vasovagal episode.  She should treat the diarrhea as above.  Encouraged following up with her PCP and cardiology as scheduled to rule out other causes.    Lindsay Mcguire, DNP, APRN, FNP-C, AOCNP  Oncology Nurse Practitioner  Division of Gynecologic Oncology  Pager: [phone]     CC  Patient Care Team:  Yamile Cotter MD as PCP - General (Family Medicine)  Yamile Cotter MD as Assigned PCP  Amanda Au MD as MD (Gynecologic Oncology)  Amanda Au MD as Assigned Cancer Care Provider  Luma Griffin  DEON, RN as Clinic Care Coordinator (Hematology & Oncology)  Kami Story MD as MD (Cardiology)  RODRIGUE FENG

## 2024-08-26 ENCOUNTER — VIRTUAL VISIT (OUTPATIENT)
Dept: ONCOLOGY | Facility: CLINIC | Age: 89
End: 2024-08-26
Attending: NURSE PRACTITIONER
Payer: MEDICARE

## 2024-08-26 VITALS — WEIGHT: 183 LBS | BODY MASS INDEX: 30.49 KG/M2 | HEIGHT: 65 IN

## 2024-08-26 DIAGNOSIS — M62.81 GENERALIZED MUSCLE WEAKNESS: ICD-10-CM

## 2024-08-26 DIAGNOSIS — Z51.11 ENCOUNTER FOR ANTINEOPLASTIC CHEMOTHERAPY AND IMMUNOTHERAPY: ICD-10-CM

## 2024-08-26 DIAGNOSIS — C54.1 ENDOMETRIAL CANCER (H): Primary | ICD-10-CM

## 2024-08-26 DIAGNOSIS — Z51.12 ENCOUNTER FOR ANTINEOPLASTIC CHEMOTHERAPY AND IMMUNOTHERAPY: ICD-10-CM

## 2024-08-26 DIAGNOSIS — C80.1 SOLID MALIGNANT NEOPLASM WITH HIGH-FREQUENCY MICROSATELLITE INSTABILITY (MSI-H) (H): ICD-10-CM

## 2024-08-26 PROCEDURE — 99215 OFFICE O/P EST HI 40 MIN: CPT | Mod: 95 | Performed by: NURSE PRACTITIONER

## 2024-08-26 RX ORDER — HEPARIN SODIUM,PORCINE 10 UNIT/ML
5-20 VIAL (ML) INTRAVENOUS DAILY PRN
Status: CANCELLED | OUTPATIENT
Start: 2024-08-29

## 2024-08-26 RX ORDER — PALONOSETRON 0.05 MG/ML
0.25 INJECTION, SOLUTION INTRAVENOUS ONCE
Status: CANCELLED | OUTPATIENT
Start: 2024-08-29

## 2024-08-26 RX ORDER — MEPERIDINE HYDROCHLORIDE 25 MG/ML
25 INJECTION INTRAMUSCULAR; INTRAVENOUS; SUBCUTANEOUS EVERY 30 MIN PRN
Status: CANCELLED | OUTPATIENT
Start: 2024-08-29

## 2024-08-26 RX ORDER — DIPHENHYDRAMINE HYDROCHLORIDE 50 MG/ML
50 INJECTION INTRAMUSCULAR; INTRAVENOUS
Status: CANCELLED
Start: 2024-08-29

## 2024-08-26 RX ORDER — DEXAMETHASONE 4 MG/1
8 TABLET ORAL DAILY
Qty: 6 TABLET | Refills: 5 | Status: SHIPPED | OUTPATIENT
Start: 2024-08-26

## 2024-08-26 RX ORDER — DIPHENHYDRAMINE HCL 25 MG
50 CAPSULE ORAL ONCE
Status: CANCELLED
Start: 2024-08-29

## 2024-08-26 RX ORDER — METHYLPREDNISOLONE SODIUM SUCCINATE 125 MG/2ML
125 INJECTION, POWDER, LYOPHILIZED, FOR SOLUTION INTRAMUSCULAR; INTRAVENOUS
Status: CANCELLED
Start: 2024-08-29

## 2024-08-26 RX ORDER — LORAZEPAM 2 MG/ML
0.5 INJECTION INTRAMUSCULAR EVERY 4 HOURS PRN
Status: CANCELLED | OUTPATIENT
Start: 2024-08-29

## 2024-08-26 RX ORDER — EPINEPHRINE 1 MG/ML
0.3 INJECTION, SOLUTION INTRAMUSCULAR; SUBCUTANEOUS EVERY 5 MIN PRN
Status: CANCELLED | OUTPATIENT
Start: 2024-08-29

## 2024-08-26 RX ORDER — ALBUTEROL SULFATE 90 UG/1
1-2 AEROSOL, METERED RESPIRATORY (INHALATION)
Status: CANCELLED
Start: 2024-08-29

## 2024-08-26 RX ORDER — HEPARIN SODIUM (PORCINE) LOCK FLUSH IV SOLN 100 UNIT/ML 100 UNIT/ML
5 SOLUTION INTRAVENOUS
Status: CANCELLED | OUTPATIENT
Start: 2024-08-29

## 2024-08-26 RX ORDER — ALBUTEROL SULFATE 0.83 MG/ML
2.5 SOLUTION RESPIRATORY (INHALATION)
Status: CANCELLED | OUTPATIENT
Start: 2024-08-29

## 2024-08-26 ASSESSMENT — PAIN SCALES - GENERAL: PAINLEVEL: NO PAIN (0)

## 2024-08-26 NOTE — LETTER
2024      Jen Layne  [address]  [address]  [place] [address]      Dear Colleague,    Thank you for referring your patient, Jen Layne, to the [facility]. Please see a copy of my visit note below.    Virtual Visit Details    Type of service:  Video Visit   Video Start Time:  3:04 pm  Video End Time: 3:21 pm    Originating Location (pt. Location): Home    Distant Location (provider location):  On-site  Platform used for Video Visit: 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  She presents for follow up and disease management by video.      Oncology History:  Initially, she was seen in the ED for intermittent vaginal bleeding x 6 wks. Was actually seen  for hematuria.  At that time, underwent CT urogram 22 which showed no gross abnormalities to explain hematuria, was noted to have low-density thickening of endometrium.  Underwent a pelvic US on 22 which showed uterus at 6.8 x 4.6cm with a 3.2cm mass along endometrial canal possibly representing polyp or fibroid.  Never had any further follow-up.     She had a repeat pelvic US on 23 which showed diffusely thickened endometrium.  Had pelvic MRI  8.1 x 4.4 x 5.4 cm in long axis, short axis and transverse dimensions, respectively. An apparent heterogeneous mass-like structure is present in the anterior and right aspect of the uterus in the subendometrial or endometrial  region, measuring approximately 2.5 x 1.6 cm (series 18 image 33). This structure is not well-visualized on T1 weighted images due to artifact. It appears isointense to the uterine myometrium on T2-weighted images. Probable mildly  heterogeneous  enhancement of this structure. A few subcentimeter low T2 signal regions in the anterior uterus likely represent very small fibroids.  The endometrial stripe is not well defined due to motion artifact. It measures approximately 0.9 cm in dual-layer anteroposterior thickness. The endometrial region appears mildly irregular on the axial images (for example, series 6 image 15). It cannot be determined on the post contrast images if there is abnormal enhancement associated with the endometrium. Possible restricted diffusion within the region of endometrium.     6/7/23:  Seen in Gyn Onc. Unable to perform EMB due to stenosis     7/19/23:  EUA, D&C under US guidance.  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  MLH1 hypermethylation +     9/22/23:  Robotic assisted hysterectomy, bilateral salpingo-oophorectomy, cancer staging, sentinel lymph node mapping and sampling, pelvic washings.  Final pathology (reviewed): FIGO Grade 1 endometrioid TOMAS, 4.5cm greatest dimension, >50% myometrial invasion (13/22mm), superficial NOLA involvment, no LVSI, cytology negative, margins negative, LN negative. Final FIGO Stage IB Grade 1 endometrioid TOMAS,      Discussed at tumor board with recs for VBT versus observation. The patient saw Rad Onc and ultimately decided not to do XRT.     5/23/24: VAGINAL MUCOSA, BIOPSY:  -MODERATELY DIFFERENTIATED ADENOCARCINOMA  PAX8: Strongly and diffusely positive  P16 strongly positive, marking the majority of the columnar cells  Estrogen receptor: Positive (95% of cells, strong staining)  Cytokeratin-7: Strongly and diffusely positive  Cytokeratin-20: Negative  CDX2: Negative (absence of enteric differentiation)  The combined morphologic and immunophenotypic features of this lesion are compatible with the clinical history of endometrial carcinoma.     6/10/24: PET CT  IMPRESSION:  1.  Focal hypermetabolic uptake at the vaginal cuff compatible with biopsy-proven recurrent  malignancy. FDG avid right pelvic lymph nodes are suspicious for man metastases.  2.  Subcentimeter pulmonary nodules are below the resolution of PET. Attention on future oncologic imaging.     6/27/24:  Tumor board: Consensus/Management Options: As patient has good functional status and the pelvic lymph node is FDG avid, recommendation for chemotherapy + IO. Also can consider  trial. Discuss with patient for final treatment plan.      Plan: Carboplatin AUC 5, paclitaxel 175 mg/m2, and dorstarlimab 500 mg IV every 3 weeks x 3 cycles followed by CT and follow up with Dr. Au.     7/11/24: RLE US negative for DVT.  7/16/24: Port placement.  7/18/24: Cycle 1 carboplatin, paclitaxel, and dorstarlimab.  8/8/24: Cycle 2 carboplatin, paclitaxel, and dorstarlimab.  8/19/24: ED for presyncope and neck pain.  Work up negative.  8/29/24: Cycle 3 carboplatin, paclitaxel, and dorstarlimab.             Today she reports with her daughters.  Her daughters provide context leading to her ED visit last week.  She had an episode where she was not responsive, developed tremors, BPs 60-80/30s.  Paramedics were called and she was brought to the ED.  Symptoms resolved by the time she got to the ED. They were told in the ED she had a vasovagal episode.  Work up was largely negative with the exception of some dehydration and low magnesium.  She has had some dizziness and light headedness since. Follow up with cardiology and PCP next week.    -Rashes/skin changes: No  -Vaginal bleeding/discharge: No, vaginal bleeding is gone  -Abdominal pain: No  -Nausea or vomiting: No nausea or vomiting after treatment  -Appetite: Appetite is ok, she drinks throughout the day but thinks its not as much as she should.  -Weight loss: She has lost some weight since her last treatment 182-183 lbs while in the ED last week  -Diarrhea/Constipation: Bowels have been normal this week, but some diarrhea last week, did use imodium which worked well only  used once or twice  -Neuropathy symptoms: She has a little bit of tingling in her thumbs and pointers, no symptoms in feet, no impact to function.  -Leg swelling: Some swelling in her right leg, no changes   -Fevers: No  -Chest pain: No  -SOB: No  Tracking BP at home since her ED visit 117/69, 123/72  She has some soreness in her bilateral thighs and she notices that she is weaker then previous.            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         Past Surgical History:    Past Surgical History:   Procedure Laterality Date     COLONOSCOPY  12/05/05    normal, recheck 10 years -- done at [facility] HYSTERECTOMY TOTAL, BILATERAL SALPINGO-OOPHORECTOMY, NODE DISSECTION, COMBINED N/A 9/22/2023    Procedure: Robotic assisted hysterectomy, bilateral salpingo-oophorectomy, cancer staging, sentinel lymph node mapping and sampling, pelvic washings;  Surgeon: Amanda Au MD;  Location: [facility] OR     DILATION AND CURETTAGE, WITH ULTRASOUND GUIDANCE N/A 7/19/2023    Procedure: Pelvic examination under anesthesia, dilation and curettage uterus, ultrasound guidance Latex Free;  Surgeon: Amanda Au MD;  Location: [facility] OR     EXAM EYE  03/30/2018    Capsulotomy Left eye.   Dr. Skip Villela     EYE SURGERY  2015     HC REMOVAL GALLBLADDER       HYSTEROSCOPY,DIAGNOSTIC  around 1998     IR CHEST PORT PLACEMENT > 5 YRS OF AGE  7/16/2024         Health Maintenance Due   Topic Date Due     RSV VACCINE (Pregnancy & 60+) (1 - 1-dose 60+ series) Never done     COVID-19 Vaccine (7 - 2023-24 season) 12/07/2023     PHQ-2 (once per calendar year)  01/01/2024     MEDICARE ANNUAL WELLNESS VISIT  09/01/2024     LIPID  09/01/2024     MICROALBUMIN   09/01/2024     FALL RISK ASSESSMENT  09/01/2024       Current Medications:     Current Outpatient Medications   Medication Sig Dispense Refill     dexAMETHasone (DECADRON) 4 MG tablet Take 2 tablets (8 mg) by mouth daily. Take for 3 days, starting the day after chemo. Take with food. Each cycle. 6 tablet 5     cyclobenzaprine (FLEXERIL) 5 MG tablet Take 1 tablet (5 mg) by mouth 3 times daily as needed for muscle spasms (neck pain) 10 tablet 0     lidocaine-prilocaine (EMLA) 2.5-2.5 % external cream Apply topically as needed (pain due to port access) Apply to port 30 minutes prior to lab appointment. 30 g 6     lisinopril (ZESTRIL) 20 MG tablet Take 1 tablet (20 mg) by mouth daily (Patient taking differently: Take 20 mg by mouth every evening) 90 tablet 4     magnesium oxide (MAG-OX) 400 MG tablet Take 1 tablet (400 mg) by mouth daily 30 tablet 0     mupirocin (BACTROBAN) 2 % external ointment Apply topically 3 times daily 30 g 0     ondansetron (ZOFRAN) 8 MG tablet Take 1 tablet (8 mg) by mouth every 8 hours as needed for nausea (vomiting) Do not take for 3 days after chemo. 30 tablet 2     prochlorperazine (COMPAZINE) 10 MG tablet Take 1 tablet (10 mg) by mouth every 6 hours as needed for nausea or vomiting 30 tablet 2     simvastatin (ZOCOR) 40 MG tablet Take 1 tablet (40 mg) by mouth At Bedtime 90 tablet 4     triamcinolone (KENALOG) 0.1 % external ointment Apply topically 2 times daily 30 g 0     VITAMIN D 1000 UNIT OR CAPS Take 1 capsule by mouth every morning           Allergies:        Allergies   Allergen Reactions     Hydrochlorothiazide      Triggered two gout attacks, no further problmes since discontinuing drug        Social History:     Social History     Tobacco Use     Smoking status: Never     Passive exposure: Never     Smokeless tobacco: Never   Substance Use Topics     Alcohol use: No       History   Drug Use No         Family History:     The patient's family history is notable for:    Family  "History   Problem Relation Age of Onset     Eye Disorder Mother         glaucoma     Heart Disease Mother          of CHF     Cancer Sister         uterine and ovarian-in remission      Other Cancer Sister      Deep Vein Thrombosis (DVT) Sister      Uterine Cancer Sister      Heart Disease Brother         valve replacement     Lipids Brother      Coronary Artery Disease Brother      Eye Disorder Brother         detached retina     Cancer Paternal Grandfather         carcinoma of the lip, pipe-smoker     Thyroid Disease Daughter         goiter or nodule?     Cancer Son         SKIN     Other Cancer Son      Other Cancer Other      Thyroid Disease Other      Diabetes No family hx of      C.A.D. No family hx of      Breast Cancer No family hx of      Cancer - colorectal No family hx of      Anesthesia Reaction No family hx of          Physical Exam:     Ht 1.638 m (5' 4.5\")   Wt 83 kg (183 lb)   LMP  (LMP Unknown)   BMI 30.93 kg/m    Body mass index is 30.93 kg/m .    General Appearance: alert, no distress    Eyes:  Eyes grossly normal to inspection.  No discharge or erythema, or obvious scleral/conjunctival abnormalities.    Respiratory: No audible wheeze, cough, or visible cyanosis.  No visible retractions or increased work of breathing.     Musculoskeletal: extremities non tender and without edema    Skin: no lesions or rashes on visible skin    Neurological: normal gait, no gross defects     Psychiatric: appropriate mood and affect. Mentation appears normal, affect normal/bright, judgement and insight intact, normal speech and appearance well-groomed                            The rest of a comprehensive physical examination is deferred due to video visit restrictions.     No results found for this or any previous visit (from the past 24 hour(s)).       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  She " presents for follow up and disease management by video.     40 minutes spent on the date of the encounter doing chart review, history and exam, documentation, and further activities as noted above.      Plan:     1.)        Endometrial cancer:  OK to proceed with planned treatment Thursday if labs are WDL.  RTC as scheduled for CT, follow up with Dr. Au, labs, and next cycle.  Reviewed signs and symptoms for when she should contact the clinic or seek additional care.  Patient to contact the clinic with any questions or concerns in the interim.       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  MLH1 promoter methylation was positive making La syndrome unlikely .     Labs and/or tests ordered include:  CBC. CMP. Mag. TSH reflex T4.                2.)        Health maintenance:  Issues addressed today include following up with PCP for annual health maintenance and non-gynecologic issues.      3.) Right leg swelling: Right calf swelling; unchanged. US 7/11/24 was negative for DVT.  Encouraged continuing to monitor and to notify the clinic of changes.     4.)        Diarrhea: Diarrhea with pure liquid stools initially now regular.  She has imodium which she used once or twice with improvement.  Encouraged to use imodium if needed to prevent dehydration.  Suspect the dehydration and low magnesium were from the diarrhea.  Discussed that if she was having a lot of cramping with the diarrhea that could have initiated the presyncopal/vasovagal episode.    5.) Vasovagal episode: Recent ED visit ultimately felt to be a vasovagal episode.  Encouraged adequate hydration.  As above the diarrhea she was having likely added to the dehydration and hypomagnesemia and possibly the vasovagal episode.  She should treat the diarrhea as above.  Encouraged following up with her PCP and cardiology as scheduled to rule out other causes.    Lindsay Mcguire, DNP, APRN, FNP-C,  AOP  Oncology Nurse Practitioner  Division of Gynecologic Oncology  Pager: [phone]     CC  Patient Care Team:  Yamile Cotter MD as PCP - General (Family Medicine)  Yamile Cotter MD as Assigned PCP  Amanda Au MD as MD (Gynecologic Oncology)  Amanda Au MD as Assigned Cancer Care Provider  Luma Griffin, RN as Clinic Care Coordinator (Hematology & Oncology)  Kami Story MD as MD (Cardiology)  AMANDA AU      Again, thank you for allowing me to participate in the care of your patient.        Sincerely,        ÓSCAR Toro CNP

## 2024-08-26 NOTE — NURSING NOTE
Patient confirms medications and allergies are accurate via patients echeck in completion, and or denies any changes since last reviewed/verified.       Current patient location:  @ [facility] (Daughter) dominique duran    Is the patient currently in the state of [place]? YES    Visit mode:VIDEO    If the visit is dropped, the patient can be reconnected by: VIDEO VISIT: Text to cell phone:   Telephone Information:   Mobile [phone]       Will anyone else be joining the visit? Marsha Altamirano (Daughter)  (If patient encounters technical issues they should call [phone] :[phone])    How would you like to obtain your AVS? MyChart    Are changes needed to the allergy or medication list? No    Are refills needed on medications prescribed by this physician? dexamethsone    Rooming Documentation:  Questionnaire(s) completed      Reason for visit: RECHECK    Luma TAMF

## 2024-08-28 ENCOUNTER — TELEPHONE (OUTPATIENT)
Dept: FAMILY MEDICINE | Facility: CLINIC | Age: 89
End: 2024-08-28
Payer: MEDICARE

## 2024-08-28 NOTE — TELEPHONE ENCOUNTER
MT referral from: [facility] visit (referral by provider)    MTM referral outreach attempt #1 on August 28, 2024 at 11:51 AM      Outcome: Spoke with patient cancel referral at this time per patient, has plenty of other appts will call when ready    Use vbc for the carrier/Plan on the flowsheet          TAMARA Navas   [phone]

## 2024-08-29 ENCOUNTER — INFUSION THERAPY VISIT (OUTPATIENT)
Dept: INFUSION THERAPY | Facility: HOSPITAL | Age: 89
End: 2024-08-29
Attending: OBSTETRICS & GYNECOLOGY
Payer: MEDICARE

## 2024-08-29 VITALS
WEIGHT: 187 LBS | SYSTOLIC BLOOD PRESSURE: 118 MMHG | OXYGEN SATURATION: 97 % | DIASTOLIC BLOOD PRESSURE: 65 MMHG | TEMPERATURE: 97.4 F | BODY MASS INDEX: 31.6 KG/M2 | HEART RATE: 98 BPM | RESPIRATION RATE: 16 BRPM

## 2024-08-29 DIAGNOSIS — E86.0 DEHYDRATION: ICD-10-CM

## 2024-08-29 DIAGNOSIS — C54.1 ENDOMETRIAL CANCER (H): ICD-10-CM

## 2024-08-29 DIAGNOSIS — C80.1 SOLID MALIGNANT NEOPLASM WITH HIGH-FREQUENCY MICROSATELLITE INSTABILITY (MSI-H) (H): Primary | ICD-10-CM

## 2024-08-29 LAB
ALBUMIN SERPL BCG-MCNC: 3.5 G/DL (ref 3.5–5.2)
ALP SERPL-CCNC: 58 U/L (ref 40–150)
ALT SERPL W P-5'-P-CCNC: 20 U/L (ref 0–50)
ANION GAP SERPL CALCULATED.3IONS-SCNC: 10 MMOL/L (ref 7–15)
AST SERPL W P-5'-P-CCNC: 22 U/L (ref 0–45)
BASOPHILS # BLD AUTO: 0.1 10E3/UL (ref 0–0.2)
BASOPHILS NFR BLD AUTO: 1 %
BILIRUB SERPL-MCNC: 0.2 MG/DL
BUN SERPL-MCNC: 21.9 MG/DL (ref 8–23)
CALCIUM SERPL-MCNC: 8.6 MG/DL (ref 8.8–10.4)
CHLORIDE SERPL-SCNC: 103 MMOL/L (ref 98–107)
CREAT SERPL-MCNC: 1.01 MG/DL (ref 0.51–0.95)
EGFRCR SERPLBLD CKD-EPI 2021: 54 ML/MIN/1.73M2
EOSINOPHIL # BLD AUTO: 0 10E3/UL (ref 0–0.7)
EOSINOPHIL NFR BLD AUTO: 0 %
ERYTHROCYTE [DISTWIDTH] IN BLOOD BY AUTOMATED COUNT: 14 % (ref 10–15)
GLUCOSE SERPL-MCNC: 133 MG/DL (ref 70–99)
HCO3 SERPL-SCNC: 26 MMOL/L (ref 22–29)
HCT VFR BLD AUTO: 39.9 % (ref 35–47)
HGB BLD-MCNC: 13.1 G/DL (ref 11.7–15.7)
IMM GRANULOCYTES # BLD: 0.1 10E3/UL
IMM GRANULOCYTES NFR BLD: 1 %
LYMPHOCYTES # BLD AUTO: 1.8 10E3/UL (ref 0.8–5.3)
LYMPHOCYTES NFR BLD AUTO: 23 %
MAGNESIUM SERPL-MCNC: 1.8 MG/DL (ref 1.7–2.3)
MCH RBC QN AUTO: 30.2 PG (ref 26.5–33)
MCHC RBC AUTO-ENTMCNC: 32.8 G/DL (ref 31.5–36.5)
MCV RBC AUTO: 92 FL (ref 78–100)
MONOCYTES # BLD AUTO: 0.8 10E3/UL (ref 0–1.3)
MONOCYTES NFR BLD AUTO: 10 %
NEUTROPHILS # BLD AUTO: 4.9 10E3/UL (ref 1.6–8.3)
NEUTROPHILS NFR BLD AUTO: 65 %
NRBC # BLD AUTO: 0 10E3/UL
NRBC BLD AUTO-RTO: 0 /100
PLATELET # BLD AUTO: 244 10E3/UL (ref 150–450)
POTASSIUM SERPL-SCNC: 4.4 MMOL/L (ref 3.4–5.3)
PROT SERPL-MCNC: 5.8 G/DL (ref 6.4–8.3)
RBC # BLD AUTO: 4.34 10E6/UL (ref 3.8–5.2)
SODIUM SERPL-SCNC: 139 MMOL/L (ref 135–145)
TSH SERPL DL<=0.005 MIU/L-ACNC: 0.57 UIU/ML (ref 0.3–4.2)
WBC # BLD AUTO: 7.6 10E3/UL (ref 4–11)

## 2024-08-29 PROCEDURE — 96375 TX/PRO/DX INJ NEW DRUG ADDON: CPT

## 2024-08-29 PROCEDURE — 36591 DRAW BLOOD OFF VENOUS DEVICE: CPT | Performed by: NURSE PRACTITIONER

## 2024-08-29 PROCEDURE — 96413 CHEMO IV INFUSION 1 HR: CPT

## 2024-08-29 PROCEDURE — 85025 COMPLETE CBC W/AUTO DIFF WBC: CPT | Performed by: NURSE PRACTITIONER

## 2024-08-29 PROCEDURE — 258N000003 HC RX IP 258 OP 636: Performed by: OBSTETRICS & GYNECOLOGY

## 2024-08-29 PROCEDURE — 96367 TX/PROPH/DG ADDL SEQ IV INF: CPT

## 2024-08-29 PROCEDURE — 250N000013 HC RX MED GY IP 250 OP 250 PS 637: Performed by: NURSE PRACTITIONER

## 2024-08-29 PROCEDURE — 83735 ASSAY OF MAGNESIUM: CPT | Performed by: NURSE PRACTITIONER

## 2024-08-29 PROCEDURE — 80053 COMPREHEN METABOLIC PANEL: CPT | Performed by: NURSE PRACTITIONER

## 2024-08-29 PROCEDURE — 96415 CHEMO IV INFUSION ADDL HR: CPT

## 2024-08-29 PROCEDURE — 258N000003 HC RX IP 258 OP 636: Performed by: NURSE PRACTITIONER

## 2024-08-29 PROCEDURE — 84443 ASSAY THYROID STIM HORMONE: CPT | Performed by: NURSE PRACTITIONER

## 2024-08-29 PROCEDURE — 250N000011 HC RX IP 250 OP 636: Performed by: OBSTETRICS & GYNECOLOGY

## 2024-08-29 PROCEDURE — 96417 CHEMO IV INFUS EACH ADDL SEQ: CPT

## 2024-08-29 PROCEDURE — 250N000011 HC RX IP 250 OP 636: Performed by: NURSE PRACTITIONER

## 2024-08-29 RX ORDER — HEPARIN SODIUM (PORCINE) LOCK FLUSH IV SOLN 100 UNIT/ML 100 UNIT/ML
5 SOLUTION INTRAVENOUS
Status: CANCELLED | OUTPATIENT
Start: 2024-08-29

## 2024-08-29 RX ORDER — ALBUTEROL SULFATE 90 UG/1
1-2 AEROSOL, METERED RESPIRATORY (INHALATION)
Status: DISCONTINUED | OUTPATIENT
Start: 2024-08-29 | End: 2024-08-29 | Stop reason: HOSPADM

## 2024-08-29 RX ORDER — EPINEPHRINE 1 MG/ML
0.3 INJECTION, SOLUTION INTRAMUSCULAR; SUBCUTANEOUS EVERY 5 MIN PRN
Status: CANCELLED | OUTPATIENT
Start: 2024-08-29

## 2024-08-29 RX ORDER — MEPERIDINE HYDROCHLORIDE 25 MG/ML
25 INJECTION INTRAMUSCULAR; INTRAVENOUS; SUBCUTANEOUS EVERY 30 MIN PRN
Status: DISCONTINUED | OUTPATIENT
Start: 2024-08-29 | End: 2024-08-29 | Stop reason: HOSPADM

## 2024-08-29 RX ORDER — ALBUTEROL SULFATE 0.83 MG/ML
2.5 SOLUTION RESPIRATORY (INHALATION)
Status: CANCELLED | OUTPATIENT
Start: 2024-08-29

## 2024-08-29 RX ORDER — ALBUTEROL SULFATE 90 UG/1
1-2 AEROSOL, METERED RESPIRATORY (INHALATION)
Status: CANCELLED
Start: 2024-08-29

## 2024-08-29 RX ORDER — MEPERIDINE HYDROCHLORIDE 25 MG/ML
25 INJECTION INTRAMUSCULAR; INTRAVENOUS; SUBCUTANEOUS EVERY 30 MIN PRN
Status: CANCELLED | OUTPATIENT
Start: 2024-08-29

## 2024-08-29 RX ORDER — DIPHENHYDRAMINE HYDROCHLORIDE 50 MG/ML
50 INJECTION INTRAMUSCULAR; INTRAVENOUS
Status: DISCONTINUED | OUTPATIENT
Start: 2024-08-29 | End: 2024-08-29 | Stop reason: HOSPADM

## 2024-08-29 RX ORDER — EPINEPHRINE 1 MG/ML
0.3 INJECTION, SOLUTION INTRAMUSCULAR; SUBCUTANEOUS EVERY 5 MIN PRN
Status: DISCONTINUED | OUTPATIENT
Start: 2024-08-29 | End: 2024-08-29 | Stop reason: HOSPADM

## 2024-08-29 RX ORDER — ONDANSETRON 2 MG/ML
8 INJECTION INTRAMUSCULAR; INTRAVENOUS EVERY 6 HOURS PRN
Status: CANCELLED
Start: 2024-08-29

## 2024-08-29 RX ORDER — DIPHENHYDRAMINE HCL 50 MG
50 CAPSULE ORAL ONCE
Status: COMPLETED | OUTPATIENT
Start: 2024-08-29 | End: 2024-08-29

## 2024-08-29 RX ORDER — METHYLPREDNISOLONE SODIUM SUCCINATE 125 MG/2ML
125 INJECTION, POWDER, LYOPHILIZED, FOR SOLUTION INTRAMUSCULAR; INTRAVENOUS
Status: CANCELLED
Start: 2024-08-29

## 2024-08-29 RX ORDER — PALONOSETRON 0.05 MG/ML
0.25 INJECTION, SOLUTION INTRAVENOUS ONCE
Status: COMPLETED | OUTPATIENT
Start: 2024-08-29 | End: 2024-08-29

## 2024-08-29 RX ORDER — HEPARIN SODIUM (PORCINE) LOCK FLUSH IV SOLN 100 UNIT/ML 100 UNIT/ML
5 SOLUTION INTRAVENOUS
Status: DISCONTINUED | OUTPATIENT
Start: 2024-08-29 | End: 2024-08-29 | Stop reason: HOSPADM

## 2024-08-29 RX ORDER — METHYLPREDNISOLONE SODIUM SUCCINATE 125 MG/2ML
125 INJECTION, POWDER, LYOPHILIZED, FOR SOLUTION INTRAMUSCULAR; INTRAVENOUS
Status: DISCONTINUED | OUTPATIENT
Start: 2024-08-29 | End: 2024-08-29 | Stop reason: HOSPADM

## 2024-08-29 RX ORDER — HEPARIN SODIUM,PORCINE 10 UNIT/ML
5-20 VIAL (ML) INTRAVENOUS DAILY PRN
Status: CANCELLED | OUTPATIENT
Start: 2024-08-29

## 2024-08-29 RX ORDER — ALBUTEROL SULFATE 0.83 MG/ML
2.5 SOLUTION RESPIRATORY (INHALATION)
Status: DISCONTINUED | OUTPATIENT
Start: 2024-08-29 | End: 2024-08-29 | Stop reason: HOSPADM

## 2024-08-29 RX ORDER — DIPHENHYDRAMINE HYDROCHLORIDE 50 MG/ML
50 INJECTION INTRAMUSCULAR; INTRAVENOUS
Status: CANCELLED
Start: 2024-08-29

## 2024-08-29 RX ADMIN — FAMOTIDINE 20 MG: 10 INJECTION, SOLUTION INTRAVENOUS at 10:29

## 2024-08-29 RX ADMIN — SODIUM CHLORIDE 250 ML: 9 INJECTION, SOLUTION INTRAVENOUS at 10:29

## 2024-08-29 RX ADMIN — PALONOSETRON 0.25 MG: 0.05 INJECTION, SOLUTION INTRAVENOUS at 10:29

## 2024-08-29 RX ADMIN — Medication 5 ML: at 15:15

## 2024-08-29 RX ADMIN — DIPHENHYDRAMINE HYDROCHLORIDE 50 MG: 50 CAPSULE ORAL at 10:29

## 2024-08-29 RX ADMIN — PACLITAXEL 348 MG: 6 INJECTION, SOLUTION INTRAVENOUS at 11:43

## 2024-08-29 RX ADMIN — SODIUM CHLORIDE 1000 ML: 9 INJECTION, SOLUTION INTRAVENOUS at 10:54

## 2024-08-29 RX ADMIN — DEXAMETHASONE SODIUM PHOSPHATE: 10 INJECTION, SOLUTION INTRAMUSCULAR; INTRAVENOUS at 10:43

## 2024-08-29 RX ADMIN — CARBOPLATIN 340 MG: 450 INJECTION, SOLUTION INTRAVENOUS at 14:44

## 2024-08-29 RX ADMIN — SODIUM CHLORIDE 500 MG: 9 INJECTION, SOLUTION INTRAVENOUS at 11:06

## 2024-08-29 ASSESSMENT — PAIN SCALES - GENERAL: PAINLEVEL: NO PAIN (0)

## 2024-08-29 NOTE — PROGRESS NOTES
Infusion Nursing Note:  Jen Layne presents today for C#3 D#1 treatment regimen.    Patient seen by provider today: No   present during visit today: Not Applicable.    Note: Patient assessed and vital signs stable. Administered premedications, extra fluids and treatment as ordered per provider. Tolerated well without issue. Jen will return next week for IV fluids.       Intravenous Access:  Labs drawn without difficulty.  Implanted Port.    Treatment Conditions:  Lab Results   Component Value Date    HGB 13.1 08/29/2024    WBC 7.6 08/29/2024    ANEU 5.7 08/14/2018    ANEUTAUTO 4.9 08/29/2024     08/29/2024        Lab Results   Component Value Date     08/29/2024    POTASSIUM 4.4 08/29/2024    MAG 1.8 08/29/2024    CR 1.01 (H) 08/29/2024    JOHN 8.6 (L) 08/29/2024    BILITOTAL 0.2 08/29/2024    ALBUMIN 3.5 08/29/2024    ALT 20 08/29/2024    AST 22 08/29/2024       Results reviewed, labs MET treatment parameters, ok to proceed with treatment.      Post Infusion Assessment:  Patient tolerated infusion without incident.  Blood return noted pre and post infusion.  Site patent and intact, free from redness, edema or discomfort.  No evidence of extravasations.  Access discontinued per protocol.       Discharge Plan:   Patient and/or family verbalized understanding of discharge instructions and all questions answered.  Patient discharged in stable condition accompanied by: daughter.  Departure Mode: Ambulatory.      JOSH VANCE RN

## 2024-09-05 ENCOUNTER — INFUSION THERAPY VISIT (OUTPATIENT)
Dept: INFUSION THERAPY | Facility: HOSPITAL | Age: 89
End: 2024-09-05
Attending: OBSTETRICS & GYNECOLOGY
Payer: MEDICARE

## 2024-09-05 VITALS
SYSTOLIC BLOOD PRESSURE: 95 MMHG | DIASTOLIC BLOOD PRESSURE: 56 MMHG | OXYGEN SATURATION: 98 % | TEMPERATURE: 97.8 F | RESPIRATION RATE: 16 BRPM

## 2024-09-05 DIAGNOSIS — E86.0 DEHYDRATION: Primary | ICD-10-CM

## 2024-09-05 DIAGNOSIS — C54.1 ENDOMETRIAL CANCER (H): ICD-10-CM

## 2024-09-05 PROCEDURE — 96374 THER/PROPH/DIAG INJ IV PUSH: CPT

## 2024-09-05 PROCEDURE — 250N000011 HC RX IP 250 OP 636: Performed by: OBSTETRICS & GYNECOLOGY

## 2024-09-05 PROCEDURE — 258N000003 HC RX IP 258 OP 636: Performed by: OBSTETRICS & GYNECOLOGY

## 2024-09-05 PROCEDURE — 96375 TX/PRO/DX INJ NEW DRUG ADDON: CPT

## 2024-09-05 RX ORDER — MEPERIDINE HYDROCHLORIDE 25 MG/ML
25 INJECTION INTRAMUSCULAR; INTRAVENOUS; SUBCUTANEOUS EVERY 30 MIN PRN
Status: CANCELLED | OUTPATIENT
Start: 2024-09-05

## 2024-09-05 RX ORDER — DIPHENHYDRAMINE HYDROCHLORIDE 50 MG/ML
50 INJECTION INTRAMUSCULAR; INTRAVENOUS
Status: CANCELLED
Start: 2024-09-05

## 2024-09-05 RX ORDER — ALBUTEROL SULFATE 0.83 MG/ML
2.5 SOLUTION RESPIRATORY (INHALATION)
Status: CANCELLED | OUTPATIENT
Start: 2024-09-05

## 2024-09-05 RX ORDER — HEPARIN SODIUM (PORCINE) LOCK FLUSH IV SOLN 100 UNIT/ML 100 UNIT/ML
5 SOLUTION INTRAVENOUS
Status: DISCONTINUED | OUTPATIENT
Start: 2024-09-05 | End: 2024-09-05 | Stop reason: HOSPADM

## 2024-09-05 RX ORDER — ONDANSETRON 2 MG/ML
8 INJECTION INTRAMUSCULAR; INTRAVENOUS EVERY 6 HOURS PRN
Status: DISCONTINUED | OUTPATIENT
Start: 2024-09-05 | End: 2024-09-05 | Stop reason: HOSPADM

## 2024-09-05 RX ORDER — EPINEPHRINE 1 MG/ML
0.3 INJECTION, SOLUTION INTRAMUSCULAR; SUBCUTANEOUS EVERY 5 MIN PRN
Status: CANCELLED | OUTPATIENT
Start: 2024-09-05

## 2024-09-05 RX ORDER — HEPARIN SODIUM,PORCINE 10 UNIT/ML
5-20 VIAL (ML) INTRAVENOUS DAILY PRN
Status: CANCELLED | OUTPATIENT
Start: 2024-09-05

## 2024-09-05 RX ORDER — HEPARIN SODIUM (PORCINE) LOCK FLUSH IV SOLN 100 UNIT/ML 100 UNIT/ML
5 SOLUTION INTRAVENOUS
Status: CANCELLED | OUTPATIENT
Start: 2024-09-05

## 2024-09-05 RX ORDER — ALBUTEROL SULFATE 90 UG/1
1-2 AEROSOL, METERED RESPIRATORY (INHALATION)
Status: CANCELLED
Start: 2024-09-05

## 2024-09-05 RX ORDER — ONDANSETRON 2 MG/ML
8 INJECTION INTRAMUSCULAR; INTRAVENOUS EVERY 6 HOURS PRN
Status: CANCELLED
Start: 2024-09-05

## 2024-09-05 RX ORDER — METHYLPREDNISOLONE SODIUM SUCCINATE 125 MG/2ML
125 INJECTION, POWDER, LYOPHILIZED, FOR SOLUTION INTRAMUSCULAR; INTRAVENOUS
Status: CANCELLED
Start: 2024-09-05

## 2024-09-05 RX ADMIN — Medication 5 ML: at 11:29

## 2024-09-05 RX ADMIN — ONDANSETRON 8 MG: 2 INJECTION INTRAMUSCULAR; INTRAVENOUS at 10:57

## 2024-09-05 RX ADMIN — PROCHLORPERAZINE EDISYLATE 5 MG: 5 INJECTION INTRAMUSCULAR; INTRAVENOUS at 10:59

## 2024-09-05 RX ADMIN — SODIUM CHLORIDE 1000 ML: 9 INJECTION, SOLUTION INTRAVENOUS at 10:29

## 2024-09-05 NOTE — PROGRESS NOTES
PT here ambulatory with assist for iv hydration. PT reports very weak, 'feel out of it, dizzy and feel discombulated'. Pt reports diarrhea for several days; is taking immodium but still having diarrhea. PT with mild nausea and no appetite. Port accessed and one liter of ns infused with zofran ivp and compazine ivp.

## 2024-09-05 NOTE — PROGRESS NOTES
Pt here ambulatory with assistance for iv hydration. PT states weakness ' dizzy, feel discombulated'. PT reports diarrhea for several days and is taking immodium. Recommended calling provider if she feels immodium not working. PT unable to eat. Port accessed and one liter ns infused and iv compazine and zofran given. Infusion completed and pt reports feeling better. Port flushed/deaccessed with 2x2 to site. Gave info on foods high in Magnesium also. PT dc'd steady gait with daughters. PT will start scheduling iv hydration in between treatments.

## 2024-09-06 ENCOUNTER — PATIENT OUTREACH (OUTPATIENT)
Dept: ONCOLOGY | Facility: HOSPITAL | Age: 89
End: 2024-09-06
Payer: MEDICARE

## 2024-09-06 DIAGNOSIS — R19.7 DIARRHEA, UNSPECIFIED TYPE: ICD-10-CM

## 2024-09-06 DIAGNOSIS — C54.1 ENDOMETRIAL CANCER (H): Primary | ICD-10-CM

## 2024-09-06 RX ORDER — DIPHENOXYLATE HCL/ATROPINE 2.5-.025/5
5 LIQUID (ML) ORAL 4 TIMES DAILY PRN
Qty: 120 ML | Refills: 0 | Status: SHIPPED | OUTPATIENT
Start: 2024-09-06 | End: 2024-09-24

## 2024-09-06 NOTE — PROGRESS NOTES
Call on nurse triage line from Marsha Mcguire, Jen's daughter, who reports that Jen is having severe diarrhea.     Return call to Marsha and Jen. Jen reports that she has had diarrhea for 5 days. Has been taking liquid imodium 30 mL with first stool and another 15 mL dose later in the day. Jen reports that she has had decreased oral intake because she is afraid it will cause more diarrhea. Only drinking about 12-16 oz of non- caffeine liquid per day. Was feeling dizzy yesterday but had IVF and dizziness resolved after infusion. Able to ambulate independently with stand by assist from family for safety. Jen reports generally feeling weaker than normal. Does state that she has a stomach ache, not particularly painful but gassy and sometimes nauseated. Has another infusion appointment set for IVF on 9/9/24.     Reviewed with Tori Johnson CNP. Tori states OK to send Lomotil Rx for persistent diarrhea.     Return call to Jen for further discussion. Jen said she has a BP cuff at home but has not been checking. Encouraged Jen to check her BP periodically, especially when feeling off. Jen does report  1 episode where she saw small amount of blood when wiping after BM. This occurred a few days ago and she has not seen any blood since. Jen denies fever or chills. Has had no additional ED visits. Reviewed increased fluid intake (Gatorade Zero or Body Armor), bland diet as tolerated, and when to call us or seek ER care. Reviewed going in to ED to be evaluated if she gets dizzy or lightheaded again .  Did ask about her Cardiology appointment which was cancelled. , Jen reports that appointment cancelled due to being sick from Chemotherapy, Jen and family state they will call to reschedule. Jen asks prescription for Lomotil be sent to [facility], prefers liquid medication when available because she has difficulty swallowing pills.    Update sent to Tori Johnson,  CNP.

## 2024-09-07 ENCOUNTER — HOSPITAL ENCOUNTER (EMERGENCY)
Facility: CLINIC | Age: 89
Discharge: HOME | End: 2024-09-07
Attending: FAMILY MEDICINE | Admitting: FAMILY MEDICINE
Payer: MEDICARE

## 2024-09-07 VITALS
TEMPERATURE: 97.2 F | HEART RATE: 70 BPM | HEIGHT: 64 IN | OXYGEN SATURATION: 98 % | DIASTOLIC BLOOD PRESSURE: 87 MMHG | WEIGHT: 175 LBS | RESPIRATION RATE: 18 BRPM | BODY MASS INDEX: 29.88 KG/M2 | SYSTOLIC BLOOD PRESSURE: 145 MMHG

## 2024-09-07 DIAGNOSIS — R19.7 DIARRHEA, UNSPECIFIED TYPE: ICD-10-CM

## 2024-09-07 DIAGNOSIS — E78.5 HYPERLIPIDEMIA LDL GOAL <100: ICD-10-CM

## 2024-09-07 LAB
ALBUMIN SERPL BCG-MCNC: 3.1 G/DL (ref 3.5–5.2)
ALP SERPL-CCNC: 34 U/L (ref 40–150)
ALT SERPL W P-5'-P-CCNC: 31 U/L (ref 0–50)
ANION GAP SERPL CALCULATED.3IONS-SCNC: 7 MMOL/L (ref 7–15)
AST SERPL W P-5'-P-CCNC: 29 U/L (ref 0–45)
BASOPHILS # BLD AUTO: 0 10E3/UL (ref 0–0.2)
BASOPHILS NFR BLD AUTO: 0 %
BILIRUB SERPL-MCNC: 0.4 MG/DL
BUN SERPL-MCNC: 24.7 MG/DL (ref 8–23)
CALCIUM SERPL-MCNC: 8.2 MG/DL (ref 8.8–10.4)
CHLORIDE SERPL-SCNC: 106 MMOL/L (ref 98–107)
CREAT SERPL-MCNC: 0.83 MG/DL (ref 0.51–0.95)
EGFRCR SERPLBLD CKD-EPI 2021: 68 ML/MIN/1.73M2
EOSINOPHIL # BLD AUTO: 0 10E3/UL (ref 0–0.7)
EOSINOPHIL NFR BLD AUTO: 1 %
ERYTHROCYTE [DISTWIDTH] IN BLOOD BY AUTOMATED COUNT: 13.4 % (ref 10–15)
GLUCOSE SERPL-MCNC: 113 MG/DL (ref 70–99)
HCO3 SERPL-SCNC: 26 MMOL/L (ref 22–29)
HCT VFR BLD AUTO: 34 % (ref 35–47)
HGB BLD-MCNC: 11.4 G/DL (ref 11.7–15.7)
IMM GRANULOCYTES # BLD: 0 10E3/UL
IMM GRANULOCYTES NFR BLD: 0 %
LYMPHOCYTES # BLD AUTO: 1.1 10E3/UL (ref 0.8–5.3)
LYMPHOCYTES NFR BLD AUTO: 41 %
MCH RBC QN AUTO: 31.4 PG (ref 26.5–33)
MCHC RBC AUTO-ENTMCNC: 33.5 G/DL (ref 31.5–36.5)
MCV RBC AUTO: 94 FL (ref 78–100)
MONOCYTES # BLD AUTO: 0.3 10E3/UL (ref 0–1.3)
MONOCYTES NFR BLD AUTO: 11 %
NEUTROPHILS # BLD AUTO: 1.2 10E3/UL (ref 1.6–8.3)
NEUTROPHILS NFR BLD AUTO: 46 %
NRBC # BLD AUTO: 0 10E3/UL
NRBC BLD AUTO-RTO: 0 /100
PLATELET # BLD AUTO: 147 10E3/UL (ref 150–450)
POTASSIUM SERPL-SCNC: 4.1 MMOL/L (ref 3.4–5.3)
PROT SERPL-MCNC: 5 G/DL (ref 6.4–8.3)
RBC # BLD AUTO: 3.63 10E6/UL (ref 3.8–5.2)
SODIUM SERPL-SCNC: 139 MMOL/L (ref 135–145)
WBC # BLD AUTO: 2.6 10E3/UL (ref 4–11)

## 2024-09-07 PROCEDURE — 99283 EMERGENCY DEPT VISIT LOW MDM: CPT | Mod: 25 | Performed by: FAMILY MEDICINE

## 2024-09-07 PROCEDURE — 36415 COLL VENOUS BLD VENIPUNCTURE: CPT | Performed by: FAMILY MEDICINE

## 2024-09-07 PROCEDURE — 99283 EMERGENCY DEPT VISIT LOW MDM: CPT | Performed by: FAMILY MEDICINE

## 2024-09-07 PROCEDURE — 80053 COMPREHEN METABOLIC PANEL: CPT | Performed by: FAMILY MEDICINE

## 2024-09-07 PROCEDURE — 96360 HYDRATION IV INFUSION INIT: CPT | Performed by: FAMILY MEDICINE

## 2024-09-07 PROCEDURE — 96361 HYDRATE IV INFUSION ADD-ON: CPT | Performed by: FAMILY MEDICINE

## 2024-09-07 PROCEDURE — 80061 LIPID PANEL: CPT

## 2024-09-07 PROCEDURE — 250N000011 HC RX IP 250 OP 636: Performed by: FAMILY MEDICINE

## 2024-09-07 PROCEDURE — 258N000003 HC RX IP 258 OP 636: Performed by: FAMILY MEDICINE

## 2024-09-07 PROCEDURE — 85025 COMPLETE CBC W/AUTO DIFF WBC: CPT | Performed by: FAMILY MEDICINE

## 2024-09-07 RX ORDER — HEPARIN SODIUM (PORCINE) LOCK FLUSH IV SOLN 100 UNIT/ML 100 UNIT/ML
5 SOLUTION INTRAVENOUS EVERY 8 HOURS
Status: DISCONTINUED | OUTPATIENT
Start: 2024-09-07 | End: 2024-09-07 | Stop reason: HOSPADM

## 2024-09-07 RX ADMIN — SODIUM CHLORIDE, POTASSIUM CHLORIDE, SODIUM LACTATE AND CALCIUM CHLORIDE 1000 ML: 600; 310; 30; 20 INJECTION, SOLUTION INTRAVENOUS at 09:53

## 2024-09-07 RX ADMIN — SODIUM CHLORIDE, PRESERVATIVE FREE 5 ML: 5 INJECTION INTRAVENOUS at 12:13

## 2024-09-07 ASSESSMENT — ACTIVITIES OF DAILY LIVING (ADL)
ADLS_ACUITY_SCORE: 35

## 2024-09-07 ASSESSMENT — COLUMBIA-SUICIDE SEVERITY RATING SCALE - C-SSRS
6. HAVE YOU EVER DONE ANYTHING, STARTED TO DO ANYTHING, OR PREPARED TO DO ANYTHING TO END YOUR LIFE?: NO
2. HAVE YOU ACTUALLY HAD ANY THOUGHTS OF KILLING YOURSELF IN THE PAST MONTH?: NO
1. IN THE PAST MONTH, HAVE YOU WISHED YOU WERE DEAD OR WISHED YOU COULD GO TO SLEEP AND NOT WAKE UP?: NO

## 2024-09-07 NOTE — ED NOTES
Discharge instructions reviewed in detail.  Pt and family verbalized understanding.  No further questions or concerns.

## 2024-09-07 NOTE — DISCHARGE INSTRUCTIONS
Return to be seen if you are unable to take food and fluids, the diarrhea becomes bloody, you develop significant pain, fevers, or other worrisome symptoms.

## 2024-09-07 NOTE — ED PROVIDER NOTES
History     Chief Complaint   Patient presents with    Diarrhea     Diarrhea for past several days, received IVF at infusion on thursday    Generalized Weakness     Weak/fatigued for past couple of days. Currently receiving treatment for endometrial CA     HPI    Jen Layne is a 88 year old female who comes in from home with her daughter with diarrhea.  She has been getting chemotherapy for recurrent endometrial cancer and had chemo on August 29, 2024.  She has been having diarrhea since then but became severe yesterday.  She is able to take food and fluids but would not take anything by mouth today because she was afraid it would cause more diarrhea.  She is taken 4 doses of Imodium without significant relief.  She has occasional cramping but no focal persistent abdominal pain.  She has not had vomiting or fevers.  She has not had exposures to people with diarrheal illness.  She has not had recent antibiotic exposure.  She had her third cycle of chemotherapy on August 29, 2024 and received dostarlimab, paclitaxel, carboplatin, in addition to the usual cocktail of antiemetics and rescue medications.  She received IV fluid hydration on September 5, 2 days ago.    Records reviewed: Infusion therapy visit August 29, 2024  Oncology clinic visit August 26, 2024 documenting her endometrial cancer recurrence in the vaginal cuff with suspected involvement of regional lymph nodes.  Emergency medicine visit [facility] August 19, 2024 where she was seen for near syncope and neck pain with extensive workup including CT angiogram of the head and neck, CT scan of the brain, and other studies all of which were reassuring and normal.  August 29, 2024, TSH normal, magnesium normal at 1.8, comprehensive metabolic profile normal with the exception of calcium of 8.6 consistent with prior values protein at 5.8.  CBC normal with hemoglobin 13.1 and white count of 7.6.    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) 06/28/2024     Priority: Medium    S/P hysterectomy 09/22/2023     Priority: Medium    Impaired fasting glucose 12/16/2011     Priority: Medium    Metabolic syndrome 12/16/2011     Priority: Medium    CKD (chronic kidney disease) stage 2, GFR 60-89 ml/min 11/16/2009     Priority: Medium     GFR in 50s 2008, 2009 -- diagnosed with CKD stage 3  Reduction likely due to obesity, hypertension, increasing age  Labs improved, eGFR >60 March 2011, December 2011 -- diagnosis of stage 3 changed  Component      Latest Ref Rng 12/9/2010 3/13/2011 12/15/2011 3/21/2013   GFR Estimate      >60 mL/min/1.7m2 53 (L) 61 63 75     Component      Latest Ref Rng 3/31/2014   GFR Estimate      >60 mL/min/1.7m2 69     Problem list name updated by automated process. Provider to review      Obesity (BMI 30-39.9) 10/30/2008     Priority: Medium     (Problem list name updated by automated process. 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) 6/28/2024       Past Surgical History:    Past Surgical History:   Procedure Laterality Date    COLONOSCOPY  12/05/05    normal, recheck 10 years -- done at [facility] HYSTERECTOMY TOTAL, BILATERAL SALPINGO-OOPHORECTOMY, NODE DISSECTION, COMBINED N/A 9/22/2023     Procedure: Robotic assisted hysterectomy, bilateral salpingo-oophorectomy, cancer staging, sentinel lymph node mapping and sampling, pelvic washings;  Surgeon: Amanda Au MD;  Location: [facility] OR    DILATION AND CURETTAGE, WITH ULTRASOUND GUIDANCE N/A 2023    Procedure: Pelvic examination under anesthesia, dilation and curettage uterus, ultrasound guidance Latex Free;  Surgeon: Amanda Au MD;  Location: [facility] OR    EXAM EYE  2018    Capsulotomy Left eye.   Dr. Skip Villela    EYE SURGERY      HC REMOVAL GALLBLADDER      HYSTEROSCOPY,DIAGNOSTIC  around     IR CHEST PORT PLACEMENT > 5 YRS OF AGE  2024       Family History:    Family History   Problem Relation Age of Onset    Eye Disorder Mother         glaucoma    Heart Disease Mother          of CHF    Cancer Sister         uterine and ovarian-in remission     Other Cancer Sister     Deep Vein Thrombosis (DVT) Sister     Uterine Cancer Sister     Heart Disease Brother         valve replacement    Lipids Brother     Coronary Artery Disease Brother     Eye Disorder Brother         detached retina    Cancer Paternal Grandfather         carcinoma of the lip, pipe-smoker    Thyroid Disease Daughter         goiter or nodule?    Cancer Son         SKIN    Other Cancer Son     Other Cancer Other     Thyroid Disease Other     Diabetes No family hx of     C.A.D. No family hx of     Breast Cancer No family hx of     Cancer - colorectal No family hx of     Anesthesia Reaction No family hx of        Social History:  Marital Status:   [5]  Social History     Tobacco Use    Smoking status: Never     Passive exposure: Never    Smokeless tobacco: Never   Vaping Use    Vaping status: Never Used   Substance Use Topics    Alcohol use: No    Drug use: No        Medications:    cyclobenzaprine (FLEXERIL) 5 MG tablet  dexAMETHasone (DECADRON) 4 MG tablet  diphenoxylate-atropine (LOMOTIL) 2.5-0.025 MG/5ML liquid  lidocaine-prilocaine (EMLA) 2.5-2.5 %  "external cream  lisinopril (ZESTRIL) 20 MG tablet  magnesium oxide (MAG-OX) 400 MG tablet  mupirocin (BACTROBAN) 2 % external ointment  ondansetron (ZOFRAN) 8 MG tablet  prochlorperazine (COMPAZINE) 10 MG tablet  simvastatin (ZOCOR) 40 MG tablet  triamcinolone (KENALOG) 0.1 % external ointment  VITAMIN D 1000 UNIT OR CAPS      Review of Systems  All other systems are reviewed and are negative    Physical Exam   BP: 131/80  Pulse: 93  Temp: 97.2  F (36.2  C)  Resp: 18  Height: 162.6 cm (5' 4\")  Weight: 79.4 kg (175 lb)  SpO2: 99 %      Physical Exam    Nursing note and vitals were reviewed.  Constitutional: Awake and alert, chronically ill but comfortable appearing 88-year-old in no apparent discomfort, who does not appear acutely ill, and who answers questions appropriately and cooperates with examination.  HEENT: Speech is fluent.  Voice quality is normal.  PERRL.  EOMI.   Neck: Freely mobile.  Cardiovascular: Cardiac examination reveals normal heart rate and regular rhythm without murmur.  Pulmonary/Chest: Breathing is unlabored.  Breath sounds are clear and equal bilaterally.  There no retractions, tachypnea, rales, wheezes, or rhonchi.  Abdomen: Soft, nontender, no HSM or masses rebound or guarding.  Musculoskeletal: Extremities are warm and well-perfused and without edema  Neurological: Alert, oriented, thought content logical, coherent   Skin: Warm, dry, no rashes.  Psychiatric: Affect broad and appropriate.    ED Course        Procedures              Critical Care time:  none               Results for orders placed or performed during the hospital encounter of 09/07/24 (from the past 24 hour(s))   CBC with platelets differential    Narrative    The following orders were created for panel order CBC with platelets differential.  Procedure                               Abnormality         Status                     ---------                               -----------         ------                     CBC with " platelets and d...[[id number]]  Abnormal            Final result                 Please view results for these tests on the individual orders.   Comprehensive metabolic panel   Result Value Ref Range    Sodium 139 135 - 145 mmol/L    Potassium 4.1 3.4 - 5.3 mmol/L    Carbon Dioxide (CO2) 26 22 - 29 mmol/L    Anion Gap 7 7 - 15 mmol/L    Urea Nitrogen 24.7 (H) 8.0 - 23.0 mg/dL    Creatinine 0.83 0.51 - 0.95 mg/dL    GFR Estimate 68 >60 mL/min/1.73m2    Calcium 8.2 (L) 8.8 - 10.4 mg/dL    Chloride 106 98 - 107 mmol/L    Glucose 113 (H) 70 - 99 mg/dL    Alkaline Phosphatase 34 (L) 40 - 150 U/L    AST 29 0 - 45 U/L    ALT 31 0 - 50 U/L    Protein Total 5.0 (L) 6.4 - 8.3 g/dL    Albumin 3.1 (L) 3.5 - 5.2 g/dL    Bilirubin Total 0.4 <=1.2 mg/dL   CBC with platelets and differential   Result Value Ref Range    WBC Count 2.6 (L) 4.0 - 11.0 10e3/uL    RBC Count 3.63 (L) 3.80 - 5.20 10e6/uL    Hemoglobin 11.4 (L) 11.7 - 15.7 g/dL    Hematocrit 34.0 (L) 35.0 - 47.0 %    MCV 94 78 - 100 fL    MCH 31.4 26.5 - 33.0 pg    MCHC 33.5 31.5 - 36.5 g/dL    RDW 13.4 10.0 - 15.0 %    Platelet Count 147 (L) 150 - 450 10e3/uL    % Neutrophils 46 %    % Lymphocytes 41 %    % Monocytes 11 %    % Eosinophils 1 %    % Basophils 0 %    % Immature Granulocytes 0 %    NRBCs per 100 WBC 0 <1 /100    Absolute Neutrophils 1.2 (L) 1.6 - 8.3 10e3/uL    Absolute Lymphocytes 1.1 0.8 - 5.3 10e3/uL    Absolute Monocytes 0.3 0.0 - 1.3 10e3/uL    Absolute Eosinophils 0.0 0.0 - 0.7 10e3/uL    Absolute Basophils 0.0 0.0 - 0.2 10e3/uL    Absolute Immature Granulocytes 0.0 <=0.4 10e3/uL    Absolute NRBCs 0.0 10e3/uL       Medications   heparin lock flush 100 unit/mL injection 5 mL (has no administration in time range)   lactated ringers BOLUS 1,000 mL (0 mLs Intravenous Stopped 9/7/24 1142)       Assessments & Plan (with Medical Decision Making)     88-year-old female undergoing chemotherapy for recurrent endometrial cancer presented with worsening diarrhea  beginning on her third cycle of chemo.  She had normal vital signs except for mild elevation of the blood pressure.  She had a benign abdominal examination.  She received IV fluids and had no diarrhea while in the emergency department.  She felt symptomatically improved.  Her laboratory studies were all reassuring with her CBC consistent with her prior values with mild leukopenia and anemia.  Her blood chemistries were all reassuring with elevation of the BUN consistent with dehydration and otherwise reassuring.  I discussed with her that the diarrhea is likely due to the chemotherapy and should improve.  She can use Imodium at nighttime to help control symptoms during the night although she has not been bothered by diarrhea during the night up till now.  Follow-up as planned and oncology.  She should return if she develops significant pain particularly focal persistent pain, vomiting, fevers, bloody diarrhea, or other significant symptoms.    I have reviewed the nursing notes.    I have reviewed the findings, diagnosis, plan and need for follow up with the patient.           Medical Decision Making  The patient's presentation was of moderate complexity (an acute illness with systemic symptoms).    The patient's evaluation involved:  review of external note(s) from 3+ sources (see separate area of note for details)  review of 3+ test result(s) ordered prior to this encounter (see separate area of note for details)  ordering and/or review of 3+ test(s) in this encounter (see separate area of note for details)    The patient's management necessitated moderate risk (parenteral hydration).        New Prescriptions    No medications on file       Final diagnoses:   Diarrhea, unspecified type       9/7/2024   [facility] EMERGENCY DEPT       Gurpreet oMscoso MD  09/07/24 0133

## 2024-09-07 NOTE — ED TRIAGE NOTES
Pt reports diarrhea for past several days and generalized weakness. Pt currently receiving treatment for endometrial CA. Pt had IVF on Thursday at [facility]. Pt also has been taking immodium and started loperamide yesterday (has taken 4 doses).      Triage Assessment (Adult)       Row Name 09/07/24 0923          Triage Assessment    Airway WDL WDL        Respiratory WDL    Respiratory WDL WDL        Skin Circulation/Temperature WDL    Skin Circulation/Temperature WDL WDL        Cardiac WDL    Cardiac WDL WDL        Cognitive/Neuro/Behavioral WDL    Cognitive/Neuro/Behavioral WDL WDL

## 2024-09-09 ENCOUNTER — INFUSION THERAPY VISIT (OUTPATIENT)
Dept: INFUSION THERAPY | Facility: HOSPITAL | Age: 89
End: 2024-09-09
Payer: MEDICARE

## 2024-09-09 ENCOUNTER — PATIENT OUTREACH (OUTPATIENT)
Dept: CARE COORDINATION | Facility: CLINIC | Age: 89
End: 2024-09-09

## 2024-09-09 VITALS
SYSTOLIC BLOOD PRESSURE: 134 MMHG | OXYGEN SATURATION: 99 % | DIASTOLIC BLOOD PRESSURE: 69 MMHG | RESPIRATION RATE: 18 BRPM | TEMPERATURE: 97.7 F | HEART RATE: 86 BPM

## 2024-09-09 DIAGNOSIS — E86.0 DEHYDRATION: Primary | ICD-10-CM

## 2024-09-09 DIAGNOSIS — C54.1 ENDOMETRIAL CANCER (H): ICD-10-CM

## 2024-09-09 PROCEDURE — 250N000011 HC RX IP 250 OP 636: Performed by: OBSTETRICS & GYNECOLOGY

## 2024-09-09 PROCEDURE — 96360 HYDRATION IV INFUSION INIT: CPT

## 2024-09-09 PROCEDURE — 258N000003 HC RX IP 258 OP 636: Performed by: OBSTETRICS & GYNECOLOGY

## 2024-09-09 RX ORDER — ALBUTEROL SULFATE 90 UG/1
1-2 AEROSOL, METERED RESPIRATORY (INHALATION)
Status: CANCELLED
Start: 2024-09-09

## 2024-09-09 RX ORDER — HEPARIN SODIUM,PORCINE 10 UNIT/ML
5-20 VIAL (ML) INTRAVENOUS DAILY PRN
Status: CANCELLED | OUTPATIENT
Start: 2024-09-09

## 2024-09-09 RX ORDER — HEPARIN SODIUM (PORCINE) LOCK FLUSH IV SOLN 100 UNIT/ML 100 UNIT/ML
5 SOLUTION INTRAVENOUS
Status: DISCONTINUED | OUTPATIENT
Start: 2024-09-09 | End: 2024-09-09 | Stop reason: HOSPADM

## 2024-09-09 RX ORDER — EPINEPHRINE 1 MG/ML
0.3 INJECTION, SOLUTION INTRAMUSCULAR; SUBCUTANEOUS EVERY 5 MIN PRN
Status: CANCELLED | OUTPATIENT
Start: 2024-09-09

## 2024-09-09 RX ORDER — METHYLPREDNISOLONE SODIUM SUCCINATE 125 MG/2ML
125 INJECTION, POWDER, LYOPHILIZED, FOR SOLUTION INTRAMUSCULAR; INTRAVENOUS
Status: CANCELLED
Start: 2024-09-09

## 2024-09-09 RX ORDER — ALBUTEROL SULFATE 0.83 MG/ML
2.5 SOLUTION RESPIRATORY (INHALATION)
Status: CANCELLED | OUTPATIENT
Start: 2024-09-09

## 2024-09-09 RX ORDER — ONDANSETRON 2 MG/ML
8 INJECTION INTRAMUSCULAR; INTRAVENOUS EVERY 6 HOURS PRN
Status: CANCELLED
Start: 2024-09-09

## 2024-09-09 RX ORDER — DIPHENHYDRAMINE HYDROCHLORIDE 50 MG/ML
50 INJECTION INTRAMUSCULAR; INTRAVENOUS
Status: CANCELLED
Start: 2024-09-09

## 2024-09-09 RX ORDER — HEPARIN SODIUM (PORCINE) LOCK FLUSH IV SOLN 100 UNIT/ML 100 UNIT/ML
5 SOLUTION INTRAVENOUS
Status: CANCELLED | OUTPATIENT
Start: 2024-09-09

## 2024-09-09 RX ORDER — MEPERIDINE HYDROCHLORIDE 25 MG/ML
25 INJECTION INTRAMUSCULAR; INTRAVENOUS; SUBCUTANEOUS EVERY 30 MIN PRN
Status: CANCELLED | OUTPATIENT
Start: 2024-09-09

## 2024-09-09 RX ADMIN — SODIUM CHLORIDE 1000 ML: 9 INJECTION, SOLUTION INTRAVENOUS at 09:05

## 2024-09-09 RX ADMIN — Medication 5 ML: at 10:12

## 2024-09-09 NOTE — PROGRESS NOTES
Infusion Nursing Note:  Jen Layne presents today for IV fluids.    Patient seen by provider today: No   present during visit today: Not Applicable.    Note: VSS.  Pt assessed.  She was in the ER over the weekend for dehydration d/t diarrhea and received IV fluids.  Diarrhea has become somewhat better since starting Lomotil.  IV fluids infused over 1 hour. Karla Johnson CNP notified of pt symptoms and no additional orders obtained.  Pt was educated by her advice to increase lean proteins or start a low glucose nutritional supplement. We also talked about BRAT diet as well as minimizing fruit juices as the sugar can pull fluid into he colon and increase diarrhea.       Intravenous Access:  Implanted Port.    Treatment Conditions:  Not Applicable.      Post Infusion Assessment:  Patient tolerated infusion without incident.  Site patent and intact, free from redness, edema or discomfort.  No evidence of extravasations.  Access discontinued per protocol.       Discharge Plan:   Patient discharged in stable condition accompanied by: family.  Departure Mode: Ambulatory.      María Bauer RN

## 2024-09-12 ENCOUNTER — PATIENT OUTREACH (OUTPATIENT)
Dept: ONCOLOGY | Facility: HOSPITAL | Age: 89
End: 2024-09-12

## 2024-09-12 ENCOUNTER — VIRTUAL VISIT (OUTPATIENT)
Dept: FAMILY MEDICINE | Facility: CLINIC | Age: 89
End: 2024-09-12
Payer: MEDICARE

## 2024-09-12 DIAGNOSIS — R55 SYNCOPE, UNSPECIFIED SYNCOPE TYPE: ICD-10-CM

## 2024-09-12 DIAGNOSIS — Z00.00 ENCOUNTER FOR MEDICARE ANNUAL WELLNESS EXAM: Primary | ICD-10-CM

## 2024-09-12 DIAGNOSIS — C54.1 ENDOMETRIAL CANCER (H): ICD-10-CM

## 2024-09-12 DIAGNOSIS — N18.2 CKD (CHRONIC KIDNEY DISEASE) STAGE 2, GFR 60-89 ML/MIN: ICD-10-CM

## 2024-09-12 DIAGNOSIS — I10 ESSENTIAL HYPERTENSION: ICD-10-CM

## 2024-09-12 DIAGNOSIS — E78.5 HYPERLIPIDEMIA LDL GOAL <100: ICD-10-CM

## 2024-09-12 LAB
CHOLEST SERPL-MCNC: 111 MG/DL
HDLC SERPL-MCNC: 35 MG/DL
LDLC SERPL CALC-MCNC: 41 MG/DL
NONHDLC SERPL-MCNC: 76 MG/DL
TRIGL SERPL-MCNC: 175 MG/DL

## 2024-09-12 PROCEDURE — 99214 OFFICE O/P EST MOD 30 MIN: CPT | Mod: 95 | Performed by: FAMILY MEDICINE

## 2024-09-12 PROCEDURE — G2211 COMPLEX E/M VISIT ADD ON: HCPCS | Mod: 95 | Performed by: FAMILY MEDICINE

## 2024-09-12 RX ORDER — LISINOPRIL 20 MG/1
20 TABLET ORAL DAILY
Qty: 90 TABLET | Refills: 4 | Status: SHIPPED | OUTPATIENT
Start: 2024-09-12

## 2024-09-12 RX ORDER — SIMVASTATIN 40 MG
40 TABLET ORAL AT BEDTIME
Qty: 90 TABLET | Refills: 4 | Status: SHIPPED | OUTPATIENT
Start: 2024-09-12

## 2024-09-12 NOTE — PROGRESS NOTES
Jen is a 88 year old who is being evaluated via a billable video visit.    How would you like to obtain your AVS? MyChart  If the video visit is dropped, the invitation should be resent by: Text to cell phone: [phone]  Will anyone else be joining your video visit? No      Assessment & Plan     Essential hypertension  Well controlled.  She reports feeling somewhat lightheaded dizzy and wobbly since starting chemotherapy.  She was wondering if this might be due to blood pressure medication dose being too high her blood pressure being too low.  None of her recent blood pressures look to be significantly hypotensive.  I suspect more likely related to chemo side effects.  She just had her last infusion so advised we wait a couple of weeks and see how she feels.  If she is still not feeling well and somewhat orthostatic then she could try reducing lisinopril to half tab daily for 2 weeks and seeing how she feels with that and monitoring home blood pressures during that time.  Recommendations provided in AVS.  - lisinopril (ZESTRIL) 20 MG tablet; Take 1 tablet (20 mg) by mouth daily.    Hyperlipidemia LDL goal <100  Well controlled. Refilled medication.     - simvastatin (ZOCOR) 40 MG tablet; Take 1 tablet (40 mg) by mouth at bedtime.  - Lipid panel reflex to direct LDL Non-fasting; Future    CKD (chronic kidney disease) stage 2, GFR 60-89 ml/min  Stable   - Albumin Random Urine Quantitative with Creat Ratio; Future    Syncope, unspecified syncope type  She also mentioned a syncopal episode that occurred in August.  She was seen in the ED for this.  They felt most likely vasovagal but she did have a slight bump in troponin.  Which remained stable.  EKG was unremarkable.  Head and neck CTA showed no hemodynamically significant stenosis of the brain or carotid arteries.  Given her and her family's concern for cardiovascular issues or complications related to chemotherapy they did schedule her for cardiology consult.  " In the interim we will also see if we can try to get an heart ultrasound prior to that appointment.  Unfortunately, the appointment is in a week and a half so will not have time to get a leadless EKG done prior but that might be reasonable additional workup as well.  - Echocardiogram Complete; Future    Endometrial cancer (H)  Just completed her final cycle of chemotherapy and has CT chest abdomen pelvis scheduled for next week to reassess.      The longitudinal plan of care for the diagnosis(es)/condition(s) as documented were addressed during this visit. Due to the added complexity in care, I will continue to support Jen in the subsequent management and with ongoing continuity of care.     Patient has been advised of split billing requirements and indicates understanding: Yes        BMI  Estimated body mass index is 30.04 kg/m  as calculated from the following:    Height as of 9/7/24: 1.626 m (5' 4\").    Weight as of 9/7/24: 79.4 kg (175 lb).       Counseling  Appropriate preventive services were addressed with this patient via screening, questionnaire, or discussion as appropriate for fall prevention, nutrition, physical activity, Tobacco-use cessation, social engagement, weight loss and cognition.  Checklist reviewing preventive services available has been given to the patient.  Reviewed patient's diet, addressing concerns and/or questions.   Discussed possible causes of fatigue. Updated plan of care.  Patient reported difficulty with activities of daily living were addressed today.Patient reported safety concerns were addressed today.Addressed any concerns about safety while driving.  The patient was provided with written information regarding signs of hearing loss.   Information on urinary incontinence and treatment options given to patient.   I have reviewed Opioid Use Disorder and Substance Use Disorder risk factors and made any needed referrals.           Racheal Li is a 88 year old, " presenting for the following health issues:  No chief complaint on file.        9/12/2024     1:08 PM   Additional Questions   Roomed by Veronica RUBI CMA       Video Start Time:  2:27pm    [facility]     Hypertension Follow-up    Do you check your blood pressure regularly outside of the clinic? Yes   Are you following a low salt diet? Yes  Are your blood pressures ever more than 140 on the top number (systolic) OR more   than 90 on the bottom number (diastolic), for example 140/90? No      ROS:   Constitutional, neuro, ENT, endocrine, pulmonary, cardiac, gastrointestinal, genitourinary, musculoskeletal, integument and psychiatric systems are negative, except as otherwise noted.       Objective           Vitals:  No vitals were obtained today due to virtual visit.    Physical Exam   GENERAL: alert and no distress  EYES: Eyes grossly normal to inspection.  No discharge or erythema, or obvious scleral/conjunctival abnormalities.  RESP: No audible wheeze, cough, or visible cyanosis.    SKIN: Visible skin clear. No significant rash, abnormal pigmentation or lesions.  NEURO: Cranial nerves grossly intact.  Mentation and speech appropriate for age.  PSYCH: Appropriate affect, tone, and pace of words          Video-Visit Details    Type of service:  Video Visit   Video End Time:2:49 PM  Originating Location (pt. Location): Home    Distant Location (provider location):  On-site  Platform used for Video Visit: Caterina  Signed Electronically by: Yamile Cotter MD

## 2024-09-12 NOTE — PATIENT INSTRUCTIONS
"You can try 1/2 tab of lisinopril x 2 weeks if symptoms not improving after chemo discontinuation.  If you do end up lowering lisinopril dose then message Dr. Cotter after 2 weeks and update with blood pressure and any improvement in symptoms.    * * *  If you have a MyChart account results will appear in your MyChart.  If you do not have a MyChart account results will be mailed or called to you.    Lab and imaging results are released \"real time\" into My Chart.  This may mean that you see the results before I have a chance to review them. My Chart will alert you again when I review the results and enter comments.  Sometimes with imaging or labs there may be serious or unexpected results. Critical results are paged to me or the after hours on-call provider so that they can be reviewed immediately.  This is not true of non-critical abnormal results. Unfortunately, this means that it's possible you may be alerted of a serious finding before I have a chance to review it.  If you ever receive a result that you are concerned about and I have not already contacted you, please feel free to reach out to me or the care team so that you get the answers you need. You can call the care team at [phone] and say \"Care Team\".    Additionally, it is my goal that you understand the care plan discussed at your visit and that any questions you have are answered.  Please feel free to reach out if you need clarification or explanation of any information addressed at your office visit.      "

## 2024-09-12 NOTE — PROGRESS NOTES
"[facility]: Cancer Care Follow-Up Note                                    Discussion with Patient:                                                      Call to Jen to see how she is feeling after ED visit over the weekend. Jen reports she is feeling better. Jen states her biggest complaint is that she feels weak and fatigued. Jen states that weakness is not worsening compared to how she felt last week but does state she wishes she were up walking around more. States that she has a walker at home if she feels she needs it but states she doesn't want to \"give up independence\". Encouraged Jen to be active as tolerated and rest as needed. Reviewed with Jen that she should continue to monitor for worsening or changing symptoms. Reviewed next scheduled appointments.      Dates of Treatment:                                                      Infusion given in last 28 days       Administered MAR Action Medication Dose Rate Visit    08/29/2024 11:06 New Bag dostarlimab-gxly (JEMPERLI) 500 mg in sodium chloride 0.9 % 120 mL infusion 500 mg 240 mL/hr Infusion Therapy Visit on 08/29/2024 in [facility]    08/29/2024 11:43 New Bag PACLitaxel (TAXOL) 348 mg in sodium chloride 0.9% in non-PVC container 608 mL infusion 348 mg 202.7 mL/hr Infusion Therapy Visit on 08/29/2024 in [facility]    08/29/2024 14:44 New Bag CARBOplatin 340 mg in sodium chloride 0.9 % 309 mL infusion 340 mg 618 mL/hr Infusion Therapy Visit on 08/29/2024 in [facility]            Assessment:                                                      NA    Intervention/Education provided during outreach:                                                       Encouraged Jen to contact clinic with any questions, concerns, new or worsening symptoms. Reviewed scheduled upcoming appointments.    Patient to follow up as scheduled at next " appt  Patient to call/MyChart message with updates  Confirmed patient has clinic and triage numbers    Signature:  Luma Griffin RN

## 2024-09-16 ENCOUNTER — INFUSION THERAPY VISIT (OUTPATIENT)
Dept: INFUSION THERAPY | Facility: HOSPITAL | Age: 89
End: 2024-09-16
Payer: MEDICARE

## 2024-09-16 VITALS
TEMPERATURE: 97.6 F | OXYGEN SATURATION: 96 % | DIASTOLIC BLOOD PRESSURE: 59 MMHG | SYSTOLIC BLOOD PRESSURE: 120 MMHG | RESPIRATION RATE: 16 BRPM | HEART RATE: 97 BPM

## 2024-09-16 DIAGNOSIS — C54.1 ENDOMETRIAL CANCER (H): ICD-10-CM

## 2024-09-16 DIAGNOSIS — E86.0 DEHYDRATION: Primary | ICD-10-CM

## 2024-09-16 PROCEDURE — 258N000003 HC RX IP 258 OP 636: Performed by: OBSTETRICS & GYNECOLOGY

## 2024-09-16 PROCEDURE — 250N000011 HC RX IP 250 OP 636: Performed by: OBSTETRICS & GYNECOLOGY

## 2024-09-16 PROCEDURE — 96360 HYDRATION IV INFUSION INIT: CPT

## 2024-09-16 RX ORDER — DIPHENHYDRAMINE HYDROCHLORIDE 50 MG/ML
50 INJECTION INTRAMUSCULAR; INTRAVENOUS
Status: CANCELLED
Start: 2024-09-16

## 2024-09-16 RX ORDER — METHYLPREDNISOLONE SODIUM SUCCINATE 125 MG/2ML
125 INJECTION, POWDER, LYOPHILIZED, FOR SOLUTION INTRAMUSCULAR; INTRAVENOUS
Status: CANCELLED
Start: 2024-09-16

## 2024-09-16 RX ORDER — MEPERIDINE HYDROCHLORIDE 25 MG/ML
25 INJECTION INTRAMUSCULAR; INTRAVENOUS; SUBCUTANEOUS EVERY 30 MIN PRN
Status: CANCELLED | OUTPATIENT
Start: 2024-09-16

## 2024-09-16 RX ORDER — ALBUTEROL SULFATE 90 UG/1
1-2 AEROSOL, METERED RESPIRATORY (INHALATION)
Status: CANCELLED
Start: 2024-09-16

## 2024-09-16 RX ORDER — EPINEPHRINE 1 MG/ML
0.3 INJECTION, SOLUTION INTRAMUSCULAR; SUBCUTANEOUS EVERY 5 MIN PRN
Status: CANCELLED | OUTPATIENT
Start: 2024-09-16

## 2024-09-16 RX ORDER — HEPARIN SODIUM (PORCINE) LOCK FLUSH IV SOLN 100 UNIT/ML 100 UNIT/ML
5 SOLUTION INTRAVENOUS
Status: CANCELLED | OUTPATIENT
Start: 2024-09-16

## 2024-09-16 RX ORDER — ALBUTEROL SULFATE 0.83 MG/ML
2.5 SOLUTION RESPIRATORY (INHALATION)
Status: CANCELLED | OUTPATIENT
Start: 2024-09-16

## 2024-09-16 RX ORDER — HEPARIN SODIUM (PORCINE) LOCK FLUSH IV SOLN 100 UNIT/ML 100 UNIT/ML
5 SOLUTION INTRAVENOUS
Status: DISCONTINUED | OUTPATIENT
Start: 2024-09-16 | End: 2024-09-16 | Stop reason: HOSPADM

## 2024-09-16 RX ORDER — HEPARIN SODIUM,PORCINE 10 UNIT/ML
5-20 VIAL (ML) INTRAVENOUS DAILY PRN
Status: CANCELLED | OUTPATIENT
Start: 2024-09-16

## 2024-09-16 RX ORDER — ONDANSETRON 2 MG/ML
8 INJECTION INTRAMUSCULAR; INTRAVENOUS EVERY 6 HOURS PRN
Status: CANCELLED
Start: 2024-09-16

## 2024-09-16 RX ADMIN — Medication 5 ML: at 10:00

## 2024-09-16 RX ADMIN — SODIUM CHLORIDE 1000 ML: 9 INJECTION, SOLUTION INTRAVENOUS at 09:02

## 2024-09-16 NOTE — PROGRESS NOTES
Pt here for IVF. Port accessed easily no blood return until completion of L NS. NS ran over 1 hour and port flushed and deaccessed upon completion. Pt d.c ambulatory to lobby with a family member. Pt aware of future appointments.

## 2024-09-19 ENCOUNTER — INFUSION THERAPY VISIT (OUTPATIENT)
Dept: INFUSION THERAPY | Facility: HOSPITAL | Age: 89
End: 2024-09-19
Payer: MEDICARE

## 2024-09-19 VITALS
RESPIRATION RATE: 96 BRPM | TEMPERATURE: 97.7 F | HEART RATE: 91 BPM | SYSTOLIC BLOOD PRESSURE: 131 MMHG | DIASTOLIC BLOOD PRESSURE: 66 MMHG

## 2024-09-19 DIAGNOSIS — E86.0 DEHYDRATION: Primary | ICD-10-CM

## 2024-09-19 DIAGNOSIS — C54.1 ENDOMETRIAL CANCER (H): ICD-10-CM

## 2024-09-19 PROCEDURE — 96360 HYDRATION IV INFUSION INIT: CPT

## 2024-09-19 PROCEDURE — 258N000003 HC RX IP 258 OP 636: Performed by: OBSTETRICS & GYNECOLOGY

## 2024-09-19 PROCEDURE — 250N000011 HC RX IP 250 OP 636: Performed by: OBSTETRICS & GYNECOLOGY

## 2024-09-19 RX ORDER — HEPARIN SODIUM,PORCINE 10 UNIT/ML
5-20 VIAL (ML) INTRAVENOUS DAILY PRN
Status: CANCELLED | OUTPATIENT
Start: 2024-09-19

## 2024-09-19 RX ORDER — ONDANSETRON 2 MG/ML
8 INJECTION INTRAMUSCULAR; INTRAVENOUS EVERY 6 HOURS PRN
Status: CANCELLED
Start: 2024-09-19

## 2024-09-19 RX ORDER — ALBUTEROL SULFATE 0.83 MG/ML
2.5 SOLUTION RESPIRATORY (INHALATION)
Status: CANCELLED | OUTPATIENT
Start: 2024-09-19

## 2024-09-19 RX ORDER — MEPERIDINE HYDROCHLORIDE 25 MG/ML
25 INJECTION INTRAMUSCULAR; INTRAVENOUS; SUBCUTANEOUS EVERY 30 MIN PRN
Status: CANCELLED | OUTPATIENT
Start: 2024-09-19

## 2024-09-19 RX ORDER — METHYLPREDNISOLONE SODIUM SUCCINATE 125 MG/2ML
125 INJECTION, POWDER, LYOPHILIZED, FOR SOLUTION INTRAMUSCULAR; INTRAVENOUS
Status: CANCELLED
Start: 2024-09-19

## 2024-09-19 RX ORDER — DIPHENHYDRAMINE HYDROCHLORIDE 50 MG/ML
50 INJECTION INTRAMUSCULAR; INTRAVENOUS
Status: CANCELLED
Start: 2024-09-19

## 2024-09-19 RX ORDER — HEPARIN SODIUM (PORCINE) LOCK FLUSH IV SOLN 100 UNIT/ML 100 UNIT/ML
5 SOLUTION INTRAVENOUS
Status: CANCELLED | OUTPATIENT
Start: 2024-09-19

## 2024-09-19 RX ORDER — HEPARIN SODIUM (PORCINE) LOCK FLUSH IV SOLN 100 UNIT/ML 100 UNIT/ML
5 SOLUTION INTRAVENOUS
Status: DISCONTINUED | OUTPATIENT
Start: 2024-09-19 | End: 2024-09-19 | Stop reason: HOSPADM

## 2024-09-19 RX ORDER — EPINEPHRINE 1 MG/ML
0.3 INJECTION, SOLUTION INTRAMUSCULAR; SUBCUTANEOUS EVERY 5 MIN PRN
Status: CANCELLED | OUTPATIENT
Start: 2024-09-19

## 2024-09-19 RX ORDER — ALBUTEROL SULFATE 90 UG/1
1-2 AEROSOL, METERED RESPIRATORY (INHALATION)
Status: CANCELLED
Start: 2024-09-19

## 2024-09-19 RX ADMIN — SODIUM CHLORIDE 1000 ML: 9 INJECTION, SOLUTION INTRAVENOUS at 09:13

## 2024-09-19 RX ADMIN — Medication 5 ML: at 10:14

## 2024-09-19 NOTE — PROGRESS NOTES
Pt arrives ambulatory with a walker, accompanied by her daughter. Confirms she is here for IV hydration only today. Pt denies any pain, new symptoms, or medication changes since last visit. VSS.   Port accessed and NS 1L infused as ordered. Pt tolerated well.  Port instilled with heparin and deaccessed. Pt dc'd to home, ambulatory with her daughter.

## 2024-09-20 ENCOUNTER — HOSPITAL ENCOUNTER (OUTPATIENT)
Dept: CT IMAGING | Facility: CLINIC | Age: 89
Discharge: HOME | End: 2024-09-20
Attending: NURSE PRACTITIONER | Admitting: NURSE PRACTITIONER
Payer: MEDICARE

## 2024-09-20 DIAGNOSIS — C54.1 ENDOMETRIAL CANCER (H): ICD-10-CM

## 2024-09-20 PROCEDURE — 250N000011 HC RX IP 250 OP 636: Performed by: NURSE PRACTITIONER

## 2024-09-20 PROCEDURE — 250N000011 HC RX IP 250 OP 636: Performed by: RADIOLOGY

## 2024-09-20 PROCEDURE — 250N000009 HC RX 250: Performed by: RADIOLOGY

## 2024-09-20 PROCEDURE — 71260 CT THORAX DX C+: CPT | Mod: MG

## 2024-09-20 RX ORDER — IOPAMIDOL 755 MG/ML
85 INJECTION, SOLUTION INTRAVASCULAR ONCE
Status: COMPLETED | OUTPATIENT
Start: 2024-09-20 | End: 2024-09-20

## 2024-09-20 RX ORDER — HEPARIN SODIUM (PORCINE) LOCK FLUSH IV SOLN 100 UNIT/ML 100 UNIT/ML
500 SOLUTION INTRAVENOUS ONCE
Status: COMPLETED | OUTPATIENT
Start: 2024-09-20 | End: 2024-09-20

## 2024-09-20 RX ADMIN — IOPAMIDOL 85 ML: 755 INJECTION, SOLUTION INTRAVENOUS at 09:10

## 2024-09-20 RX ADMIN — SODIUM CHLORIDE 61 ML: 9 INJECTION, SOLUTION INTRAVENOUS at 09:10

## 2024-09-20 RX ADMIN — Medication 500 UNITS: at 09:20

## 2024-09-23 ENCOUNTER — INFUSION THERAPY VISIT (OUTPATIENT)
Dept: INFUSION THERAPY | Facility: HOSPITAL | Age: 89
End: 2024-09-23
Payer: MEDICARE

## 2024-09-23 VITALS
SYSTOLIC BLOOD PRESSURE: 147 MMHG | OXYGEN SATURATION: 96 % | RESPIRATION RATE: 18 BRPM | DIASTOLIC BLOOD PRESSURE: 67 MMHG | TEMPERATURE: 97.5 F | HEART RATE: 89 BPM

## 2024-09-23 DIAGNOSIS — C54.1 ENDOMETRIAL CANCER (H): ICD-10-CM

## 2024-09-23 DIAGNOSIS — E86.0 DEHYDRATION: Primary | ICD-10-CM

## 2024-09-23 LAB
ALBUMIN SERPL BCG-MCNC: 3.6 G/DL (ref 3.5–5.2)
ALP SERPL-CCNC: 55 U/L (ref 40–150)
ALT SERPL W P-5'-P-CCNC: 27 U/L (ref 0–50)
ANION GAP SERPL CALCULATED.3IONS-SCNC: 7 MMOL/L (ref 7–15)
AST SERPL W P-5'-P-CCNC: 31 U/L (ref 0–45)
BILIRUB SERPL-MCNC: 0.2 MG/DL
BUN SERPL-MCNC: 21.1 MG/DL (ref 8–23)
CALCIUM SERPL-MCNC: 8.8 MG/DL (ref 8.8–10.4)
CHLORIDE SERPL-SCNC: 108 MMOL/L (ref 98–107)
CREAT SERPL-MCNC: 0.88 MG/DL (ref 0.51–0.95)
EGFRCR SERPLBLD CKD-EPI 2021: 63 ML/MIN/1.73M2
GLUCOSE SERPL-MCNC: 127 MG/DL (ref 70–99)
HCO3 SERPL-SCNC: 28 MMOL/L (ref 22–29)
MAGNESIUM SERPL-MCNC: 1.9 MG/DL (ref 1.7–2.3)
POTASSIUM SERPL-SCNC: 4.3 MMOL/L (ref 3.4–5.3)
PROT SERPL-MCNC: 5.8 G/DL (ref 6.4–8.3)
SODIUM SERPL-SCNC: 143 MMOL/L (ref 135–145)

## 2024-09-23 PROCEDURE — 36591 DRAW BLOOD OFF VENOUS DEVICE: CPT

## 2024-09-23 PROCEDURE — 96360 HYDRATION IV INFUSION INIT: CPT

## 2024-09-23 PROCEDURE — 258N000003 HC RX IP 258 OP 636: Performed by: OBSTETRICS & GYNECOLOGY

## 2024-09-23 PROCEDURE — 80053 COMPREHEN METABOLIC PANEL: CPT

## 2024-09-23 PROCEDURE — 83735 ASSAY OF MAGNESIUM: CPT

## 2024-09-23 PROCEDURE — 250N000011 HC RX IP 250 OP 636: Performed by: OBSTETRICS & GYNECOLOGY

## 2024-09-23 RX ORDER — HEPARIN SODIUM,PORCINE 10 UNIT/ML
5-20 VIAL (ML) INTRAVENOUS DAILY PRN
Status: CANCELLED | OUTPATIENT
Start: 2024-09-23

## 2024-09-23 RX ORDER — ALBUTEROL SULFATE 0.83 MG/ML
2.5 SOLUTION RESPIRATORY (INHALATION)
Status: CANCELLED | OUTPATIENT
Start: 2024-09-23

## 2024-09-23 RX ORDER — MEPERIDINE HYDROCHLORIDE 25 MG/ML
25 INJECTION INTRAMUSCULAR; INTRAVENOUS; SUBCUTANEOUS EVERY 30 MIN PRN
Status: CANCELLED | OUTPATIENT
Start: 2024-09-23

## 2024-09-23 RX ORDER — MEPERIDINE HYDROCHLORIDE 25 MG/ML
25 INJECTION INTRAMUSCULAR; INTRAVENOUS; SUBCUTANEOUS EVERY 30 MIN PRN
Status: DISCONTINUED | OUTPATIENT
Start: 2024-09-23 | End: 2024-09-23 | Stop reason: HOSPADM

## 2024-09-23 RX ORDER — EPINEPHRINE 1 MG/ML
0.3 INJECTION, SOLUTION INTRAMUSCULAR; SUBCUTANEOUS EVERY 5 MIN PRN
Status: DISCONTINUED | OUTPATIENT
Start: 2024-09-23 | End: 2024-09-23 | Stop reason: HOSPADM

## 2024-09-23 RX ORDER — HEPARIN SODIUM (PORCINE) LOCK FLUSH IV SOLN 100 UNIT/ML 100 UNIT/ML
5 SOLUTION INTRAVENOUS
Status: DISCONTINUED | OUTPATIENT
Start: 2024-09-23 | End: 2024-09-23 | Stop reason: HOSPADM

## 2024-09-23 RX ORDER — EPINEPHRINE 1 MG/ML
0.3 INJECTION, SOLUTION INTRAMUSCULAR; SUBCUTANEOUS EVERY 5 MIN PRN
Status: CANCELLED | OUTPATIENT
Start: 2024-09-23

## 2024-09-23 RX ORDER — ONDANSETRON 2 MG/ML
8 INJECTION INTRAMUSCULAR; INTRAVENOUS EVERY 6 HOURS PRN
Status: CANCELLED
Start: 2024-09-23

## 2024-09-23 RX ORDER — ALBUTEROL SULFATE 0.83 MG/ML
2.5 SOLUTION RESPIRATORY (INHALATION)
Status: DISCONTINUED | OUTPATIENT
Start: 2024-09-23 | End: 2024-09-23 | Stop reason: HOSPADM

## 2024-09-23 RX ORDER — DIPHENHYDRAMINE HYDROCHLORIDE 50 MG/ML
50 INJECTION INTRAMUSCULAR; INTRAVENOUS
Status: CANCELLED
Start: 2024-09-23

## 2024-09-23 RX ORDER — METHYLPREDNISOLONE SODIUM SUCCINATE 125 MG/2ML
125 INJECTION, POWDER, LYOPHILIZED, FOR SOLUTION INTRAMUSCULAR; INTRAVENOUS
Status: DISCONTINUED | OUTPATIENT
Start: 2024-09-23 | End: 2024-09-23 | Stop reason: HOSPADM

## 2024-09-23 RX ORDER — HEPARIN SODIUM (PORCINE) LOCK FLUSH IV SOLN 100 UNIT/ML 100 UNIT/ML
5 SOLUTION INTRAVENOUS
Status: CANCELLED | OUTPATIENT
Start: 2024-09-23

## 2024-09-23 RX ORDER — DIPHENHYDRAMINE HYDROCHLORIDE 50 MG/ML
50 INJECTION INTRAMUSCULAR; INTRAVENOUS
Status: DISCONTINUED | OUTPATIENT
Start: 2024-09-23 | End: 2024-09-23 | Stop reason: HOSPADM

## 2024-09-23 RX ORDER — METHYLPREDNISOLONE SODIUM SUCCINATE 125 MG/2ML
125 INJECTION, POWDER, LYOPHILIZED, FOR SOLUTION INTRAMUSCULAR; INTRAVENOUS
Status: CANCELLED
Start: 2024-09-23

## 2024-09-23 RX ORDER — ALBUTEROL SULFATE 90 UG/1
1-2 AEROSOL, METERED RESPIRATORY (INHALATION)
Status: DISCONTINUED | OUTPATIENT
Start: 2024-09-23 | End: 2024-09-23 | Stop reason: HOSPADM

## 2024-09-23 RX ORDER — ALBUTEROL SULFATE 90 UG/1
1-2 AEROSOL, METERED RESPIRATORY (INHALATION)
Status: CANCELLED
Start: 2024-09-23

## 2024-09-23 RX ADMIN — SODIUM CHLORIDE 1000 ML: 9 INJECTION, SOLUTION INTRAVENOUS at 09:47

## 2024-09-23 RX ADMIN — Medication 5 ML: at 10:48

## 2024-09-23 NOTE — PROGRESS NOTES
Infusion Nursing Note:  Jen Layne presents today for IV fluids.    Patient seen by provider today: No   present during visit today: Not Applicable.    Note: Jen comes in today for IV fluids. She has had a rough week with diarrhea per the patient and daughters. Jen had labs sitting there for a mag and CMP so I drew them due to increase in diarrhea. They were happy that we were going to check. Port was accessed per protocol and labs were drawn. Port had great blood return throughout. Labs reviewed and Jen did not meet requirements for electrolyte replacement. IV fluids hung per order and then port flushed and heparinized per protocol. Val left ambulatory to the [facility] and plans on returning on 09/26/2024.      Intravenous Access:  Labs drawn without difficulty.  Peripheral IV placed.    Treatment Conditions:  Lab Results   Component Value Date     09/23/2024    POTASSIUM 4.3 09/23/2024    MAG 1.9 09/23/2024    CR 0.88 09/23/2024    JOHN 8.8 09/23/2024    BILITOTAL 0.2 09/23/2024    ALBUMIN 3.6 09/23/2024    ALT 27 09/23/2024    AST 31 09/23/2024       Results reviewed, labs MET treatment parameters, ok to proceed with treatment for IV fluids.  Results reviewed, labs did NOT meet treatment parameters: for electrolyte replacement.      Post Infusion Assessment:  Patient tolerated infusion without incident.  Blood return noted pre and post infusion.  Site patent and intact, free from redness, edema or discomfort.  No evidence of extravasations.  Access discontinued per protocol.       Discharge Plan:   Patient and/or family verbalized understanding of discharge instructions and all questions answered.  AVS to patient via [employer].  Patient will return 09/26/24 for next appointment.   Patient discharged in stable condition accompanied by: daughters.  Departure Mode: Ambulatory.      TACOS MANTILLA RN

## 2024-09-24 ENCOUNTER — VIRTUAL VISIT (OUTPATIENT)
Dept: ONCOLOGY | Facility: CLINIC | Age: 89
End: 2024-09-24
Attending: OBSTETRICS & GYNECOLOGY
Payer: MEDICARE

## 2024-09-24 ENCOUNTER — PATIENT OUTREACH (OUTPATIENT)
Dept: ONCOLOGY | Facility: CLINIC | Age: 89
End: 2024-09-24

## 2024-09-24 VITALS
HEIGHT: 65 IN | SYSTOLIC BLOOD PRESSURE: 146 MMHG | WEIGHT: 175 LBS | DIASTOLIC BLOOD PRESSURE: 93 MMHG | BODY MASS INDEX: 29.16 KG/M2

## 2024-09-24 DIAGNOSIS — R19.7 DIARRHEA, UNSPECIFIED TYPE: ICD-10-CM

## 2024-09-24 DIAGNOSIS — Z51.11 ENCOUNTER FOR ANTINEOPLASTIC CHEMOTHERAPY AND IMMUNOTHERAPY: ICD-10-CM

## 2024-09-24 DIAGNOSIS — C54.1 ENDOMETRIAL CANCER (H): ICD-10-CM

## 2024-09-24 DIAGNOSIS — Z51.12 ENCOUNTER FOR ANTINEOPLASTIC CHEMOTHERAPY AND IMMUNOTHERAPY: ICD-10-CM

## 2024-09-24 DIAGNOSIS — C54.1 ENDOMETRIAL CANCER (H): Primary | ICD-10-CM

## 2024-09-24 PROCEDURE — 99214 OFFICE O/P EST MOD 30 MIN: CPT | Mod: 95 | Performed by: OBSTETRICS & GYNECOLOGY

## 2024-09-24 PROCEDURE — G2211 COMPLEX E/M VISIT ADD ON: HCPCS | Mod: 95 | Performed by: OBSTETRICS & GYNECOLOGY

## 2024-09-24 RX ORDER — DIPHENOXYLATE HCL/ATROPINE 2.5-.025MG
TABLET ORAL
Qty: 30 TABLET | Refills: 0 | Status: SHIPPED | OUTPATIENT
Start: 2024-09-24

## 2024-09-24 ASSESSMENT — PAIN SCALES - GENERAL: PAINLEVEL: NO PAIN (0)

## 2024-09-24 NOTE — NURSING NOTE
Current patient location:  [id number] [place] [address]. [place] [address]    Is the patient currently in the state of [place]? YES    Visit mode:VIDEO    If the visit is dropped, the patient can be reconnected by:  [phone]    Will anyone else be joining the visit? NO  (If patient encounters technical issues they should call [phone]    How would you like to obtain your AVS? MyChart    Are changes needed to the allergy or medication list? Pt stated no changes to allergies and Pt stated no med changes    Are refills needed on medications prescribed by this physician? NO    Rooming Documentation:  Questionnaire(s) not done per department protocol Not applicable    Reason for visit: BETHANY TAMF

## 2024-09-24 NOTE — LETTER
9/24/2024      Jen Layne  [address]  [place] [address]      Dear Colleague,    Thank you for referring your patient, Jen Layne, to the [facility]. Please see a copy of my visit note below.    Virtual Visit Details    Type of service:  Video Visit     Originating Location (pt. 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  She presents for follow up and disease management by video.      Oncology History:  Initially, she was seen in the ED for intermittent vaginal bleeding x 6 wks. Was actually seen 2022 for hematuria.  At that time, underwent CT urogram 6/8/22 which showed no gross abnormalities to explain hematuria, was noted to have low-density thickening of endometrium.  Underwent a pelvic US on 7/8/22 which showed uterus at 6.8 x 4.6cm with a 3.2cm mass along endometrial canal possibly representing polyp or fibroid.  Never had any further follow-up.     She had a repeat pelvic US on 6/5/23 which showed diffusely thickened endometrium.  Had pelvic MRI  8.1 x 4.4 x 5.4 cm in long axis, short axis and transverse dimensions, respectively. An apparent heterogeneous mass-like structure is present in the anterior and right aspect of the uterus in the subendometrial or endometrial  region, measuring approximately 2.5 x 1.6 cm (series 18 image 33). This structure is not well-visualized on T1 weighted images due to artifact. It appears isointense to the uterine myometrium on T2-weighted images. Probable mildly heterogeneous  enhancement of this structure. A few subcentimeter low T2 signal regions in the anterior  uterus likely represent very small fibroids.  The endometrial stripe is not well defined due to motion artifact. It measures approximately 0.9 cm in dual-layer anteroposterior thickness. The endometrial region appears mildly irregular on the axial images (for example, series 6 image 15). It cannot be determined on the post contrast images if there is abnormal enhancement associated with the endometrium. Possible restricted diffusion within the region of endometrium.     6/7/23:  Seen in Gyn Onc. Unable to perform EMB due to stenosis     7/19/23:  EUA, D&C under US guidance.  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  MLH1 hypermethylation +     9/22/23:  Robotic assisted hysterectomy, bilateral salpingo-oophorectomy, cancer staging, sentinel lymph node mapping and sampling, pelvic washings.  Final pathology (reviewed): FIGO Grade 1 endometrioid TOMAS, 4.5cm greatest dimension, >50% myometrial invasion (13/22mm), superficial NOLA involvment, no LVSI, cytology negative, margins negative, LN negative. Final FIGO Stage IB Grade 1 endometrioid TOMAS,      Discussed at tumor board with recs for VBT versus observation. The patient saw Rad Onc and ultimately decided not to do XRT.     5/23/24: VAGINAL MUCOSA, BIOPSY:  -MODERATELY DIFFERENTIATED ADENOCARCINOMA  PAX8: Strongly and diffusely positive  P16 strongly positive, marking the majority of the columnar cells  Estrogen receptor: Positive (95% of cells, strong staining)  Cytokeratin-7: Strongly and diffusely positive  Cytokeratin-20: Negative  CDX2: Negative (absence of enteric differentiation)  The combined morphologic and immunophenotypic features of this lesion are compatible with the clinical history of endometrial carcinoma.     6/10/24: PET CT  IMPRESSION:  1.  Focal hypermetabolic uptake at the vaginal cuff compatible with biopsy-proven recurrent malignancy. FDG avid right pelvic lymph nodes are suspicious for man metastases.  2.  Subcentimeter  pulmonary nodules are below the resolution of PET. Attention on future oncologic imaging.     6/27/24:  Tumor board: Consensus/Management Options: As patient has good functional status and the pelvic lymph node is FDG avid, recommendation for chemotherapy + IO. Also can consider  trial. Discuss with patient for final treatment plan.      Plan: Carboplatin AUC 5, paclitaxel 175 mg/m2, and dorstarlimab 500 mg IV every 3 weeks x 3 cycles followed by CT and follow up with Dr. Au.     7/11/24: RLE US negative for DVT.  7/16/24: Port placement.  7/18/24: Cycle 1 carboplatin, paclitaxel, and dorstarlimab.  8/8/24: Cycle 2 carboplatin, paclitaxel, and dorstarlimab.  8/19/24: ED for presyncope and neck pain.  Work up negative.  8/29/24: Cycle 3 carboplatin, paclitaxel, and dorstarlimab.     9/7/24:  ED for diarrhea.    Was set up for outpatient IVF    9/20/24:  CT CAP: Images reviewed  IMPRESSION:  1.  Unremarkable CT appearance of the vaginal cuff. The known  biopsy-proven recurrence is not definitively identified.  2.  Decreased size of the right external iliac chain lymph nodes,  which were previously hypermetabolic. No progressive lymphadenopathy  otherwise.  3.  Unchanged solid pulmonary nodules measuring up to 0.5 cm.  Continued imaging surveillance is recommended.  4.  Nonobstructing left renal calculi.    Still dealing with weakness and diarrhea. Going in for regular IVF support.  Using anti-diarrheal medication but still an issue. Cancelled her cardiology evals.         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         Past Surgical History:    Past Surgical History:   Procedure Laterality Date     COLONOSCOPY  12/05/05    normal,  [place] 10 years [place] [facility] [place], BILATERAL SALPINGO-OOPHORECTOMY, NODE DISSECTION, COMBINED N/A 2023    Procedure: Robotic assisted hysterectomy, bilateral salpingo-oophorectomy, cancer staging, sentinel lymph node mapping and sampling, pelvic washings;  Surgeon: Amanda Au MD;  Location: [facility] OR     DILATION AND CURETTAGE, WITH ULTRASOUND GUIDANCE N/A 2023    Procedure: Pelvic examination under anesthesia, dilation and curettage uterus, ultrasound guidance Latex Free;  Surgeon: Amanda Au MD;  Location: [facility] OR     EXAM EYE  2018    Capsulotomy Left eye.   Dr. Skip Villela     EYE SURGERY       HC REMOVAL GALLBLADDER       HYSTEROSCOPY,DIAGNOSTIC  around      IR CHEST PORT PLACEMENT > 5 YRS OF AGE  2024         Health Maintenance:  Health Maintenance Due   Topic Date Due     RSV VACCINE (1 - 1-dose 75+ series) Never done     MICROALBUMIN  2024     INFLUENZA VACCINE (1) 2024     COVID-19 Vaccine (2024- season) 2024     MEDICARE ANNUAL WELLNESS VISIT  2024         Current Medications:     has a current medication list which includes the following prescription(s): cyclobenzaprine, dexamethasone, diphenoxylate-atropine, lidocaine-prilocaine, lisinopril, magnesium oxide, mupirocin, ondansetron, prochlorperazine, simvastatin, triamcinolone, and vitamin d.       Allergies:     Hydrochlorothiazide        Social History:     Social History     Tobacco Use     Smoking status: Never     Passive exposure: Never     Smokeless tobacco: Never   Substance Use Topics     Alcohol use: No       History   Drug Use No           Family History:     The patient's family history is notable for :.    Family History   Problem Relation Age of Onset     Eye Disorder Mother         glaucoma     Heart Disease Mother          of CHF     Cancer Sister         uterine and ovarian-in remission      Other Cancer Sister      Deep Vein  "Thrombosis (DVT) Sister      Uterine Cancer Sister      Heart Disease Brother         valve replacement     Lipids Brother      Coronary Artery Disease Brother      Eye Disorder Brother         detached retina     Cancer Paternal Grandfather         carcinoma of the lip, pipe-smoker     Thyroid Disease Daughter         goiter or nodule?     Cancer Son         SKIN     Other Cancer Son      Other Cancer Other      Thyroid Disease Other      Diabetes No family hx of      C.A.D. No family hx of      Breast Cancer No family hx of      Cancer - colorectal No family hx of      Anesthesia Reaction No family hx of          Physical Exam:     BP (!) 146/93   Ht 1.638 m (5' 4.5\")   Wt 79.4 kg (175 lb)   LMP  (LMP Unknown)   BMI 29.57 kg/m    Body mass index is 29.57 kg/m .    General Appearance: healthy and alert, no distress     Assessment:     Jen Layne is a 89 year old woman with a diagnosis of Stage IB Grade 1 endometrioid endometrial TOMAS, with vaginal cuff recurrence and local man disease, currently on chemotherapy        30 minutes spent on the date of the encounter doing chart review, history and exam, documentation and further activities as noted above     The longitudinal plan of care for the diagnosis(es)/condition(s) as documented were addressed during this visit. Due to the added complexity in care, I will continue to support Jen in the subsequent management and with ongoing continuity of care.      TT video 20 minutes     Plan:      1.)        FIGO Grade 1 endometrioid TOMAS:  Pathology previously reviewed in detail.  Tumor 4.5cm greatest dimension, >50% myometrial invasion (13/22mm), superficial NOLA involvment, no LVSI, cytology negative, margins negative, LN negative. Final FIGO Stage IB Grade 1 endometrioid TOMAS.  H-IR due to age, DOI.  Discussed options including observation, radiation (VBT).      Patient was seen by Rad Onc and ultimately elected for observation.       Was then seen for " bleeding with exam findings consistent with vaginal cuff recurrence.  This has now been biopsy proven and PET/CT scan supports vaginal cuff recurrence with likely right pelvic lymph node spread.     Discussed systemic treatment options including chemotherapy and possible vaginal cuff radiation depending on symptoms.     Tumor board review discussed with patient and her family. Recommend systemic chemotherapy with Carboplatin/Taxol and Dostarblimab followed by maintenance dostarlimab. Alternatively, discussed clinical trial and gave her information regarding .  The standard at this time would be chemo with IO which is what I recommended to patient.      Patient has now completed three cycles of chemotherapy with tumor response based on recent imaging.  However, has had progressive difficulty with tolerance with diarrhea, dehydration, ED visit, IVF infusions.  While typically would recommend additional three cycles followed by maintenance, patient is showing difficulty with tolerance and is requesting a treatment break.  She has PT/OT set up for the next month and she would like to reassess her status after the next month.    I think a treatment break at this time is warranted. I will see patient in one month for exam and discussion of further treatment.  Could consider extending break and if she develops future signs of progresssion,would then recommend .      In the interim, continue weekly labs and IVF infusion prn      Questions answered, patient and family expressed understanding of plan of care.     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        Amanda Au MD  Gynecologic Oncology  [facility]  CC  Patient Care Team:  Yamile Cotter MD as PCP - General (Family Medicine)  Yamile Cotter MD as Assigned PCP  Roe  MD Amanda as MD (Gynecologic Oncology)  Amanda Au MD as Assigned Cancer Care Provider  Luma Griffin RN as Clinic Care Coordinator (Hematology & Oncology)  Kami Story MD as MD (Cardiology)  SELF, REFERRED        Again, thank you for allowing me to participate in the care of your patient.        Sincerely,        Amanda Au MD

## 2024-09-24 NOTE — PATIENT INSTRUCTIONS
Continue weekly labs and IVF prn at infusion center    Return to see Roe at     Amanda Au MD  Gynecologic Oncology  [facility]

## 2024-09-24 NOTE — PROGRESS NOTES
Virtual Visit Details    Type of service:  Video Visit     Originating Location (pt. 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  She presents for follow up and disease management by video.      Oncology History:  Initially, she was seen in the ED for intermittent vaginal bleeding x 6 wks. Was actually seen 2022 for hematuria.  At that time, underwent CT urogram 6/8/22 which showed no gross abnormalities to explain hematuria, was noted to have low-density thickening of endometrium.  Underwent a pelvic US on 7/8/22 which showed uterus at 6.8 x 4.6cm with a 3.2cm mass along endometrial canal possibly representing polyp or fibroid.  Never had any further follow-up.     She had a repeat pelvic US on 6/5/23 which showed diffusely thickened endometrium.  Had pelvic MRI  8.1 x 4.4 x 5.4 cm in long axis, short axis and transverse dimensions, respectively. An apparent heterogeneous mass-like structure is present in the anterior and right aspect of the uterus in the subendometrial or endometrial  region, measuring approximately 2.5 x 1.6 cm (series 18 image 33). This structure is not well-visualized on T1 weighted images due to artifact. It appears isointense to the uterine myometrium on T2-weighted images. Probable mildly heterogeneous  enhancement of this structure. A few subcentimeter low T2 signal regions in the anterior uterus likely represent very small fibroids.  The endometrial stripe is not well defined due to motion artifact. It measures approximately 0.9 cm in dual-layer anteroposterior thickness. The endometrial region appears mildly irregular on the axial images  (for example, series 6 image 15). It cannot be determined on the post contrast images if there is abnormal enhancement associated with the endometrium. Possible restricted diffusion within the region of endometrium.     6/7/23:  Seen in Gyn Onc. Unable to perform EMB due to stenosis     7/19/23:  EUA, D&C under US guidance.  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  MLH1 hypermethylation +     9/22/23:  Robotic assisted hysterectomy, bilateral salpingo-oophorectomy, cancer staging, sentinel lymph node mapping and sampling, pelvic washings.  Final pathology (reviewed): FIGO Grade 1 endometrioid TOMAS, 4.5cm greatest dimension, >50% myometrial invasion (13/22mm), superficial NOLA involvment, no LVSI, cytology negative, margins negative, LN negative. Final FIGO Stage IB Grade 1 endometrioid TOMAS,      Discussed at tumor board with recs for VBT versus observation. The patient saw Rad Onc and ultimately decided not to do XRT.     5/23/24: VAGINAL MUCOSA, BIOPSY:  -MODERATELY DIFFERENTIATED ADENOCARCINOMA  PAX8: Strongly and diffusely positive  P16 strongly positive, marking the majority of the columnar cells  Estrogen receptor: Positive (95% of cells, strong staining)  Cytokeratin-7: Strongly and diffusely positive  Cytokeratin-20: Negative  CDX2: Negative (absence of enteric differentiation)  The combined morphologic and immunophenotypic features of this lesion are compatible with the clinical history of endometrial carcinoma.     6/10/24: PET CT  IMPRESSION:  1.  Focal hypermetabolic uptake at the vaginal cuff compatible with biopsy-proven recurrent malignancy. FDG avid right pelvic lymph nodes are suspicious for man metastases.  2.  Subcentimeter pulmonary nodules are below the resolution of PET. Attention on future oncologic imaging.     6/27/24:  Tumor board: Consensus/Management Options: As patient has good functional status and the pelvic lymph node is FDG avid, recommendation for chemotherapy +  IO. Also can consider  trial. Discuss with patient for final treatment plan.      Plan: Carboplatin AUC 5, paclitaxel 175 mg/m2, and dorstarlimab 500 mg IV every 3 weeks x 3 cycles followed by CT and follow up with Dr. Au.     7/11/24: RLE US negative for DVT.  7/16/24: Port placement.  7/18/24: Cycle 1 carboplatin, paclitaxel, and dorstarlimab.  8/8/24: Cycle 2 carboplatin, paclitaxel, and dorstarlimab.  8/19/24: ED for presyncope and neck pain.  Work up negative.  8/29/24: Cycle 3 carboplatin, paclitaxel, and dorstarlimab.     9/7/24:  ED for diarrhea.    Was set up for outpatient IVF    9/20/24:  CT CAP: Images reviewed  IMPRESSION:  1.  Unremarkable CT appearance of the vaginal cuff. The known  biopsy-proven recurrence is not definitively identified.  2.  Decreased size of the right external iliac chain lymph nodes,  which were previously hypermetabolic. No progressive lymphadenopathy  otherwise.  3.  Unchanged solid pulmonary nodules measuring up to 0.5 cm.  Continued imaging surveillance is recommended.  4.  Nonobstructing left renal calculi.    Still dealing with weakness and diarrhea. Going in for regular IVF support.  Using anti-diarrheal medication but still an issue. Cancelled her cardiology evals.         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         Past Surgical History:    Past Surgical History:   Procedure Laterality Date    COLONOSCOPY  12/05/05    normal, recheck 10 years -- done at [facility] HYSTERECTOMY TOTAL, BILATERAL SALPINGO-OOPHORECTOMY, NODE DISSECTION, COMBINED N/A 9/22/2023    Procedure: Robotic assisted hysterectomy, bilateral salpingo-oophorectomy, cancer staging, sentinel lymph node mapping and  sampling, pelvic washings;  Surgeon: Amanda Au MD;  Location: [facility] OR    DILATION AND CURETTAGE, WITH ULTRASOUND GUIDANCE N/A 2023    Procedure: Pelvic examination under anesthesia, dilation and curettage uterus, ultrasound guidance Latex Free;  Surgeon: Amanda Au MD;  Location: [facility] OR    EXAM EYE  2018    Capsulotomy Left eye.   Dr. Skip Villela    EYE SURGERY      HC REMOVAL GALLBLADDER      HYSTEROSCOPY,DIAGNOSTIC  around     IR CHEST PORT PLACEMENT > 5 YRS OF AGE  2024         Health Maintenance:  Health Maintenance Due   Topic Date Due    RSV VACCINE (1 - 1-dose 75+ series) Never done    MICROALBUMIN  2024    INFLUENZA VACCINE (1) 2024    COVID-19 Vaccine (2024- season) 2024    MEDICARE ANNUAL WELLNESS VISIT  2024         Current Medications:     has a current medication list which includes the following prescription(s): cyclobenzaprine, dexamethasone, diphenoxylate-atropine, lidocaine-prilocaine, lisinopril, magnesium oxide, mupirocin, ondansetron, prochlorperazine, simvastatin, triamcinolone, and vitamin d.       Allergies:     Hydrochlorothiazide        Social History:     Social History     Tobacco Use    Smoking status: Never     Passive exposure: Never    Smokeless tobacco: Never   Substance Use Topics    Alcohol use: No       History   Drug Use No           Family History:     The patient's family history is notable for :.    Family History   Problem Relation Age of Onset    Eye Disorder Mother         glaucoma    Heart Disease Mother          of CHF    Cancer Sister         uterine and ovarian-in remission     Other Cancer Sister     Deep Vein Thrombosis (DVT) Sister     Uterine Cancer Sister     Heart Disease Brother         valve replacement    Lipids Brother     Coronary Artery Disease Brother     Eye Disorder Brother         detached retina    Cancer Paternal Grandfather         carcinoma of the lip, pipe-smoker    Thyroid Disease  "Daughter         goiter or nodule?    Cancer Son         SKIN    Other Cancer Son     Other Cancer Other     Thyroid Disease Other     Diabetes No family hx of     C.A.D. No family hx of     Breast Cancer No family hx of     Cancer - colorectal No family hx of     Anesthesia Reaction No family hx of          Physical Exam:     BP (!) 146/93   Ht 1.638 m (5' 4.5\")   Wt 79.4 kg (175 lb)   LMP  (LMP Unknown)   BMI 29.57 kg/m    Body mass index is 29.57 kg/m .    General Appearance: healthy and alert, no distress     Assessment:     Jen Layne is a 89 year old woman with a diagnosis of Stage IB Grade 1 endometrioid endometrial TOMAS, with vaginal cuff recurrence and local man disease, currently on chemotherapy        30 minutes spent on the date of the encounter doing chart review, history and exam, documentation and further activities as noted above     The longitudinal plan of care for the diagnosis(es)/condition(s) as documented were addressed during this visit. Due to the added complexity in care, I will continue to support Jen in the subsequent management and with ongoing continuity of care.      TT video 20 minutes     Plan:      1.)        FIGO Grade 1 endometrioid TOMAS:  Pathology previously reviewed in detail.  Tumor 4.5cm greatest dimension, >50% myometrial invasion (13/22mm), superficial NOLA involvment, no LVSI, cytology negative, margins negative, LN negative. Final FIGO Stage IB Grade 1 endometrioid TOMAS.  H-IR due to age, DOI.  Discussed options including observation, radiation (VBT).      Patient was seen by Rad Onc and ultimately elected for observation.       Was then seen for bleeding with exam findings consistent with vaginal cuff recurrence.  This has now been biopsy proven and PET/CT scan supports vaginal cuff recurrence with likely right pelvic lymph node spread.     Discussed systemic treatment options including chemotherapy and possible vaginal cuff radiation depending on symptoms.   "   Tumor board review discussed with patient and her family. Recommend systemic chemotherapy with Carboplatin/Taxol and Dostarblimab followed by maintenance dostarlimab. Alternatively, discussed clinical trial and gave her information regarding .  The standard at this time would be chemo with IO which is what I recommended to patient.      Patient has now completed three cycles of chemotherapy with tumor response based on recent imaging.  However, has had progressive difficulty with tolerance with diarrhea, dehydration, ED visit, IVF infusions.  While typically would recommend additional three cycles followed by maintenance, patient is showing difficulty with tolerance and is requesting a treatment break.  She has PT/OT set up for the next month and she would like to reassess her status after the next month.    I think a treatment break at this time is warranted. I will see patient in one month for exam and discussion of further treatment.  Could consider extending break and if she develops future signs of progresssion,would then recommend .      In the interim, continue weekly labs and IVF infusion prn      Questions answered, patient and family expressed understanding of plan of care.     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        Amanda Au MD  Gynecologic Oncology  [facility]  CC  Patient Care Team:  Yamile Cotter MD as PCP - General (Family Medicine)  Yamile Cotter MD as Assigned PCP  Amanda Au MD as MD (Gynecologic Oncology)  Amanda Au MD as Assigned Cancer Care Provider  Luma Griffin, RN as Clinic Care Coordinator (Hematology & Oncology)  Kami Story MD as MD (Cardiology)  SELF, REFERRED

## 2024-09-24 NOTE — PROGRESS NOTES
RN reached out to pharmacy to assure refill for the diphenoxylate-atropine (LOMOTIL)  went through     Pharmacy stated they have this and they see no issues     Pily Hines RN

## 2024-09-30 ENCOUNTER — LAB (OUTPATIENT)
Dept: INFUSION THERAPY | Facility: HOSPITAL | Age: 89
End: 2024-09-30
Attending: OBSTETRICS & GYNECOLOGY
Payer: MEDICARE

## 2024-09-30 VITALS
DIASTOLIC BLOOD PRESSURE: 68 MMHG | SYSTOLIC BLOOD PRESSURE: 101 MMHG | TEMPERATURE: 97.3 F | HEART RATE: 98 BPM | RESPIRATION RATE: 16 BRPM | OXYGEN SATURATION: 97 %

## 2024-09-30 DIAGNOSIS — C54.1 ENDOMETRIAL CANCER (H): ICD-10-CM

## 2024-09-30 DIAGNOSIS — E86.0 DEHYDRATION: Primary | ICD-10-CM

## 2024-09-30 LAB
ALBUMIN SERPL BCG-MCNC: 3.5 G/DL (ref 3.5–5.2)
ALP SERPL-CCNC: 59 U/L (ref 40–150)
ALT SERPL W P-5'-P-CCNC: 21 U/L (ref 0–50)
ANION GAP SERPL CALCULATED.3IONS-SCNC: 10 MMOL/L (ref 7–15)
AST SERPL W P-5'-P-CCNC: 27 U/L (ref 0–45)
BASOPHILS # BLD AUTO: 0.1 10E3/UL (ref 0–0.2)
BASOPHILS NFR BLD AUTO: 1 %
BILIRUB SERPL-MCNC: 0.3 MG/DL
BUN SERPL-MCNC: 19.6 MG/DL (ref 8–23)
CALCIUM SERPL-MCNC: 8.5 MG/DL (ref 8.8–10.4)
CHLORIDE SERPL-SCNC: 107 MMOL/L (ref 98–107)
CREAT SERPL-MCNC: 0.86 MG/DL (ref 0.51–0.95)
EGFRCR SERPLBLD CKD-EPI 2021: 65 ML/MIN/1.73M2
EOSINOPHIL # BLD AUTO: 0.1 10E3/UL (ref 0–0.7)
EOSINOPHIL NFR BLD AUTO: 2 %
ERYTHROCYTE [DISTWIDTH] IN BLOOD BY AUTOMATED COUNT: 15.2 % (ref 10–15)
GLUCOSE SERPL-MCNC: 156 MG/DL (ref 70–99)
HCO3 SERPL-SCNC: 26 MMOL/L (ref 22–29)
HCT VFR BLD AUTO: 38.8 % (ref 35–47)
HGB BLD-MCNC: 12.6 G/DL (ref 11.7–15.7)
IMM GRANULOCYTES # BLD: 0 10E3/UL
IMM GRANULOCYTES NFR BLD: 1 %
LYMPHOCYTES # BLD AUTO: 1.3 10E3/UL (ref 0.8–5.3)
LYMPHOCYTES NFR BLD AUTO: 26 %
MCH RBC QN AUTO: 30.7 PG (ref 26.5–33)
MCHC RBC AUTO-ENTMCNC: 32.5 G/DL (ref 31.5–36.5)
MCV RBC AUTO: 94 FL (ref 78–100)
MONOCYTES # BLD AUTO: 0.7 10E3/UL (ref 0–1.3)
MONOCYTES NFR BLD AUTO: 13 %
NEUTROPHILS # BLD AUTO: 2.8 10E3/UL (ref 1.6–8.3)
NEUTROPHILS NFR BLD AUTO: 57 %
NRBC # BLD AUTO: 0 10E3/UL
NRBC BLD AUTO-RTO: 0 /100
PLATELET # BLD AUTO: 312 10E3/UL (ref 150–450)
POTASSIUM SERPL-SCNC: 4.4 MMOL/L (ref 3.4–5.3)
PROT SERPL-MCNC: 5.8 G/DL (ref 6.4–8.3)
RBC # BLD AUTO: 4.11 10E6/UL (ref 3.8–5.2)
SODIUM SERPL-SCNC: 143 MMOL/L (ref 135–145)
WBC # BLD AUTO: 5 10E3/UL (ref 4–11)

## 2024-09-30 PROCEDURE — 36591 DRAW BLOOD OFF VENOUS DEVICE: CPT

## 2024-09-30 PROCEDURE — 82040 ASSAY OF SERUM ALBUMIN: CPT

## 2024-09-30 PROCEDURE — 250N000011 HC RX IP 250 OP 636: Performed by: OBSTETRICS & GYNECOLOGY

## 2024-09-30 PROCEDURE — 85025 COMPLETE CBC W/AUTO DIFF WBC: CPT

## 2024-09-30 PROCEDURE — 96360 HYDRATION IV INFUSION INIT: CPT

## 2024-09-30 PROCEDURE — 258N000003 HC RX IP 258 OP 636: Performed by: OBSTETRICS & GYNECOLOGY

## 2024-09-30 PROCEDURE — 82374 ASSAY BLOOD CARBON DIOXIDE: CPT

## 2024-09-30 RX ORDER — METHYLPREDNISOLONE SODIUM SUCCINATE 125 MG/2ML
125 INJECTION, POWDER, LYOPHILIZED, FOR SOLUTION INTRAMUSCULAR; INTRAVENOUS
Start: 2024-09-30

## 2024-09-30 RX ORDER — EPINEPHRINE 1 MG/ML
0.3 INJECTION, SOLUTION INTRAMUSCULAR; SUBCUTANEOUS EVERY 5 MIN PRN
OUTPATIENT
Start: 2024-09-30

## 2024-09-30 RX ORDER — MEPERIDINE HYDROCHLORIDE 25 MG/ML
25 INJECTION INTRAMUSCULAR; INTRAVENOUS; SUBCUTANEOUS EVERY 30 MIN PRN
OUTPATIENT
Start: 2024-09-30

## 2024-09-30 RX ORDER — HEPARIN SODIUM,PORCINE 10 UNIT/ML
5-20 VIAL (ML) INTRAVENOUS DAILY PRN
OUTPATIENT
Start: 2024-09-30

## 2024-09-30 RX ORDER — DIPHENHYDRAMINE HYDROCHLORIDE 50 MG/ML
50 INJECTION INTRAMUSCULAR; INTRAVENOUS
Start: 2024-09-30

## 2024-09-30 RX ORDER — ONDANSETRON 2 MG/ML
8 INJECTION INTRAMUSCULAR; INTRAVENOUS EVERY 6 HOURS PRN
Start: 2024-09-30

## 2024-09-30 RX ORDER — HEPARIN SODIUM (PORCINE) LOCK FLUSH IV SOLN 100 UNIT/ML 100 UNIT/ML
5 SOLUTION INTRAVENOUS
Status: DISCONTINUED | OUTPATIENT
Start: 2024-09-30 | End: 2024-09-30 | Stop reason: HOSPADM

## 2024-09-30 RX ORDER — HEPARIN SODIUM (PORCINE) LOCK FLUSH IV SOLN 100 UNIT/ML 100 UNIT/ML
5 SOLUTION INTRAVENOUS
OUTPATIENT
Start: 2024-09-30

## 2024-09-30 RX ORDER — ALBUTEROL SULFATE 0.83 MG/ML
2.5 SOLUTION RESPIRATORY (INHALATION)
OUTPATIENT
Start: 2024-09-30

## 2024-09-30 RX ORDER — ALBUTEROL SULFATE 90 UG/1
1-2 AEROSOL, METERED RESPIRATORY (INHALATION)
Start: 2024-09-30

## 2024-09-30 RX ADMIN — Medication 5 ML: at 10:49

## 2024-09-30 RX ADMIN — SODIUM CHLORIDE 1000 ML: 9 INJECTION, SOLUTION INTRAVENOUS at 09:38

## 2024-09-30 NOTE — PROGRESS NOTES
Pt here for labs and IVF. Port accessed easily and labs obtained and no intervention needed. Pt did receive 1 L NS without incident. Port flushed and deaccessed upon completion. Pt sally.c using rolling walker to lobby with her daughter. Pt aware of treatment plan.

## 2024-10-01 ENCOUNTER — THERAPY VISIT (OUTPATIENT)
Dept: PHYSICAL THERAPY | Facility: CLINIC | Age: 89
End: 2024-10-01
Attending: NURSE PRACTITIONER
Payer: MEDICARE

## 2024-10-01 DIAGNOSIS — M62.81 GENERALIZED MUSCLE WEAKNESS: ICD-10-CM

## 2024-10-01 DIAGNOSIS — C54.1 ENDOMETRIAL CANCER (H): ICD-10-CM

## 2024-10-01 PROCEDURE — 97162 PT EVAL MOD COMPLEX 30 MIN: CPT | Mod: GP | Performed by: PHYSICAL THERAPIST

## 2024-10-01 PROCEDURE — 97110 THERAPEUTIC EXERCISES: CPT | Mod: GP | Performed by: PHYSICAL THERAPIST

## 2024-10-01 PROCEDURE — 97530 THERAPEUTIC ACTIVITIES: CPT | Mod: GP | Performed by: PHYSICAL THERAPIST

## 2024-10-01 NOTE — PROGRESS NOTES
PHYSICAL THERAPY EVALUATION  Type of Visit: Evaluation              Subjective Pt was diagnosed with recurrent stage 1B grade endometrial endometrioid adenocarcinoma in 2023. Pt has undergone 3 cycles of heavy chemo with many side effects and hasn't had chemo treatment for the past month. She is possibly going to start chemo up again at the end of the month dependent on lab values. The pt notes diarrhea, dehydration, etc with ED visits and IVF to counter sx. She notes ~1x/wk going to infusion for fluids, etc. Pt is now ambulating with a 4WW. Daughter and daughter-in-law present with pt at this time.       Presenting condition or subjective complaint: Weakness due to chemo  Date of onset: 08/26/24    Relevant medical history: Bladder or bowel problems; Cancer; Dizziness; High blood pressure; Incontinence; Menopause; Migraines or headaches; Overweight; Pain at night or rest; Significant weakness   Dates & types of surgery: Hysterectomy 9/2023 and others    Prior diagnostic imaging/testing results: Other I've had CTs and MRIs for cancer. No noted bone METs in imaging.  Prior therapy history for the same diagnosis, illness or injury: No      Prior Level of Function  Independent with all mobility    Living Environment  Social support: Alone   Type of home: Apartment/condo   Stairs to enter the home: No       Ramp: No   Stairs inside the home: No       Help at home: Home management tasks (cooking, cleaning); Medication and/or finances; Assist for driving and community activities  Equipment owned: Walker with wheels     Employment: No    Hobbies/Interests: Reading, cards, family gatherings at my home    Patient goals for therapy: Walk steadily without concern    Pain assessment: Pain denied     Objective   GENERAL EVALUATION  POSTURE:  leaning on the walker, fwd rounded back  GAIT: 4WW, decreased stride length, toeing out b/l, trendelenburg  BALANCE/PROPRIOCEPTION:  modified tandem stance- able to hold position with  minimal/moderate sway for 30 sec  ROM: AROM WFL  STRENGTH:  DF- 4/5 b/l;  quad- 4-/5 b/l;  HS- 4/5 b/l;  hip flexion- 3+/5 b/l;  hip add- 4-/5b/l;  abd- 2/5 L, 3-/5 R;  shoulder flex- 4/5  FLEXIBILITY:  pt notes tightness in calves and HS but able to complete full motion  FUNCTIONAL TESTS:  STS- use of Ues for stability, use of walker once standing, wide AUGIE;  FACIT- 17/52  PALPATION:  tenderness on the lateral portion of both hips, HS, and calves    Assessment & Plan   CLINICAL IMPRESSIONS  Medical Diagnosis: Endometrial cancer (H) (C54.1), Generalized muscle weakness (M62.81)    Treatment Diagnosis: generalized weakness and deconditioning   Impression/Assessment: Patient is a 89 year old female with deconditioning and weakness complaints.  The following significant findings have been identified: Decreased ROM/flexibility, Decreased strength, Impaired balance, Impaired gait, Impaired muscle performance, and Decreased activity tolerance. These impairments interfere with their ability to perform self care tasks, recreational activities, household chores, driving , household mobility, and community mobility as compared to previous level of function. Pt is undergoing chemo treatments at this time with deconditioning, generalized weakness, decreased endurance, and balance problems would benefit from PT to address these concerns to increase tolerance to activity and resume previous activities.    Clinical Decision Making (Complexity):  Clinical Presentation: Evolving/Changing  Clinical Presentation Rationale: based on medical and personal factors listed in PT evaluation  Clinical Decision Making (Complexity): Moderate complexity    PLAN OF CARE  Treatment Interventions:  Interventions: Gait Training, Manual Therapy, Neuromuscular Re-education, Therapeutic Activity, Therapeutic Exercise, Self-Care/Home Management    Long Term Goals     PT Goal 1  Goal Identifier: LTG  Goal Description: Pt will have a FACIT score of >25/52  to significantly improve her energy level for greater tolerance to activity at home in 12 weeks.  Target Date: 12/24/24  PT Goal 2  Goal Identifier: LTG  Goal Description: Pt will be able to ambulate for a full 6MWT at 700' in 12 weeks to improve her mobility and tolerance for grocery shopping etc in 12 weeks.  Target Date: 12/24/24  PT Goal 3  Goal Identifier: LTG  Goal Description: pt will be able to hold a tandem stance for 15 sec in 12 weeks to improve her mobility and function without risk of fall.  Target Date: 12/24/24      Frequency of Treatment: 1x/wk  Duration of Treatment: 12 weeks    Education Assessment:   Learner/Method: Patient;Listening;Demonstration;No Barriers to Learning    Risks and benefits of evaluation/treatment have been explained.   Patient/Family/caregiver agrees with Plan of Care.     Evaluation Time:     PT Eval, Moderate Complexity Minutes (45731): 25     Signing Clinician: Anu Shaw PT        [facility]                                                                                   OUTPATIENT PHYSICAL THERAPY      PLAN OF TREATMENT FOR OUTPATIENT REHABILITATION   Patient's Last Name, First Name, Jen Monaco YOB: 1935   Provider's Name   [facility]   Medical Record No.  [id number]     Onset Date: 08/26/24  Start of Care Date: 10/01/24     Medical Diagnosis:  Endometrial cancer (H) (C54.1), Generalized muscle weakness (M62.81)      PT Treatment Diagnosis:  generalized weakness and deconditioning Plan of Treatment  Frequency/Duration: 1x/wk/ 12 weeks    Certification date from 10/01/24 to 12/24/24         See note for plan of treatment details and functional goals     Anu Shaw PT                         I CERTIFY THE NEED FOR THESE SERVICES FURNISHED UNDER        THIS PLAN OF TREATMENT AND WHILE UNDER MY CARE     (Physician attestation of this document indicates review and certification  of the therapy plan).              Referring Provider:  ÓSCAR Corona CNP    Initial Assessment  See Epic Evaluation- Start of Care Date: 10/01/24

## 2024-10-08 ENCOUNTER — THERAPY VISIT (OUTPATIENT)
Dept: PHYSICAL THERAPY | Facility: CLINIC | Age: 89
End: 2024-10-08
Attending: NURSE PRACTITIONER
Payer: MEDICARE

## 2024-10-08 DIAGNOSIS — M62.81 GENERALIZED MUSCLE WEAKNESS: Primary | ICD-10-CM

## 2024-10-08 PROCEDURE — 97110 THERAPEUTIC EXERCISES: CPT | Mod: GP | Performed by: PHYSICAL THERAPIST

## 2024-10-10 ENCOUNTER — TELEPHONE (OUTPATIENT)
Dept: INFUSION THERAPY | Facility: HOSPITAL | Age: 89
End: 2024-10-10
Payer: MEDICARE

## 2024-10-10 NOTE — PROGRESS NOTES
Pt was called to assess need for IVF as she is scheduled for IVF on Monday.  She reports that she is eating and drinking fluid fairly well without any nausea or vomiting.  Her diarrhea has also greatly improved.  She was agreeable with plan to cancel appointment for Monday.

## 2024-10-16 ENCOUNTER — PATIENT OUTREACH (OUTPATIENT)
Dept: ONCOLOGY | Facility: HOSPITAL | Age: 89
End: 2024-10-16
Payer: MEDICARE

## 2024-10-16 NOTE — PROGRESS NOTES
"Call to Jen to check to see how she is doing. Per Chart review, last 2 appointments for fluids/labs have been cancelled. Jen reports that her diarrhea had subsided a little but this week \"it seemed to start up again\". Jen reports that she is eating and drinking well, taking Lomotil for diarrhea. Jen reports she is generally feeling well. Did review with Jen that lab work could still be checked even though IVF appointments have been cancelled. Jen is agreeable to this. Jen was warm transferred to scheduling at [facility] to get lab appointment set up.    Luma Griffin RN  10/16/24  3:38 PM     "

## 2024-10-17 ENCOUNTER — LAB (OUTPATIENT)
Dept: INFUSION THERAPY | Facility: HOSPITAL | Age: 89
End: 2024-10-17
Payer: MEDICARE

## 2024-10-17 DIAGNOSIS — C54.1 ENDOMETRIAL CANCER (H): ICD-10-CM

## 2024-10-17 DIAGNOSIS — E86.0 DEHYDRATION: Primary | ICD-10-CM

## 2024-10-17 LAB
ALBUMIN SERPL BCG-MCNC: 3.5 G/DL (ref 3.5–5.2)
ALP SERPL-CCNC: 49 U/L (ref 40–150)
ALT SERPL W P-5'-P-CCNC: 19 U/L (ref 0–50)
ANION GAP SERPL CALCULATED.3IONS-SCNC: 11 MMOL/L (ref 7–15)
AST SERPL W P-5'-P-CCNC: 25 U/L (ref 0–45)
BASOPHILS # BLD AUTO: 0.1 10E3/UL (ref 0–0.2)
BASOPHILS NFR BLD AUTO: 1 %
BILIRUB SERPL-MCNC: 0.3 MG/DL
BUN SERPL-MCNC: 25.9 MG/DL (ref 8–23)
CALCIUM SERPL-MCNC: 9 MG/DL (ref 8.8–10.4)
CHLORIDE SERPL-SCNC: 104 MMOL/L (ref 98–107)
CREAT SERPL-MCNC: 1.02 MG/DL (ref 0.51–0.95)
EGFRCR SERPLBLD CKD-EPI 2021: 53 ML/MIN/1.73M2
EOSINOPHIL # BLD AUTO: 0.3 10E3/UL (ref 0–0.7)
EOSINOPHIL NFR BLD AUTO: 4 %
ERYTHROCYTE [DISTWIDTH] IN BLOOD BY AUTOMATED COUNT: 14.3 % (ref 10–15)
GLUCOSE SERPL-MCNC: 134 MG/DL (ref 70–99)
HCO3 SERPL-SCNC: 26 MMOL/L (ref 22–29)
HCT VFR BLD AUTO: 38.6 % (ref 35–47)
HGB BLD-MCNC: 12.7 G/DL (ref 11.7–15.7)
IMM GRANULOCYTES # BLD: 0 10E3/UL
IMM GRANULOCYTES NFR BLD: 0 %
LYMPHOCYTES # BLD AUTO: 1.6 10E3/UL (ref 0.8–5.3)
LYMPHOCYTES NFR BLD AUTO: 22 %
MCH RBC QN AUTO: 31.3 PG (ref 26.5–33)
MCHC RBC AUTO-ENTMCNC: 32.9 G/DL (ref 31.5–36.5)
MCV RBC AUTO: 95 FL (ref 78–100)
MONOCYTES # BLD AUTO: 0.7 10E3/UL (ref 0–1.3)
MONOCYTES NFR BLD AUTO: 10 %
NEUTROPHILS # BLD AUTO: 4.6 10E3/UL (ref 1.6–8.3)
NEUTROPHILS NFR BLD AUTO: 64 %
NRBC # BLD AUTO: 0 10E3/UL
NRBC BLD AUTO-RTO: 0 /100
PLATELET # BLD AUTO: 248 10E3/UL (ref 150–450)
POTASSIUM SERPL-SCNC: 4.3 MMOL/L (ref 3.4–5.3)
PROT SERPL-MCNC: 5.9 G/DL (ref 6.4–8.3)
RBC # BLD AUTO: 4.06 10E6/UL (ref 3.8–5.2)
SODIUM SERPL-SCNC: 141 MMOL/L (ref 135–145)
WBC # BLD AUTO: 7.2 10E3/UL (ref 4–11)

## 2024-10-17 PROCEDURE — 36591 DRAW BLOOD OFF VENOUS DEVICE: CPT

## 2024-10-17 PROCEDURE — 250N000011 HC RX IP 250 OP 636

## 2024-10-17 PROCEDURE — 80053 COMPREHEN METABOLIC PANEL: CPT

## 2024-10-17 PROCEDURE — 85025 COMPLETE CBC W/AUTO DIFF WBC: CPT

## 2024-10-17 RX ORDER — ALBUTEROL SULFATE 0.83 MG/ML
2.5 SOLUTION RESPIRATORY (INHALATION)
Status: CANCELLED | OUTPATIENT
Start: 2024-10-17

## 2024-10-17 RX ORDER — HEPARIN SODIUM (PORCINE) LOCK FLUSH IV SOLN 100 UNIT/ML 100 UNIT/ML
5 SOLUTION INTRAVENOUS
Status: DISCONTINUED | OUTPATIENT
Start: 2024-10-17 | End: 2024-10-17 | Stop reason: HOSPADM

## 2024-10-17 RX ORDER — HEPARIN SODIUM (PORCINE) LOCK FLUSH IV SOLN 100 UNIT/ML 100 UNIT/ML
5 SOLUTION INTRAVENOUS
Status: CANCELLED | OUTPATIENT
Start: 2024-10-17

## 2024-10-17 RX ORDER — MEPERIDINE HYDROCHLORIDE 25 MG/ML
25 INJECTION INTRAMUSCULAR; INTRAVENOUS; SUBCUTANEOUS EVERY 30 MIN PRN
Status: CANCELLED | OUTPATIENT
Start: 2024-10-17

## 2024-10-17 RX ORDER — HEPARIN SODIUM,PORCINE 10 UNIT/ML
5-20 VIAL (ML) INTRAVENOUS DAILY PRN
Status: CANCELLED | OUTPATIENT
Start: 2024-10-17

## 2024-10-17 RX ORDER — EPINEPHRINE 1 MG/ML
0.3 INJECTION, SOLUTION INTRAMUSCULAR; SUBCUTANEOUS EVERY 5 MIN PRN
Status: CANCELLED | OUTPATIENT
Start: 2024-10-17

## 2024-10-17 RX ORDER — ONDANSETRON 2 MG/ML
8 INJECTION INTRAMUSCULAR; INTRAVENOUS EVERY 6 HOURS PRN
Status: CANCELLED
Start: 2024-10-17

## 2024-10-17 RX ORDER — METHYLPREDNISOLONE SODIUM SUCCINATE 125 MG/2ML
125 INJECTION INTRAMUSCULAR; INTRAVENOUS
Status: CANCELLED
Start: 2024-10-17

## 2024-10-17 RX ORDER — DIPHENHYDRAMINE HYDROCHLORIDE 50 MG/ML
50 INJECTION INTRAMUSCULAR; INTRAVENOUS
Status: CANCELLED
Start: 2024-10-17

## 2024-10-17 RX ORDER — ALBUTEROL SULFATE 90 UG/1
1-2 INHALANT RESPIRATORY (INHALATION)
Status: CANCELLED
Start: 2024-10-17

## 2024-10-17 RX ORDER — HEPARIN SODIUM (PORCINE) LOCK FLUSH IV SOLN 100 UNIT/ML 100 UNIT/ML
SOLUTION INTRAVENOUS
Status: COMPLETED
Start: 2024-10-17 | End: 2024-10-17

## 2024-10-17 RX ADMIN — HEPARIN SODIUM (PORCINE) LOCK FLUSH IV SOLN 100 UNIT/ML 5 ML: 100 SOLUTION at 09:51

## 2024-10-17 RX ADMIN — Medication 5 ML: at 09:51

## 2024-10-21 ENCOUNTER — HOSPITAL ENCOUNTER (OUTPATIENT)
Dept: CARDIOLOGY | Facility: CLINIC | Age: 89
Discharge: HOME | End: 2024-10-21
Attending: FAMILY MEDICINE | Admitting: FAMILY MEDICINE
Payer: MEDICARE

## 2024-10-21 ENCOUNTER — INFUSION THERAPY VISIT (OUTPATIENT)
Dept: INFUSION THERAPY | Facility: HOSPITAL | Age: 89
End: 2024-10-21
Attending: OBSTETRICS & GYNECOLOGY
Payer: MEDICARE

## 2024-10-21 VITALS
RESPIRATION RATE: 18 BRPM | OXYGEN SATURATION: 97 % | TEMPERATURE: 98 F | DIASTOLIC BLOOD PRESSURE: 64 MMHG | HEART RATE: 82 BPM | SYSTOLIC BLOOD PRESSURE: 98 MMHG

## 2024-10-21 DIAGNOSIS — E86.0 DEHYDRATION: Primary | ICD-10-CM

## 2024-10-21 DIAGNOSIS — R55 SYNCOPE, UNSPECIFIED SYNCOPE TYPE: ICD-10-CM

## 2024-10-21 DIAGNOSIS — C54.1 ENDOMETRIAL CANCER (H): ICD-10-CM

## 2024-10-21 LAB
ALBUMIN SERPL BCG-MCNC: 3.7 G/DL (ref 3.5–5.2)
ALP SERPL-CCNC: 55 U/L (ref 40–150)
ALT SERPL W P-5'-P-CCNC: 18 U/L (ref 0–50)
ANION GAP SERPL CALCULATED.3IONS-SCNC: 11 MMOL/L (ref 7–15)
AST SERPL W P-5'-P-CCNC: 27 U/L (ref 0–45)
BASOPHILS # BLD AUTO: 0.1 10E3/UL (ref 0–0.2)
BASOPHILS NFR BLD AUTO: 1 %
BILIRUB SERPL-MCNC: 0.4 MG/DL
BUN SERPL-MCNC: 18.7 MG/DL (ref 8–23)
CALCIUM SERPL-MCNC: 9 MG/DL (ref 8.8–10.4)
CHLORIDE SERPL-SCNC: 104 MMOL/L (ref 98–107)
CREAT SERPL-MCNC: 0.9 MG/DL (ref 0.51–0.95)
EGFRCR SERPLBLD CKD-EPI 2021: 62 ML/MIN/1.73M2
EOSINOPHIL # BLD AUTO: 0.3 10E3/UL (ref 0–0.7)
EOSINOPHIL NFR BLD AUTO: 4 %
ERYTHROCYTE [DISTWIDTH] IN BLOOD BY AUTOMATED COUNT: 14.2 % (ref 10–15)
GLUCOSE SERPL-MCNC: 147 MG/DL (ref 70–99)
HCO3 SERPL-SCNC: 26 MMOL/L (ref 22–29)
HCT VFR BLD AUTO: 39.3 % (ref 35–47)
HGB BLD-MCNC: 13.1 G/DL (ref 11.7–15.7)
IMM GRANULOCYTES # BLD: 0.1 10E3/UL
IMM GRANULOCYTES NFR BLD: 1 %
LVEF ECHO: NORMAL
LYMPHOCYTES # BLD AUTO: 1.7 10E3/UL (ref 0.8–5.3)
LYMPHOCYTES NFR BLD AUTO: 23 %
MCH RBC QN AUTO: 31.2 PG (ref 26.5–33)
MCHC RBC AUTO-ENTMCNC: 33.3 G/DL (ref 31.5–36.5)
MCV RBC AUTO: 94 FL (ref 78–100)
MONOCYTES # BLD AUTO: 0.6 10E3/UL (ref 0–1.3)
MONOCYTES NFR BLD AUTO: 8 %
NEUTROPHILS # BLD AUTO: 4.7 10E3/UL (ref 1.6–8.3)
NEUTROPHILS NFR BLD AUTO: 64 %
NRBC # BLD AUTO: 0 10E3/UL
NRBC BLD AUTO-RTO: 0 /100
PLATELET # BLD AUTO: 254 10E3/UL (ref 150–450)
POTASSIUM SERPL-SCNC: 4.5 MMOL/L (ref 3.4–5.3)
PROT SERPL-MCNC: 6 G/DL (ref 6.4–8.3)
RBC # BLD AUTO: 4.2 10E6/UL (ref 3.8–5.2)
SODIUM SERPL-SCNC: 141 MMOL/L (ref 135–145)
WBC # BLD AUTO: 7.3 10E3/UL (ref 4–11)

## 2024-10-21 PROCEDURE — 96360 HYDRATION IV INFUSION INIT: CPT

## 2024-10-21 PROCEDURE — 258N000003 HC RX IP 258 OP 636: Performed by: OBSTETRICS & GYNECOLOGY

## 2024-10-21 PROCEDURE — 85014 HEMATOCRIT: CPT

## 2024-10-21 PROCEDURE — 85004 AUTOMATED DIFF WBC COUNT: CPT

## 2024-10-21 PROCEDURE — 93306 TTE W/DOPPLER COMPLETE: CPT | Mod: 26 | Performed by: INTERNAL MEDICINE

## 2024-10-21 PROCEDURE — 82947 ASSAY GLUCOSE BLOOD QUANT: CPT

## 2024-10-21 PROCEDURE — 93306 TTE W/DOPPLER COMPLETE: CPT

## 2024-10-21 PROCEDURE — 250N000011 HC RX IP 250 OP 636: Performed by: OBSTETRICS & GYNECOLOGY

## 2024-10-21 PROCEDURE — 36591 DRAW BLOOD OFF VENOUS DEVICE: CPT

## 2024-10-21 RX ORDER — DIPHENHYDRAMINE HYDROCHLORIDE 50 MG/ML
50 INJECTION INTRAMUSCULAR; INTRAVENOUS
Start: 2024-10-21

## 2024-10-21 RX ORDER — METHYLPREDNISOLONE SODIUM SUCCINATE 125 MG/2ML
125 INJECTION INTRAMUSCULAR; INTRAVENOUS
Start: 2024-10-21

## 2024-10-21 RX ORDER — EPINEPHRINE 1 MG/ML
0.3 INJECTION, SOLUTION INTRAMUSCULAR; SUBCUTANEOUS EVERY 5 MIN PRN
OUTPATIENT
Start: 2024-10-21

## 2024-10-21 RX ORDER — MEPERIDINE HYDROCHLORIDE 25 MG/ML
25 INJECTION INTRAMUSCULAR; INTRAVENOUS; SUBCUTANEOUS EVERY 30 MIN PRN
OUTPATIENT
Start: 2024-10-21

## 2024-10-21 RX ORDER — HEPARIN SODIUM (PORCINE) LOCK FLUSH IV SOLN 100 UNIT/ML 100 UNIT/ML
5 SOLUTION INTRAVENOUS
Status: DISCONTINUED | OUTPATIENT
Start: 2024-10-21 | End: 2024-10-21 | Stop reason: HOSPADM

## 2024-10-21 RX ORDER — HEPARIN SODIUM,PORCINE 10 UNIT/ML
5-20 VIAL (ML) INTRAVENOUS DAILY PRN
OUTPATIENT
Start: 2024-10-21

## 2024-10-21 RX ORDER — ONDANSETRON 2 MG/ML
8 INJECTION INTRAMUSCULAR; INTRAVENOUS EVERY 6 HOURS PRN
Start: 2024-10-21

## 2024-10-21 RX ORDER — ALBUTEROL SULFATE 90 UG/1
1-2 INHALANT RESPIRATORY (INHALATION)
Start: 2024-10-21

## 2024-10-21 RX ORDER — HEPARIN SODIUM (PORCINE) LOCK FLUSH IV SOLN 100 UNIT/ML 100 UNIT/ML
5 SOLUTION INTRAVENOUS
OUTPATIENT
Start: 2024-10-21

## 2024-10-21 RX ORDER — ALBUTEROL SULFATE 0.83 MG/ML
2.5 SOLUTION RESPIRATORY (INHALATION)
OUTPATIENT
Start: 2024-10-21

## 2024-10-21 RX ADMIN — Medication 5 ML: at 10:31

## 2024-10-21 RX ADMIN — SODIUM CHLORIDE 1000 ML: 9 INJECTION, SOLUTION INTRAVENOUS at 09:27

## 2024-10-21 NOTE — PROGRESS NOTES
Infusion Nursing Note:  Jen Layne presents today for labs and iv hydration.    Patient seen by provider today: No   present during visit today: Not Applicable.    Note: PT here ambulatory for iv fluid and labwork. PT reports weakness. Port accessed and labs drawn. One liter ns infused over one hour then port flushed with heparin/deaccessed with 2x2 to site.PT tolerated infusion without any difficulty.      Intravenous Access:  Implanted Port.    Treatment Conditions:  Not Applicable.      Post Infusion Assessment:  Patient tolerated infusion without incident.       Discharge Plan:   Follow up reviewed.      Pily Kim RN

## 2024-10-24 ENCOUNTER — ONCOLOGY VISIT (OUTPATIENT)
Dept: ONCOLOGY | Facility: HOSPITAL | Age: 89
End: 2024-10-24
Attending: OBSTETRICS & GYNECOLOGY
Payer: MEDICARE

## 2024-10-24 VITALS
RESPIRATION RATE: 16 BRPM | DIASTOLIC BLOOD PRESSURE: 65 MMHG | WEIGHT: 179.8 LBS | HEART RATE: 65 BPM | SYSTOLIC BLOOD PRESSURE: 122 MMHG | OXYGEN SATURATION: 99 % | HEIGHT: 65 IN | TEMPERATURE: 98.1 F | BODY MASS INDEX: 29.96 KG/M2

## 2024-10-24 DIAGNOSIS — E83.42 HYPOMAGNESEMIA: Primary | ICD-10-CM

## 2024-10-24 DIAGNOSIS — C54.1 ENDOMETRIAL CANCER (H): ICD-10-CM

## 2024-10-24 PROCEDURE — G2211 COMPLEX E/M VISIT ADD ON: HCPCS | Performed by: OBSTETRICS & GYNECOLOGY

## 2024-10-24 PROCEDURE — 99214 OFFICE O/P EST MOD 30 MIN: CPT | Performed by: OBSTETRICS & GYNECOLOGY

## 2024-10-24 PROCEDURE — G0463 HOSPITAL OUTPT CLINIC VISIT: HCPCS | Performed by: OBSTETRICS & GYNECOLOGY

## 2024-10-24 ASSESSMENT — PAIN SCALES - GENERAL: PAINLEVEL_OUTOF10: NO PAIN (0)

## 2024-10-24 NOTE — PATIENT INSTRUCTIONS
Continue treatment break    PET/CT in January followed by return visit with Roe for exam and discussion of treatment options (clinical trial, immunotherapy alone, resumption of chemotherapy, Megace off study)    Please call if you feel you need iv fluids    Schedule port flushes every six weeks

## 2024-10-24 NOTE — Clinical Note
"10/24/2024      Jen Layne  [address]  [place] [address]      Dear Colleague,    Thank you for referring your patient, Jen Layne, to the [facility]. Please see a copy of my visit note below.    Oncology Rooming Note    October 24, 2024 11:17 AM   Jen Layne is a 89 year old female who presents for:    Chief Complaint   Patient presents with    Oncology Clinic Visit       Endometrial cancer        Initial Vitals: /65   Pulse 65   Temp 98.1  F (36.7  C)   Resp 16   Ht 1.638 m (5' 4.5\")   Wt 81.6 kg (179 lb 12.8 oz)   LMP  (LMP Unknown)   SpO2 99%   BMI 30.39 kg/m   Estimated body mass index is 30.39 kg/m  as calculated from the following:    Height as of this encounter: 1.638 m (5' 4.5\").    Weight as of this encounter: 81.6 kg (179 lb 12.8 oz). Body surface area is 1.93 meters squared.  No Pain (0) Comment: Data Unavailable   No LMP recorded (lmp unknown). Patient is postmenopausal.  Allergies reviewed: Yes  Medications reviewed: Yes    Medications: MEDICATION REFILLS NEEDED TODAY. Provider was notified.  Pharmacy name entered into EPIC:    [facility] - [place], [place] - [address] [facility]    Frailty Screening:   Is the patient here for a new oncology consult visit in cancer care? 2. No      Clinical concerns:  follow up      Naya Salazar                Again, thank you for allowing me to participate in the care of your patient.        Sincerely,        Amanda Au MD  "

## 2024-10-24 NOTE — PROGRESS NOTES
"Oncology Rooming Note    October 24, 2024 11:17 AM   Jen Layne is a 89 year old female who presents for:    Chief Complaint   Patient presents with    Oncology Clinic Visit       Endometrial cancer        Initial Vitals: /65   Pulse 65   Temp 98.1  F (36.7  C)   Resp 16   Ht 1.638 m (5' 4.5\")   Wt 81.6 kg (179 lb 12.8 oz)   LMP  (LMP Unknown)   SpO2 99%   BMI 30.39 kg/m   Estimated body mass index is 30.39 kg/m  as calculated from the following:    Height as of this encounter: 1.638 m (5' 4.5\").    Weight as of this encounter: 81.6 kg (179 lb 12.8 oz). Body surface area is 1.93 meters squared.  No Pain (0) Comment: Data Unavailable   No LMP recorded (lmp unknown). Patient is postmenopausal.  Allergies reviewed: Yes  Medications reviewed: Yes    Medications: MEDICATION REFILLS NEEDED TODAY. Provider was notified.  Pharmacy name entered into EPIC:    [facility] [place] - [place], [place] - [address] [place] [address] AND CLINICS    Frailty Screening:   Is the patient here for a new oncology consult visit in cancer care? 2. No      Clinical concerns:  follow up      Naya Salazar            "

## 2024-11-03 ENCOUNTER — HEALTH MAINTENANCE LETTER (OUTPATIENT)
Age: 89
End: 2024-11-03

## 2024-11-11 NOTE — PROGRESS NOTES
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  She presents for follow up and disease management by video.      Oncology History:  Initially, she was seen in the ED for intermittent vaginal bleeding x 6 wks. Was actually seen 2022 for hematuria.  At that time, underwent CT urogram 6/8/22 which showed no gross abnormalities to explain hematuria, was noted to have low-density thickening of endometrium.  Underwent a pelvic US on 7/8/22 which showed uterus at 6.8 x 4.6cm with a 3.2cm mass along endometrial canal possibly representing polyp or fibroid.  Never had any further follow-up.     She had a repeat pelvic US on 6/5/23 which showed diffusely thickened endometrium.  Had pelvic MRI  8.1 x 4.4 x 5.4 cm in long axis, short axis and transverse dimensions, respectively. An apparent heterogeneous mass-like structure is present in the anterior and right aspect of the uterus in the subendometrial or endometrial  region, measuring approximately 2.5 x 1.6 cm (series 18 image 33). This structure is not well-visualized on T1 weighted images due to artifact. It appears isointense to the uterine myometrium on T2-weighted images. Probable mildly heterogeneous  enhancement of this structure. A few subcentimeter low T2 signal regions in the anterior uterus likely represent very small fibroids.  The endometrial stripe is not well defined due to motion artifact. It measures approximately 0.9 cm in dual-layer anteroposterior thickness. The endometrial region appears mildly irregular on the axial images (for example, series 6 image 15). It cannot be determined on the post contrast images if there is abnormal enhancement associated with the endometrium. Possible restricted diffusion within the  region of endometrium.     6/7/23:  Seen in Gyn Onc. Unable to perform EMB due to stenosis     7/19/23:  EUA, D&C under US guidance.  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  MLH1 hypermethylation +     9/22/23:  Robotic assisted hysterectomy, bilateral salpingo-oophorectomy, cancer staging, sentinel lymph node mapping and sampling, pelvic washings.  Final pathology (reviewed): FIGO Grade 1 endometrioid TOMAS, 4.5cm greatest dimension, >50% myometrial invasion (13/22mm), superficial NOLA involvment, no LVSI, cytology negative, margins negative, LN negative. Final FIGO Stage IB Grade 1 endometrioid TOMAS,      Discussed at tumor board with recs for VBT versus observation. The patient saw Rad Onc and ultimately decided not to do XRT.     5/23/24: VAGINAL MUCOSA, BIOPSY:  -MODERATELY DIFFERENTIATED ADENOCARCINOMA  PAX8: Strongly and diffusely positive  P16 strongly positive, marking the majority of the columnar cells  Estrogen receptor: Positive (95% of cells, strong staining)  Cytokeratin-7: Strongly and diffusely positive  Cytokeratin-20: Negative  CDX2: Negative (absence of enteric differentiation)  The combined morphologic and immunophenotypic features of this lesion are compatible with the clinical history of endometrial carcinoma.     6/10/24: PET CT  IMPRESSION:  1.  Focal hypermetabolic uptake at the vaginal cuff compatible with biopsy-proven recurrent malignancy. FDG avid right pelvic lymph nodes are suspicious for man metastases.  2.  Subcentimeter pulmonary nodules are below the resolution of PET. Attention on future oncologic imaging.     6/27/24:  Tumor board: Consensus/Management Options: As patient has good functional status and the pelvic lymph node is FDG avid, recommendation for chemotherapy + IO. Also can consider  trial. Discuss with patient for final treatment plan.      Plan: Carboplatin AUC 5, paclitaxel 175 mg/m2, and dorstarlimab 500 mg IV every 3 weeks x 3 cycles followed  by CT and follow up with Dr. Au.     7/11/24: RLE US negative for DVT.  7/16/24: Port placement.  7/18/24: Cycle 1 carboplatin, paclitaxel, and dorstarlimab.  8/8/24: Cycle 2 carboplatin, paclitaxel, and dorstarlimab.  8/19/24: ED for presyncope and neck pain.  Work up negative.  8/29/24: Cycle 3 carboplatin, paclitaxel, and dorstarlimab.     9/7/24:  ED for diarrhea.     Was set up for outpatient IVF     9/20/24:  CT CAP: Images reviewed  IMPRESSION:  1.  Unremarkable CT appearance of the vaginal cuff. The known  biopsy-proven recurrence is not definitively identified.  2.  Decreased size of the right external iliac chain lymph nodes,  which were previously hypermetabolic. No progressive lymphadenopathy  otherwise.  3.  Unchanged solid pulmonary nodules measuring up to 0.5 cm.  Continued imaging surveillance is recommended.  4.  Nonobstructing left renal calculi.     The patient has been on a treatment break since Cycle #3 due to weakness, diarrhea, intolerance of therapy  She returns today for evaluation.  Denies any vaginal bleeding. Diarrhea better.  Still intermittently needing IVF.      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 HYSTERECTOMY TOTAL, BILATERAL SALPINGO-OOPHORECTOMY, NODE DISSECTION, COMBINED N/A 9/22/2023    Procedure: Robotic assisted hysterectomy, bilateral salpingo-oophorectomy, cancer staging, sentinel lymph node mapping and sampling, pelvic washings;  Surgeon: Amanda Au MD;  Location: [facility] OR    DILATION AND CURETTAGE, WITH ULTRASOUND GUIDANCE N/A  2023    Procedure: Pelvic examination under anesthesia, dilation and curettage uterus, ultrasound guidance Latex Free;  Surgeon: Amanda Au MD;  Location: [facility] OR    EXAM EYE  2018    Capsulotomy Left eye.   Dr. Skip Villela    EYE SURGERY      HC REMOVAL GALLBLADDER      HYSTEROSCOPY,DIAGNOSTIC  around     IR CHEST PORT PLACEMENT > 5 YRS OF AGE  2024         Health Maintenance:  Health Maintenance Due   Topic Date Due    RSV VACCINE (1 - 1-dose 75+ series) Never done    MEDICARE ANNUAL WELLNESS VISIT  2024    MICROALBUMIN  2024       Current Medications:     has a current medication list which includes the following prescription(s): cyclobenzaprine, dexamethasone, lidocaine-prilocaine, lisinopril, magnesium oxide, mupirocin, simvastatin, triamcinolone, vitamin d, diphenoxylate-atropine, ondansetron, and prochlorperazine.       Allergies:     Hydrochlorothiazide        Social History:     Social History     Tobacco Use    Smoking status: Never     Passive exposure: Never    Smokeless tobacco: Never   Substance Use Topics    Alcohol use: No       History   Drug Use No           Family History:     The patient's family history is notable for :.    Family History   Problem Relation Age of Onset    Eye Disorder Mother         glaucoma    Heart Disease Mother          of CHF    Cancer Sister         uterine and ovarian-in remission     Other Cancer Sister     Deep Vein Thrombosis (DVT) Sister     Uterine Cancer Sister     Heart Disease Brother         valve replacement    Lipids Brother     Coronary Artery Disease Brother     Eye Disorder Brother         detached retina    Cancer Paternal Grandfather         carcinoma of the lip, pipe-smoker    Thyroid Disease Daughter         goiter or nodule?    Cancer Son         SKIN    Other Cancer Son     Other Cancer Other     Thyroid Disease Other     Diabetes No family hx of     C.A.D. No family hx of     Breast Cancer No family hx of  "    Cancer - colorectal No family hx of     Anesthesia Reaction No family hx of          Physical Exam:     /65   Pulse 65   Temp 98.1  F (36.7  C)   Resp 16   Ht 1.638 m (5' 4.5\")   Wt 81.6 kg (179 lb 12.8 oz)   LMP  (LMP Unknown)   SpO2 99%   BMI 30.39 kg/m    Body mass index is 30.39 kg/m .    General Appearance: healthy and alert, no distress    Musculoskeletal: extremities non tender and without edema    Skin: no lesions or rashes     Neurological: normal gait, no gross defects     Psychiatric: appropriate mood and affect                               Hematological: normal cervical, supraclavicular and inguinal lymph nodes     Gastrointestinal:       abdomen soft, non-tender, non-distended, no organomegaly or masses    Genitourinary: External genitalia and urethral meatus appears normal.  Vagina is smooth without nodularity or masses. Apparent complete clinical response with no signs of vaginal recurrence that had previously been there.     Assessment:     Jen Layne is a 89 year old woman with a diagnosis of Stage IB Grade 1 endometrioid endometrial TOMAS, with vaginal cuff recurrence and local man disease, on break from chemotherapy due to intolerance.        30 minutes spent on the date of the encounter doing chart review, history and exam, documentation and further activities as noted above     The longitudinal plan of care for the diagnosis(es)/condition(s) as documented were addressed during this visit. Due to the added complexity in care, I will continue to support Jen in the subsequent management and with ongoing continuity of care.       Plan:      1.)        FIGO Grade 1 endometrioid TOMAS:  Pathology previously reviewed in detail.  Tumor 4.5cm greatest dimension, >50% myometrial invasion (13/22mm), superficial NOLA involvment, no LVSI, cytology negative, margins negative, LN negative. Final FIGO Stage IB Grade 1 endometrioid TOMAS.  H-IR due to age, DOI.  Discussed options " including observation, radiation (VBT).      Patient was seen by Rad Onc and ultimately elected for observation.       Was then seen for bleeding with exam findings consistent with vaginal cuff recurrence.  This has now been biopsy proven and PET/CT scan supports vaginal cuff recurrence with likely right pelvic lymph node spread.     Discussed systemic treatment options including chemotherapy and possible vaginal cuff radiation depending on symptoms.     Tumor board review discussed with patient and her family. Recommend systemic chemotherapy with Carboplatin/Taxol and Dostarblimab followed by maintenance dostarlimab. Alternatively, discussed clinical trial and gave her information regarding .  The standard at this time would be chemo with IO which is what I recommended to patient.       Patient completed three cycles of chemotherapy with tumor response based on recent imaging.  However, treatment complicated by progressive difficulty with tolerance with diarrhea, dehydration, ED visit, IVF infusions.  The patient has been on a treatment break since the end of August.  She is working with PT and has had some  improvement in functional status, still intermittently requiring IVF.      On exam, she appears to have had complete clinical response with no signs of the vaginal tumor. Discussed resumption of chemotherapy versus ongoing observation versus clinical trial versus immunotherapy alone.    Discussed options in detail. Given apparent clinical response as well as her intolerance for treatment, recommend continuing her treatment break until signs fo recurrence.  At that time, could consider  verus chemo again.      Discussed in detail.  Patient prefers ongoing observation.  Plan PET in January.      Questions answered, patient and family expressed understanding of plan of care.     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        Amanda Au MD  Gynecologic Oncology  [facility]    CC  Patient Care Team:  Yamile Cotter MD as PCP - General (Family Medicine)  Yamile Cotter MD as Assigned PCP  Amanda Au MD as MD (Gynecologic Oncology)  Amanda Au MD as Assigned Cancer Care Provider  Luma Griffin, RN as Clinic Care Coordinator (Hematology & Oncology)  Kami Story MD as MD (Cardiology)  AMANDA AU

## 2024-11-18 ENCOUNTER — INFUSION THERAPY VISIT (OUTPATIENT)
Dept: INFUSION THERAPY | Facility: HOSPITAL | Age: 89
End: 2024-11-18
Attending: OBSTETRICS & GYNECOLOGY
Payer: MEDICARE

## 2024-11-18 VITALS
RESPIRATION RATE: 16 BRPM | TEMPERATURE: 97.5 F | HEART RATE: 73 BPM | OXYGEN SATURATION: 97 % | SYSTOLIC BLOOD PRESSURE: 144 MMHG | DIASTOLIC BLOOD PRESSURE: 80 MMHG

## 2024-11-18 DIAGNOSIS — E86.0 DEHYDRATION: Primary | ICD-10-CM

## 2024-11-18 DIAGNOSIS — C54.1 ENDOMETRIAL CANCER (H): ICD-10-CM

## 2024-11-18 LAB
ALBUMIN SERPL BCG-MCNC: 3.6 G/DL (ref 3.5–5.2)
ALP SERPL-CCNC: 62 U/L (ref 40–150)
ALT SERPL W P-5'-P-CCNC: 15 U/L (ref 0–50)
ANION GAP SERPL CALCULATED.3IONS-SCNC: 9 MMOL/L (ref 7–15)
AST SERPL W P-5'-P-CCNC: 22 U/L (ref 0–45)
BASOPHILS # BLD AUTO: 0.1 10E3/UL (ref 0–0.2)
BASOPHILS NFR BLD AUTO: 1 %
BILIRUB SERPL-MCNC: 0.3 MG/DL
BUN SERPL-MCNC: 24.9 MG/DL (ref 8–23)
CALCIUM SERPL-MCNC: 8.7 MG/DL (ref 8.8–10.4)
CHLORIDE SERPL-SCNC: 108 MMOL/L (ref 98–107)
CREAT SERPL-MCNC: 0.9 MG/DL (ref 0.51–0.95)
EGFRCR SERPLBLD CKD-EPI 2021: 62 ML/MIN/1.73M2
EOSINOPHIL # BLD AUTO: 0.2 10E3/UL (ref 0–0.7)
EOSINOPHIL NFR BLD AUTO: 3 %
ERYTHROCYTE [DISTWIDTH] IN BLOOD BY AUTOMATED COUNT: 13.3 % (ref 10–15)
GLUCOSE SERPL-MCNC: 111 MG/DL (ref 70–99)
HCO3 SERPL-SCNC: 26 MMOL/L (ref 22–29)
HCT VFR BLD AUTO: 39.2 % (ref 35–47)
HGB BLD-MCNC: 13.2 G/DL (ref 11.7–15.7)
IMM GRANULOCYTES # BLD: 0 10E3/UL
IMM GRANULOCYTES NFR BLD: 0 %
LYMPHOCYTES # BLD AUTO: 1.4 10E3/UL (ref 0.8–5.3)
LYMPHOCYTES NFR BLD AUTO: 20 %
MCH RBC QN AUTO: 31.9 PG (ref 26.5–33)
MCHC RBC AUTO-ENTMCNC: 33.7 G/DL (ref 31.5–36.5)
MCV RBC AUTO: 95 FL (ref 78–100)
MONOCYTES # BLD AUTO: 0.7 10E3/UL (ref 0–1.3)
MONOCYTES NFR BLD AUTO: 9 %
NEUTROPHILS # BLD AUTO: 4.8 10E3/UL (ref 1.6–8.3)
NEUTROPHILS NFR BLD AUTO: 66 %
NRBC # BLD AUTO: 0 10E3/UL
NRBC BLD AUTO-RTO: 0 /100
PLATELET # BLD AUTO: 232 10E3/UL (ref 150–450)
POTASSIUM SERPL-SCNC: 4 MMOL/L (ref 3.4–5.3)
PROT SERPL-MCNC: 5.8 G/DL (ref 6.4–8.3)
RBC # BLD AUTO: 4.14 10E6/UL (ref 3.8–5.2)
SODIUM SERPL-SCNC: 143 MMOL/L (ref 135–145)
WBC # BLD AUTO: 7.3 10E3/UL (ref 4–11)

## 2024-11-18 PROCEDURE — 250N000011 HC RX IP 250 OP 636: Performed by: OBSTETRICS & GYNECOLOGY

## 2024-11-18 PROCEDURE — 96360 HYDRATION IV INFUSION INIT: CPT

## 2024-11-18 PROCEDURE — 85004 AUTOMATED DIFF WBC COUNT: CPT

## 2024-11-18 PROCEDURE — 36591 DRAW BLOOD OFF VENOUS DEVICE: CPT

## 2024-11-18 PROCEDURE — 80053 COMPREHEN METABOLIC PANEL: CPT

## 2024-11-18 PROCEDURE — 85014 HEMATOCRIT: CPT

## 2024-11-18 PROCEDURE — 258N000003 HC RX IP 258 OP 636: Performed by: OBSTETRICS & GYNECOLOGY

## 2024-11-18 RX ORDER — METHYLPREDNISOLONE SODIUM SUCCINATE 125 MG/2ML
125 INJECTION INTRAMUSCULAR; INTRAVENOUS
Start: 2024-11-18

## 2024-11-18 RX ORDER — EPINEPHRINE 1 MG/ML
0.3 INJECTION, SOLUTION INTRAMUSCULAR; SUBCUTANEOUS EVERY 5 MIN PRN
OUTPATIENT
Start: 2024-11-18

## 2024-11-18 RX ORDER — ALBUTEROL SULFATE 90 UG/1
1-2 INHALANT RESPIRATORY (INHALATION)
Start: 2024-11-18

## 2024-11-18 RX ORDER — DIPHENHYDRAMINE HYDROCHLORIDE 50 MG/ML
50 INJECTION INTRAMUSCULAR; INTRAVENOUS
Start: 2024-11-18

## 2024-11-18 RX ORDER — HEPARIN SODIUM (PORCINE) LOCK FLUSH IV SOLN 100 UNIT/ML 100 UNIT/ML
5 SOLUTION INTRAVENOUS
Status: DISCONTINUED | OUTPATIENT
Start: 2024-11-18 | End: 2024-11-18 | Stop reason: HOSPADM

## 2024-11-18 RX ORDER — ONDANSETRON 2 MG/ML
8 INJECTION INTRAMUSCULAR; INTRAVENOUS EVERY 6 HOURS PRN
Start: 2024-11-18

## 2024-11-18 RX ORDER — HEPARIN SODIUM,PORCINE 10 UNIT/ML
5-20 VIAL (ML) INTRAVENOUS DAILY PRN
OUTPATIENT
Start: 2024-11-18

## 2024-11-18 RX ORDER — HEPARIN SODIUM (PORCINE) LOCK FLUSH IV SOLN 100 UNIT/ML 100 UNIT/ML
5 SOLUTION INTRAVENOUS
OUTPATIENT
Start: 2024-11-18

## 2024-11-18 RX ORDER — ALBUTEROL SULFATE 0.83 MG/ML
2.5 SOLUTION RESPIRATORY (INHALATION)
OUTPATIENT
Start: 2024-11-18

## 2024-11-18 RX ORDER — MEPERIDINE HYDROCHLORIDE 25 MG/ML
25 INJECTION INTRAMUSCULAR; INTRAVENOUS; SUBCUTANEOUS EVERY 30 MIN PRN
OUTPATIENT
Start: 2024-11-18

## 2024-11-18 RX ADMIN — SODIUM CHLORIDE 1000 ML: 9 INJECTION, SOLUTION INTRAVENOUS at 10:23

## 2024-11-18 RX ADMIN — Medication 5 ML: at 11:27

## 2024-11-18 NOTE — PROGRESS NOTES
Infusion Nursing Note:  Jen Layne presents today for IVF.    Patient seen by provider today: No   present during visit today: Not Applicable.    Note: reviewed with pt to increase fluids as she is not having nausea and possibly wouldn't need IVF. Pt and her daughter agree. 1 L NS given without incident over 1 hour. Port flushed and deaccessed.       Intravenous Access:  Implanted Port.    Treatment Conditions:  Results reviewed, labs MET treatment parameters, ok to proceed with treatment.      Post Infusion Assessment:  Patient tolerated infusion without incident.       Discharge Plan:   Patient discharged in stable condition accompanied by: daughter.  Pt aware of treatment plan.      Janeen Marcos RN

## 2024-12-16 ENCOUNTER — INFUSION THERAPY VISIT (OUTPATIENT)
Dept: INFUSION THERAPY | Facility: HOSPITAL | Age: 89
End: 2024-12-16
Payer: MEDICARE

## 2024-12-16 VITALS — SYSTOLIC BLOOD PRESSURE: 113 MMHG | DIASTOLIC BLOOD PRESSURE: 75 MMHG

## 2024-12-16 DIAGNOSIS — E86.0 DEHYDRATION: Primary | ICD-10-CM

## 2024-12-16 DIAGNOSIS — E83.42 HYPOMAGNESEMIA: ICD-10-CM

## 2024-12-16 DIAGNOSIS — C54.1 ENDOMETRIAL CANCER (H): ICD-10-CM

## 2024-12-16 LAB
ALBUMIN SERPL BCG-MCNC: 3.6 G/DL (ref 3.5–5.2)
ALP SERPL-CCNC: 63 U/L (ref 40–150)
ALT SERPL W P-5'-P-CCNC: 14 U/L (ref 0–50)
ANION GAP SERPL CALCULATED.3IONS-SCNC: 9 MMOL/L (ref 7–15)
AST SERPL W P-5'-P-CCNC: 19 U/L (ref 0–45)
BASOPHILS # BLD AUTO: 0.1 10E3/UL (ref 0–0.2)
BASOPHILS NFR BLD AUTO: 1 %
BILIRUB SERPL-MCNC: 0.3 MG/DL
BUN SERPL-MCNC: 20 MG/DL (ref 8–23)
CALCIUM SERPL-MCNC: 9.4 MG/DL (ref 8.8–10.4)
CHLORIDE SERPL-SCNC: 109 MMOL/L (ref 98–107)
CREAT SERPL-MCNC: 0.85 MG/DL (ref 0.51–0.95)
EGFRCR SERPLBLD CKD-EPI 2021: 66 ML/MIN/1.73M2
EOSINOPHIL # BLD AUTO: 0.4 10E3/UL (ref 0–0.7)
EOSINOPHIL NFR BLD AUTO: 6 %
ERYTHROCYTE [DISTWIDTH] IN BLOOD BY AUTOMATED COUNT: 12.4 % (ref 10–15)
GLUCOSE SERPL-MCNC: 111 MG/DL (ref 70–99)
HCO3 SERPL-SCNC: 26 MMOL/L (ref 22–29)
HCT VFR BLD AUTO: 40.5 % (ref 35–47)
HGB BLD-MCNC: 13.4 G/DL (ref 11.7–15.7)
IMM GRANULOCYTES # BLD: 0 10E3/UL
IMM GRANULOCYTES NFR BLD: 0 %
LYMPHOCYTES # BLD AUTO: 1.2 10E3/UL (ref 0.8–5.3)
LYMPHOCYTES NFR BLD AUTO: 17 %
MAGNESIUM SERPL-MCNC: 1.8 MG/DL (ref 1.7–2.3)
MCH RBC QN AUTO: 30.9 PG (ref 26.5–33)
MCHC RBC AUTO-ENTMCNC: 33.1 G/DL (ref 31.5–36.5)
MCV RBC AUTO: 93 FL (ref 78–100)
MONOCYTES # BLD AUTO: 0.7 10E3/UL (ref 0–1.3)
MONOCYTES NFR BLD AUTO: 10 %
NEUTROPHILS # BLD AUTO: 4.9 10E3/UL (ref 1.6–8.3)
NEUTROPHILS NFR BLD AUTO: 67 %
NRBC # BLD AUTO: 0 10E3/UL
NRBC BLD AUTO-RTO: 0 /100
PLATELET # BLD AUTO: 239 10E3/UL (ref 150–450)
POTASSIUM SERPL-SCNC: 4.2 MMOL/L (ref 3.4–5.3)
PROT SERPL-MCNC: 5.9 G/DL (ref 6.4–8.3)
RBC # BLD AUTO: 4.34 10E6/UL (ref 3.8–5.2)
SODIUM SERPL-SCNC: 144 MMOL/L (ref 135–145)
WBC # BLD AUTO: 7.3 10E3/UL (ref 4–11)

## 2024-12-16 PROCEDURE — 84450 TRANSFERASE (AST) (SGOT): CPT

## 2024-12-16 PROCEDURE — 83735 ASSAY OF MAGNESIUM: CPT

## 2024-12-16 PROCEDURE — 36591 DRAW BLOOD OFF VENOUS DEVICE: CPT

## 2024-12-16 PROCEDURE — 250N000011 HC RX IP 250 OP 636

## 2024-12-16 PROCEDURE — 85048 AUTOMATED LEUKOCYTE COUNT: CPT

## 2024-12-16 PROCEDURE — 84155 ASSAY OF PROTEIN SERUM: CPT

## 2024-12-16 PROCEDURE — 85004 AUTOMATED DIFF WBC COUNT: CPT

## 2024-12-16 RX ORDER — ALBUTEROL SULFATE 0.83 MG/ML
2.5 SOLUTION RESPIRATORY (INHALATION)
OUTPATIENT
Start: 2024-12-16

## 2024-12-16 RX ORDER — METHYLPREDNISOLONE SODIUM SUCCINATE 125 MG/2ML
125 INJECTION INTRAMUSCULAR; INTRAVENOUS
Start: 2024-12-16

## 2024-12-16 RX ORDER — HEPARIN SODIUM (PORCINE) LOCK FLUSH IV SOLN 100 UNIT/ML 100 UNIT/ML
5 SOLUTION INTRAVENOUS
Status: DISCONTINUED | OUTPATIENT
Start: 2024-12-16 | End: 2024-12-16 | Stop reason: HOSPADM

## 2024-12-16 RX ORDER — HEPARIN SODIUM (PORCINE) LOCK FLUSH IV SOLN 100 UNIT/ML 100 UNIT/ML
5 SOLUTION INTRAVENOUS
OUTPATIENT
Start: 2024-12-16

## 2024-12-16 RX ORDER — ONDANSETRON 2 MG/ML
8 INJECTION INTRAMUSCULAR; INTRAVENOUS EVERY 6 HOURS PRN
Start: 2024-12-16

## 2024-12-16 RX ORDER — HEPARIN SODIUM (PORCINE) LOCK FLUSH IV SOLN 100 UNIT/ML 100 UNIT/ML
SOLUTION INTRAVENOUS
Status: COMPLETED
Start: 2024-12-16 | End: 2024-12-16

## 2024-12-16 RX ORDER — HEPARIN SODIUM,PORCINE 10 UNIT/ML
5-20 VIAL (ML) INTRAVENOUS DAILY PRN
OUTPATIENT
Start: 2024-12-16

## 2024-12-16 RX ORDER — MEPERIDINE HYDROCHLORIDE 25 MG/ML
25 INJECTION INTRAMUSCULAR; INTRAVENOUS; SUBCUTANEOUS EVERY 30 MIN PRN
OUTPATIENT
Start: 2024-12-16

## 2024-12-16 RX ORDER — DIPHENHYDRAMINE HYDROCHLORIDE 50 MG/ML
50 INJECTION INTRAMUSCULAR; INTRAVENOUS
Start: 2024-12-16

## 2024-12-16 RX ORDER — EPINEPHRINE 1 MG/ML
0.3 INJECTION, SOLUTION INTRAMUSCULAR; SUBCUTANEOUS EVERY 5 MIN PRN
OUTPATIENT
Start: 2024-12-16

## 2024-12-16 RX ORDER — ALBUTEROL SULFATE 90 UG/1
1-2 INHALANT RESPIRATORY (INHALATION)
Start: 2024-12-16

## 2024-12-16 RX ADMIN — Medication 500 UNITS: at 10:56

## 2024-12-16 NOTE — PROGRESS NOTES
Pt states she is feeling well and drinking fluids. No diarrhea. BP stable. Labs stable. Pt feels OK with stopping hydration at this time. Pt will RTC as sched and call us in the meantime for any new diarrhea, and or dizziness.  Pt/family will call Provider to see if 1/20 lab apt is still needed.

## 2025-01-09 ENCOUNTER — MYC MEDICAL ADVICE (OUTPATIENT)
Dept: FAMILY MEDICINE | Facility: CLINIC | Age: 89
End: 2025-01-09
Payer: MEDICARE

## 2025-01-20 ENCOUNTER — INFUSION THERAPY VISIT (OUTPATIENT)
Dept: INFUSION THERAPY | Facility: HOSPITAL | Age: 89
End: 2025-01-20
Payer: MEDICARE

## 2025-01-20 DIAGNOSIS — C54.1 ENDOMETRIAL CANCER (H): ICD-10-CM

## 2025-01-20 DIAGNOSIS — E86.0 DEHYDRATION: Primary | ICD-10-CM

## 2025-01-20 DIAGNOSIS — E83.42 HYPOMAGNESEMIA: ICD-10-CM

## 2025-01-20 LAB
ALBUMIN SERPL BCG-MCNC: 3.8 G/DL (ref 3.5–5.2)
ALP SERPL-CCNC: 69 U/L (ref 40–150)
ALT SERPL W P-5'-P-CCNC: 15 U/L (ref 0–50)
ANION GAP SERPL CALCULATED.3IONS-SCNC: 9 MMOL/L (ref 7–15)
AST SERPL W P-5'-P-CCNC: 20 U/L (ref 0–45)
BASOPHILS # BLD AUTO: 0.1 10E3/UL (ref 0–0.2)
BASOPHILS NFR BLD AUTO: 1 %
BILIRUB SERPL-MCNC: 0.3 MG/DL
BUN SERPL-MCNC: 19.1 MG/DL (ref 8–23)
CALCIUM SERPL-MCNC: 9.3 MG/DL (ref 8.8–10.4)
CHLORIDE SERPL-SCNC: 106 MMOL/L (ref 98–107)
CREAT SERPL-MCNC: 0.85 MG/DL (ref 0.51–0.95)
EGFRCR SERPLBLD CKD-EPI 2021: 66 ML/MIN/1.73M2
EOSINOPHIL # BLD AUTO: 1.6 10E3/UL (ref 0–0.7)
EOSINOPHIL NFR BLD AUTO: 21 %
ERYTHROCYTE [DISTWIDTH] IN BLOOD BY AUTOMATED COUNT: 12.6 % (ref 10–15)
GLUCOSE SERPL-MCNC: 100 MG/DL (ref 70–99)
HCO3 SERPL-SCNC: 26 MMOL/L (ref 22–29)
HCT VFR BLD AUTO: 41.6 % (ref 35–47)
HGB BLD-MCNC: 13.8 G/DL (ref 11.7–15.7)
IMM GRANULOCYTES # BLD: 0 10E3/UL
IMM GRANULOCYTES NFR BLD: 0 %
LYMPHOCYTES # BLD AUTO: 1.6 10E3/UL (ref 0.8–5.3)
LYMPHOCYTES NFR BLD AUTO: 20 %
MAGNESIUM SERPL-MCNC: 1.8 MG/DL (ref 1.7–2.3)
MCH RBC QN AUTO: 30.6 PG (ref 26.5–33)
MCHC RBC AUTO-ENTMCNC: 33.2 G/DL (ref 31.5–36.5)
MCV RBC AUTO: 92 FL (ref 78–100)
MONOCYTES # BLD AUTO: 0.7 10E3/UL (ref 0–1.3)
MONOCYTES NFR BLD AUTO: 9 %
NEUTROPHILS # BLD AUTO: 3.9 10E3/UL (ref 1.6–8.3)
NEUTROPHILS NFR BLD AUTO: 50 %
NRBC # BLD AUTO: 0 10E3/UL
NRBC BLD AUTO-RTO: 0 /100
PLATELET # BLD AUTO: 221 10E3/UL (ref 150–450)
POTASSIUM SERPL-SCNC: 4.2 MMOL/L (ref 3.4–5.3)
PROT SERPL-MCNC: 6.1 G/DL (ref 6.4–8.3)
RBC # BLD AUTO: 4.51 10E6/UL (ref 3.8–5.2)
SODIUM SERPL-SCNC: 141 MMOL/L (ref 135–145)
WBC # BLD AUTO: 7.9 10E3/UL (ref 4–11)

## 2025-01-20 PROCEDURE — 80053 COMPREHEN METABOLIC PANEL: CPT

## 2025-01-20 PROCEDURE — 83735 ASSAY OF MAGNESIUM: CPT

## 2025-01-20 PROCEDURE — 85041 AUTOMATED RBC COUNT: CPT

## 2025-01-20 PROCEDURE — 250N000011 HC RX IP 250 OP 636: Performed by: OBSTETRICS & GYNECOLOGY

## 2025-01-20 PROCEDURE — 85004 AUTOMATED DIFF WBC COUNT: CPT

## 2025-01-20 PROCEDURE — 36591 DRAW BLOOD OFF VENOUS DEVICE: CPT

## 2025-01-20 RX ORDER — HEPARIN SODIUM (PORCINE) LOCK FLUSH IV SOLN 100 UNIT/ML 100 UNIT/ML
5 SOLUTION INTRAVENOUS
Status: DISCONTINUED | OUTPATIENT
Start: 2025-01-20 | End: 2025-01-20 | Stop reason: HOSPADM

## 2025-01-20 RX ORDER — EPINEPHRINE 1 MG/ML
0.3 INJECTION, SOLUTION INTRAMUSCULAR; SUBCUTANEOUS EVERY 5 MIN PRN
OUTPATIENT
Start: 2025-01-20

## 2025-01-20 RX ORDER — METHYLPREDNISOLONE SODIUM SUCCINATE 125 MG/2ML
125 INJECTION INTRAMUSCULAR; INTRAVENOUS
Start: 2025-01-20

## 2025-01-20 RX ORDER — HEPARIN SODIUM (PORCINE) LOCK FLUSH IV SOLN 100 UNIT/ML 100 UNIT/ML
5 SOLUTION INTRAVENOUS
OUTPATIENT
Start: 2025-01-20

## 2025-01-20 RX ORDER — ALBUTEROL SULFATE 0.83 MG/ML
2.5 SOLUTION RESPIRATORY (INHALATION)
OUTPATIENT
Start: 2025-01-20

## 2025-01-20 RX ORDER — ONDANSETRON 2 MG/ML
8 INJECTION INTRAMUSCULAR; INTRAVENOUS EVERY 6 HOURS PRN
Start: 2025-01-20

## 2025-01-20 RX ORDER — HEPARIN SODIUM,PORCINE 10 UNIT/ML
5-20 VIAL (ML) INTRAVENOUS DAILY PRN
OUTPATIENT
Start: 2025-01-20

## 2025-01-20 RX ORDER — ALBUTEROL SULFATE 90 UG/1
1-2 INHALANT RESPIRATORY (INHALATION)
Start: 2025-01-20

## 2025-01-20 RX ORDER — MEPERIDINE HYDROCHLORIDE 25 MG/ML
25 INJECTION INTRAMUSCULAR; INTRAVENOUS; SUBCUTANEOUS EVERY 30 MIN PRN
OUTPATIENT
Start: 2025-01-20

## 2025-01-20 RX ORDER — DIPHENHYDRAMINE HYDROCHLORIDE 50 MG/ML
50 INJECTION INTRAMUSCULAR; INTRAVENOUS
Start: 2025-01-20

## 2025-01-20 RX ADMIN — HEPARIN 5 ML: 100 SYRINGE at 15:19

## 2025-01-30 NOTE — TELEPHONE ENCOUNTER
Patient Quality Outreach    Patient is due for the following:   Physical Annual Wellness Visit    Action(s) Taken:   Schedule a Annual Wellness Visit    Type of outreach:    Sent Ring[employer] message.    Questions for provider review:    None           Veronica Jensen

## 2025-02-03 ENCOUNTER — HOSPITAL ENCOUNTER (OUTPATIENT)
Dept: PET IMAGING | Facility: HOSPITAL | Age: 89
Discharge: HOME | End: 2025-02-03
Attending: OBSTETRICS & GYNECOLOGY
Payer: MEDICARE

## 2025-02-03 DIAGNOSIS — C54.1 ENDOMETRIAL CANCER (H): ICD-10-CM

## 2025-02-03 DIAGNOSIS — E83.42 HYPOMAGNESEMIA: ICD-10-CM

## 2025-02-03 LAB
CREAT BLD-MCNC: 0.9 MG/DL (ref 0.6–1.1)
EGFRCR SERPLBLD CKD-EPI 2021: >60 ML/MIN/1.73M2
GLUCOSE BLDC GLUCOMTR-MCNC: 102 MG/DL (ref 70–99)

## 2025-02-03 PROCEDURE — A9552 F18 FDG: HCPCS | Performed by: OBSTETRICS & GYNECOLOGY

## 2025-02-03 PROCEDURE — 82962 GLUCOSE BLOOD TEST: CPT

## 2025-02-03 PROCEDURE — 82565 ASSAY OF CREATININE: CPT

## 2025-02-03 PROCEDURE — 343N000001 HC RX 343 MED OP 636: Performed by: OBSTETRICS & GYNECOLOGY

## 2025-02-03 PROCEDURE — 71260 CT THORAX DX C+: CPT

## 2025-02-03 PROCEDURE — 78816 PET IMAGE W/CT FULL BODY: CPT | Mod: PS

## 2025-02-03 PROCEDURE — 74177 CT ABD & PELVIS W/CONTRAST: CPT

## 2025-02-03 PROCEDURE — 250N000011 HC RX IP 250 OP 636: Performed by: OBSTETRICS & GYNECOLOGY

## 2025-02-03 RX ORDER — HEPARIN SODIUM (PORCINE) LOCK FLUSH IV SOLN 100 UNIT/ML 100 UNIT/ML
500 SOLUTION INTRAVENOUS ONCE
Status: COMPLETED | OUTPATIENT
Start: 2025-02-03 | End: 2025-02-03

## 2025-02-03 RX ORDER — IOPAMIDOL 755 MG/ML
89 INJECTION, SOLUTION INTRAVASCULAR ONCE
Status: COMPLETED | OUTPATIENT
Start: 2025-02-03 | End: 2025-02-03

## 2025-02-03 RX ORDER — FLUDEOXYGLUCOSE F 18 200 MCI/ML
7-17 INJECTION, SOLUTION INTRAVENOUS ONCE
Status: COMPLETED | OUTPATIENT
Start: 2025-02-03 | End: 2025-02-03

## 2025-02-03 RX ADMIN — FLUDEOXYGLUCOSE F 18 10.73 MILLICURIE: 200 INJECTION, SOLUTION INTRAVENOUS at 10:29

## 2025-02-03 RX ADMIN — IOPAMIDOL 89 ML: 755 INJECTION, SOLUTION INTRAVENOUS at 11:23

## 2025-02-03 RX ADMIN — HEPARIN SODIUM (PORCINE) LOCK FLUSH IV SOLN 100 UNIT/ML 500 UNITS: 100 SOLUTION at 11:48

## 2025-02-05 ENCOUNTER — LAB (OUTPATIENT)
Dept: INFUSION THERAPY | Facility: HOSPITAL | Age: 89
End: 2025-02-05
Attending: OBSTETRICS & GYNECOLOGY
Payer: MEDICARE

## 2025-02-05 ENCOUNTER — ONCOLOGY VISIT (OUTPATIENT)
Dept: ONCOLOGY | Facility: HOSPITAL | Age: 89
End: 2025-02-05
Attending: OBSTETRICS & GYNECOLOGY
Payer: MEDICARE

## 2025-02-05 VITALS
SYSTOLIC BLOOD PRESSURE: 142 MMHG | DIASTOLIC BLOOD PRESSURE: 90 MMHG | HEART RATE: 86 BPM | BODY MASS INDEX: 30.34 KG/M2 | OXYGEN SATURATION: 98 % | WEIGHT: 177.7 LBS | HEIGHT: 64 IN | RESPIRATION RATE: 18 BRPM

## 2025-02-05 DIAGNOSIS — E86.0 DEHYDRATION: Primary | ICD-10-CM

## 2025-02-05 DIAGNOSIS — C54.1 ENDOMETRIAL CANCER (H): ICD-10-CM

## 2025-02-05 DIAGNOSIS — E83.42 HYPOMAGNESEMIA: Primary | ICD-10-CM

## 2025-02-05 LAB
ALBUMIN SERPL BCG-MCNC: 3.7 G/DL (ref 3.5–5.2)
ALP SERPL-CCNC: 50 U/L (ref 40–150)
ALT SERPL W P-5'-P-CCNC: 11 U/L (ref 0–50)
ANION GAP SERPL CALCULATED.3IONS-SCNC: 10 MMOL/L (ref 7–15)
AST SERPL W P-5'-P-CCNC: 24 U/L (ref 0–45)
BASOPHILS # BLD AUTO: 0.1 10E3/UL (ref 0–0.2)
BASOPHILS NFR BLD AUTO: 1 %
BILIRUB SERPL-MCNC: 0.5 MG/DL
BUN SERPL-MCNC: 18.6 MG/DL (ref 8–23)
CALCIUM SERPL-MCNC: 8.9 MG/DL (ref 8.8–10.4)
CHLORIDE SERPL-SCNC: 104 MMOL/L (ref 98–107)
CREAT SERPL-MCNC: 1.03 MG/DL (ref 0.51–0.95)
EGFRCR SERPLBLD CKD-EPI 2021: 52 ML/MIN/1.73M2
EOSINOPHIL # BLD AUTO: 1.6 10E3/UL (ref 0–0.7)
EOSINOPHIL NFR BLD AUTO: 16 %
ERYTHROCYTE [DISTWIDTH] IN BLOOD BY AUTOMATED COUNT: 13 % (ref 10–15)
GLUCOSE SERPL-MCNC: 107 MG/DL (ref 70–99)
HCO3 SERPL-SCNC: 25 MMOL/L (ref 22–29)
HCT VFR BLD AUTO: 41.7 % (ref 35–47)
HGB BLD-MCNC: 14 G/DL (ref 11.7–15.7)
IMM GRANULOCYTES # BLD: 0 10E3/UL
IMM GRANULOCYTES NFR BLD: 0 %
LYMPHOCYTES # BLD AUTO: 1.6 10E3/UL (ref 0.8–5.3)
LYMPHOCYTES NFR BLD AUTO: 16 %
MCH RBC QN AUTO: 30.6 PG (ref 26.5–33)
MCHC RBC AUTO-ENTMCNC: 33.6 G/DL (ref 31.5–36.5)
MCV RBC AUTO: 91 FL (ref 78–100)
MONOCYTES # BLD AUTO: 1 10E3/UL (ref 0–1.3)
MONOCYTES NFR BLD AUTO: 10 %
NEUTROPHILS # BLD AUTO: 5.9 10E3/UL (ref 1.6–8.3)
NEUTROPHILS NFR BLD AUTO: 57 %
NRBC # BLD AUTO: 0 10E3/UL
NRBC BLD AUTO-RTO: 0 /100
PLATELET # BLD AUTO: 256 10E3/UL (ref 150–450)
POTASSIUM SERPL-SCNC: 4.3 MMOL/L (ref 3.4–5.3)
PROT SERPL-MCNC: 6 G/DL (ref 6.4–8.3)
RBC # BLD AUTO: 4.58 10E6/UL (ref 3.8–5.2)
SODIUM SERPL-SCNC: 139 MMOL/L (ref 135–145)
WBC # BLD AUTO: 10.2 10E3/UL (ref 4–11)

## 2025-02-05 PROCEDURE — 36591 DRAW BLOOD OFF VENOUS DEVICE: CPT

## 2025-02-05 PROCEDURE — 85004 AUTOMATED DIFF WBC COUNT: CPT

## 2025-02-05 PROCEDURE — 82310 ASSAY OF CALCIUM: CPT

## 2025-02-05 PROCEDURE — 82947 ASSAY GLUCOSE BLOOD QUANT: CPT

## 2025-02-05 PROCEDURE — 99214 OFFICE O/P EST MOD 30 MIN: CPT | Performed by: OBSTETRICS & GYNECOLOGY

## 2025-02-05 PROCEDURE — 85048 AUTOMATED LEUKOCYTE COUNT: CPT

## 2025-02-05 PROCEDURE — 250N000011 HC RX IP 250 OP 636: Performed by: OBSTETRICS & GYNECOLOGY

## 2025-02-05 PROCEDURE — 82040 ASSAY OF SERUM ALBUMIN: CPT

## 2025-02-05 PROCEDURE — G0463 HOSPITAL OUTPT CLINIC VISIT: HCPCS | Performed by: OBSTETRICS & GYNECOLOGY

## 2025-02-05 PROCEDURE — G2211 COMPLEX E/M VISIT ADD ON: HCPCS | Performed by: OBSTETRICS & GYNECOLOGY

## 2025-02-05 PROCEDURE — 85018 HEMOGLOBIN: CPT

## 2025-02-05 RX ORDER — DIPHENHYDRAMINE HYDROCHLORIDE 50 MG/ML
50 INJECTION INTRAMUSCULAR; INTRAVENOUS
Start: 2025-02-05

## 2025-02-05 RX ORDER — ALBUTEROL SULFATE 0.83 MG/ML
2.5 SOLUTION RESPIRATORY (INHALATION)
OUTPATIENT
Start: 2025-02-05

## 2025-02-05 RX ORDER — ONDANSETRON 2 MG/ML
8 INJECTION INTRAMUSCULAR; INTRAVENOUS EVERY 6 HOURS PRN
Start: 2025-02-05

## 2025-02-05 RX ORDER — METHYLPREDNISOLONE SODIUM SUCCINATE 125 MG/2ML
125 INJECTION INTRAMUSCULAR; INTRAVENOUS
Start: 2025-02-05

## 2025-02-05 RX ORDER — EPINEPHRINE 1 MG/ML
0.3 INJECTION, SOLUTION INTRAMUSCULAR; SUBCUTANEOUS EVERY 5 MIN PRN
OUTPATIENT
Start: 2025-02-05

## 2025-02-05 RX ORDER — MEPERIDINE HYDROCHLORIDE 25 MG/ML
25 INJECTION INTRAMUSCULAR; INTRAVENOUS; SUBCUTANEOUS EVERY 30 MIN PRN
OUTPATIENT
Start: 2025-02-05

## 2025-02-05 RX ORDER — HEPARIN SODIUM (PORCINE) LOCK FLUSH IV SOLN 100 UNIT/ML 100 UNIT/ML
5 SOLUTION INTRAVENOUS
Status: DISCONTINUED | OUTPATIENT
Start: 2025-02-05 | End: 2025-02-05 | Stop reason: HOSPADM

## 2025-02-05 RX ORDER — HEPARIN SODIUM,PORCINE 10 UNIT/ML
5-20 VIAL (ML) INTRAVENOUS DAILY PRN
OUTPATIENT
Start: 2025-02-05

## 2025-02-05 RX ORDER — HEPARIN SODIUM (PORCINE) LOCK FLUSH IV SOLN 100 UNIT/ML 100 UNIT/ML
5 SOLUTION INTRAVENOUS
OUTPATIENT
Start: 2025-02-05

## 2025-02-05 RX ORDER — ALBUTEROL SULFATE 90 UG/1
1-2 INHALANT RESPIRATORY (INHALATION)
Start: 2025-02-05

## 2025-02-05 RX ADMIN — HEPARIN 5 ML: 100 SYRINGE at 10:25

## 2025-02-05 ASSESSMENT — PAIN SCALES - GENERAL: PAINLEVEL_OUTOF10: NO PAIN (0)

## 2025-02-05 NOTE — PATIENT INSTRUCTIONS
Return in three months  with PET    Port flushes every six weeks    Follow-up with PCP regarding diarrhea    Contact  Dr. Au if you notice any vaginal bleeding    Amanda Au MD  Gynecologic Oncology  [facility]

## 2025-02-05 NOTE — PROGRESS NOTES
"Oncology Rooming Note    February 5, 2025 10:42 AM   Jen Layne is a 89 year old female who presents for:    Chief Complaint   Patient presents with    Oncology Clinic Visit     Solid malignant neoplasm with high-frequency microsatellite instability  Endometrial cancer     Initial Vitals: BP (!) 142/90 (BP Location: Left arm, Patient Position: Sitting, Cuff Size: Adult Regular)   Pulse 86   Resp 18   Ht 1.638 m (5' 4.49\")   Wt 80.6 kg (177 lb 11.2 oz)   LMP  (LMP Unknown)   SpO2 98%   BMI 30.04 kg/m   Estimated body mass index is 30.04 kg/m  as calculated from the following:    Height as of this encounter: 1.638 m (5' 4.49\").    Weight as of this encounter: 80.6 kg (177 lb 11.2 oz). Body surface area is 1.92 meters squared.  No Pain (0) Comment: Data Unavailable   No LMP recorded (lmp unknown). Patient is postmenopausal.  Allergies reviewed: Yes  Medications reviewed: Yes    Medications: Medication refills not needed today.  Pharmacy name entered into EPIC:    [facility], [facility] - [place], [place] - [address] [place] [address]    Frailty Screening:   Is the patient here for a new oncology consult visit in cancer care? 2. No      Clinical concerns:       Lindsay Sotelo MA            "

## 2025-02-05 NOTE — PROGRESS NOTES
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  She presents for follow up and disease management.      Oncology History:  Initially, she was seen in the ED for intermittent vaginal bleeding x 6 wks. Was actually seen 2022 for hematuria.  At that time, underwent CT urogram 6/8/22 which showed no gross abnormalities to explain hematuria, was noted to have low-density thickening of endometrium.  Underwent a pelvic US on 7/8/22 which showed uterus at 6.8 x 4.6cm with a 3.2cm mass along endometrial canal possibly representing polyp or fibroid.  Never had any further follow-up.     She had a repeat pelvic US on 6/5/23 which showed diffusely thickened endometrium.  Had pelvic MRI  8.1 x 4.4 x 5.4 cm in long axis, short axis and transverse dimensions, respectively. An apparent heterogeneous mass-like structure is present in the anterior and right aspect of the uterus in the subendometrial or endometrial  region, measuring approximately 2.5 x 1.6 cm (series 18 image 33). This structure is not well-visualized on T1 weighted images due to artifact. It appears isointense to the uterine myometrium on T2-weighted images. Probable mildly heterogeneous  enhancement of this structure. A few subcentimeter low T2 signal regions in the anterior uterus likely represent very small fibroids.  The endometrial stripe is not well defined due to motion artifact. It measures approximately 0.9 cm in dual-layer anteroposterior thickness. The endometrial region appears mildly irregular on the axial images (for example, series 6 image 15). It cannot be determined on the post contrast images if there is abnormal enhancement associated with the endometrium. Possible restricted diffusion within the region of  endometrium.     6/7/23:  Seen in Gyn Onc. Unable to perform EMB due to stenosis     7/19/23:  EUA, D&C under US guidance.  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  MLH1 hypermethylation +     9/22/23:  Robotic assisted hysterectomy, bilateral salpingo-oophorectomy, cancer staging, sentinel lymph node mapping and sampling, pelvic washings.  Final pathology (reviewed): FIGO Grade 1 endometrioid TOMAS, 4.5cm greatest dimension, >50% myometrial invasion (13/22mm), superficial NOLA involvment, no LVSI, cytology negative, margins negative, LN negative. Final FIGO Stage IB Grade 1 endometrioid TOMAS,      Discussed at tumor board with recs for VBT versus observation. The patient saw Rad Onc and ultimately decided not to do XRT.     5/23/24: VAGINAL MUCOSA, BIOPSY:  -MODERATELY DIFFERENTIATED ADENOCARCINOMA  PAX8: Strongly and diffusely positive  P16 strongly positive, marking the majority of the columnar cells  Estrogen receptor: Positive (95% of cells, strong staining)  Cytokeratin-7: Strongly and diffusely positive  Cytokeratin-20: Negative  CDX2: Negative (absence of enteric differentiation)  The combined morphologic and immunophenotypic features of this lesion are compatible with the clinical history of endometrial carcinoma.     6/10/24: PET CT  IMPRESSION:  1.  Focal hypermetabolic uptake at the vaginal cuff compatible with biopsy-proven recurrent malignancy. FDG avid right pelvic lymph nodes are suspicious for man metastases.  2.  Subcentimeter pulmonary nodules are below the resolution of PET. Attention on future oncologic imaging.     6/27/24:  Tumor board: Consensus/Management Options: As patient has good functional status and the pelvic lymph node is FDG avid, recommendation for chemotherapy + IO. Also can consider  trial. Discuss with patient for final treatment plan.      Plan: Carboplatin AUC 5, paclitaxel 175 mg/m2, and dorstarlimab 500 mg IV every 3 weeks x 3 cycles followed by CT and  follow up with Dr. Au.     7/11/24: RLE US negative for DVT.  7/16/24: Port placement.  7/18/24: Cycle 1 carboplatin, paclitaxel, and dorstarlimab.  8/8/24: Cycle 2 carboplatin, paclitaxel, and dorstarlimab.  8/19/24: ED for presyncope and neck pain.  Work up negative.  8/29/24: Cycle 3 carboplatin, paclitaxel, and dorstarlimab.     9/7/24:  ED for diarrhea.     Was set up for outpatient IVF     9/20/24:  CT CAP: Images reviewed  IMPRESSION:  1.  Unremarkable CT appearance of the vaginal cuff. The known  biopsy-proven recurrence is not definitively identified.  2.  Decreased size of the right external iliac chain lymph nodes,  which were previously hypermetabolic. No progressive lymphadenopathy  otherwise.  3.  Unchanged solid pulmonary nodules measuring up to 0.5 cm.  Continued imaging surveillance is recommended.  4.  Nonobstructing left renal calculi.       2/3/25:  PET/CT:  Images reviewed  Subtle residual FDG avid soft tissue thickening in the region of the vaginal cuff which is decreased in size/uptake compared to prior exam and measures 1.3 x 0.6 cm (SUV max 5.1), previously 3.0 x 1.4 cm (SUV max 10.5). Interval resolution of the   previously FDG avid right external iliac lymph nodes. Findings suggest significant partial response to therapy.IMPRESSION:  1. Significant partial response to therapy with subtle residual soft tissue thickening in the region of the vaginal cuff.  2. No significant change in tiny pulmonary nodules which remain too small to characterize by PET/CT.    The patient returns today for follow-up.  She has been off treatment since 8/29/24 when she received cycle #3 chemo and decision was made to stop due to weakness, diarrhea, intolerance of therapy  She returns today for evaluation.  She continues to have issues with diarrhea that prevents her from enjoying life, her meals.  She is only taking  lomotil once a day after she has the  diarrhea. No vaginal bleeding.       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         Past Surgical History:    Past Surgical History:   Procedure Laterality Date    COLONOSCOPY  12/05/05    normal, [place] 10 years [place] [facility] [place], BILATERAL SALPINGO-OOPHORECTOMY, NODE DISSECTION, COMBINED N/A 9/22/2023    Procedure: Robotic assisted hysterectomy, bilateral salpingo-oophorectomy, cancer staging, sentinel lymph node mapping and sampling, pelvic washings;  Surgeon: Amanda Au MD;  Location: [facility] OR    DILATION AND CURETTAGE, WITH ULTRASOUND GUIDANCE N/A 7/19/2023    Procedure: Pelvic examination under anesthesia, dilation and curettage uterus, ultrasound guidance Latex Free;  Surgeon: Amanda Au MD;  Location: [facility] OR    EXAM EYE  03/30/2018    Capsulotomy Left eye.   Dr. Skip Villela    EYE SURGERY  2015    HC REMOVAL GALLBLADDER      HYSTEROSCOPY,DIAGNOSTIC  around 1998    IR CHEST PORT PLACEMENT > 5 YRS OF AGE  7/16/2024         Health Maintenance:  Health Maintenance Due   Topic Date Due    RSV VACCINE (1 - 1-dose 75+ series) Never done    MEDICARE ANNUAL WELLNESS VISIT  09/01/2024    MICROALBUMIN  09/01/2024    PHQ-2 (once per calendar year)  01/01/2025           Current Medications:     has a current medication list which includes the following prescription(s): cyclobenzaprine, dexamethasone, diphenoxylate-atropine, lidocaine-prilocaine, lisinopril, magnesium oxide, mupirocin, ondansetron, prochlorperazine, simvastatin, triamcinolone, and vitamin d, and the following Facility-Administered Medications: heparin lock flush.       Allergies:     Hydrochlorothiazide        Social History:     Social History     Tobacco Use    Smoking status: Never     Passive  "exposure: Never    Smokeless tobacco: Never   Substance Use Topics    Alcohol use: No       History   Drug Use No           Family History:     The patient's family history is notable for :.    Family History   Problem Relation Age of Onset    Eye Disorder Mother         glaucoma    Heart Disease Mother          of CHF    Cancer Sister         uterine and ovarian-in remission     Other Cancer Sister     Deep Vein Thrombosis (DVT) Sister     Uterine Cancer Sister     Heart Disease Brother         valve replacement    Lipids Brother     Coronary Artery Disease Brother     Eye Disorder Brother         detached retina    Cancer Paternal Grandfather         carcinoma of the lip, pipe-smoker    Thyroid Disease Daughter         goiter or nodule?    Cancer Son         SKIN    Other Cancer Son     Other Cancer Other     Thyroid Disease Other     Diabetes No family hx of     C.A.D. No family hx of     Breast Cancer No family hx of     Cancer - colorectal No family hx of     Anesthesia Reaction No family hx of          Physical Exam:     BP (!) 142/90 (BP Location: Left arm, Patient Position: Sitting, Cuff Size: Adult Regular)   Pulse 86   Resp 18   Ht 1.638 m (5' 4.49\")   Wt 80.6 kg (177 lb 11.2 oz)   LMP  (LMP Unknown)   SpO2 98%   BMI 30.04 kg/m    Body mass index is 30.04 kg/m .    General Appearance: healthy and alert, no distress     Musculoskeletal: extremities non tender and without edema    Skin: no lesions or rashes     Neurological: normal gait, no gross defects     Psychiatric: appropriate mood and affect                               Hematological: normal cervical, supraclavicular and inguinal lymph nodes     Gastrointestinal:       abdomen soft, non-tender, non-distended, no organomegaly or masses    Genitourinary: External genitalia and urethral meatus appears normal.  Vagina is smooth without nodularity or masses. No lesions at cuff, no bleeding, no evidence of  disease on inspection and " palpation.  Bimanual negative.      Assessment:     Jen Layne is a 89 year old woman with a diagnosis of Stage IB Grade 1 endometrioid endometrial TOMAS, with vaginal cuff recurrence and local man disease, on break from chemotherapy due to intolerance.        30 minutes spent on the date of the encounter doing chart review, history and exam, documentation and further activities as noted above     The longitudinal plan of care for the diagnosis(es)/condition(s) as documented were addressed during this visit. Due to the added complexity in care, I will continue to support Jen in the subsequent management and with ongoing continuity of care.       Plan:      1.)        FIGO Grade 1 endometrioid TOMAS:  P Tumor 4.5cm greatest dimension, >50% myometrial invasion (13/22mm), superficial NOLA involvment, no LVSI, cytology negative, margins negative, LN negative. Final FIGO Stage IB Grade 1 endometrioid TOMAS.  H-IR due to age, DOI.  Discussed options including observation, radiation (VBT).  Patient was seen by Rad Onc and ultimately elected for observation.       Was then seen for bleeding with exam findings consistent with vaginal cuff recurrence 5/24.  This was biopsy proven and PET/CT scan showed vaginal cuff recurrence with likely right pelvic lymph node spread.     Tumor board review were discussed with patient and her family. Recommended systemic chemotherapy with Carboplatin/Taxol and Dostarblimab followed by maintenance dostarlimab. Alternatively, discussed clinical trial and gave her information regarding .       Patient ultimately only completed three cycles of chemotherapy due to intolerance.Treatment was complicated by progressive difficulty with tolerance with diarrhea, dehydration, ED visit, IVF infusions. Fortunately, the patient seemed to have a complete clinical response after only three cycle.     The patient has been on a treatment break since the end of August.  Vaginal exam continues to be  negative for recurrence and PET with no obvious sign of disease. Vaginal thickening noted but no sign of disease on exam and patient  asymptomatic.  For now, recommend continuing observation.  Advise repeat imaging in three months.  If recurrence at that time, could consider  verus chemo again.  Precautions given, she will contact me if she has any bleeding.        Questions answered, patient and family expressed understanding of plan of care.     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  Recommend evaluation with PCP/GI as it has now been 6 months since chemo.  She will start with PCP     Questions answered, she expressed understanding of plan of care.        Amanda Au MD  Gynecologic Oncology  [facility]    CC  Patient Care Team:  Yamile Cotter MD as PCP - General (Family Medicine)  Yamile Cotter MD as Assigned PCP  Amanda Au MD as MD (Gynecologic Oncology)  Amanda Au MD as Assigned Cancer Care Provider  Luma Griffin, RN as Clinic Care Coordinator (Hematology & Oncology)  Kami Story MD as MD (Cardiology)  AMANDA AU

## 2025-02-05 NOTE — LETTER
"2/5/2025      Jen Layne  [address]  [place] [facility] [address]      Dear Colleague,    Thank you for referring your patient, Jen Layne, to the [facility]. Please see a copy of my visit note below.    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    Weight as of this encounter: 80.6 kg (177 lb 11.2 oz). Body surface area is 1.92 meters squared.  No Pain (0) Comment: Data Unavailable   No LMP recorded (lmp unknown). Patient is postmenopausal.  Allergies reviewed: Yes  Medications reviewed: Yes    Medications: Medication refills not needed today.  Pharmacy name entered into EPIC:    [facility] - [place], [place] - [address] [place] [address] AND CLINICS    Frailty Screening:   Is the patient here for a new oncology consult visit in cancer care? 2. 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  She presents for follow up and disease management.      Oncology History:  Initially, she was seen in the ED for intermittent vaginal bleeding x 6 wks. " Was actually seen 2022 for hematuria.  At that time, underwent CT urogram 6/8/22 which showed no gross abnormalities to explain hematuria, was noted to have low-density thickening of endometrium.  Underwent a pelvic US on 7/8/22 which showed uterus at 6.8 x 4.6cm with a 3.2cm mass along endometrial canal possibly representing polyp or fibroid.  Never had any further follow-up.     She had a repeat pelvic US on 6/5/23 which showed diffusely thickened endometrium.  Had pelvic MRI  8.1 x 4.4 x 5.4 cm in long axis, short axis and transverse dimensions, respectively. An apparent heterogeneous mass-like structure is present in the anterior and right aspect of the uterus in the subendometrial or endometrial  region, measuring approximately 2.5 x 1.6 cm (series 18 image 33). This structure is not well-visualized on T1 weighted images due to artifact. It appears isointense to the uterine myometrium on T2-weighted images. Probable mildly heterogeneous  enhancement of this structure. A few subcentimeter low T2 signal regions in the anterior uterus likely represent very small fibroids.  The endometrial stripe is not well defined due to motion artifact. It measures approximately 0.9 cm in dual-layer anteroposterior thickness. The endometrial region appears mildly irregular on the axial images (for example, series 6 image 15). It cannot be determined on the post contrast images if there is abnormal enhancement associated with the endometrium. Possible restricted diffusion within the region of endometrium.     6/7/23:  Seen in Gyn Onc. Unable to perform EMB due to stenosis     7/19/23:  EUA, D&C under US guidance.  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  MLH1 hypermethylation +     9/22/23:  Robotic assisted hysterectomy, bilateral salpingo-oophorectomy, cancer staging, sentinel lymph node mapping and sampling, pelvic washings.  Final pathology (reviewed): FIGO Grade 1 endometrioid TOMAS, 4.5cm greatest  dimension, >50% myometrial invasion (13/22mm), superficial NOLA involvment, no LVSI, cytology negative, margins negative, LN negative. Final FIGO Stage IB Grade 1 endometrioid TOMAS,      Discussed at tumor board with recs for VBT versus observation. The patient saw Rad Onc and ultimately decided not to do XRT.     5/23/24: VAGINAL MUCOSA, BIOPSY:  -MODERATELY DIFFERENTIATED ADENOCARCINOMA  PAX8: Strongly and diffusely positive  P16 strongly positive, marking the majority of the columnar cells  Estrogen receptor: Positive (95% of cells, strong staining)  Cytokeratin-7: Strongly and diffusely positive  Cytokeratin-20: Negative  CDX2: Negative (absence of enteric differentiation)  The combined morphologic and immunophenotypic features of this lesion are compatible with the clinical history of endometrial carcinoma.     6/10/24: PET CT  IMPRESSION:  1.  Focal hypermetabolic uptake at the vaginal cuff compatible with biopsy-proven recurrent malignancy. FDG avid right pelvic lymph nodes are suspicious for man metastases.  2.  Subcentimeter pulmonary nodules are below the resolution of PET. Attention on future oncologic imaging.     6/27/24:  Tumor board: Consensus/Management Options: As patient has good functional status and the pelvic lymph node is FDG avid, recommendation for chemotherapy + IO. Also can consider  trial. Discuss with patient for final treatment plan.      Plan: Carboplatin AUC 5, paclitaxel 175 mg/m2, and dorstarlimab 500 mg IV every 3 weeks x 3 cycles followed by CT and follow up with Dr. Au.     7/11/24: RLE US negative for DVT.  7/16/24: Port placement.  7/18/24: Cycle 1 carboplatin, paclitaxel, and dorstarlimab.  8/8/24: Cycle 2 carboplatin, paclitaxel, and dorstarlimab.  8/19/24: ED for presyncope and neck pain.  Work up negative.  8/29/24: Cycle 3 carboplatin, paclitaxel, and dorstarlimab.     9/7/24:  ED for diarrhea.     Was set up for outpatient IVF     9/20/24:  CT CAP: Images  reviewed  IMPRESSION:  1.  Unremarkable CT appearance of the vaginal cuff. The known  biopsy-proven recurrence is not definitively identified.  2.  Decreased size of the right external iliac chain lymph nodes,  which were previously hypermetabolic. No progressive lymphadenopathy  otherwise.  3.  Unchanged solid pulmonary nodules measuring up to 0.5 cm.  Continued imaging surveillance is recommended.  4.  Nonobstructing left renal calculi.       2/3/25:  PET/CT:  Images reviewed  Subtle residual FDG avid soft tissue thickening in the region of the vaginal cuff which is decreased in size/uptake compared to prior exam and measures 1.3 x 0.6 cm (SUV max 5.1), previously 3.0 x 1.4 cm (SUV max 10.5). Interval resolution of the   previously FDG avid right external iliac lymph nodes. Findings suggest significant partial response to therapy.IMPRESSION:  1. Significant partial response to therapy with subtle residual soft tissue thickening in the region of the vaginal cuff.  2. No significant change in tiny pulmonary nodules which remain too small to characterize by PET/CT.    The patient returns today for follow-up.  She has been off treatment since 8/29/24 when she received cycle #3 chemo and decision was made to stop due to weakness, diarrhea, intolerance of therapy  She returns today for evaluation.  She continues to have issues with diarrhea that prevents her from enjoying life, her meals.  She is only taking  lomotil once a day after she has the  diarrhea. No vaginal bleeding.       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         Past Surgical History:    Past Surgical History:   Procedure Laterality Date     COLONOSCOPY  12/05/05    normal,  recheck 10 years -- done at [facility] HYSTERECTOMY TOTAL, BILATERAL SALPINGO-OOPHORECTOMY, NODE DISSECTION, COMBINED N/A 2023    Procedure: Robotic assisted hysterectomy, bilateral salpingo-oophorectomy, cancer staging, sentinel lymph node mapping and sampling, pelvic washings;  Surgeon: Amanda Au MD;  Location: [facility] OR     DILATION AND CURETTAGE, WITH ULTRASOUND GUIDANCE N/A 2023    Procedure: Pelvic examination under anesthesia, dilation and curettage uterus, ultrasound guidance Latex Free;  Surgeon: Amanda Au MD;  Location: [facility] OR     EXAM EYE  2018    Capsulotomy Left eye.   Dr. Skip Villela     EYE SURGERY       HC REMOVAL GALLBLADDER       HYSTEROSCOPY,DIAGNOSTIC  around      IR CHEST PORT PLACEMENT > 5 YRS OF AGE  2024         Health Maintenance:  Health Maintenance Due   Topic Date Due     RSV VACCINE (1 - 1-dose 75+ series) Never done     MEDICARE ANNUAL WELLNESS VISIT  2024     MICROALBUMIN  2024     PHQ-2 (once per calendar year)  2025           Current Medications:     has a current medication list which includes the following prescription(s): cyclobenzaprine, dexamethasone, diphenoxylate-atropine, lidocaine-prilocaine, lisinopril, magnesium oxide, mupirocin, ondansetron, prochlorperazine, simvastatin, triamcinolone, and vitamin d, and the following Facility-Administered Medications: heparin lock flush.       Allergies:     Hydrochlorothiazide        Social History:     Social History     Tobacco Use     Smoking status: Never     Passive exposure: Never     Smokeless tobacco: Never   Substance Use Topics     Alcohol use: No       History   Drug Use No           Family History:     The patient's family history is notable for :.    Family History   Problem Relation Age of Onset     Eye Disorder Mother         glaucoma     Heart Disease Mother          of CHF     Cancer Sister         uterine and ovarian-in remission      Other  "Cancer Sister      Deep Vein Thrombosis (DVT) Sister      Uterine Cancer Sister      Heart Disease Brother         valve replacement     Lipids Brother      Coronary Artery Disease Brother      Eye Disorder Brother         detached retina     Cancer Paternal Grandfather         carcinoma of the lip, pipe-smoker     Thyroid Disease Daughter         goiter or nodule?     Cancer Son         SKIN     Other Cancer Son      Other Cancer Other      Thyroid Disease Other      Diabetes No family hx of      C.A.D. No family hx of      Breast Cancer No family hx of      Cancer - colorectal No family hx of      Anesthesia Reaction No family hx of          Physical Exam:     BP (!) 142/90 (BP Location: Left arm, Patient Position: Sitting, Cuff Size: Adult Regular)   Pulse 86   Resp 18   Ht 1.638 m (5' 4.49\")   Wt 80.6 kg (177 lb 11.2 oz)   LMP  (LMP Unknown)   SpO2 98%   BMI 30.04 kg/m    Body mass index is 30.04 kg/m .    General Appearance: healthy and alert, no distress     Musculoskeletal: extremities non tender and without edema    Skin: no lesions or rashes     Neurological: normal gait, no gross defects     Psychiatric: appropriate mood and affect                               Hematological: normal cervical, supraclavicular and inguinal lymph nodes     Gastrointestinal:       abdomen soft, non-tender, non-distended, no organomegaly or masses    Genitourinary: External genitalia and urethral meatus appears normal.  Vagina is smooth without nodularity or masses. No lesions at cuff, no bleeding, no evidence of  disease on inspection and palpation.  Bimanual negative.      Assessment:     Jen Layne is a 89 year old woman with a diagnosis of Stage IB Grade 1 endometrioid endometrial TOMAS, with vaginal cuff recurrence and local man disease, on break from chemotherapy due to intolerance.        30 minutes spent on the date of the encounter doing chart review, history and exam, documentation and further activities " as noted above     The longitudinal plan of care for the diagnosis(es)/condition(s) as documented were addressed during this visit. Due to the added complexity in care, I will continue to support Jen in the subsequent management and with ongoing continuity of care.       Plan:      1.)        FIGO Grade 1 endometrioid TOMAS:  P Tumor 4.5cm greatest dimension, >50% myometrial invasion (13/22mm), superficial NOLA involvment, no LVSI, cytology negative, margins negative, LN negative. Final FIGO Stage IB Grade 1 endometrioid TOMAS.  H-IR due to age, DOI.  Discussed options including observation, radiation (VBT).  Patient was seen by Rad Onc and ultimately elected for observation.       Was then seen for bleeding with exam findings consistent with vaginal cuff recurrence 5/24.  This was biopsy proven and PET/CT scan showed vaginal cuff recurrence with likely right pelvic lymph node spread.     Tumor board review were discussed with patient and her family. Recommended systemic chemotherapy with Carboplatin/Taxol and Dostarblimab followed by maintenance dostarlimab. Alternatively, discussed clinical trial and gave her information regarding .       Patient ultimately only completed three cycles of chemotherapy due to intolerance.Treatment was complicated by progressive difficulty with tolerance with diarrhea, dehydration, ED visit, IVF infusions. Fortunately, the patient seemed to have a complete clinical response after only three cycle.     The patient has been on a treatment break since the end of August.  Vaginal exam continues to be negative for recurrence and PET with no obvious sign of disease. Vaginal thickening noted but no sign of disease on exam and patient  asymptomatic.  For now, recommend continuing observation.  Advise repeat imaging in three months.  If recurrence at that time, could consider  verus chemo again.  Precautions given, she will contact me if she has any bleeding.        Questions  answered, patient and family expressed understanding of plan of care.     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  Recommend evaluation with PCP/GI as it has now been 6 months since chemo.  She will start with PCP     Questions answered, she expressed understanding of plan of care.        Amanda Au MD  Gynecologic Oncology  [facility]    CC  Patient Care Team:  Yamile Cotter MD as PCP - General (Family Medicine)  Yamile Cotter MD as Assigned PCP  Amanda Au MD as MD (Gynecologic Oncology)  Amanda Au MD as Assigned Cancer Care Provider  Luma Griffin, RN as Clinic Care Coordinator (Hematology & Oncology)  Kami Story MD as MD (Cardiology)  AMANDA AU        Again, thank you for allowing me to participate in the care of your patient.        Sincerely,        Amanda Au MD    Electronically signed

## 2025-02-07 ENCOUNTER — HOSPITAL ENCOUNTER (EMERGENCY)
Facility: CLINIC | Age: 89
Discharge: HOME | End: 2025-02-07
Attending: FAMILY MEDICINE | Admitting: FAMILY MEDICINE
Payer: MEDICARE

## 2025-02-07 VITALS
RESPIRATION RATE: 18 BRPM | DIASTOLIC BLOOD PRESSURE: 77 MMHG | TEMPERATURE: 97.5 F | HEART RATE: 71 BPM | BODY MASS INDEX: 30.22 KG/M2 | OXYGEN SATURATION: 98 % | SYSTOLIC BLOOD PRESSURE: 121 MMHG | HEIGHT: 64 IN | WEIGHT: 177 LBS

## 2025-02-07 DIAGNOSIS — R19.7 DIARRHEA, UNSPECIFIED TYPE: ICD-10-CM

## 2025-02-07 LAB
ALBUMIN SERPL BCG-MCNC: 3.5 G/DL (ref 3.5–5.2)
ALP SERPL-CCNC: 51 U/L (ref 40–150)
ALT SERPL W P-5'-P-CCNC: 13 U/L (ref 0–50)
ANION GAP SERPL CALCULATED.3IONS-SCNC: 11 MMOL/L (ref 7–15)
AST SERPL W P-5'-P-CCNC: 26 U/L (ref 0–45)
BASOPHILS # BLD AUTO: 0.1 10E3/UL (ref 0–0.2)
BASOPHILS NFR BLD AUTO: 1 %
BILIRUB SERPL-MCNC: 0.5 MG/DL
BUN SERPL-MCNC: 26.3 MG/DL (ref 8–23)
CALCIUM SERPL-MCNC: 8.9 MG/DL (ref 8.8–10.4)
CHLORIDE SERPL-SCNC: 106 MMOL/L (ref 98–107)
CREAT SERPL-MCNC: 1.13 MG/DL (ref 0.51–0.95)
EGFRCR SERPLBLD CKD-EPI 2021: 47 ML/MIN/1.73M2
EOSINOPHIL # BLD AUTO: 1 10E3/UL (ref 0–0.7)
EOSINOPHIL NFR BLD AUTO: 9 %
ERYTHROCYTE [DISTWIDTH] IN BLOOD BY AUTOMATED COUNT: 12.8 % (ref 10–15)
GLUCOSE SERPL-MCNC: 102 MG/DL (ref 70–99)
HCO3 SERPL-SCNC: 25 MMOL/L (ref 22–29)
HCT VFR BLD AUTO: 42.8 % (ref 35–47)
HGB BLD-MCNC: 14 G/DL (ref 11.7–15.7)
IMM GRANULOCYTES # BLD: 0 10E3/UL
IMM GRANULOCYTES NFR BLD: 0 %
LIPASE SERPL-CCNC: 38 U/L (ref 13–60)
LYMPHOCYTES # BLD AUTO: 1.8 10E3/UL (ref 0.8–5.3)
LYMPHOCYTES NFR BLD AUTO: 17 %
MCH RBC QN AUTO: 30.7 PG (ref 26.5–33)
MCHC RBC AUTO-ENTMCNC: 32.7 G/DL (ref 31.5–36.5)
MCV RBC AUTO: 94 FL (ref 78–100)
MONOCYTES # BLD AUTO: 0.9 10E3/UL (ref 0–1.3)
MONOCYTES NFR BLD AUTO: 9 %
NEUTROPHILS # BLD AUTO: 6.6 10E3/UL (ref 1.6–8.3)
NEUTROPHILS NFR BLD AUTO: 64 %
NRBC # BLD AUTO: 0 10E3/UL
NRBC BLD AUTO-RTO: 0 /100
PLATELET # BLD AUTO: 237 10E3/UL (ref 150–450)
POTASSIUM SERPL-SCNC: 4.2 MMOL/L (ref 3.4–5.3)
PROT SERPL-MCNC: 6 G/DL (ref 6.4–8.3)
RBC # BLD AUTO: 4.56 10E6/UL (ref 3.8–5.2)
SODIUM SERPL-SCNC: 142 MMOL/L (ref 135–145)
WBC # BLD AUTO: 10.3 10E3/UL (ref 4–11)

## 2025-02-07 PROCEDURE — 85048 AUTOMATED LEUKOCYTE COUNT: CPT | Performed by: FAMILY MEDICINE

## 2025-02-07 PROCEDURE — 99283 EMERGENCY DEPT VISIT LOW MDM: CPT | Performed by: FAMILY MEDICINE

## 2025-02-07 PROCEDURE — 85004 AUTOMATED DIFF WBC COUNT: CPT | Performed by: FAMILY MEDICINE

## 2025-02-07 PROCEDURE — 36415 COLL VENOUS BLD VENIPUNCTURE: CPT | Performed by: FAMILY MEDICINE

## 2025-02-07 PROCEDURE — 96361 HYDRATE IV INFUSION ADD-ON: CPT | Performed by: FAMILY MEDICINE

## 2025-02-07 PROCEDURE — 99283 EMERGENCY DEPT VISIT LOW MDM: CPT | Mod: 25 | Performed by: FAMILY MEDICINE

## 2025-02-07 PROCEDURE — 82040 ASSAY OF SERUM ALBUMIN: CPT | Performed by: FAMILY MEDICINE

## 2025-02-07 PROCEDURE — 250N000011 HC RX IP 250 OP 636: Performed by: FAMILY MEDICINE

## 2025-02-07 PROCEDURE — 96360 HYDRATION IV INFUSION INIT: CPT | Performed by: FAMILY MEDICINE

## 2025-02-07 PROCEDURE — 258N000003 HC RX IP 258 OP 636: Performed by: FAMILY MEDICINE

## 2025-02-07 PROCEDURE — 80053 COMPREHEN METABOLIC PANEL: CPT | Performed by: FAMILY MEDICINE

## 2025-02-07 RX ORDER — ONDANSETRON 4 MG/1
4 TABLET, ORALLY DISINTEGRATING ORAL EVERY 8 HOURS PRN
Qty: 10 TABLET | Refills: 0 | Status: SHIPPED | OUTPATIENT
Start: 2025-02-07 | End: 2025-02-10

## 2025-02-07 RX ORDER — HEPARIN SODIUM,PORCINE 10 UNIT/ML
5-10 VIAL (ML) INTRAVENOUS EVERY 24 HOURS
Status: DISCONTINUED | OUTPATIENT
Start: 2025-02-07 | End: 2025-02-07 | Stop reason: HOSPADM

## 2025-02-07 RX ADMIN — Medication 5 ML: at 14:29

## 2025-02-07 RX ADMIN — SODIUM CHLORIDE 1000 ML: 9 INJECTION, SOLUTION INTRAVENOUS at 10:54

## 2025-02-07 ASSESSMENT — COLUMBIA-SUICIDE SEVERITY RATING SCALE - C-SSRS
1. IN THE PAST MONTH, HAVE YOU WISHED YOU WERE DEAD OR WISHED YOU COULD GO TO SLEEP AND NOT WAKE UP?: NO
6. HAVE YOU EVER DONE ANYTHING, STARTED TO DO ANYTHING, OR PREPARED TO DO ANYTHING TO END YOUR LIFE?: NO
2. HAVE YOU ACTUALLY HAD ANY THOUGHTS OF KILLING YOURSELF IN THE PAST MONTH?: NO

## 2025-02-07 ASSESSMENT — ACTIVITIES OF DAILY LIVING (ADL)
ADLS_ACUITY_SCORE: 41

## 2025-02-07 NOTE — ED NOTES
Heparin flush given in port prior to discontinuing. Pt and family member verbalize understanding regarding stool collection and appropriate supplies given. Pt walked with strong steady gait to lobby with family member

## 2025-02-07 NOTE — ED PROVIDER NOTES
"  History     Chief Complaint   Patient presents with    Diarrhea     HPI  Jen Layne is a 89 year old female, past medical history is significant for generalized muscle weakness, dehydration, endometrial cancer, metabolic syndrome, stage II kidney disease, obesity, hypertension, hyperlipidemia, presents to the emergency department with concerns of 3 days of diarrhea.  Approximately 7 episodes per day per patient.  History is obtained from the patient who presents with her daughter with concerns of explosive diarrhea that occurs typically between 3 and 5:00 in the morning.  It often awakens her from sleep and given her limited ability to move quickly she often does not make it to the bathroom.  The stool is described as \"mashed potatoes\" it is definitely not black or bloody.  Occasionally she will have some abdominal cramping but nothing that she describes as pain and there is no pain currently.  She denies any fever chills or sweats.  There is been no recent concern for contaminated food or water, no recent antibiotics, no travel outside of the [place].  She has been using Lomotil that had been prescribed for her previously when she had chemotherapy associated diarrhea.  It does not seem to be working.  She is also followed a brat diet and that has not helped either.  She is reluctant to eat during the day because of the \"explosive and debilitating diarrhea\"  Patient is concerned she could have norovirus or Giardia and would like her stools checked.      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) 06/28/2024     Priority: Medium    S/P hysterectomy 09/22/2023     Priority: Medium    Impaired fasting glucose 12/16/2011     Priority: Medium    Metabolic syndrome 12/16/2011     Priority: Medium    CKD (chronic kidney disease) stage 2, GFR 60-89 ml/min 11/16/2009     Priority: Medium     GFR in 50s 2008, 2009 -- diagnosed with CKD stage 3  Reduction likely due to obesity, hypertension, increasing age  Labs improved, eGFR >60 March 2011March 2011, December 2011 -- diagnosis of stage 3 changed  Component      Latest Ref Rng 12/9/2010 3/13/2011 12/15/2011 3/21/2013   GFR Estimate      >60 mL/min/1.7m2 53 (L) 61 63 75     Component      Latest Ref Rng 3/31/2014   GFR Estimate      >60 mL/min/1.7m2 69     Problem list name updated by automated process. Provider to review      Obesity (BMI 30-39.9) 10/30/2008     Priority: Medium     (Problem list name updated by automated process. 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) 6/28/2024       Past Surgical History:    Past Surgical History:   Procedure Laterality Date    COLONOSCOPY  12/05/05    normal, recheck 10 years -- done at [facility] [place] HYSTERECTOMY TOTAL, BILATERAL SALPINGO-OOPHORECTOMY, NODE DISSECTION, COMBINED N/A 9/22/2023    Procedure: Robotic assisted hysterectomy, bilateral salpingo-oophorectomy, cancer staging, sentinel lymph node mapping and sampling, pelvic washings;  Surgeon: Amanda Au MD;  Location: [facility] OR    DILATION AND CURETTAGE, WITH ULTRASOUND GUIDANCE N/A 7/19/2023    Procedure: Pelvic examination under anesthesia, dilation  and curettage uterus, ultrasound guidance Latex Free;  Surgeon: Amanda Au MD;  Location: [facility] OR    EXAM EYE  2018    Capsulotomy Left eye.   Dr. Skip Villela    EYE SURGERY      HC REMOVAL GALLBLADDER      HYSTEROSCOPY,DIAGNOSTIC  around     IR CHEST PORT PLACEMENT > 5 YRS OF AGE  2024       Family History:    Family History   Problem Relation Age of Onset    Eye Disorder Mother         glaucoma    Heart Disease Mother          of CHF    Cancer Sister         uterine and ovarian-in remission     Other Cancer Sister     Deep Vein Thrombosis (DVT) Sister     Uterine Cancer Sister     Heart Disease Brother         valve replacement    Lipids Brother     Coronary Artery Disease Brother     Eye Disorder Brother         detached retina    Cancer Paternal Grandfather         carcinoma of the lip, pipe-smoker    Thyroid Disease Daughter         goiter or nodule?    Cancer Son         SKIN    Other Cancer Son     Other Cancer Other     Thyroid Disease Other     Diabetes No family hx of     C.A.D. No family hx of     Breast Cancer No family hx of     Cancer - colorectal No family hx of     Anesthesia Reaction No family hx of        Social History:  Marital Status:   [5]  Social History     Tobacco Use    Smoking status: Never     Passive exposure: Never    Smokeless tobacco: Never   Vaping Use    Vaping status: Never Used   Substance Use Topics    Alcohol use: No    Drug use: No        Medications:    ondansetron (ZOFRAN ODT) 4 MG ODT tab  cyclobenzaprine (FLEXERIL) 5 MG tablet  dexAMETHasone (DECADRON) 4 MG tablet  diphenoxylate-atropine (LOMOTIL) 2.5-0.025 MG tablet  lidocaine-prilocaine (EMLA) 2.5-2.5 % external cream  lisinopril (ZESTRIL) 20 MG tablet  magnesium oxide (MAG-OX) 400 MG tablet  mupirocin (BACTROBAN) 2 % external ointment  ondansetron (ZOFRAN) 8 MG tablet  prochlorperazine (COMPAZINE) 10 MG tablet  simvastatin (ZOCOR) 40 MG tablet  triamcinolone (KENALOG) 0.1 % external  "ointment  VITAMIN D 1000 UNIT OR CAPS          Review of Systems   All other systems reviewed and are negative.      Physical Exam   BP: 127/79  Pulse: 84  Temp: 97.5  F (36.4  C)  Resp: 18  Height: 162.6 cm (5' 4\")  Weight: 80.3 kg (177 lb)  SpO2: 96 %      Physical Exam  Vitals and nursing note reviewed.   Constitutional:       General: She is not in acute distress.     Appearance: Normal appearance. She is normal weight. She is not ill-appearing.   HENT:      Head: Normocephalic and atraumatic.      Right Ear: Tympanic membrane, ear canal and external ear normal.      Left Ear: Tympanic membrane and ear canal normal.      Nose: Nose normal.      Mouth/Throat:      Mouth: Mucous membranes are dry.      Pharynx: Oropharynx is clear.   Eyes:      Extraocular Movements: Extraocular movements intact.      Conjunctiva/sclera: Conjunctivae normal.      Pupils: Pupils are equal, round, and reactive to light.   Cardiovascular:      Rate and Rhythm: Normal rate and regular rhythm.      Pulses: Normal pulses.      Heart sounds: Normal heart sounds.   Pulmonary:      Effort: Pulmonary effort is normal.      Breath sounds: Normal breath sounds.   Abdominal:      General: Bowel sounds are normal.      Palpations: Abdomen is soft.   Musculoskeletal:      Cervical back: Normal range of motion and neck supple.   Skin:     General: Skin is warm and dry.   Neurological:      General: No focal deficit present.      Mental Status: She is alert and oriented to person, place, and time.   Psychiatric:         Mood and Affect: Mood normal.         Behavior: Behavior normal.         ED Course        Procedures                Results for orders placed or performed during the hospital encounter of 02/07/25 (from the past 24 hours)   CBC with platelets, differential    Narrative    The following orders were created for panel order CBC with platelets, differential.  Procedure                               Abnormality         Status            "          ---------                               -----------         ------                     CBC with platelets and d...[[id number]]  Abnormal            Final result                 Please view results for these tests on the individual orders.   Comprehensive metabolic panel   Result Value Ref Range    Sodium 142 135 - 145 mmol/L    Potassium 4.2 3.4 - 5.3 mmol/L    Carbon Dioxide (CO2) 25 22 - 29 mmol/L    Anion Gap 11 7 - 15 mmol/L    Urea Nitrogen 26.3 (H) 8.0 - 23.0 mg/dL    Creatinine 1.13 (H) 0.51 - 0.95 mg/dL    GFR Estimate 47 (L) >60 mL/min/1.73m2    Calcium 8.9 8.8 - 10.4 mg/dL    Chloride 106 98 - 107 mmol/L    Glucose 102 (H) 70 - 99 mg/dL    Alkaline Phosphatase 51 40 - 150 U/L    AST 26 0 - 45 U/L    ALT 13 0 - 50 U/L    Protein Total 6.0 (L) 6.4 - 8.3 g/dL    Albumin 3.5 3.5 - 5.2 g/dL    Bilirubin Total 0.5 <=1.2 mg/dL   CBC with platelets and differential   Result Value Ref Range    WBC Count 10.3 4.0 - 11.0 10e3/uL    RBC Count 4.56 3.80 - 5.20 10e6/uL    Hemoglobin 14.0 11.7 - 15.7 g/dL    Hematocrit 42.8 35.0 - 47.0 %    MCV 94 78 - 100 fL    MCH 30.7 26.5 - 33.0 pg    MCHC 32.7 31.5 - 36.5 g/dL    RDW 12.8 10.0 - 15.0 %    Platelet Count 237 150 - 450 10e3/uL    % Neutrophils 64 %    % Lymphocytes 17 %    % Monocytes 9 %    % Eosinophils 9 %    % Basophils 1 %    % Immature Granulocytes 0 %    NRBCs per 100 WBC 0 <1 /100    Absolute Neutrophils 6.6 1.6 - 8.3 10e3/uL    Absolute Lymphocytes 1.8 0.8 - 5.3 10e3/uL    Absolute Monocytes 0.9 0.0 - 1.3 10e3/uL    Absolute Eosinophils 1.0 (H) 0.0 - 0.7 10e3/uL    Absolute Basophils 0.1 0.0 - 0.2 10e3/uL    Absolute Immature Granulocytes 0.0 <=0.4 10e3/uL    Absolute NRBCs 0.0 10e3/uL       Medications   sodium chloride 0.9% BOLUS 1,000 mL (1,000 mLs Intravenous $New Bag 2/7/25 3292)     2:14 PM  Discussed normal hemogram, mild deterioration in the patient's baseline renal function with increased BUN and increased creatinine.  Does not meet criteria  based on the most recent BMP comparison for FATOUMATA.  I discussed this with the patient and her daughter and the need for vigorous hydration at all times and a probable recheck of this sometime in the next 10 to 14 days.  She was unable to provide us with a sample of stool today and so I have sent a stool collection at home with her.  We discussed the use of Lomotil/Imodium which she has tried with minimal success.  I suggested that she could try Zofran not for the antiemetic properties but rather for the role it plays in the production of gut motility.  She could try taking this before bedtime and see if that helps.  It would be a relatively innocuous and safe intervention for her.  Criteria for the emergency department were discussed and disposition is to home.    Assessments & Plan (with Medical Decision Making)   Assessment and plan with medical decision making at the time stamp above.    Disclaimer: This note consists of symbols derived from keyboarding, dictation and/or voice recognition software. As a result, there may be errors in the script that have gone undetected. Please consider this when interpreting information found in this chart.      I have reviewed the nursing notes.    I have reviewed the findings, diagnosis, plan and need for follow up with the patient.        New Prescriptions    ONDANSETRON (ZOFRAN ODT) 4 MG ODT TAB    Take 1 tablet (4 mg) by mouth every 8 hours as needed for nausea.       Final diagnoses:   Diarrhea, unspecified type       2/7/2025   [facility] EMERGENCY DEPT       Julio Euceda MD  02/07/25 1997

## 2025-02-07 NOTE — ED TRIAGE NOTES
Pt presents with 3 days of diarrhea.  Approx 7 loose stools per day.  Denies blood and mucus in stool.  Afebrile.      Triage Assessment (Adult)       Row Name 02/07/25 0939          Triage Assessment    Airway WDL WDL        Cognitive/Neuro/Behavioral WDL    Cognitive/Neuro/Behavioral WDL WDL

## 2025-02-07 NOTE — DISCHARGE INSTRUCTIONS
As we discussed your kidney function is a little bit off the baseline and he really need to work on maintaining good fluid intake especially water.  With respect to the diarrhea occurring episodically this is unlikely to be infectious.  I have sent you home with stool collection.  Please return to soon as possible so that we can definitively exclude the possibility of infectious diarrhea.  He may continue with the medication that you successfully during your chemotherapy previously i.e. Lomotil.  We can also try Zofran which as you know is an antinausea medication but also plays a role in slowing colonic transit and slowing down diarrhea potentially.  If worse or changes please return.

## 2025-02-10 ENCOUNTER — LAB (OUTPATIENT)
Dept: LAB | Facility: CLINIC | Age: 89
End: 2025-02-10
Payer: MEDICARE

## 2025-02-10 DIAGNOSIS — C54.1 ENDOMETRIAL CANCER (H): ICD-10-CM

## 2025-02-10 DIAGNOSIS — R19.7 DIARRHEA, UNSPECIFIED TYPE: ICD-10-CM

## 2025-02-10 DIAGNOSIS — E83.42 HYPOMAGNESEMIA: ICD-10-CM

## 2025-02-20 ENCOUNTER — OFFICE VISIT (OUTPATIENT)
Dept: FAMILY MEDICINE | Facility: CLINIC | Age: 89
End: 2025-02-20
Payer: MEDICARE

## 2025-02-20 VITALS
HEART RATE: 79 BPM | HEIGHT: 64 IN | WEIGHT: 170 LBS | OXYGEN SATURATION: 95 % | SYSTOLIC BLOOD PRESSURE: 118 MMHG | RESPIRATION RATE: 16 BRPM | TEMPERATURE: 96.9 F | BODY MASS INDEX: 29.02 KG/M2 | DIASTOLIC BLOOD PRESSURE: 78 MMHG

## 2025-02-20 DIAGNOSIS — A09 DIARRHEA OF INFECTIOUS ORIGIN: Primary | ICD-10-CM

## 2025-02-20 DIAGNOSIS — C79.82 SECONDARY MALIGNANT NEOPLASM OF GENITAL ORGANS (H): ICD-10-CM

## 2025-02-20 DIAGNOSIS — Z29.11 NEED FOR VACCINATION AGAINST RESPIRATORY SYNCYTIAL VIRUS: ICD-10-CM

## 2025-02-20 DIAGNOSIS — N18.31 CHRONIC KIDNEY DISEASE, STAGE 3A (H): ICD-10-CM

## 2025-02-20 LAB

## 2025-02-20 PROCEDURE — 3074F SYST BP LT 130 MM HG: CPT | Performed by: FAMILY MEDICINE

## 2025-02-20 PROCEDURE — G2211 COMPLEX E/M VISIT ADD ON: HCPCS | Performed by: FAMILY MEDICINE

## 2025-02-20 PROCEDURE — 3078F DIAST BP <80 MM HG: CPT | Performed by: FAMILY MEDICINE

## 2025-02-20 PROCEDURE — 99214 OFFICE O/P EST MOD 30 MIN: CPT | Performed by: FAMILY MEDICINE

## 2025-02-20 PROCEDURE — 1125F AMNT PAIN NOTED PAIN PRSNT: CPT | Performed by: FAMILY MEDICINE

## 2025-02-20 PROCEDURE — 87493 C DIFF AMPLIFIED PROBE: CPT | Performed by: FAMILY MEDICINE

## 2025-02-20 ASSESSMENT — PAIN SCALES - GENERAL: PAINLEVEL_OUTOF10: SEVERE PAIN (7)

## 2025-02-20 ASSESSMENT — ENCOUNTER SYMPTOMS: DIARRHEA: 1

## 2025-02-20 NOTE — PROGRESS NOTES
{PROVIDER CHARTING PREFERENCE:568782}    Racheal Li is a 89 year old, presenting for the following health issues:  Diarrhea  {(!) Visit Details have not yet been documented.  Please enter Visit Details and then use this list to pull in documentation. (Optional):792633}  Diarrhea    History of Present Illness       Reason for visit:  Diarrhea  Symptom onset:  More than a month  Symptoms include:  Spoadic bouts of diarrhia  Symptom intensity:  Moderate  Symptom progression:  Staying the same  Had these symptoms before:  No  What makes it worse:  Unknown  What makes it better:  Not eating anything   She is taking medications regularly.       {MA/LPN/RN Pre-Provider Visit Orders- hCG/UA/Strep (Optional):640211}  {SUPERLIST (Optional):731172}  {additonal problems for provider to add (Optional):987529}    {ROS Picklists (Optional):644095}      Objective    LMP  (LMP Unknown)   There is no height or weight on file to calculate BMI.  Physical Exam   {Exam List (Optional):742601}    {Diagnostic Test Results (Optional):250763}        Signed Electronically by: Yamile Cotter MD  {Email feedback regarding this note to [email or web address]   :769541}    LMP  (LMP Unknown)   SpO2 95%   BMI 29.18 kg/m    Body mass index is 29.18 kg/m .  Physical Exam   GENERAL: Pleasant, well appearing female.  ABDOMEN: Soft, nondistended, nontender, no hepatosplenomegaly. No palpable masses.             Signed Electronically by: Yamile Cotter MD

## 2025-02-20 NOTE — NURSING NOTE
"Initial /78   Pulse 79   Temp 96.9  F (36.1  C) (Tympanic)   Resp 16   Ht 1.626 m (5' 4\")   Wt 77.1 kg (170 lb)   LMP  (LMP Unknown)   SpO2 95%   BMI 29.18 kg/m   Estimated body mass index is 29.18 kg/m  as calculated from the following:    Height as of this encounter: 1.626 m (5' 4\").    Weight as of this encounter: 77.1 kg (170 lb). .    "
"Initial LMP  (LMP Unknown)  Estimated body mass index is 30.38 kg/m  as calculated from the following:    Height as of 2/7/25: 1.626 m (5' 4\").    Weight as of 2/7/25: 80.3 kg (177 lb). .    "
no

## 2025-03-11 PROBLEM — N18.31 CHRONIC KIDNEY DISEASE, STAGE 3A (H): Status: ACTIVE | Noted: 2025-03-11

## 2025-03-11 PROBLEM — C79.82: Status: ACTIVE | Noted: 2025-03-11

## 2025-03-17 ENCOUNTER — INFUSION THERAPY VISIT (OUTPATIENT)
Dept: INFUSION THERAPY | Facility: HOSPITAL | Age: 89
End: 2025-03-17
Payer: MEDICARE

## 2025-03-17 DIAGNOSIS — C54.1 ENDOMETRIAL CANCER (H): ICD-10-CM

## 2025-03-17 DIAGNOSIS — E86.0 DEHYDRATION: Primary | ICD-10-CM

## 2025-03-17 PROCEDURE — 250N000011 HC RX IP 250 OP 636: Performed by: OBSTETRICS & GYNECOLOGY

## 2025-03-17 PROCEDURE — 96523 IRRIG DRUG DELIVERY DEVICE: CPT

## 2025-03-17 RX ORDER — HEPARIN SODIUM (PORCINE) LOCK FLUSH IV SOLN 100 UNIT/ML 100 UNIT/ML
5 SOLUTION INTRAVENOUS
Status: DISCONTINUED | OUTPATIENT
Start: 2025-03-17 | End: 2025-03-17 | Stop reason: HOSPADM

## 2025-03-17 RX ORDER — HEPARIN SODIUM (PORCINE) LOCK FLUSH IV SOLN 100 UNIT/ML 100 UNIT/ML
5 SOLUTION INTRAVENOUS
OUTPATIENT
Start: 2025-03-17

## 2025-03-17 RX ORDER — EPINEPHRINE 1 MG/ML
0.3 INJECTION, SOLUTION INTRAMUSCULAR; SUBCUTANEOUS EVERY 5 MIN PRN
OUTPATIENT
Start: 2025-03-17

## 2025-03-17 RX ORDER — DIPHENHYDRAMINE HYDROCHLORIDE 50 MG/ML
50 INJECTION, SOLUTION INTRAMUSCULAR; INTRAVENOUS
Start: 2025-03-17

## 2025-03-17 RX ORDER — ONDANSETRON 2 MG/ML
8 INJECTION INTRAMUSCULAR; INTRAVENOUS EVERY 6 HOURS PRN
Start: 2025-03-17

## 2025-03-17 RX ORDER — ALBUTEROL SULFATE 90 UG/1
1-2 INHALANT RESPIRATORY (INHALATION)
Start: 2025-03-17

## 2025-03-17 RX ORDER — ALBUTEROL SULFATE 0.83 MG/ML
2.5 SOLUTION RESPIRATORY (INHALATION)
OUTPATIENT
Start: 2025-03-17

## 2025-03-17 RX ORDER — MEPERIDINE HYDROCHLORIDE 25 MG/ML
25 INJECTION INTRAMUSCULAR; INTRAVENOUS; SUBCUTANEOUS EVERY 30 MIN PRN
OUTPATIENT
Start: 2025-03-17

## 2025-03-17 RX ORDER — METHYLPREDNISOLONE SODIUM SUCCINATE 125 MG/2ML
125 INJECTION INTRAMUSCULAR; INTRAVENOUS
Start: 2025-03-17

## 2025-03-17 RX ORDER — HEPARIN SODIUM,PORCINE 10 UNIT/ML
5-20 VIAL (ML) INTRAVENOUS DAILY PRN
OUTPATIENT
Start: 2025-03-17

## 2025-03-17 RX ADMIN — HEPARIN 5 ML: 100 SYRINGE at 09:35

## 2025-05-01 ENCOUNTER — HOSPITAL ENCOUNTER (OUTPATIENT)
Dept: PET IMAGING | Facility: HOSPITAL | Age: 89
Discharge: HOME | End: 2025-05-01
Attending: OBSTETRICS & GYNECOLOGY
Payer: MEDICARE

## 2025-05-01 ENCOUNTER — INFUSION THERAPY VISIT (OUTPATIENT)
Dept: INFUSION THERAPY | Facility: HOSPITAL | Age: 89
End: 2025-05-01
Attending: OBSTETRICS & GYNECOLOGY
Payer: MEDICARE

## 2025-05-01 DIAGNOSIS — C54.1 ENDOMETRIAL CANCER (H): ICD-10-CM

## 2025-05-01 DIAGNOSIS — E86.0 DEHYDRATION: Primary | ICD-10-CM

## 2025-05-01 LAB
CREAT BLD-MCNC: 0.9 MG/DL (ref 0.5–1)
EGFRCR SERPLBLD CKD-EPI 2021: >60 ML/MIN/1.73M2
GLUCOSE BLDC GLUCOMTR-MCNC: 88 MG/DL (ref 70–99)

## 2025-05-01 PROCEDURE — 78816 PET IMAGE W/CT FULL BODY: CPT | Mod: PS

## 2025-05-01 PROCEDURE — 250N000011 HC RX IP 250 OP 636: Performed by: OBSTETRICS & GYNECOLOGY

## 2025-05-01 PROCEDURE — 71260 CT THORAX DX C+: CPT

## 2025-05-01 PROCEDURE — 343N000001 HC RX 343 MED OP 636: Performed by: OBSTETRICS & GYNECOLOGY

## 2025-05-01 PROCEDURE — 82962 GLUCOSE BLOOD TEST: CPT

## 2025-05-01 PROCEDURE — 82565 ASSAY OF CREATININE: CPT

## 2025-05-01 PROCEDURE — A9552 F18 FDG: HCPCS | Performed by: OBSTETRICS & GYNECOLOGY

## 2025-05-01 RX ORDER — ONDANSETRON 2 MG/ML
8 INJECTION INTRAMUSCULAR; INTRAVENOUS EVERY 6 HOURS PRN
Start: 2025-05-01

## 2025-05-01 RX ORDER — FLUDEOXYGLUCOSE F 18 200 MCI/ML
7-18 INJECTION, SOLUTION INTRAVENOUS ONCE
Status: COMPLETED | OUTPATIENT
Start: 2025-05-01 | End: 2025-05-01

## 2025-05-01 RX ORDER — MEPERIDINE HYDROCHLORIDE 25 MG/ML
25 INJECTION INTRAMUSCULAR; INTRAVENOUS; SUBCUTANEOUS EVERY 30 MIN PRN
OUTPATIENT
Start: 2025-05-01

## 2025-05-01 RX ORDER — EPINEPHRINE 1 MG/ML
0.3 INJECTION, SOLUTION INTRAMUSCULAR; SUBCUTANEOUS EVERY 5 MIN PRN
OUTPATIENT
Start: 2025-05-01

## 2025-05-01 RX ORDER — METHYLPREDNISOLONE SODIUM SUCCINATE 125 MG/2ML
125 INJECTION INTRAMUSCULAR; INTRAVENOUS
Start: 2025-05-01

## 2025-05-01 RX ORDER — HEPARIN SODIUM,PORCINE 10 UNIT/ML
5-20 VIAL (ML) INTRAVENOUS DAILY PRN
OUTPATIENT
Start: 2025-05-01

## 2025-05-01 RX ORDER — HEPARIN SODIUM (PORCINE) LOCK FLUSH IV SOLN 100 UNIT/ML 100 UNIT/ML
5 SOLUTION INTRAVENOUS
Status: DISCONTINUED | OUTPATIENT
Start: 2025-05-01 | End: 2025-05-01 | Stop reason: HOSPADM

## 2025-05-01 RX ORDER — ALBUTEROL SULFATE 90 UG/1
1-2 INHALANT RESPIRATORY (INHALATION)
Start: 2025-05-01

## 2025-05-01 RX ORDER — DIPHENHYDRAMINE HYDROCHLORIDE 50 MG/ML
50 INJECTION, SOLUTION INTRAMUSCULAR; INTRAVENOUS
Start: 2025-05-01

## 2025-05-01 RX ORDER — IOPAMIDOL 755 MG/ML
83 INJECTION, SOLUTION INTRAVASCULAR ONCE
Status: COMPLETED | OUTPATIENT
Start: 2025-05-01 | End: 2025-05-01

## 2025-05-01 RX ORDER — ALBUTEROL SULFATE 0.83 MG/ML
2.5 SOLUTION RESPIRATORY (INHALATION)
OUTPATIENT
Start: 2025-05-01

## 2025-05-01 RX ORDER — HEPARIN SODIUM (PORCINE) LOCK FLUSH IV SOLN 100 UNIT/ML 100 UNIT/ML
5 SOLUTION INTRAVENOUS
OUTPATIENT
Start: 2025-05-01

## 2025-05-01 RX ADMIN — IOPAMIDOL 83 ML: 755 INJECTION, SOLUTION INTRAVENOUS at 09:49

## 2025-05-01 RX ADMIN — FLUDEOXYGLUCOSE F 18 10.21 MILLICURIE: 200 INJECTION, SOLUTION INTRAVENOUS at 08:53

## 2025-05-01 RX ADMIN — HEPARIN SODIUM (PORCINE) LOCK FLUSH IV SOLN 100 UNIT/ML 5 ML: 100 SOLUTION at 10:15

## 2025-05-06 ENCOUNTER — ONCOLOGY VISIT (OUTPATIENT)
Dept: ONCOLOGY | Facility: CLINIC | Age: 89
End: 2025-05-06
Attending: OBSTETRICS & GYNECOLOGY
Payer: MEDICARE

## 2025-05-06 VITALS
WEIGHT: 179.6 LBS | RESPIRATION RATE: 16 BRPM | TEMPERATURE: 97.5 F | HEART RATE: 67 BPM | DIASTOLIC BLOOD PRESSURE: 81 MMHG | BODY MASS INDEX: 30.83 KG/M2 | SYSTOLIC BLOOD PRESSURE: 133 MMHG | OXYGEN SATURATION: 98 %

## 2025-05-06 DIAGNOSIS — C54.1 ENDOMETRIAL CANCER (H): Primary | ICD-10-CM

## 2025-05-06 PROCEDURE — G0463 HOSPITAL OUTPT CLINIC VISIT: HCPCS | Performed by: OBSTETRICS & GYNECOLOGY

## 2025-05-06 PROCEDURE — G2211 COMPLEX E/M VISIT ADD ON: HCPCS | Performed by: OBSTETRICS & GYNECOLOGY

## 2025-05-06 PROCEDURE — 99215 OFFICE O/P EST HI 40 MIN: CPT | Performed by: OBSTETRICS & GYNECOLOGY

## 2025-05-06 ASSESSMENT — PAIN SCALES - GENERAL: PAINLEVEL_OUTOF10: NO PAIN (0)

## 2025-05-06 NOTE — LETTER
5/6/2025      Jen Layne  [address]  [place] [address]      Dear Colleague,    Thank you for referring your patient, Jen Layne, to the [facility]. Please see a copy of my visit note below.                            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  She presents for follow up and disease management.      Oncology History:  Initially, she was seen in the ED for intermittent vaginal bleeding x 6 wks. Was actually seen 2022 for hematuria.  At that time, underwent CT urogram 6/8/22 which showed no gross abnormalities to explain hematuria, was noted to have low-density thickening of endometrium.  Underwent a pelvic US on 7/8/22 which showed uterus at 6.8 x 4.6cm with a 3.2cm mass along endometrial canal possibly representing polyp or fibroid.  Never had any further follow-up.     She had a repeat pelvic US on 6/5/23 which showed diffusely thickened endometrium.  Had pelvic MRI  8.1 x 4.4 x 5.4 cm in long axis, short axis and transverse dimensions, respectively. An apparent heterogeneous mass-like structure is present in the anterior and right aspect of the uterus in the subendometrial or endometrial  region, measuring approximately 2.5 x 1.6 cm (series 18 image 33). This structure is not well-visualized on T1 weighted images due to artifact. It appears isointense to the uterine myometrium on T2-weighted images. Probable mildly heterogeneous  enhancement of this structure. A few subcentimeter low T2 signal regions in the anterior uterus likely represent very small fibroids.  The endometrial stripe is not well defined due to motion artifact. It measures approximately 0.9 cm in dual-layer anteroposterior thickness. The endometrial  region appears mildly irregular on the axial images (for example, series 6 image 15). It cannot be determined on the post contrast images if there is abnormal enhancement associated with the endometrium. Possible restricted diffusion within the region of endometrium.     6/7/23:  Seen in Gyn Onc. Unable to perform EMB due to stenosis     7/19/23:  EUA, D&C under US guidance.  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  MLH1 hypermethylation +     9/22/23:  Robotic assisted hysterectomy, bilateral salpingo-oophorectomy, cancer staging, sentinel lymph node mapping and sampling, pelvic washings.  Final pathology (reviewed): FIGO Grade 1 endometrioid TOMAS, 4.5cm greatest dimension, >50% myometrial invasion (13/22mm), superficial NOLA involvment, no LVSI, cytology negative, margins negative, LN negative. Final FIGO Stage IB Grade 1 endometrioid TOMAS,      Discussed at tumor board with recs for VBT versus observation. The patient saw Rad Onc and ultimately decided not to do XRT.     5/23/24: VAGINAL MUCOSA, BIOPSY:  -MODERATELY DIFFERENTIATED ADENOCARCINOMA  PAX8: Strongly and diffusely positive  P16 strongly positive, marking the majority of the columnar cells  Estrogen receptor: Positive (95% of cells, strong staining)  Cytokeratin-7: Strongly and diffusely positive  Cytokeratin-20: Negative  CDX2: Negative (absence of enteric differentiation)  The combined morphologic and immunophenotypic features of this lesion are compatible with the clinical history of endometrial carcinoma.     6/10/24: PET CT  IMPRESSION:  1.  Focal hypermetabolic uptake at the vaginal cuff compatible with biopsy-proven recurrent malignancy. FDG avid right pelvic lymph nodes are suspicious for man metastases.  2.  Subcentimeter pulmonary nodules are below the resolution of PET. Attention on future oncologic imaging.     6/27/24:  Tumor board: Consensus/Management Options: As patient has good functional status and the pelvic lymph  node is FDG avid, recommendation for chemotherapy + IO. Also can consider  trial. Discuss with patient for final treatment plan.      Plan: Carboplatin AUC 5, paclitaxel 175 mg/m2, and dorstarlimab 500 mg IV every 3 weeks x 3 cycles followed by CT and follow up with Dr. Au.     7/11/24: RLE US negative for DVT.  7/16/24: Port placement.  7/18/24: Cycle 1 carboplatin, paclitaxel, and dorstarlimab.  8/8/24: Cycle 2 carboplatin, paclitaxel, and dorstarlimab.  8/19/24: ED for presyncope and neck pain.  Work up negative.  8/29/24: Cycle 3 carboplatin, paclitaxel, and dorstarlimab.     9/7/24:  ED for diarrhea.     Was set up for outpatient IVF     9/20/24:  CT CAP: Images reviewed  IMPRESSION:  1.  Unremarkable CT appearance of the vaginal cuff. The known  biopsy-proven recurrence is not definitively identified.  2.  Decreased size of the right external iliac chain lymph nodes,  which were previously hypermetabolic. No progressive lymphadenopathy  otherwise.  3.  Unchanged solid pulmonary nodules measuring up to 0.5 cm.  Continued imaging surveillance is recommended.  4.  Nonobstructing left renal calculi.        2/3/25:  PET/CT:  Stable    5/1/25:  PET images reviewed  IMPRESSION:  1. Increase in size and FDG avidity involving the right vaginal cuff suspicious for local progression of malignancy.  2. No evidence of metastasis.    The patient returns today for follow-up.  She has been off treatment since 8/29/24 when she received cycle #3 chemo and decision was made to stop due to weakness, diarrhea, intolerance of therapy  She returns today for evaluation. She is feeling better.  Diarrhea has finally resolved.  Her energy is good.  She is no longer requiring IVF.  Denies vaginal bleeding.     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         Past Surgical History:    Past Surgical History:   Procedure Laterality Date     COLONOSCOPY  05    normal, [place]he[place] 10 years [place] [facility] [place], BILATERAL SALPINGO-OOPHORECTOMY, NODE DISSECTION, COMBINED N/A 2023    Procedure: Robotic assisted hysterectomy, bilateral salpingo-oophorectomy, cancer staging, sentinel lymph node mapping and sampling, pelvic washings;  Surgeon: Amanda Au MD;  Location: [facility] OR     DILATION AND CURETTAGE, WITH ULTRASOUND GUIDANCE N/A 2023    Procedure: Pelvic examination under anesthesia, dilation and curettage uterus, ultrasound guidance Latex Free;  Surgeon: Amanda Au MD;  Location: [facility] OR     EXAM EYE  2018    Capsulotomy Left eye.   Dr. Skip Villela     EYE SURGERY       HC REMOVAL GALLBLADDER       HYSTEROSCOPY,DIAGNOSTIC  around      IR CHEST PORT PLACEMENT > 5 YRS OF AGE  2024         Health Maintenance:  Health Maintenance Due   Topic Date Due     RSV VACCINE (1 - 1-dose 75+ series) Never done     MEDICARE ANNUAL WELLNESS VISIT  2024     MICROALBUMIN  2024     COVID-19 Vaccine (8 - Pfizer risk - season) 04/10/2025         Current Medications:     has a current medication list which includes the following prescription(s): diphenoxylate-atropine, lisinopril, ondansetron, prochlorperazine, simvastatin, triamcinolone, and vitamin d.       Allergies:     Hydrochlorothiazide        Social History:     Social History     Tobacco Use     Smoking status: Never     Passive exposure: Never     Smokeless tobacco: Never   Substance Use Topics     Alcohol use: No       History   Drug Use No           Family History:     The patient's family history is notable for :.    Family History   Problem Relation Age of Onset     Eye Disorder Mother         glaucoma     Heart Disease Mother          of  CHF     Cancer Sister         uterine and ovarian-in remission 2008     Other Cancer Sister      Deep Vein Thrombosis (DVT) Sister      Uterine Cancer Sister      Heart Disease Brother         valve replacement     Lipids Brother      Coronary Artery Disease Brother      Eye Disorder Brother         detached retina     Cancer Paternal Grandfather         carcinoma of the lip, pipe-smoker     Thyroid Disease Daughter         goiter or nodule?     Cancer Son         SKIN     Other Cancer Son      Other Cancer Other      Thyroid Disease Other      Diabetes No family hx of      C.A.D. No family hx of      Breast Cancer No family hx of      Cancer - colorectal No family hx of      Anesthesia Reaction No family hx of          Physical Exam:     /81 (BP Location: Right arm, Patient Position: Sitting, Cuff Size: Adult Large)   Pulse 67   Temp 97.5  F (36.4  C) (Oral)   Resp 16   Wt 81.5 kg (179 lb 9.6 oz)   LMP  (LMP Unknown)   SpO2 98%   BMI 30.83 kg/m    Body mass index is 30.83 kg/m .    General Appearance: healthy and alert, no distress     Musculoskeletal: extremities non tender and without edema    Skin: no lesions or rashes     Neurological: normal gait, no gross defects     Psychiatric: appropriate mood and affect                               Hematological: normal cervical, supraclavicular and inguinal lymph nodes     Gastrointestinal:       abdomen soft, non-tender, non-distended, no organomegaly or masses    Genitourinary: External genitalia and urethral meatus appears normal.  Vagina is smooth. At apex, recurrent disease visible with small polypoid lesion right/central vaginal apex about 1.5cm in size.  Bimanual exam with mild fullness apex.     Assessment:     Jen Layne is a 89 year old woman with a diagnosis of Stage IB Grade 1 endometrioid endometrial TOMAS, with vaginal cuff recurrence and local mna disease, on break from chemotherapy due to intolerance, now with progressive disease at  cuff        40 minutes spent on the date of the encounter doing chart review, history and exam, documentation and further activities as noted above     The longitudinal plan of care for the diagnosis(es)/condition(s) as documented were addressed during this visit. Due to the added complexity in care, I will continue to support Jen in the subsequent management and with ongoing continuity of care.       Plan:     Treatment course to date:  9/2023:  Robotic hyst, BSO, staging.  Stage IB G1 endometrioid TOMAS. Declined XRT  5/2024:  Biopsy confirmed vaginal cuff recurrence with PET+RPLN  7/24-8/24:  Three cycle Carbo/Taxol/Dostarlimab.  Stopped due to intolerance.  Clinical & radiographic CR  5/2025:  Current: exam + PET consistent with progression at cuff.         1.)        FIGO Grade 1 endometrioid TOMAS:  Tumor 4.5cm greatest dimension, >50% myometrial invasion (13/22mm), superficial NOLA involvment, no LVSI, cytology negative, margins negative, LN negative. Final FIGO Stage IB Grade 1 endometrioid TOMAS.  H-IR due to age, DOI.  Discussed options including observation, radiation (VBT).  Patient was seen by Rad Onc and ultimately elected for observation.       Was then seen for bleeding with exam findings consistent with vaginal cuff recurrence 5/24.  This was biopsy proven and PET/CT scan showed vaginal cuff recurrence with likely right pelvic lymph node spread.     Tumor board review were discussed with patient and her family. Recommended systemic chemotherapy with Carboplatin/Taxol and Dostarblimab followed by maintenance dostarlimab. Alternatively, discussed clinical trial and gave her information regarding .       Patient ultimately only completed three cycles of chemotherapy due to intolerance.Treatment was complicated by progressive difficulty with tolerance with diarrhea, dehydration, ED visit, IVF infusions. Fortunately, the patient seemed to have a complete clinical response after only three cycle.      The patient has been on a treatment break since 8/2024.  She has had a protracted recovery from sequelae of chemotherapy and is finally feeling improved.  Today, exam consistent with progressive disease at vaginal cuff and consistent with PET findings.  No other signs of disease on PET.  Discussed options including ongoing observation (suspect that she will eventually develop bleeding that will require treatment) versus radiation therapy versus cytotoxic chemotherapy, clinical trial.    While she previously had evidence of lymph node recurrence in the pelvis, she had a complete radiographic response to the LN after three cycles of chemotherapy with no obvious evidence of man disease on recent imaging. Thus, in this elderly patient whom I do not think is a good candidate for traditional cytotoxic chemotherapy, I think it would be reasonable to consider radiation. However, I do think that she remains at risk for LN recurrence given prior known LN involvement and consideration should be given to some type of systemic therapy. She had previously been considered for  and I think this clinical trial would be a good option for her, offering a systemic therapy option without the toxicity of cytotoxic chemotherapy.  I think concurrent XRT with  would offer patient both better local control with prevention of future bleeding events and, systemic control.    Discussed in detail.  Will arrange referral to Rad Onc and I contacted research team today to Craig back with patient regarding .      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  No longer requiring IVF.     Questions answered, she expressed understanding of plan of care.        Amanda Au MD  Gynecologic Oncology  [facility]    CC  Patient Care Team:  Yamile Cotter MD as PCP -  General (Family Medicine)  Yamile Cotter MD as Assigned PCP  Amanda Au MD as MD (Gynecologic Oncology)  Amanda Au MD as Assigned Cancer Care Provider  Luma Griffin, RN as Clinic Care Coordinator (Hematology & Oncology)  Kami Story MD as MD (Cardiology)  AMANDA AU        Again, thank you for allowing me to participate in the care of your patient.        Sincerely,        Amanda Au MD    Electronically signed

## 2025-05-06 NOTE — PATIENT INSTRUCTIONS
Radiation therapy referral    Clinical trial review    Return to clinic in two weeks to review     Amanda uA MD  Gynecologic Oncology  [facility]

## 2025-05-06 NOTE — PROGRESS NOTES
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  She presents for follow up and disease management.      Oncology History:  Initially, she was seen in the ED for intermittent vaginal bleeding x 6 wks. Was actually seen 2022 for hematuria.  At that time, underwent CT urogram 6/8/22 which showed no gross abnormalities to explain hematuria, was noted to have low-density thickening of endometrium.  Underwent a pelvic US on 7/8/22 which showed uterus at 6.8 x 4.6cm with a 3.2cm mass along endometrial canal possibly representing polyp or fibroid.  Never had any further follow-up.     She had a repeat pelvic US on 6/5/23 which showed diffusely thickened endometrium.  Had pelvic MRI  8.1 x 4.4 x 5.4 cm in long axis, short axis and transverse dimensions, respectively. An apparent heterogeneous mass-like structure is present in the anterior and right aspect of the uterus in the subendometrial or endometrial  region, measuring approximately 2.5 x 1.6 cm (series 18 image 33). This structure is not well-visualized on T1 weighted images due to artifact. It appears isointense to the uterine myometrium on T2-weighted images. Probable mildly heterogeneous  enhancement of this structure. A few subcentimeter low T2 signal regions in the anterior uterus likely represent very small fibroids.  The endometrial stripe is not well defined due to motion artifact. It measures approximately 0.9 cm in dual-layer anteroposterior thickness. The endometrial region appears mildly irregular on the axial images (for example, series 6 image 15). It cannot be determined on the post contrast images if there is abnormal enhancement associated with the endometrium. Possible restricted diffusion within the region of  endometrium.     6/7/23:  Seen in Gyn Onc. Unable to perform EMB due to stenosis     7/19/23:  EUA, D&C under US guidance.  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  MLH1 hypermethylation +     9/22/23:  Robotic assisted hysterectomy, bilateral salpingo-oophorectomy, cancer staging, sentinel lymph node mapping and sampling, pelvic washings.  Final pathology (reviewed): FIGO Grade 1 endometrioid TOMAS, 4.5cm greatest dimension, >50% myometrial invasion (13/22mm), superficial NOLA involvment, no LVSI, cytology negative, margins negative, LN negative. Final FIGO Stage IB Grade 1 endometrioid TOMAS,      Discussed at tumor board with recs for VBT versus observation. The patient saw Rad Onc and ultimately decided not to do XRT.     5/23/24: VAGINAL MUCOSA, BIOPSY:  -MODERATELY DIFFERENTIATED ADENOCARCINOMA  PAX8: Strongly and diffusely positive  P16 strongly positive, marking the majority of the columnar cells  Estrogen receptor: Positive (95% of cells, strong staining)  Cytokeratin-7: Strongly and diffusely positive  Cytokeratin-20: Negative  CDX2: Negative (absence of enteric differentiation)  The combined morphologic and immunophenotypic features of this lesion are compatible with the clinical history of endometrial carcinoma.     6/10/24: PET CT  IMPRESSION:  1.  Focal hypermetabolic uptake at the vaginal cuff compatible with biopsy-proven recurrent malignancy. FDG avid right pelvic lymph nodes are suspicious for man metastases.  2.  Subcentimeter pulmonary nodules are below the resolution of PET. Attention on future oncologic imaging.     6/27/24:  Tumor board: Consensus/Management Options: As patient has good functional status and the pelvic lymph node is FDG avid, recommendation for chemotherapy + IO. Also can consider  trial. Discuss with patient for final treatment plan.      Plan: Carboplatin AUC 5, paclitaxel 175 mg/m2, and dorstarlimab 500 mg IV every 3 weeks x 3 cycles followed by CT and  follow up with Dr. Au.     7/11/24: RLE US negative for DVT.  7/16/24: Port placement.  7/18/24: Cycle 1 carboplatin, paclitaxel, and dorstarlimab.  8/8/24: Cycle 2 carboplatin, paclitaxel, and dorstarlimab.  8/19/24: ED for presyncope and neck pain.  Work up negative.  8/29/24: Cycle 3 carboplatin, paclitaxel, and dorstarlimab.     9/7/24:  ED for diarrhea.     Was set up for outpatient IVF     9/20/24:  CT CAP: Images reviewed  IMPRESSION:  1.  Unremarkable CT appearance of the vaginal cuff. The known  biopsy-proven recurrence is not definitively identified.  2.  Decreased size of the right external iliac chain lymph nodes,  which were previously hypermetabolic. No progressive lymphadenopathy  otherwise.  3.  Unchanged solid pulmonary nodules measuring up to 0.5 cm.  Continued imaging surveillance is recommended.  4.  Nonobstructing left renal calculi.        2/3/25:  PET/CT:  Stable    5/1/25:  PET images reviewed  IMPRESSION:  1. Increase in size and FDG avidity involving the right vaginal cuff suspicious for local progression of malignancy.  2. No evidence of metastasis.    The patient returns today for follow-up.  She has been off treatment since 8/29/24 when she received cycle #3 chemo and decision was made to stop due to weakness, diarrhea, intolerance of therapy  She returns today for evaluation. She is feeling better.  Diarrhea has finally resolved.  Her energy is good.  She is no longer requiring IVF.  Denies vaginal bleeding.     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         Past Surgical History:    Past Surgical History:   Procedure Laterality Date    COLONOSCOPY  12/05/05    normal, recheck 10 years -- done at [facility]     DAVINCI HYSTERECTOMY TOTAL, BILATERAL SALPINGO-OOPHORECTOMY, NODE DISSECTION, COMBINED N/A 2023    Procedure: Robotic assisted hysterectomy, bilateral salpingo-oophorectomy, cancer staging, sentinel lymph node mapping and sampling, pelvic washings;  Surgeon: Amanda Au MD;  Location: [facility] OR    DILATION AND CURETTAGE, WITH ULTRASOUND GUIDANCE N/A 2023    Procedure: Pelvic examination under anesthesia, dilation and curettage uterus, ultrasound guidance Latex Free;  Surgeon: Amanda Au MD;  Location: [facility] OR    EXAM EYE  2018    Capsulotomy Left eye.   Dr. Skip Villela    EYE SURGERY      HC REMOVAL GALLBLADDER      HYSTEROSCOPY,DIAGNOSTIC  around     IR CHEST PORT PLACEMENT > 5 YRS OF AGE  2024         Health Maintenance:  Health Maintenance Due   Topic Date Due    RSV VACCINE (1 - 1-dose 75+ series) Never done    MEDICARE ANNUAL WELLNESS VISIT  2024    MICROALBUMIN  2024    COVID-19 Vaccine (8 - Pfizer risk - season) 04/10/2025         Current Medications:     has a current medication list which includes the following prescription(s): diphenoxylate-atropine, lisinopril, ondansetron, prochlorperazine, simvastatin, triamcinolone, and vitamin d.       Allergies:     Hydrochlorothiazide        Social History:     Social History     Tobacco Use    Smoking status: Never     Passive exposure: Never    Smokeless tobacco: Never   Substance Use Topics    Alcohol use: No       History   Drug Use No           Family History:     The patient's family history is notable for :.    Family History   Problem Relation Age of Onset    Eye Disorder Mother         glaucoma    Heart Disease Mother          of CHF    Cancer Sister         uterine and ovarian-in remission     Other Cancer Sister     Deep Vein Thrombosis (DVT) Sister     Uterine Cancer Sister     Heart Disease Brother         valve replacement    Lipids Brother     Coronary Artery Disease Brother     Eye Disorder  Brother         detached retina    Cancer Paternal Grandfather         carcinoma of the lip, pipe-smoker    Thyroid Disease Daughter         goiter or nodule?    Cancer Son         SKIN    Other Cancer Son     Other Cancer Other     Thyroid Disease Other     Diabetes No family hx of     C.A.D. No family hx of     Breast Cancer No family hx of     Cancer - colorectal No family hx of     Anesthesia Reaction No family hx of          Physical Exam:     /81 (BP Location: Right arm, Patient Position: Sitting, Cuff Size: Adult Large)   Pulse 67   Temp 97.5  F (36.4  C) (Oral)   Resp 16   Wt 81.5 kg (179 lb 9.6 oz)   LMP  (LMP Unknown)   SpO2 98%   BMI 30.83 kg/m    Body mass index is 30.83 kg/m .    General Appearance: healthy and alert, no distress     Musculoskeletal: extremities non tender and without edema    Skin: no lesions or rashes     Neurological: normal gait, no gross defects     Psychiatric: appropriate mood and affect                               Hematological: normal cervical, supraclavicular and inguinal lymph nodes     Gastrointestinal:       abdomen soft, non-tender, non-distended, no organomegaly or masses    Genitourinary: External genitalia and urethral meatus appears normal.  Vagina is smooth. At apex, recurrent disease visible with small polypoid lesion right/central vaginal apex about 1.5cm in size.  Bimanual exam with mild fullness apex.     Assessment:     Jen Layne is a 89 year old woman with a diagnosis of Stage IB Grade 1 endometrioid endometrial TOMAS, with vaginal cuff recurrence and local man disease, on break from chemotherapy due to intolerance, now with progressive disease at cuff        40 minutes spent on the date of the encounter doing chart review, history and exam, documentation and further activities as noted above     The longitudinal plan of care for the diagnosis(es)/condition(s) as documented were addressed during this visit. Due to the added complexity in  care, I will continue to support Jen in the subsequent management and with ongoing continuity of care.       Plan:     Treatment course to date:  9/2023:  Robotic hyst, BSO, staging.  Stage IB G1 endometrioid TOMAS. Declined XRT  5/2024:  Biopsy confirmed vaginal cuff recurrence with PET+RPLN  7/24-8/24:  Three cycle Carbo/Taxol/Dostarlimab.  Stopped due to intolerance.  Clinical & radiographic CR  5/2025:  Current: exam + PET consistent with progression at cuff.         1.)        FIGO Grade 1 endometrioid TOMAS:  Tumor 4.5cm greatest dimension, >50% myometrial invasion (13/22mm), superficial NOLA involvment, no LVSI, cytology negative, margins negative, LN negative. Final FIGO Stage IB Grade 1 endometrioid TOMAS.  H-IR due to age, DOI.  Discussed options including observation, radiation (VBT).  Patient was seen by Rad Onc and ultimately elected for observation.       Was then seen for bleeding with exam findings consistent with vaginal cuff recurrence 5/24.  This was biopsy proven and PET/CT scan showed vaginal cuff recurrence with likely right pelvic lymph node spread.     Tumor board review were discussed with patient and her family. Recommended systemic chemotherapy with Carboplatin/Taxol and Dostarblimab followed by maintenance dostarlimab. Alternatively, discussed clinical trial and gave her information regarding .       Patient ultimately only completed three cycles of chemotherapy due to intolerance.Treatment was complicated by progressive difficulty with tolerance with diarrhea, dehydration, ED visit, IVF infusions. Fortunately, the patient seemed to have a complete clinical response after only three cycle.     The patient has been on a treatment break since 8/2024.  She has had a protracted recovery from sequelae of chemotherapy and is finally feeling improved.  Today, exam consistent with progressive disease at vaginal cuff and consistent with PET findings.  No other signs of disease on PET.  Discussed  options including ongoing observation (suspect that she will eventually develop bleeding that will require treatment) versus radiation therapy versus cytotoxic chemotherapy, clinical trial.    While she previously had evidence of lymph node recurrence in the pelvis, she had a complete radiographic response to the LN after three cycles of chemotherapy with no obvious evidence of man disease on recent imaging. Thus, in this elderly patient whom I do not think is a good candidate for traditional cytotoxic chemotherapy, I think it would be reasonable to consider radiation. However, I do think that she remains at risk for LN recurrence given prior known LN involvement and consideration should be given to some type of systemic therapy. She had previously been considered for  and I think this clinical trial would be a good option for her, offering a systemic therapy option without the toxicity of cytotoxic chemotherapy.  I think concurrent XRT with  would offer patient both better local control with prevention of future bleeding events and, systemic control.    Discussed in detail.  Will arrange referral to Rad Onc and I contacted research team today to [place] back with patient regarding .      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  No longer requiring IVF.     Questions answered, she expressed understanding of plan of care.        Amanda Au MD  Gynecologic Oncology  [facility]    CC  Patient Care Team:  Yamile Cotter MD as PCP - General (Family Medicine)  Yamile Cotter MD as Assigned PCP  Amanda Au MD as MD (Gynecologic Oncology)  Amanda Au MD as Assigned Cancer Care Provider  Luma Griffin, RN as Clinic Care Coordinator (Hematology & Oncology)  Kami Story MD as MD (Cardiology)  PING  RODRIGUE

## 2025-05-06 NOTE — NURSING NOTE
"Oncology Rooming Note    May 6, 2025 9:33 AM   Jen Layne is a 89 year old female who presents for:    Chief Complaint   Patient presents with    Oncology Clinic Visit     Endometrial cancer     Initial Vitals: LMP  (LMP Unknown)  Estimated body mass index is 29.18 kg/m  as calculated from the following:    Height as of 2/20/25: 1.626 m (5' 4\").    Weight as of 2/20/25: 77.1 kg (170 lb). There is no height or weight on file to calculate BSA.  No Pain (0) Comment: Data Unavailable   No LMP recorded (lmp unknown). Patient is postmenopausal.  Allergies reviewed: Yes  Medications reviewed: Yes    Medications: Medication refills not needed today.  Pharmacy name entered into EPIC:    [facility] - [place], [place] - [address] [place] [address] AND CLINICS    Frailty Screening:   Is the patient here for a new oncology consult visit in cancer care? 2. No    PHQ9:  Did this patient require a PHQ9?: No      Clinical concerns: none       Fely Frederick            "

## 2025-05-08 ENCOUNTER — TELEPHONE (OUTPATIENT)
Dept: RADIATION ONCOLOGY | Facility: CLINIC | Age: 89
End: 2025-05-08
Payer: MEDICARE

## 2025-05-08 NOTE — TELEPHONE ENCOUNTER
RN spoke with pt re: her recently canceled appointments. Pt states she has decided she does not want to have any radiation. RN informed the team of pt decision.

## 2025-05-29 ENCOUNTER — ONCOLOGY VISIT (OUTPATIENT)
Dept: ONCOLOGY | Facility: HOSPITAL | Age: 89
End: 2025-05-29
Attending: OBSTETRICS & GYNECOLOGY
Payer: MEDICARE

## 2025-05-29 VITALS
SYSTOLIC BLOOD PRESSURE: 144 MMHG | DIASTOLIC BLOOD PRESSURE: 70 MMHG | HEART RATE: 74 BPM | RESPIRATION RATE: 16 BRPM | OXYGEN SATURATION: 97 % | WEIGHT: 178.4 LBS | HEIGHT: 64 IN | BODY MASS INDEX: 30.46 KG/M2

## 2025-05-29 DIAGNOSIS — C54.1 ENDOMETRIAL CANCER (H): Primary | ICD-10-CM

## 2025-05-29 PROCEDURE — G0463 HOSPITAL OUTPT CLINIC VISIT: HCPCS | Performed by: OBSTETRICS & GYNECOLOGY

## 2025-05-29 RX ORDER — LETROZOLE 2.5 MG/1
2.5 TABLET, FILM COATED ORAL DAILY
Qty: 30 TABLET | Refills: 2 | Status: SHIPPED | OUTPATIENT
Start: 2025-05-29

## 2025-05-29 ASSESSMENT — PAIN SCALES - GENERAL: PAINLEVEL_OUTOF10: NO PAIN (0)

## 2025-05-29 NOTE — LETTER
5/29/2025      Jen Layne  [address]  [place] [address]      Dear Colleague,    Thank you for referring your patient, Jen Layne, to the [facility]. Please see a copy of my visit note below.                            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  She presents for follow up and disease management.      Oncology History:  Initially, she was seen in the ED for intermittent vaginal bleeding x 6 wks.      6/7/23:  Seen in Gyn Onc. Unable to perform EMB due to stenosis     7/19/23:  EUA, D&C under US guidance.  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  MLH1 hypermethylation +     9/22/23:  Robotic assisted hysterectomy, bilateral salpingo-oophorectomy, cancer staging, sentinel lymph node mapping and sampling, pelvic washings.  Final pathology (reviewed): FIGO Grade 1 endometrioid TOMAS, 4.5cm greatest dimension, >50% myometrial invasion (13/22mm), superficial NOLA involvment, no LVSI, cytology negative, margins negative, LN negative. Final FIGO Stage IB Grade 1 endometrioid TOMAS,      Discussed at tumor board with recs for VBT versus observation. The patient saw Rad Onc and ultimately decided not to do XRT.     5/23/24: VAGINAL MUCOSA, BIOPSY:  -MODERATELY DIFFERENTIATED ADENOCARCINOMA  PAX8: Strongly and diffusely positive  P16 strongly positive, marking the majority of the columnar cells  Estrogen receptor: Positive (95% of cells, strong staining)  Cytokeratin-7: Strongly and diffusely positive  Cytokeratin-20: Negative  CDX2: Negative (absence of enteric differentiation)  The combined morphologic and immunophenotypic features of this lesion are compatible with the clinical history of endometrial  carcinoma.     6/10/24: PET CT  IMPRESSION:  1.  Focal hypermetabolic uptake at the vaginal cuff compatible with biopsy-proven recurrent malignancy. FDG avid right pelvic lymph nodes are suspicious for man metastases.  2.  Subcentimeter pulmonary nodules are below the resolution of PET. Attention on future oncologic imaging.     6/27/24:  Tumor board: Consensus/Management Options: As patient has good functional status and the pelvic lymph node is FDG avid, recommendation for chemotherapy + IO. Also can consider  trial. Discuss with patient for final treatment plan.       7/18/24: Cycle 1 carboplatin, paclitaxel, and dorstarlimab.  8/8/24: Cycle 2 carboplatin, paclitaxel, and dorstarlimab.  8/19/24: ED for presyncope and neck pain.  Work up negative.  8/29/24: Cycle 3 carboplatin, paclitaxel, and dorstarlimab.     9/7/24:  ED for diarrhea.     Was set up for outpatient IVF     9/20/24:  CT CAP: IMPRESSION:  1.  Unremarkable CT appearance of the vaginal cuff. The known  biopsy-proven recurrence is not definitively identified.  2.  Decreased size of the right external iliac chain lymph nodes,  which were previously hypermetabolic. No progressive lymphadenopathy  otherwise.  3.  Unchanged solid pulmonary nodules measuring up to 0.5 cm.  Continued imaging surveillance is recommended.  4.  Nonobstructing left renal calculi.        2/3/25:  PET/CT:  Stable     5/1/25:  PET images:  Recurrent/progressive disease. Increase in size and FDG avidity involving the right vaginal cuff suspicious for local progression of malignancy.  Lesion visible on exam.      5/6/25:  Discussed vaginal cuff recurrence with options for Radiation, clinical trial, endocrine therapy.  The patient ultimately declined radiation and is interested in .    Patient seen today for follow-up with her family.  She declines radiation and did not want consult.  She is not having any vaginal bleeding, limited diarrhea. reviewed the consent form for  .  She is not comfortable with the side-effect profile, particularly with ipatasertib, due to her prior issues with diarrhea.  She continue to intermittently get diarrhea.     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         Past Surgical History:    Past Surgical History:   Procedure Laterality Date     COLONOSCOPY  12/05/05    normal, recheck 10 years -- done at [facility] HYSTERECTOMY TOTAL, BILATERAL SALPINGO-OOPHORECTOMY, NODE DISSECTION, COMBINED N/A 9/22/2023    Procedure: Robotic assisted hysterectomy, bilateral salpingo-oophorectomy, cancer staging, sentinel lymph node mapping and sampling, pelvic washings;  Surgeon: Amanda Au MD;  Location: [facility] OR     DILATION AND CURETTAGE, WITH ULTRASOUND GUIDANCE N/A 7/19/2023    Procedure: Pelvic examination under anesthesia, dilation and curettage uterus, ultrasound guidance Latex Free;  Surgeon: Amanda Au MD;  Location: [facility] OR     EXAM EYE  03/30/2018    Capsulotomy Left eye.   Dr. Skip Villela     EYE SURGERY  2015     HC REMOVAL GALLBLADDER       HYSTEROSCOPY,DIAGNOSTIC  around 1998     IR CHEST PORT PLACEMENT > 5 YRS OF AGE  7/16/2024         Health Maintenance:  Health Maintenance Due   Topic Date Due     RSV VACCINE (1 - 1-dose 75+ series) Never done     MEDICARE ANNUAL WELLNESS VISIT  09/01/2024     MICROALBUMIN  09/01/2024     COVID-19 VACCINE (8 - Pfizer risk 2024-25 season) 04/10/2025       Current Medications:     has a current medication list which includes the following prescription(s): diphenoxylate-atropine, lisinopril, ondansetron, prochlorperazine, simvastatin, triamcinolone, and vitamin d.       Allergies:     Hydrochlorothiazide        Social  "History:     Social History     Tobacco Use     Smoking status: Never     Passive exposure: Never     Smokeless tobacco: Never   Substance Use Topics     Alcohol use: No       History   Drug Use No           Family History:     The patient's family history is notable for :.    Family History   Problem Relation Age of Onset     Eye Disorder Mother         glaucoma     Heart Disease Mother          of CHF     Cancer Sister         uterine and ovarian-in remission      Other Cancer Sister      Deep Vein Thrombosis (DVT) Sister      Uterine Cancer Sister      Heart Disease Brother         valve replacement     Lipids Brother      Coronary Artery Disease Brother      Eye Disorder Brother         detached retina     Cancer Paternal Grandfather         carcinoma of the lip, pipe-smoker     Thyroid Disease Daughter         goiter or nodule?     Cancer Son         SKIN     Other Cancer Son      Other Cancer Other      Thyroid Disease Other      Diabetes No family hx of      C.A.D. No family hx of      Breast Cancer No family hx of      Cancer - colorectal No family hx of      Anesthesia Reaction No family hx of          Physical Exam:     Ht 1.638 m (5' 4.49\")   LMP  (LMP Unknown)   BMI 30.36 kg/m    Body mass index is 30.36 kg/m .    General Appearance: healthy and alert, no distress       Assessment:     Jen Layne is a 89 year old woman with a diagnosis of Stage IB Grade 1 endometrioid endometrial TOMAS, with vaginal cuff recurrence and local man disease, on break from chemotherapy due to intolerance, now with progressive disease at cuff        30 minutes spent on the date of the encounter doing chart review, history and exam, documentation and further activities as noted above     The longitudinal plan of care for the diagnosis(es)/condition(s) as documented were addressed during this visit. Due to the added complexity in care, I will continue to support Ejn in the subsequent management and with " ongoing continuity of care.       Plan:      Treatment course to date:  9/2023:  Robotic hyst, BSO, staging.  Stage IB G1 endometrioid TOMAS. Declined XRT  5/2024:  Biopsy confirmed vaginal cuff recurrence with PET+RPLN  7/24-8/24:  Three cycle Carbo/Taxol/Dostarlimab.  Stopped due to intolerance.  Clinical & radiographic CR  5/2025:  Current: exam + PET consistent with progression at cuff.           1.)        FIGO Grade 1 endometrioid TOMAS:  Tumor 4.5cm greatest dimension, >50% myometrial invasion (13/22mm), superficial NOLA involvment, no LVSI, cytology negative, margins negative, LN negative. Final FIGO Stage IB Grade 1 endometrioid TOMAS.  H-IR due to age, DOI.  Discussed options including observation, radiation (VBT).  Patient was seen by Rad Onc and ultimately elected for observation.       Developed vaginal cuff recurrence as well as pelvic LN involvement. Received three cycles  Carboplatin/Taxol and Dostarblimab which was ultimately stopped due to intolerance.Treatment was complicated by progressive difficulty with tolerance with diarrhea, dehydration, ED visit, IVF infusions. Fortunately, the patient seemed to have a complete clinical response after only three cycle.     Has been on treatment break since 8/2024 and now has radiographic and clinical recurrence at vaginal cuff.  Discussed radiation which she declined.  Today, I reviewed options again including:   Ongoing observation with repeat imaging in three months with initiation of treatment when she becomes symptomatic  Endocrine therapy off study:  I would  give her letrozole with repeat imaging in three months  .  She remains a good candidate for this  Radiation therapy  Traditional cytotoxic therapy: She has not been a good candidate for this.    Options reviewed in detail.  She would like to do something but declines radiation and I think that this can be reserved for when she becomes more symptomatic.  I recommend the clinical trial as best  "treatment approach at this time and I provided reassurance about the side-effect profile.  However, patient is very anxious about any potential GI related side-effects and declines trial enrollment at this time.  Therefore, recommend endocrine therapy with AI, Letrozole.  Risks, benefits and side-effects discussed in detail.  Will plan virtual visit in one month to assess tolerance.  PET scan in three months. She will still be a candidate for  in future.      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  No longer requiring IVF.     Questions answered, she expressed understanding of plan of care.        Amanda Au MD  Gynecologic Oncology  [facility]    CC  Patient Care Team:  Yamile Cotter MD as PCP - General (Family Medicine)  Yamile Cotter MD as Assigned PCP  Amanda Au MD as MD (Gynecologic Oncology)  Amanda Au MD as Assigned Cancer Care Provider  Luma Griffin, RN as Clinic Care Coordinator (Hematology & Oncology)  Kami Story MD as MD (Cardiology)  SELF, REFERRED        Oncology Rooming Note    May 29, 2025 9:32 AM   Jen Layne is a 89 year old female who presents for:    Chief Complaint   Patient presents with     Oncology Clinic Visit     Endometrial cancer   Secondary malignant neoplasm of genital organs      Initial Vitals: BP (!) 144/70 (BP Location: Right arm, Patient Position: Sitting, Cuff Size: Adult Regular)   Pulse 74   Resp 16   Ht 1.638 m (5' 4.49\")   Wt 80.9 kg (178 lb 6.4 oz)   LMP  (LMP Unknown)   SpO2 97%   BMI 30.16 kg/m   Estimated body mass index is 30.16 kg/m  as calculated from the following:    Height as of this encounter: 1.638 m (5' 4.49\").    Weight as of this encounter: 80.9 kg (178 lb 6.4 oz). Body surface area is 1.92 meters squared.  No Pain (0) Comment: Data " Unavailable   No LMP recorded (lmp unknown). Patient is postmenopausal.  Allergies reviewed: Yes  Medications reviewed: Yes    Medications: Medication refills not needed today.  Pharmacy name entered into EPIC:    [facility] - [place], [place] - [address]    Frailty Screening:   Is the patient here for a new oncology consult visit in cancer care? 2. No    PHQ9:  Did this patient require a PHQ9?: No      Clinical concerns: ! Month follow up      Lindsay Sotelo MA                Again, thank you for allowing me to participate in the care of your patient.        Sincerely,        Amanda Au MD    Electronically signed

## 2025-05-29 NOTE — PATIENT INSTRUCTIONS
Start Letrozole    Virtual visit in one month to assess tolerance    PET scan in three months    Amanda Au MD  Gynecologic Oncology  [facility]

## 2025-05-29 NOTE — PROGRESS NOTES
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  She presents for follow up and disease management.      Oncology History:  Initially, she was seen in the ED for intermittent vaginal bleeding x 6 wks.      6/7/23:  Seen in Gyn Onc. Unable to perform EMB due to stenosis     7/19/23:  EUA, D&C under US guidance.  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  MLH1 hypermethylation +     9/22/23:  Robotic assisted hysterectomy, bilateral salpingo-oophorectomy, cancer staging, sentinel lymph node mapping and sampling, pelvic washings.  Final pathology (reviewed): FIGO Grade 1 endometrioid TOMAS, 4.5cm greatest dimension, >50% myometrial invasion (13/22mm), superficial NOLA involvment, no LVSI, cytology negative, margins negative, LN negative. Final FIGO Stage IB Grade 1 endometrioid TOMAS,      Discussed at tumor board with recs for VBT versus observation. The patient saw Rad Onc and ultimately decided not to do XRT.     5/23/24: VAGINAL MUCOSA, BIOPSY:  -MODERATELY DIFFERENTIATED ADENOCARCINOMA  PAX8: Strongly and diffusely positive  P16 strongly positive, marking the majority of the columnar cells  Estrogen receptor: Positive (95% of cells, strong staining)  Cytokeratin-7: Strongly and diffusely positive  Cytokeratin-20: Negative  CDX2: Negative (absence of enteric differentiation)  The combined morphologic and immunophenotypic features of this lesion are compatible with the clinical history of endometrial carcinoma.     6/10/24: PET CT  IMPRESSION:  1.  Focal hypermetabolic uptake at the vaginal cuff compatible with biopsy-proven recurrent malignancy. FDG avid right pelvic lymph nodes are suspicious for man metastases.  2.  Subcentimeter pulmonary nodules are below  the resolution of PET. Attention on future oncologic imaging.     6/27/24:  Tumor board: Consensus/Management Options: As patient has good functional status and the pelvic lymph node is FDG avid, recommendation for chemotherapy + IO. Also can consider  trial. Discuss with patient for final treatment plan.       7/18/24: Cycle 1 carboplatin, paclitaxel, and dorstarlimab.  8/8/24: Cycle 2 carboplatin, paclitaxel, and dorstarlimab.  8/19/24: ED for presyncope and neck pain.  Work up negative.  8/29/24: Cycle 3 carboplatin, paclitaxel, and dorstarlimab.     9/7/24:  ED for diarrhea.     Was set up for outpatient IVF     9/20/24:  CT CAP: IMPRESSION:  1.  Unremarkable CT appearance of the vaginal cuff. The known  biopsy-proven recurrence is not definitively identified.  2.  Decreased size of the right external iliac chain lymph nodes,  which were previously hypermetabolic. No progressive lymphadenopathy  otherwise.  3.  Unchanged solid pulmonary nodules measuring up to 0.5 cm.  Continued imaging surveillance is recommended.  4.  Nonobstructing left renal calculi.        2/3/25:  PET/CT:  Stable     5/1/25:  PET images:  Recurrent/progressive disease. Increase in size and FDG avidity involving the right vaginal cuff suspicious for local progression of malignancy.  Lesion visible on exam.      5/6/25:  Discussed vaginal cuff recurrence with options for Radiation, clinical trial, endocrine therapy.  The patient ultimately declined radiation and is interested in .    Patient seen today for follow-up with her family.  She declines radiation and did not want consult.  She is not having any vaginal bleeding, limited diarrhea. reviewed the consent form for .  She is not comfortable with the side-effect profile, particularly with ipatasertib, due to her prior issues with diarrhea.  She continue to intermittently get diarrhea.     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) 6/28/2024         Past Surgical History:    Past Surgical History:   Procedure Laterality Date    COLONOSCOPY  12/05/05    normal, recheck 10 years -- done at [facility] HYSTERECTOMY TOTAL, BILATERAL SALPINGO-OOPHORECTOMY, NODE DISSECTION, COMBINED N/A 9/22/2023    Procedure: Robotic assisted hysterectomy, bilateral salpingo-oophorectomy, cancer staging, sentinel lymph node mapping and sampling, pelvic washings;  Surgeon: Amanda Au MD;  Location: [facility] OR    DILATION AND CURETTAGE, WITH ULTRASOUND GUIDANCE N/A 7/19/2023    Procedure: Pelvic examination under anesthesia, dilation and curettage uterus, ultrasound guidance Latex Free;  Surgeon: Amanda Au MD;  Location: [facility] OR    EXAM EYE  03/30/2018    Capsulotomy Left eye.   Dr. Skip Villela    EYE SURGERY  2015    HC REMOVAL GALLBLADDER      HYSTEROSCOPY,DIAGNOSTIC  around 1998    IR CHEST PORT PLACEMENT > 5 YRS OF AGE  7/16/2024         Health Maintenance:  Health Maintenance Due   Topic Date Due    RSV VACCINE (1 - 1-dose 75+ series) Never done    MEDICARE ANNUAL WELLNESS VISIT  09/01/2024    MICROALBUMIN  09/01/2024    COVID-19 VACCINE (8 - Pfizer risk 2024-25 season) 04/10/2025       Current Medications:     has a current medication list which includes the following prescription(s): diphenoxylate-atropine, lisinopril, ondansetron, prochlorperazine, simvastatin, triamcinolone, and vitamin d.       Allergies:     Hydrochlorothiazide        Social History:     Social History     Tobacco Use    Smoking status: Never     Passive exposure: Never    Smokeless tobacco: Never   Substance Use Topics    Alcohol use: No       History   Drug Use No           Family History:     The patient's family history is notable for :.    Family History  "  Problem Relation Age of Onset    Eye Disorder Mother         glaucoma    Heart Disease Mother          of CHF    Cancer Sister         uterine and ovarian-in remission     Other Cancer Sister     Deep Vein Thrombosis (DVT) Sister     Uterine Cancer Sister     Heart Disease Brother         valve replacement    Lipids Brother     Coronary Artery Disease Brother     Eye Disorder Brother         detached retina    Cancer Paternal Grandfather         carcinoma of the lip, pipe-smoker    Thyroid Disease Daughter         goiter or nodule?    Cancer Son         SKIN    Other Cancer Son     Other Cancer Other     Thyroid Disease Other     Diabetes No family hx of     C.A.D. No family hx of     Breast Cancer No family hx of     Cancer - colorectal No family hx of     Anesthesia Reaction No family hx of          Physical Exam:     Ht 1.638 m (5' 4.49\")   LMP  (LMP Unknown)   BMI 30.36 kg/m    Body mass index is 30.36 kg/m .    General Appearance: healthy and alert, no distress       Assessment:     Jen Layne is a 89 year old woman with a diagnosis of Stage IB Grade 1 endometrioid endometrial TOMAS, with vaginal cuff recurrence and local man disease, on break from chemotherapy due to intolerance, now with progressive disease at cuff        30 minutes spent on the date of the encounter doing chart review, history and exam, documentation and further activities as noted above     The longitudinal plan of care for the diagnosis(es)/condition(s) as documented were addressed during this visit. Due to the added complexity in care, I will continue to support Jen in the subsequent management and with ongoing continuity of care.       Plan:      Treatment course to date:  2023:  Robotic hyst, BSO, staging.  Stage IB G1 endometrioid TOMAS. Declined XRT  2024:  Biopsy confirmed vaginal cuff recurrence with PET+RPLN  -:  Three cycle Carbo/Taxol/Dostarlimab.  Stopped due to intolerance.  Clinical & " radiographic CR  5/2025:  Current: exam + PET consistent with progression at cuff.           1.)        FIGO Grade 1 endometrioid TOMAS:  Tumor 4.5cm greatest dimension, >50% myometrial invasion (13/22mm), superficial NOLA involvment, no LVSI, cytology negative, margins negative, LN negative. Final FIGO Stage IB Grade 1 endometrioid TOMAS.  H-IR due to age, DOI.  Discussed options including observation, radiation (VBT).  Patient was seen by Rad Onc and ultimately elected for observation.       Developed vaginal cuff recurrence as well as pelvic LN involvement. Received three cycles  Carboplatin/Taxol and Dostarblimab which was ultimately stopped due to intolerance.Treatment was complicated by progressive difficulty with tolerance with diarrhea, dehydration, ED visit, IVF infusions. Fortunately, the patient seemed to have a complete clinical response after only three cycle.     Has been on treatment break since 8/2024 and now has radiographic and clinical recurrence at vaginal cuff.  Discussed radiation which she declined.  Today, I reviewed options again including:   Ongoing observation with repeat imaging in three months with initiation of treatment when she becomes symptomatic  Endocrine therapy off study:  I would  give her letrozole with repeat imaging in three months  .  She remains a good candidate for this  Radiation therapy  Traditional cytotoxic therapy: She has not been a good candidate for this.    Options reviewed in detail.  She would like to do something but declines radiation and I think that this can be reserved for when she becomes more symptomatic.  I recommend the clinical trial as best treatment approach at this time and I provided reassurance about the side-effect profile.  However, patient is very anxious about any potential GI related side-effects and declines trial enrollment at this time.  Therefore, recommend endocrine therapy with AI, Letrozole.  Risks, benefits and side-effects discussed  in detail.  Will plan virtual visit in one month to assess tolerance.  PET scan in three months. She will still be a candidate for  in future.      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  No longer requiring IVF.     Questions answered, she expressed understanding of plan of care.        Amanda Au MD  Gynecologic Oncology  [facility]    CC  Patient Care Team:  Yamile Cotter MD as PCP - General (Family Medicine)  Yamile Cotter MD as Assigned PCP  Amanda Au MD as MD (Gynecologic Oncology)  Amanda Au MD as Assigned Cancer Care Provider  Luma Griffin, RN as Clinic Care Coordinator (Hematology & Oncology)  Kami Story MD as MD (Cardiology)  SELF, REFERRED

## 2025-05-29 NOTE — PROGRESS NOTES
"Oncology Rooming Note    May 29, 2025 9:32 AM   Jen Layne is a 89 year old female who presents for:    Chief Complaint   Patient presents with    Oncology Clinic Visit     Endometrial cancer   Secondary malignant neoplasm of genital organs      Initial Vitals: BP (!) 144/70 (BP Location: Right arm, Patient Position: Sitting, Cuff Size: Adult Regular)   Pulse 74   Resp 16   Ht 1.638 m (5' 4.49\")   Wt 80.9 kg (178 lb 6.4 oz)   LMP  (LMP Unknown)   SpO2 97%   BMI 30.16 kg/m   Estimated body mass index is 30.16 kg/m  as calculated from the following:    Height as of this encounter: 1.638 m (5' 4.49\").    Weight as of this encounter: 80.9 kg (178 lb 6.4 oz). Body surface area is 1.92 meters squared.  No Pain (0) Comment: Data Unavailable   No LMP recorded (lmp unknown). Patient is postmenopausal.  Allergies reviewed: Yes  Medications reviewed: Yes    Medications: Medication refills not needed today.  Pharmacy name entered into EPIC:    [facility] - [place], [place] - [address] [place] [address]    Frailty Screening:   Is the patient here for a new oncology consult visit in cancer care? 2. No    PHQ9:  Did this patient require a PHQ9?: No      Clinical concerns: ! Month follow up      Lindsay Sotelo MA              "

## 2025-06-05 ENCOUNTER — INFUSION THERAPY VISIT (OUTPATIENT)
Dept: INFUSION THERAPY | Facility: HOSPITAL | Age: 89
End: 2025-06-05
Attending: OBSTETRICS & GYNECOLOGY
Payer: MEDICARE

## 2025-06-05 DIAGNOSIS — E86.0 DEHYDRATION: Primary | ICD-10-CM

## 2025-06-05 DIAGNOSIS — C54.1 ENDOMETRIAL CANCER (H): ICD-10-CM

## 2025-06-05 PROCEDURE — 250N000011 HC RX IP 250 OP 636: Performed by: OBSTETRICS & GYNECOLOGY

## 2025-06-05 RX ORDER — HEPARIN SODIUM (PORCINE) LOCK FLUSH IV SOLN 100 UNIT/ML 100 UNIT/ML
5 SOLUTION INTRAVENOUS
OUTPATIENT
Start: 2025-06-05

## 2025-06-05 RX ORDER — HEPARIN SODIUM (PORCINE) LOCK FLUSH IV SOLN 100 UNIT/ML 100 UNIT/ML
5 SOLUTION INTRAVENOUS
Status: DISCONTINUED | OUTPATIENT
Start: 2025-06-05 | End: 2025-06-05 | Stop reason: HOSPADM

## 2025-06-05 RX ORDER — DIPHENHYDRAMINE HYDROCHLORIDE 50 MG/ML
50 INJECTION, SOLUTION INTRAMUSCULAR; INTRAVENOUS
Start: 2025-06-05

## 2025-06-05 RX ORDER — ONDANSETRON 2 MG/ML
8 INJECTION INTRAMUSCULAR; INTRAVENOUS EVERY 6 HOURS PRN
Start: 2025-06-05

## 2025-06-05 RX ORDER — HEPARIN SODIUM,PORCINE 10 UNIT/ML
5-20 VIAL (ML) INTRAVENOUS DAILY PRN
OUTPATIENT
Start: 2025-06-05

## 2025-06-05 RX ORDER — MEPERIDINE HYDROCHLORIDE 25 MG/ML
25 INJECTION INTRAMUSCULAR; INTRAVENOUS; SUBCUTANEOUS EVERY 30 MIN PRN
OUTPATIENT
Start: 2025-06-05

## 2025-06-05 RX ORDER — ALBUTEROL SULFATE 0.83 MG/ML
2.5 SOLUTION RESPIRATORY (INHALATION)
OUTPATIENT
Start: 2025-06-05

## 2025-06-05 RX ORDER — EPINEPHRINE 1 MG/ML
0.3 INJECTION, SOLUTION INTRAMUSCULAR; SUBCUTANEOUS EVERY 5 MIN PRN
OUTPATIENT
Start: 2025-06-05

## 2025-06-05 RX ORDER — METHYLPREDNISOLONE SODIUM SUCCINATE 125 MG/2ML
125 INJECTION INTRAMUSCULAR; INTRAVENOUS
Start: 2025-06-05

## 2025-06-05 RX ORDER — ALBUTEROL SULFATE 90 UG/1
1-2 INHALANT RESPIRATORY (INHALATION)
Start: 2025-06-05

## 2025-06-05 RX ADMIN — Medication 5 ML: at 10:10

## 2025-06-05 NOTE — PROGRESS NOTES
Infusion Nursing Note:  Jen CHRISTIANE Layne presents today for a port flush only.    Patient seen by provider today: No   present during visit today: Not Applicable.    Note: Port accessed under sterile technique. Excellent blood return noted. Flushed and de accessed per protocol. Site covered with 2x2 gauze and secured in place with paper tape. Left infusion ambulatory and in stable condition.    Intravenous Access:  Implanted Port.    Treatment Conditions:  Not Applicable.    Post Infusion Assessment:  Access discontinued per protocol.     Discharge Plan:   Discharge instructions reviewed with: Patient.  Patient and/or family verbalized understanding of discharge instructions and all questions answered.  AVS to patient via [email or web address].    Patient discharged in stable condition accompanied by: self.  Departure Mode: Ambulatory.    Carlota Hendrix RN

## 2025-06-26 ENCOUNTER — VIRTUAL VISIT (OUTPATIENT)
Dept: ONCOLOGY | Facility: HOSPITAL | Age: 89
End: 2025-06-26
Attending: OBSTETRICS & GYNECOLOGY
Payer: MEDICARE

## 2025-06-26 VITALS — WEIGHT: 178 LBS | HEIGHT: 65 IN | BODY MASS INDEX: 29.66 KG/M2

## 2025-06-26 DIAGNOSIS — C54.1 ENDOMETRIAL CANCER (H): ICD-10-CM

## 2025-06-26 RX ORDER — LETROZOLE 2.5 MG/1
2.5 TABLET, FILM COATED ORAL DAILY
Qty: 30 TABLET | Refills: 2 | Status: SHIPPED | OUTPATIENT
Start: 2025-06-26

## 2025-06-26 ASSESSMENT — PAIN SCALES - GENERAL: PAINLEVEL_OUTOF10: NO PAIN (0)

## 2025-06-26 NOTE — NURSING NOTE
Current patient location: [address] [place], [place]    Is the patient currently in the state of [place]? YES    Visit mode: VIDEO    If the visit is dropped, the patient can be reconnected by:VIDEO VISIT: Send to e-mail at: [email or web address]    Will anyone else be joining the visit? NO  (If patient encounters technical issues they should call [phone]    Are changes needed to the allergy or medication list? No, Pt stated no changes to allergies, and Pt stated no med changes    Are refills needed on medications prescribed by this physician? YES  Letrozole 2.5 mg    Rooming Documentation:  Questionnaire(s) not done per department protocol    Reason for visit: RECHECK (Return CCSL )    Kathya GENTILE

## 2025-06-26 NOTE — PROGRESS NOTES
Virtual Visit Details    Type of service:  Video Visit   Originating Location (pt. 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  She presents for follow up and disease management.      Oncology History:  Initially, she was seen in the ED for intermittent vaginal bleeding x 6 wks.      7/19/23:  EUA, D&C under US guidance.  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  MLH1 hypermethylation +     9/22/23:  Robotic assisted hysterectomy, bilateral salpingo-oophorectomy, cancer staging, sentinel lymph node mapping and sampling, pelvic washings.  Final pathology (reviewed): FIGO Grade 1 endometrioid TOMAS, 4.5cm greatest dimension, >50% myometrial invasion (13/22mm), superficial NOLA involvment, no LVSI, cytology negative, margins negative, LN negative. Final FIGO Stage IB Grade 1 endometrioid TOMAS,      Discussed at tumor board with recs for VBT versus observation. The patient saw Rad Onc and ultimately decided not to do XRT.     5/23/24: VAGINAL MUCOSA, BIOPSY:  -MODERATELY DIFFERENTIATED ADENOCARCINOMA  PAX8: Strongly and diffusely positive  P16 strongly positive, marking the majority of the columnar cells  Estrogen receptor: Positive (95% of cells, strong staining)  Cytokeratin-7: Strongly and diffusely positive  Cytokeratin-20: Negative  CDX2: Negative (absence of enteric differentiation)  The combined morphologic and immunophenotypic features of this lesion are compatible with the clinical history of endometrial carcinoma.     6/10/24: PET CT: Focal hypermetabolic uptake at the vaginal cuff compatible with biopsy-proven recurrent malignancy. FDG avid right  pelvic lymph nodes are suspicious for man metastases.     6/27/24:  Tumor board: Consensus/Management Options: recommendation for chemotherapy + IO.       7/18/24-8/29/24 : Cycle 1-3 carboplatin, paclitaxel, and dorstarlimab.  Ultimately discontinued due to toxicity with significant diarrhea, dehydration      9/20/24:  CT CAP: Complete response      2/3/25:  PET/CT:  NORM     5/1/25:  PET images:  Recurrent/progressive disease. Increase in size and FDG avidity involving the right vaginal cuff suspicious for local progression of malignancy.  Lesion visible on exam.      5/6/25:  Discussed vaginal cuff recurrence with options for Radiation, clinical trial, endocrine therapy.  The patient ultimately declined radiation, ultimately declined clinical trial    5/29/25:  Started  Letrozole    The patient is seen today on video with her family.  She has been on Letrozole for one month and is tolerating it well.  Remembering to take it.  Some mild joint aches for which she is taking Aleve.  No diarrhea, a little constipation.  No vaginal bleeding.      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         Past Surgical History:    Past Surgical History:   Procedure Laterality Date    COLONOSCOPY  12/05/05    normal, recheck 10 years -- done at [facility] HYSTERECTOMY TOTAL, BILATERAL SALPINGO-OOPHORECTOMY, NODE DISSECTION, COMBINED N/A 9/22/2023    Procedure: Robotic assisted hysterectomy, bilateral salpingo-oophorectomy, cancer staging, sentinel lymph node mapping and sampling, pelvic washings;  Surgeon: Amanda Au MD;  Location: [facility] OR    DILATION AND CURETTAGE, WITH ULTRASOUND GUIDANCE N/A 7/19/2023    Procedure: Pelvic examination under  anesthesia, dilation and curettage uterus, ultrasound guidance Latex Free;  Surgeon: Amanda Au MD;  Location: [facility] OR    EXAM EYE  2018    Capsulotomy Left eye.   Dr. Skip Villela    EYE SURGERY      HC REMOVAL GALLBLADDER      HYSTEROSCOPY,DIAGNOSTIC  around     IR CHEST PORT PLACEMENT > 5 YRS OF AGE  2024         Health Maintenance:  Health Maintenance Due   Topic Date Due    RSV VACCINE (1 - 1-dose 75+ series) Never done    MEDICARE ANNUAL WELLNESS VISIT  2024    MICROALBUMIN  2024    COVID-19 VACCINE (8 - Pfizer risk - season) 04/10/2025         Current Medications:     has a current medication list which includes the following prescription(s): diphenoxylate-atropine, letrozole, lisinopril, ondansetron, prochlorperazine, simvastatin, triamcinolone, and vitamin d.       Allergies:     Hydrochlorothiazide        Social History:     Social History     Tobacco Use    Smoking status: Never     Passive exposure: Never    Smokeless tobacco: Never   Substance Use Topics    Alcohol use: No       History   Drug Use No           Family History:     The patient's family history is notable for :.    Family History   Problem Relation Age of Onset    Eye Disorder Mother         glaucoma    Heart Disease Mother          of CHF    Cancer Sister         uterine and ovarian-in remission     Other Cancer Sister     Deep Vein Thrombosis (DVT) Sister     Uterine Cancer Sister     Heart Disease Brother         valve replacement    Lipids Brother     Coronary Artery Disease Brother     Eye Disorder Brother         detached retina    Cancer Paternal Grandfather         carcinoma of the lip, pipe-smoker    Thyroid Disease Daughter         goiter or nodule?    Cancer Son         SKIN    Other Cancer Son     Other Cancer Other     Thyroid Disease Other     Diabetes No family hx of     C.A.D. No family hx of     Breast Cancer No family hx of     Cancer - colorectal No family hx of      "Anesthesia Reaction No family hx of          Physical Exam:     Ht 1.638 m (5' 4.5\")   Wt 80.7 kg (178 lb)   LMP  (LMP Unknown)   BMI 30.08 kg/m    Body mass index is 30.08 kg/m .    General Appearance: healthy and alert, no distress        Assessment:     Jen Layne is a 89 year old woman with a diagnosis of Stage IB Grade 1 endometrioid endometrial TOMAS, with vaginal cuff recurrence and local man disease, intolerant of chemotherapy, being treated for progressive disease at cuff with Letrozole.       20 minutes spent on the date of the encounter doing chart review, history and exam, documentation and further activities as noted above      The longitudinal plan of care for the diagnosis(es)/condition(s) as documented were addressed during this visit. Due to the added complexity in care, I will continue to support Jen in the subsequent management and with ongoing continuity of care.    TT video 8 minutes       Plan:      Treatment course to date:  9/2023:  Robotic hyst, BSO, staging.  Stage IB G1 endometrioid TOMAS. Declined XRT  5/2024:  Biopsy confirmed vaginal cuff recurrence with PET+RPLN  7/24-8/24:  Three cycle Carbo/Taxol/Dostarlimab.  Stopped due to intolerance.  Clinical & radiographic CR  5/2025:  Exam + PET consistent with progression at cuff.  5/29/25-Current: Letrozole           1.)        FIGO Grade 1 endometrioid TOMAS:  Underwent surgical staging and treatement in 9/23 for FIGO Stage IB Grade 1 endometrioid TOMAS. Tumor 4.5cm greatest dimension, >50% myometrial invasion (13/22mm), superficial NOLA involvment, no LVSI, cytology negative, margins negative, LN negative. Declined radiation and elected for observation.     Developed vaginal cuff recurrence as well as pelvic LN involvement in 5/2024 for which she received three cycles  Carboplatin/Taxol and Dostarblimab, ultimately stopped due to intolerance. Fortunately, the patient seemed to have a complete clinical response after only three " cycle.     The patient was then on treatment break from 8/2024 until 5/2025 at which time she had and had radiographic and clinical recurrence at vaginal cuff.  Options were discussed including: Ongoing observation with repeat imaging in three months with initiation of treatment when she becomes symptomatic  Endocrine therapy off study:  I would  give her letrozole with repeat imaging in three months  .  She remains a good candidate for this  Radiation therapy  Traditional cytotoxic therapy: She has not been a good candidate for this.       The patient ultimately declined radiation or .  I started her on Letrozole one month ago.  She has been tolerating this with acceptable side-effect profile and compliance.  No new symptoms, no vaginal bleeding.  Therefore, recommend continuing AI for another two months (three months total) followed by repeat PET/CT.  She will still be a candidate for  in future.      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  No recurrence.     Questions answered, she expressed understanding of plan of care.        Amanda Au MD  Gynecologic Oncology  [facility]  CC  Patient Care Team:  Yamile Cotter MD as PCP - General (Family Medicine)  Yamile Cotter MD as Assigned PCP  Amanda Au MD as MD (Gynecologic Oncology)  Amanda Au MD as Assigned Cancer Care Provider  Luma Griffin, RN as Clinic Care Coordinator (Hematology & Oncology)  Kami Story MD as MD (Cardiology)  AMANDA AU

## 2025-06-26 NOTE — PATIENT INSTRUCTIONS
Continue Letrozole    PET/CT in two months followed by return visit with Roe Au MD  Gynecologic Oncology  [facility]

## 2025-07-29 ENCOUNTER — INFUSION THERAPY VISIT (OUTPATIENT)
Dept: INFUSION THERAPY | Facility: HOSPITAL | Age: 89
End: 2025-07-29
Attending: OBSTETRICS & GYNECOLOGY
Payer: MEDICARE

## 2025-07-29 DIAGNOSIS — C54.1 ENDOMETRIAL CANCER (H): ICD-10-CM

## 2025-07-29 DIAGNOSIS — E86.0 DEHYDRATION: Primary | ICD-10-CM

## 2025-07-29 PROCEDURE — 250N000011 HC RX IP 250 OP 636: Performed by: OBSTETRICS & GYNECOLOGY

## 2025-07-29 PROCEDURE — 96523 IRRIG DRUG DELIVERY DEVICE: CPT

## 2025-07-29 RX ORDER — ALBUTEROL SULFATE 0.83 MG/ML
2.5 SOLUTION RESPIRATORY (INHALATION)
OUTPATIENT
Start: 2025-07-29

## 2025-07-29 RX ORDER — DIPHENHYDRAMINE HYDROCHLORIDE 50 MG/ML
50 INJECTION, SOLUTION INTRAMUSCULAR; INTRAVENOUS
Start: 2025-07-29

## 2025-07-29 RX ORDER — ALBUTEROL SULFATE 90 UG/1
1-2 INHALANT RESPIRATORY (INHALATION)
Start: 2025-07-29

## 2025-07-29 RX ORDER — HEPARIN SODIUM,PORCINE 10 UNIT/ML
5-20 VIAL (ML) INTRAVENOUS DAILY PRN
OUTPATIENT
Start: 2025-07-29

## 2025-07-29 RX ORDER — METHYLPREDNISOLONE SODIUM SUCCINATE 125 MG/2ML
125 INJECTION INTRAMUSCULAR; INTRAVENOUS
Start: 2025-07-29

## 2025-07-29 RX ORDER — HEPARIN SODIUM (PORCINE) LOCK FLUSH IV SOLN 100 UNIT/ML 100 UNIT/ML
5 SOLUTION INTRAVENOUS
Status: DISCONTINUED | OUTPATIENT
Start: 2025-07-29 | End: 2025-07-29 | Stop reason: HOSPADM

## 2025-07-29 RX ORDER — ONDANSETRON 2 MG/ML
8 INJECTION INTRAMUSCULAR; INTRAVENOUS EVERY 6 HOURS PRN
Start: 2025-07-29

## 2025-07-29 RX ORDER — HEPARIN SODIUM (PORCINE) LOCK FLUSH IV SOLN 100 UNIT/ML 100 UNIT/ML
5 SOLUTION INTRAVENOUS
OUTPATIENT
Start: 2025-07-29

## 2025-07-29 RX ORDER — MEPERIDINE HYDROCHLORIDE 25 MG/ML
25 INJECTION INTRAMUSCULAR; INTRAVENOUS; SUBCUTANEOUS EVERY 30 MIN PRN
OUTPATIENT
Start: 2025-07-29

## 2025-07-29 RX ORDER — EPINEPHRINE 1 MG/ML
0.3 INJECTION, SOLUTION INTRAMUSCULAR; SUBCUTANEOUS EVERY 5 MIN PRN
OUTPATIENT
Start: 2025-07-29

## 2025-07-29 RX ADMIN — Medication 5 ML: at 14:44

## 2025-07-29 NOTE — PROGRESS NOTES
Infusion Nursing Note:  Jen CHRISTIANE Layne presents today for a port flush only.    Patient seen by provider today: No   present during visit today: Not Applicable.    Note: Port accessed under sterile technique. Excellent blood return noted. Flushed and de accessed per protocol. Site covered with 2x2 gauze and secured in place with paper tape. Left infusion ambulatory and in stable condition.    Intravenous Access:  Implanted Port.    Treatment Conditions:  Not Applicable.    Post Infusion Assessment:  Access discontinued per protocol.     Discharge Plan:   Discharge instructions reviewed with: Patient.  Patient and/or family verbalized understanding of discharge instructions and all questions answered.  AVS to patient via [email or web address].    Patient discharged in stable condition accompanied by: self.  Departure Mode: Ambulatory.    Carlota Hendrix RN

## 2025-08-18 ENCOUNTER — HOSPITAL ENCOUNTER (OUTPATIENT)
Dept: PET IMAGING | Facility: HOSPITAL | Age: 89
Discharge: HOME | End: 2025-08-18
Attending: OBSTETRICS & GYNECOLOGY
Payer: MEDICARE

## 2025-08-18 DIAGNOSIS — C54.1 ENDOMETRIAL CANCER (H): ICD-10-CM

## 2025-08-18 LAB
CREAT BLD-MCNC: 0.9 MG/DL (ref 0.5–1)
EGFRCR SERPLBLD CKD-EPI 2021: >60 ML/MIN/1.73M2
GLUCOSE BLDC GLUCOMTR-MCNC: 98 MG/DL (ref 70–99)

## 2025-08-18 PROCEDURE — 74177 CT ABD & PELVIS W/CONTRAST: CPT

## 2025-08-18 PROCEDURE — 78816 PET IMAGE W/CT FULL BODY: CPT | Mod: PS

## 2025-08-18 PROCEDURE — 82565 ASSAY OF CREATININE: CPT

## 2025-08-18 PROCEDURE — A9552 F18 FDG: HCPCS | Performed by: OBSTETRICS & GYNECOLOGY

## 2025-08-18 PROCEDURE — 343N000001 HC RX 343 MED OP 636: Performed by: OBSTETRICS & GYNECOLOGY

## 2025-08-18 PROCEDURE — 82962 GLUCOSE BLOOD TEST: CPT

## 2025-08-18 PROCEDURE — 250N000011 HC RX IP 250 OP 636: Performed by: OBSTETRICS & GYNECOLOGY

## 2025-08-18 RX ORDER — FLUDEOXYGLUCOSE F 18 200 MCI/ML
7-17 INJECTION, SOLUTION INTRAVENOUS ONCE
Status: COMPLETED | OUTPATIENT
Start: 2025-08-18 | End: 2025-08-18

## 2025-08-18 RX ORDER — HEPARIN SODIUM (PORCINE) LOCK FLUSH IV SOLN 100 UNIT/ML 100 UNIT/ML
5-10 SOLUTION INTRAVENOUS
Status: DISCONTINUED | OUTPATIENT
Start: 2025-08-18 | End: 2025-08-19 | Stop reason: HOSPADM

## 2025-08-18 RX ORDER — HEPARIN SODIUM (PORCINE) LOCK FLUSH IV SOLN 100 UNIT/ML 100 UNIT/ML
500 SOLUTION INTRAVENOUS ONCE
Status: COMPLETED | OUTPATIENT
Start: 2025-08-18 | End: 2025-08-18

## 2025-08-18 RX ORDER — IOPAMIDOL 755 MG/ML
88 INJECTION, SOLUTION INTRAVASCULAR ONCE
Status: COMPLETED | OUTPATIENT
Start: 2025-08-18 | End: 2025-08-18

## 2025-08-18 RX ORDER — HEPARIN SODIUM,PORCINE 10 UNIT/ML
5-10 VIAL (ML) INTRAVENOUS
Status: DISCONTINUED | OUTPATIENT
Start: 2025-08-18 | End: 2025-08-19 | Stop reason: HOSPADM

## 2025-08-18 RX ORDER — HEPARIN SODIUM,PORCINE 10 UNIT/ML
5-10 VIAL (ML) INTRAVENOUS EVERY 24 HOURS
Status: DISCONTINUED | OUTPATIENT
Start: 2025-08-18 | End: 2025-08-19 | Stop reason: HOSPADM

## 2025-08-18 RX ADMIN — IOPAMIDOL 88 ML: 755 INJECTION, SOLUTION INTRAVENOUS at 10:25

## 2025-08-18 RX ADMIN — HEPARIN SODIUM (PORCINE) LOCK FLUSH IV SOLN 100 UNIT/ML 500 UNITS: 100 SOLUTION at 10:49

## 2025-08-18 RX ADMIN — FLUDEOXYGLUCOSE F 18 10.56 MILLICURIE: 200 INJECTION, SOLUTION INTRAVENOUS at 09:39

## 2025-08-21 ENCOUNTER — ONCOLOGY VISIT (OUTPATIENT)
Dept: ONCOLOGY | Facility: HOSPITAL | Age: 89
End: 2025-08-21
Attending: OBSTETRICS & GYNECOLOGY
Payer: MEDICARE

## 2025-08-21 VITALS
DIASTOLIC BLOOD PRESSURE: 72 MMHG | HEART RATE: 79 BPM | HEIGHT: 64 IN | OXYGEN SATURATION: 99 % | RESPIRATION RATE: 16 BRPM | SYSTOLIC BLOOD PRESSURE: 131 MMHG | BODY MASS INDEX: 30.73 KG/M2 | TEMPERATURE: 97.9 F | WEIGHT: 180 LBS

## 2025-08-21 DIAGNOSIS — C54.1 ENDOMETRIAL CANCER (H): Primary | ICD-10-CM

## 2025-08-21 ASSESSMENT — PAIN SCALES - GENERAL: PAINLEVEL_OUTOF10: MILD PAIN (3)

## 2025-08-25 DIAGNOSIS — R19.7 DIARRHEA, UNSPECIFIED TYPE: ICD-10-CM

## 2025-08-25 DIAGNOSIS — C54.1 ENDOMETRIAL CANCER (H): ICD-10-CM

## 2025-08-27 RX ORDER — DIPHENOXYLATE HYDROCHLORIDE AND ATROPINE SULFATE 2.5; .025 MG/1; MG/1
TABLET ORAL
Qty: 30 TABLET | Refills: 1 | Status: SHIPPED | OUTPATIENT
Start: 2025-08-27

## (undated) DEVICE — GLOVE BIOGEL PI MICRO SZ 6.0 48560

## (undated) DEVICE — SPECIMEN TRAP MUCOUS 40ML LUKI C30200A

## (undated) DEVICE — LINEN TOWEL PACK X30 5481

## (undated) DEVICE — PANTIES MESH LG/XLG 2PK 706M2

## (undated) DEVICE — Device

## (undated) DEVICE — ENDO OBTURATOR ACCESS PORT BLADELESS 8X100MM IAS8-100LP

## (undated) DEVICE — DRAPE SHEET REV FOLD 3/4 9349

## (undated) DEVICE — DAVINCI XI DRAPE ARM 470015

## (undated) DEVICE — PAD PERI INDIV WRAP 11" 2022A

## (undated) DEVICE — CUP AND LID 2PK 2OZ STERILE  SSK9006A

## (undated) DEVICE — PREP CHLORAPREP 26ML TINTED HI-LITE ORANGE 930815

## (undated) DEVICE — DAVINCI XI DRAPE COLUMN 470341

## (undated) DEVICE — JELLY LUBRICATING SURGILUBE 2OZ TUBE

## (undated) DEVICE — ENDO POUCH UNIVERSAL RETRIEVAL SYSTEM INZII 5MM CD003

## (undated) DEVICE — DRAPE LEGGINGS CLEAR 8430

## (undated) DEVICE — DAVINCI XI MONOPOLAR SCISSORS HOT SHEARS 8MM 470179

## (undated) DEVICE — LINEN TOWEL PACK X5 5464

## (undated) DEVICE — RETR ELEV / UTERINE MANIPULATOR V-CARE SM CUP 60-6085-200A

## (undated) DEVICE — KOH COLPOTOMIZER OCCLUDER  CPO-6

## (undated) DEVICE — LUBRICANT INST ELECTROLUBE EL101

## (undated) DEVICE — PROTECTOR ARM ONE-STEP TRENDELENBURG 40418

## (undated) DEVICE — SUCTION IRR STRYKERFLOW II W/TIP 250-070-520

## (undated) DEVICE — SUCTION MANIFOLD NEPTUNE 2 SYS 4 PORT 0702-020-000

## (undated) DEVICE — PREP SKIN SCRUB TRAY 4461A

## (undated) DEVICE — SYSTEM LAPAROVUE VISIBILITY LAPVUE10

## (undated) DEVICE — PREP SCRUB SOL EXIDINE 4% CHG 4OZ 29002-404

## (undated) DEVICE — ESU GROUND PAD ADULT REM W/15' CORD E7507DB

## (undated) DEVICE — DRSG TELFA 3X8" 1238

## (undated) DEVICE — DAVINCI XI OBTURATOR BLADELESS 8MM 470359

## (undated) DEVICE — SOL WATER IRRIG 1000ML BOTTLE 2F7114

## (undated) DEVICE — SOL NACL 0.9% INJ 1000ML BAG 2B1324X

## (undated) DEVICE — DRAPE MAYO STAND 23X54 8337

## (undated) DEVICE — TUBING FILTER TRI-LUMEN AIRSEAL ASC-EVAC1

## (undated) DEVICE — PACK GOWN 3/PK DISP XL SBA32GPFCB

## (undated) DEVICE — DAVINCI XI SEAL UNIVERSAL 5-8MM 470361

## (undated) DEVICE — KIT PATIENT POSITIONING PIGAZZI LATEX FREE 40580

## (undated) DEVICE — GLOVE BIOGEL PI MICRO INDICATOR UNDERGLOVE SZ 6.0 48960

## (undated) DEVICE — DAVINCI XI DRIVER NDL MEGA SUTURECUT 8MM EXT 471309

## (undated) DEVICE — DAVINCI XI ESU BIPOLAR 3MM ENDOWRIST FENESTRATED EXT 471205

## (undated) DEVICE — LINEN TOWEL PACK X6 WHITE 5487

## (undated) DEVICE — SU MONOCRYL 3-0 PS-1 27" Y936H

## (undated) DEVICE — SU VICRYL 0 UR-6 27" J603H

## (undated) DEVICE — SU VICRYL 0 CT-2 27" J334H

## (undated) DEVICE — COVER NEOPROBE SOFTFLEX 5X96" W/BANDS 20-PC596

## (undated) DEVICE — WIPES FOLEY CARE SURESTEP PROVON DFC100

## (undated) DEVICE — DAVINCI XI GRASPER PROGRASP 8MM EXT 471093

## (undated) DEVICE — NDL SPINAL 22GA 3.5" QUINCKE 405181

## (undated) DEVICE — SU DERMABOND ADVANCED .7ML DNX12

## (undated) DEVICE — SURGICEL HEMOSTAT 4X8" 1952

## (undated) DEVICE — DAVINCI HOT SHEARS TIP COVER  400180

## (undated) RX ORDER — PHENAZOPYRIDINE HYDROCHLORIDE 200 MG/1
TABLET, FILM COATED ORAL
Status: DISPENSED
Start: 2023-09-22

## (undated) RX ORDER — PROPOFOL 10 MG/ML
INJECTION, EMULSION INTRAVENOUS
Status: DISPENSED
Start: 2023-07-19

## (undated) RX ORDER — ONDANSETRON 2 MG/ML
INJECTION INTRAMUSCULAR; INTRAVENOUS
Status: DISPENSED
Start: 2023-07-19

## (undated) RX ORDER — GLYCOPYRROLATE 0.2 MG/ML
INJECTION, SOLUTION INTRAMUSCULAR; INTRAVENOUS
Status: DISPENSED
Start: 2023-09-22

## (undated) RX ORDER — FENTANYL CITRATE 50 UG/ML
INJECTION, SOLUTION INTRAMUSCULAR; INTRAVENOUS
Status: DISPENSED
Start: 2023-07-19

## (undated) RX ORDER — CEFAZOLIN SODIUM/WATER 2 G/20 ML
SYRINGE (ML) INTRAVENOUS
Status: DISPENSED
Start: 2024-07-16

## (undated) RX ORDER — PHENAZOPYRIDINE HYDROCHLORIDE 200 MG/1
TABLET, FILM COATED ORAL
Status: DISPENSED
Start: 2023-07-19

## (undated) RX ORDER — EPHEDRINE SULFATE 50 MG/ML
INJECTION, SOLUTION INTRAMUSCULAR; INTRAVENOUS; SUBCUTANEOUS
Status: DISPENSED
Start: 2023-07-19

## (undated) RX ORDER — CEFAZOLIN SODIUM/WATER 2 G/20 ML
SYRINGE (ML) INTRAVENOUS
Status: DISPENSED
Start: 2023-09-22

## (undated) RX ORDER — FENTANYL CITRATE 50 UG/ML
INJECTION, SOLUTION INTRAMUSCULAR; INTRAVENOUS
Status: DISPENSED
Start: 2023-09-22

## (undated) RX ORDER — FENTANYL CITRATE 50 UG/ML
INJECTION, SOLUTION INTRAMUSCULAR; INTRAVENOUS
Status: DISPENSED
Start: 2024-07-16

## (undated) RX ORDER — INDOCYANINE GREEN AND WATER 25 MG
KIT INJECTION
Status: DISPENSED
Start: 2023-09-22

## (undated) RX ORDER — HEPARIN SODIUM (PORCINE) LOCK FLUSH IV SOLN 100 UNIT/ML 100 UNIT/ML
SOLUTION INTRAVENOUS
Status: DISPENSED
Start: 2024-09-20

## (undated) RX ORDER — HEPARIN SODIUM (PORCINE) LOCK FLUSH IV SOLN 100 UNIT/ML 100 UNIT/ML
SOLUTION INTRAVENOUS
Status: DISPENSED
Start: 2025-08-18

## (undated) RX ORDER — HEPARIN SODIUM 5000 [USP'U]/.5ML
INJECTION, SOLUTION INTRAVENOUS; SUBCUTANEOUS
Status: DISPENSED
Start: 2023-07-19

## (undated) RX ORDER — HEPARIN SODIUM 5000 [USP'U]/.5ML
INJECTION, SOLUTION INTRAVENOUS; SUBCUTANEOUS
Status: DISPENSED
Start: 2023-09-22

## (undated) RX ORDER — HEPARIN SODIUM (PORCINE) LOCK FLUSH IV SOLN 100 UNIT/ML 100 UNIT/ML
SOLUTION INTRAVENOUS
Status: DISPENSED
Start: 2025-02-03

## (undated) RX ORDER — HYDROMORPHONE HYDROCHLORIDE 1 MG/ML
INJECTION, SOLUTION INTRAMUSCULAR; INTRAVENOUS; SUBCUTANEOUS
Status: DISPENSED
Start: 2023-09-22

## (undated) RX ORDER — METRONIDAZOLE 500 MG/100ML
INJECTION, SOLUTION INTRAVENOUS
Status: DISPENSED
Start: 2023-09-22

## (undated) RX ORDER — HEPARIN SODIUM (PORCINE) LOCK FLUSH IV SOLN 100 UNIT/ML 100 UNIT/ML
SOLUTION INTRAVENOUS
Status: DISPENSED
Start: 2025-05-01

## (undated) RX ORDER — ACETAMINOPHEN 500 MG
TABLET ORAL
Status: DISPENSED
Start: 2023-07-19